# Patient Record
Sex: MALE | Race: WHITE | Employment: FULL TIME | URBAN - METROPOLITAN AREA
[De-identification: names, ages, dates, MRNs, and addresses within clinical notes are randomized per-mention and may not be internally consistent; named-entity substitution may affect disease eponyms.]

---

## 2017-01-09 ENCOUNTER — TELEPHONE (OUTPATIENT)
Dept: FAMILY MEDICINE CLINIC | Age: 57
End: 2017-01-09

## 2017-01-24 ENCOUNTER — TELEPHONE (OUTPATIENT)
Dept: FAMILY MEDICINE CLINIC | Age: 57
End: 2017-01-24

## 2017-02-28 RX ORDER — OLMESARTAN MEDOXOMIL 5 MG/1
TABLET ORAL
Qty: 180 TABLET | Refills: 0 | Status: SHIPPED | OUTPATIENT
Start: 2017-02-28 | End: 2017-05-18 | Stop reason: SDUPTHER

## 2017-05-18 ENCOUNTER — OFFICE VISIT (OUTPATIENT)
Dept: FAMILY MEDICINE CLINIC | Age: 57
End: 2017-05-18

## 2017-05-18 VITALS
HEIGHT: 69 IN | WEIGHT: 186 LBS | BODY MASS INDEX: 27.55 KG/M2 | HEART RATE: 70 BPM | DIASTOLIC BLOOD PRESSURE: 88 MMHG | TEMPERATURE: 98 F | RESPIRATION RATE: 10 BRPM | SYSTOLIC BLOOD PRESSURE: 132 MMHG

## 2017-05-18 DIAGNOSIS — Z00.00 WELL ADULT EXAM: Primary | ICD-10-CM

## 2017-05-18 DIAGNOSIS — Z12.11 SCREEN FOR COLON CANCER: ICD-10-CM

## 2017-05-18 DIAGNOSIS — Z11.59 NEED FOR HEPATITIS C SCREENING TEST: ICD-10-CM

## 2017-05-18 DIAGNOSIS — Z11.4 SCREENING FOR HIV (HUMAN IMMUNODEFICIENCY VIRUS): ICD-10-CM

## 2017-05-18 LAB
ALBUMIN SERPL-MCNC: 4.7 GM/DL (ref 3.5–5.1)
ALP BLD-CCNC: 70 U/L (ref 38–126)
ALT SERPL-CCNC: 32 U/L (ref 11–66)
ANION GAP SERPL CALCULATED.3IONS-SCNC: 21 MEQ/L (ref 8–16)
AST SERPL-CCNC: 19 U/L (ref 5–40)
BACTERIA: NORMAL
BASOPHILS # BLD: 0.8 %
BASOPHILS ABSOLUTE: 0.1 THOU/MM3 (ref 0–0.1)
BILIRUB SERPL-MCNC: 0.4 MG/DL (ref 0.3–1.2)
BILIRUBIN URINE: NEGATIVE
BLOOD, URINE: NEGATIVE
BUN BLDV-MCNC: 16 MG/DL (ref 7–22)
CALCIUM SERPL-MCNC: 10.1 MG/DL (ref 8.5–10.5)
CASTS: NORMAL /LPF
CASTS: NORMAL /LPF
CHARACTER, URINE: CLEAR
CHLORIDE BLD-SCNC: 100 MEQ/L (ref 98–111)
CHOLESTEROL, TOTAL: 196 MG/DL (ref 100–199)
CO2: 22 MEQ/L (ref 23–33)
COLOR: YELLOW
CREAT SERPL-MCNC: 0.8 MG/DL (ref 0.4–1.2)
CRYSTALS: NORMAL
EOSINOPHIL # BLD: 2.5 %
EOSINOPHILS ABSOLUTE: 0.2 THOU/MM3 (ref 0–0.4)
EPITHELIAL CELLS, UA: NORMAL /HPF
GFR SERPL CREATININE-BSD FRML MDRD: > 90 ML/MIN/1.73M2
GLUCOSE BLD-MCNC: 107 MG/DL (ref 70–108)
GLUCOSE, URINE: NEGATIVE MG/DL
HCT VFR BLD CALC: 45.2 % (ref 42–52)
HDLC SERPL-MCNC: 68 MG/DL
HEMOGLOBIN: 15.2 GM/DL (ref 14–18)
HEPATITIS C ANTIBODY: NEGATIVE
KETONES, URINE: NEGATIVE
LDL CHOLESTEROL CALCULATED: 97 MG/DL
LEUKOCYTE ESTERASE, URINE: NEGATIVE
LYMPHOCYTES # BLD: 26.4 %
LYMPHOCYTES ABSOLUTE: 2.1 THOU/MM3 (ref 1–4.8)
MCH RBC QN AUTO: 28.9 PG (ref 27–31)
MCHC RBC AUTO-ENTMCNC: 33.7 GM/DL (ref 33–37)
MCV RBC AUTO: 85.8 FL (ref 80–94)
MISCELLANEOUS LAB TEST RESULT: NORMAL
MONOCYTES # BLD: 6 %
MONOCYTES ABSOLUTE: 0.5 THOU/MM3 (ref 0.4–1.3)
NITRITE, URINE: NEGATIVE
NUCLEATED RED BLOOD CELLS: 0 /100 WBC
PDW BLD-RTO: 13.8 % (ref 11.5–14.5)
PH UA: 6.5
PLATELET # BLD: 304 THOU/MM3 (ref 130–400)
PMV BLD AUTO: 8.5 MCM (ref 7.4–10.4)
POTASSIUM SERPL-SCNC: 4.3 MEQ/L (ref 3.5–5.2)
PROTEIN UA: NEGATIVE MG/DL
RBC # BLD: 5.27 MILL/MM3 (ref 4.7–6.1)
RBC # BLD: NORMAL 10*6/UL
RBC URINE: NORMAL /HPF
RENAL EPITHELIAL, UA: NORMAL
SEG NEUTROPHILS: 64.3 %
SEGMENTED NEUTROPHILS ABSOLUTE COUNT: 5 THOU/MM3 (ref 1.8–7.7)
SODIUM BLD-SCNC: 143 MEQ/L (ref 135–145)
SPECIFIC GRAVITY UA: 1.02 (ref 1–1.03)
TOTAL PROTEIN: 7.6 GM/DL (ref 6.1–8)
TRIGL SERPL-MCNC: 154 MG/DL (ref 0–199)
UROBILINOGEN, URINE: 0.2 EU/DL
WBC # BLD: 7.8 THOU/MM3 (ref 4.8–10.8)
WBC UA: NORMAL /HPF
YEAST: NORMAL

## 2017-05-18 PROCEDURE — 90670 PCV13 VACCINE IM: CPT | Performed by: FAMILY MEDICINE

## 2017-05-18 PROCEDURE — 90471 IMMUNIZATION ADMIN: CPT | Performed by: FAMILY MEDICINE

## 2017-05-18 PROCEDURE — 99396 PREV VISIT EST AGE 40-64: CPT | Performed by: FAMILY MEDICINE

## 2017-05-18 RX ORDER — OLMESARTAN MEDOXOMIL 5 MG/1
TABLET ORAL
Qty: 180 TABLET | Refills: 1 | Status: SHIPPED | OUTPATIENT
Start: 2017-05-18 | End: 2017-07-21

## 2017-05-18 RX ORDER — PRAVASTATIN SODIUM 40 MG
TABLET ORAL
Qty: 90 TABLET | Refills: 1 | Status: SHIPPED | OUTPATIENT
Start: 2017-05-18 | End: 2018-01-16 | Stop reason: SDUPTHER

## 2017-05-18 ASSESSMENT — PATIENT HEALTH QUESTIONNAIRE - PHQ9
2. FEELING DOWN, DEPRESSED OR HOPELESS: 0
SUM OF ALL RESPONSES TO PHQ9 QUESTIONS 1 & 2: 0
1. LITTLE INTEREST OR PLEASURE IN DOING THINGS: 0
SUM OF ALL RESPONSES TO PHQ QUESTIONS 1-9: 0

## 2017-05-19 LAB — HIV-2 AB: NEGATIVE

## 2017-05-22 ENCOUNTER — TELEPHONE (OUTPATIENT)
Dept: FAMILY MEDICINE CLINIC | Age: 57
End: 2017-05-22

## 2017-05-23 ENCOUNTER — TELEPHONE (OUTPATIENT)
Dept: FAMILY MEDICINE CLINIC | Age: 57
End: 2017-05-23

## 2017-07-21 ENCOUNTER — TELEPHONE (OUTPATIENT)
Dept: FAMILY MEDICINE CLINIC | Age: 57
End: 2017-07-21

## 2017-07-21 RX ORDER — OLMESARTAN MEDOXOMIL 5 MG/1
10 TABLET ORAL DAILY
Qty: 60 TABLET | Refills: 5 | Status: SHIPPED | OUTPATIENT
Start: 2017-07-21 | End: 2018-01-16 | Stop reason: SDUPTHER

## 2018-01-17 RX ORDER — PRAVASTATIN SODIUM 40 MG
TABLET ORAL
Qty: 90 TABLET | Refills: 1 | Status: SHIPPED | OUTPATIENT
Start: 2018-01-17 | End: 2018-05-30 | Stop reason: SDUPTHER

## 2018-01-17 RX ORDER — OLMESARTAN MEDOXOMIL 5 MG/1
10 TABLET ORAL DAILY
Qty: 180 TABLET | Refills: 1 | Status: SHIPPED | OUTPATIENT
Start: 2018-01-17 | End: 2018-05-29 | Stop reason: SDUPTHER

## 2018-01-17 RX ORDER — OLMESARTAN MEDOXOMIL 5 MG/1
10 TABLET ORAL DAILY
Qty: 60 TABLET | Refills: 5 | Status: SHIPPED | OUTPATIENT
Start: 2018-01-17 | End: 2018-01-17 | Stop reason: DRUGHIGH

## 2018-01-17 NOTE — TELEPHONE ENCOUNTER
1/17/18 Pt is requesting 90 day supply on the Benicar, pt is leaving in the morning out of [place].  Pt goes to [facility], thanks/bing

## 2018-05-29 ENCOUNTER — TELEPHONE (OUTPATIENT)
Dept: FAMILY MEDICINE CLINIC | Age: 58
End: 2018-05-29

## 2018-05-30 ENCOUNTER — HOSPITAL ENCOUNTER (OUTPATIENT)
Age: 58
Discharge: HOME | End: 2018-05-30
Payer: COMMERCIAL

## 2018-05-30 ENCOUNTER — HOSPITAL ENCOUNTER (OUTPATIENT)
Dept: GENERAL RADIOLOGY | Age: 58
Discharge: HOME | End: 2018-05-30
Payer: COMMERCIAL

## 2018-05-30 ENCOUNTER — OFFICE VISIT (OUTPATIENT)
Dept: FAMILY MEDICINE CLINIC | Age: 58
End: 2018-05-30
Payer: COMMERCIAL

## 2018-05-30 VITALS
HEART RATE: 57 BPM | DIASTOLIC BLOOD PRESSURE: 72 MMHG | RESPIRATION RATE: 12 BRPM | HEIGHT: 69 IN | SYSTOLIC BLOOD PRESSURE: 128 MMHG | BODY MASS INDEX: 28.14 KG/M2 | WEIGHT: 190 LBS | OXYGEN SATURATION: 98 % | TEMPERATURE: 97.8 F

## 2018-05-30 DIAGNOSIS — I10 ESSENTIAL HYPERTENSION: ICD-10-CM

## 2018-05-30 DIAGNOSIS — R06.02 SHORTNESS OF BREATH: ICD-10-CM

## 2018-05-30 DIAGNOSIS — R07.89 CHEST TIGHTNESS: ICD-10-CM

## 2018-05-30 DIAGNOSIS — R40.0 HAS DAYTIME DROWSINESS: ICD-10-CM

## 2018-05-30 DIAGNOSIS — R06.83 SNORING: ICD-10-CM

## 2018-05-30 DIAGNOSIS — E78.00 HYPERCHOLESTEROLEMIA: ICD-10-CM

## 2018-05-30 DIAGNOSIS — R07.89 CHEST TIGHTNESS: Primary | ICD-10-CM

## 2018-05-30 DIAGNOSIS — R06.81 WITNESSED EPISODE OF APNEA: ICD-10-CM

## 2018-05-30 LAB
ALBUMIN SERPL-MCNC: 4.6 G/DL (ref 3.5–5.1)
ALP BLD-CCNC: 69 U/L (ref 38–126)
ALT SERPL-CCNC: 21 U/L (ref 11–66)
ANION GAP SERPL CALCULATED.3IONS-SCNC: 13 MEQ/L (ref 8–16)
AST SERPL-CCNC: 15 U/L (ref 5–40)
BACTERIA: NORMAL
BASOPHILS # BLD: 0.8 %
BASOPHILS ABSOLUTE: 0.1 THOU/MM3 (ref 0–0.1)
BILIRUB SERPL-MCNC: 0.4 MG/DL (ref 0.3–1.2)
BILIRUBIN URINE: NEGATIVE
BLOOD, URINE: NEGATIVE
BUN BLDV-MCNC: 13 MG/DL (ref 7–22)
CALCIUM SERPL-MCNC: 9.4 MG/DL (ref 8.5–10.5)
CASTS: NORMAL /LPF
CASTS: NORMAL /LPF
CHARACTER, URINE: CLEAR
CHLORIDE BLD-SCNC: 105 MEQ/L (ref 98–111)
CHOLESTEROL, TOTAL: 182 MG/DL (ref 100–199)
CO2: 24 MEQ/L (ref 23–33)
COLOR: YELLOW
CREAT SERPL-MCNC: 0.7 MG/DL (ref 0.4–1.2)
CRYSTALS: NORMAL
EKG ATRIAL RATE: 61 BPM
EKG P AXIS: 54 DEGREES
EKG P-R INTERVAL: 142 MS
EKG Q-T INTERVAL: 422 MS
EKG QRS DURATION: 88 MS
EKG QTC CALCULATION (BAZETT): 424 MS
EKG R AXIS: 93 DEGREES
EKG T AXIS: 79 DEGREES
EKG VENTRICULAR RATE: 61 BPM
EOSINOPHIL # BLD: 3.9 %
EOSINOPHILS ABSOLUTE: 0.3 THOU/MM3 (ref 0–0.4)
EPITHELIAL CELLS, UA: NORMAL /HPF
GFR SERPL CREATININE-BSD FRML MDRD: > 90 ML/MIN/1.73M2
GLUCOSE BLD-MCNC: 100 MG/DL (ref 70–108)
GLUCOSE, URINE: NEGATIVE MG/DL
HCT VFR BLD CALC: 42.5 % (ref 42–52)
HDLC SERPL-MCNC: 70 MG/DL
HEMOGLOBIN: 14.4 GM/DL (ref 14–18)
KETONES, URINE: NEGATIVE
LDL CHOLESTEROL CALCULATED: 90 MG/DL
LEUKOCYTE ESTERASE, URINE: NEGATIVE
LYMPHOCYTES # BLD: 31 %
LYMPHOCYTES ABSOLUTE: 2.1 THOU/MM3 (ref 1–4.8)
MCH RBC QN AUTO: 29.3 PG (ref 27–31)
MCHC RBC AUTO-ENTMCNC: 33.9 GM/DL (ref 33–37)
MCV RBC AUTO: 86.4 FL (ref 80–94)
MISCELLANEOUS LAB TEST RESULT: NORMAL
MONOCYTES # BLD: 2.3 %
MONOCYTES ABSOLUTE: 0.2 THOU/MM3 (ref 0.4–1.3)
NITRITE, URINE: NEGATIVE
NUCLEATED RED BLOOD CELLS: 0 /100 WBC
PDW BLD-RTO: 13.9 % (ref 11.5–14.5)
PH UA: 5.5
PLATELET # BLD: 260 THOU/MM3 (ref 130–400)
PMV BLD AUTO: 8.7 FL (ref 7.4–10.4)
POTASSIUM SERPL-SCNC: 4.2 MEQ/L (ref 3.5–5.2)
PROTEIN UA: NEGATIVE MG/DL
RBC # BLD: 4.92 MILL/MM3 (ref 4.7–6.1)
RBC URINE: NORMAL /HPF
RENAL EPITHELIAL, UA: NORMAL
SEG NEUTROPHILS: 62 %
SEGMENTED NEUTROPHILS ABSOLUTE COUNT: 4.2 THOU/MM3 (ref 1.8–7.7)
SODIUM BLD-SCNC: 142 MEQ/L (ref 135–145)
SPECIFIC GRAVITY UA: 1.02 (ref 1–1.03)
TOTAL PROTEIN: 7.1 G/DL (ref 6.1–8)
TRIGL SERPL-MCNC: 111 MG/DL (ref 0–199)
TSH SERPL DL<=0.05 MIU/L-ACNC: 3.75 UIU/ML (ref 0.4–4.2)
UROBILINOGEN, URINE: 0.2 EU/DL
VITAMIN B-12: 284 PG/ML (ref 211–911)
VITAMIN D 25-HYDROXY: 15 NG/ML (ref 30–100)
WBC # BLD: 6.7 THOU/MM3 (ref 4.8–10.8)
WBC UA: NORMAL /HPF
YEAST: NORMAL

## 2018-05-30 PROCEDURE — 86141 C-REACTIVE PROTEIN HS: CPT

## 2018-05-30 PROCEDURE — 82306 VITAMIN D 25 HYDROXY: CPT

## 2018-05-30 PROCEDURE — 36415 COLL VENOUS BLD VENIPUNCTURE: CPT

## 2018-05-30 PROCEDURE — 85025 COMPLETE CBC W/AUTO DIFF WBC: CPT

## 2018-05-30 PROCEDURE — 84443 ASSAY THYROID STIM HORMONE: CPT

## 2018-05-30 PROCEDURE — 81001 URINALYSIS AUTO W/SCOPE: CPT

## 2018-05-30 PROCEDURE — 80061 LIPID PANEL: CPT

## 2018-05-30 PROCEDURE — 80053 COMPREHEN METABOLIC PANEL: CPT

## 2018-05-30 PROCEDURE — 93005 ELECTROCARDIOGRAM TRACING: CPT

## 2018-05-30 PROCEDURE — 99215 OFFICE O/P EST HI 40 MIN: CPT | Performed by: FAMILY MEDICINE

## 2018-05-30 PROCEDURE — 82607 VITAMIN B-12: CPT

## 2018-05-30 PROCEDURE — 71046 X-RAY EXAM CHEST 2 VIEWS: CPT

## 2018-05-30 RX ORDER — OLMESARTAN MEDOXOMIL 5 MG/1
10 TABLET ORAL DAILY
Qty: 180 TABLET | Refills: 0 | Status: SHIPPED | OUTPATIENT
Start: 2018-05-30 | End: 2018-08-30 | Stop reason: SDUPTHER

## 2018-05-30 RX ORDER — PRAVASTATIN SODIUM 40 MG
TABLET ORAL
Qty: 90 TABLET | Refills: 1 | Status: SHIPPED | OUTPATIENT
Start: 2018-05-30 | End: 2019-02-11 | Stop reason: SDUPTHER

## 2018-05-30 RX ORDER — FLUTICASONE PROPIONATE 50 MCG
1 SPRAY, SUSPENSION (ML) NASAL DAILY
Qty: 1 BOTTLE | Refills: 3 | Status: SHIPPED | OUTPATIENT
Start: 2018-05-30 | End: 2018-08-30 | Stop reason: SDUPTHER

## 2018-05-30 ASSESSMENT — PATIENT HEALTH QUESTIONNAIRE - PHQ9
SUM OF ALL RESPONSES TO PHQ9 QUESTIONS 1 & 2: 0
1. LITTLE INTEREST OR PLEASURE IN DOING THINGS: 0
2. FEELING DOWN, DEPRESSED OR HOPELESS: 0
SUM OF ALL RESPONSES TO PHQ QUESTIONS 1-9: 0

## 2018-05-31 ENCOUNTER — TELEPHONE (OUTPATIENT)
Dept: FAMILY MEDICINE CLINIC | Age: 58
End: 2018-05-31

## 2018-05-31 DIAGNOSIS — R07.9 CHEST PAIN IN ADULT: Primary | ICD-10-CM

## 2018-05-31 DIAGNOSIS — I10 ESSENTIAL HYPERTENSION: ICD-10-CM

## 2018-05-31 DIAGNOSIS — E78.00 HYPERCHOLESTEROLEMIA: ICD-10-CM

## 2018-06-01 LAB — C-REACTIVE PROTEIN, HIGH SENSITIVITY: 1 MG/L

## 2018-06-04 ENCOUNTER — TELEPHONE (OUTPATIENT)
Dept: FAMILY MEDICINE CLINIC | Age: 58
End: 2018-06-04

## 2018-06-05 ENCOUNTER — INITIAL CONSULT (OUTPATIENT)
Dept: PULMONOLOGY | Age: 58
End: 2018-06-05
Payer: COMMERCIAL

## 2018-06-05 VITALS
BODY MASS INDEX: 28.58 KG/M2 | OXYGEN SATURATION: 97 % | DIASTOLIC BLOOD PRESSURE: 78 MMHG | HEIGHT: 69 IN | SYSTOLIC BLOOD PRESSURE: 136 MMHG | HEART RATE: 66 BPM | WEIGHT: 193 LBS

## 2018-06-05 DIAGNOSIS — R06.81 WITNESSED APNEIC SPELLS: ICD-10-CM

## 2018-06-05 DIAGNOSIS — R06.89 SLEEP RELATED CHOKING SENSATION: ICD-10-CM

## 2018-06-05 DIAGNOSIS — G47.10 HYPERSOMNIA: Primary | ICD-10-CM

## 2018-06-05 DIAGNOSIS — R06.09 DOE (DYSPNEA ON EXERTION): ICD-10-CM

## 2018-06-05 PROCEDURE — 99203 OFFICE O/P NEW LOW 30 MIN: CPT | Performed by: INTERNAL MEDICINE

## 2018-06-19 ENCOUNTER — HOSPITAL ENCOUNTER (OUTPATIENT)
Dept: NON INVASIVE DIAGNOSTICS | Age: 58
Discharge: HOME | End: 2018-06-19
Payer: COMMERCIAL

## 2018-06-19 VITALS — WEIGHT: 189 LBS | HEIGHT: 69 IN | BODY MASS INDEX: 27.99 KG/M2

## 2018-06-19 DIAGNOSIS — R07.89 CHEST TIGHTNESS: ICD-10-CM

## 2018-06-19 DIAGNOSIS — I10 ESSENTIAL HYPERTENSION: ICD-10-CM

## 2018-06-19 DIAGNOSIS — E78.00 HYPERCHOLESTEROLEMIA: ICD-10-CM

## 2018-06-19 DIAGNOSIS — R06.02 SHORTNESS OF BREATH: ICD-10-CM

## 2018-06-19 PROCEDURE — A9500 TC99M SESTAMIBI: HCPCS | Performed by: FAMILY MEDICINE

## 2018-06-19 PROCEDURE — 6360000002 HC RX W HCPCS

## 2018-06-19 PROCEDURE — 93017 CV STRESS TEST TRACING ONLY: CPT

## 2018-06-19 PROCEDURE — 78452 HT MUSCLE IMAGE SPECT MULT: CPT | Performed by: INTERNAL MEDICINE

## 2018-06-19 PROCEDURE — 3430000000 HC RX DIAGNOSTIC RADIOPHARMACEUTICAL: Performed by: FAMILY MEDICINE

## 2018-06-19 RX ADMIN — Medication 35 MILLICURIE: at 08:57

## 2018-06-19 RX ADMIN — Medication 10.9 MILLICURIE: at 07:58

## 2018-06-20 ENCOUNTER — TELEPHONE (OUTPATIENT)
Dept: FAMILY MEDICINE CLINIC | Age: 58
End: 2018-06-20

## 2018-06-22 ENCOUNTER — HOSPITAL ENCOUNTER (OUTPATIENT)
Age: 58
Discharge: HOME | End: 2018-06-22
Payer: COMMERCIAL

## 2018-06-22 ENCOUNTER — OFFICE VISIT (OUTPATIENT)
Dept: FAMILY MEDICINE CLINIC | Age: 58
End: 2018-06-22
Payer: COMMERCIAL

## 2018-06-22 VITALS
BODY MASS INDEX: 28.29 KG/M2 | TEMPERATURE: 97.6 F | SYSTOLIC BLOOD PRESSURE: 135 MMHG | HEART RATE: 63 BPM | DIASTOLIC BLOOD PRESSURE: 85 MMHG | HEIGHT: 69 IN | OXYGEN SATURATION: 98 % | WEIGHT: 191 LBS

## 2018-06-22 DIAGNOSIS — M79.604 PAIN IN BOTH LOWER EXTREMITIES: ICD-10-CM

## 2018-06-22 DIAGNOSIS — E53.8 LOW VITAMIN B12 LEVEL: ICD-10-CM

## 2018-06-22 DIAGNOSIS — R53.81 PHYSICAL DECONDITIONING: ICD-10-CM

## 2018-06-22 DIAGNOSIS — M79.605 PAIN IN BOTH LOWER EXTREMITIES: ICD-10-CM

## 2018-06-22 DIAGNOSIS — Z82.49 FAMILY HISTORY OF PERIPHERAL VASCULAR DISEASE: ICD-10-CM

## 2018-06-22 DIAGNOSIS — R68.89 ABNORMAL NASAL FINDING: ICD-10-CM

## 2018-06-22 DIAGNOSIS — Z12.11 COLON CANCER SCREENING: ICD-10-CM

## 2018-06-22 DIAGNOSIS — R09.89 DECREASED DORSALIS PEDIS PULSE: ICD-10-CM

## 2018-06-22 DIAGNOSIS — E53.8 LOW VITAMIN B12 LEVEL: Primary | ICD-10-CM

## 2018-06-22 LAB — FOLATE: 12.4 NG/ML (ref 4.8–24.2)

## 2018-06-22 PROCEDURE — 99214 OFFICE O/P EST MOD 30 MIN: CPT | Performed by: FAMILY MEDICINE

## 2018-06-22 PROCEDURE — 83090 ASSAY OF HOMOCYSTEINE: CPT

## 2018-06-22 PROCEDURE — 82746 ASSAY OF FOLIC ACID SERUM: CPT

## 2018-06-22 PROCEDURE — 36415 COLL VENOUS BLD VENIPUNCTURE: CPT

## 2018-06-22 PROCEDURE — 83921 ORGANIC ACID SINGLE QUANT: CPT

## 2018-06-23 LAB — HOMOCYSTEINE, TOTAL: 9 UMOL/L

## 2018-06-24 LAB — METHYLMALONIC ACID: NORMAL

## 2018-06-27 ENCOUNTER — TELEPHONE (OUTPATIENT)
Dept: FAMILY MEDICINE CLINIC | Age: 58
End: 2018-06-27

## 2018-06-29 ENCOUNTER — HOSPITAL ENCOUNTER (OUTPATIENT)
Dept: INTERVENTIONAL RADIOLOGY/VASCULAR | Age: 58
Discharge: HOME | End: 2018-06-29
Payer: COMMERCIAL

## 2018-06-29 DIAGNOSIS — Z82.49 FAMILY HISTORY OF PERIPHERAL VASCULAR DISEASE: ICD-10-CM

## 2018-06-29 DIAGNOSIS — M79.605 PAIN IN BOTH LOWER EXTREMITIES: ICD-10-CM

## 2018-06-29 DIAGNOSIS — R09.89 DECREASED DORSALIS PEDIS PULSE: ICD-10-CM

## 2018-06-29 DIAGNOSIS — M79.604 PAIN IN BOTH LOWER EXTREMITIES: ICD-10-CM

## 2018-06-29 PROCEDURE — 93922 UPR/L XTREMITY ART 2 LEVELS: CPT

## 2018-07-08 DIAGNOSIS — G47.33 OSA (OBSTRUCTIVE SLEEP APNEA): Primary | ICD-10-CM

## 2018-07-16 ENCOUNTER — TELEPHONE (OUTPATIENT)
Dept: FAMILY MEDICINE CLINIC | Age: 58
End: 2018-07-16

## 2018-07-18 ENCOUNTER — HOSPITAL ENCOUNTER (OUTPATIENT)
Dept: PULMONOLOGY | Age: 58
Discharge: HOME | End: 2018-07-18
Payer: COMMERCIAL

## 2018-07-18 DIAGNOSIS — R06.09 DOE (DYSPNEA ON EXERTION): ICD-10-CM

## 2018-07-18 PROCEDURE — 6360000002 HC RX W HCPCS

## 2018-07-18 PROCEDURE — 94070 EVALUATION OF WHEEZING: CPT

## 2018-07-18 PROCEDURE — 94726 PLETHYSMOGRAPHY LUNG VOLUMES: CPT

## 2018-07-18 PROCEDURE — 94010 BREATHING CAPACITY TEST: CPT

## 2018-07-18 PROCEDURE — 94729 DIFFUSING CAPACITY: CPT

## 2018-07-18 PROCEDURE — 2580000003 HC RX 258

## 2018-07-19 ENCOUNTER — TELEPHONE (OUTPATIENT)
Dept: PULMONOLOGY | Age: 58
End: 2018-07-19

## 2018-07-19 ENCOUNTER — OFFICE VISIT (OUTPATIENT)
Dept: PULMONOLOGY | Age: 58
End: 2018-07-19
Payer: COMMERCIAL

## 2018-07-19 VITALS
BODY MASS INDEX: 28.17 KG/M2 | WEIGHT: 190.2 LBS | OXYGEN SATURATION: 97 % | TEMPERATURE: 98.7 F | HEART RATE: 68 BPM | SYSTOLIC BLOOD PRESSURE: 115 MMHG | DIASTOLIC BLOOD PRESSURE: 74 MMHG | HEIGHT: 69 IN

## 2018-07-19 DIAGNOSIS — G47.33 OSA (OBSTRUCTIVE SLEEP APNEA): Primary | ICD-10-CM

## 2018-07-19 PROCEDURE — 99213 OFFICE O/P EST LOW 20 MIN: CPT | Performed by: INTERNAL MEDICINE

## 2018-07-19 NOTE — PROGRESS NOTES
CC: Hypersomnia, PICKERING    INTERVAL HISTORY         Nayeli Weber is a 63 y.o. old male who comes in to review the results of his recent sleep study, to answer questions and to explore options for treatment.   Had PSG, PFTs, Bronchoprocation test    PFTs and bronchial challenge test are normal    PSG reveal mild DAVIDSON which worsens during REM sleep    Has appointment with Dr Rodgers Phoenix for PICKERING, stress test is negative for ischemia        PMH  Past Medical History:   Diagnosis Date    CRVO (central retinal vein occlusion) 01/2016    Right eye    Heartburn     Hyperlipidemia 2010    Hypertension 2010     Past Surgical History:   Procedure Laterality Date    COLONOSCOPY  2009    WNL     PROSTATE BIOPSY  2012    benign     TESTICLE REMOVAL  1994    Lt     Social History   Substance Use Topics    Smoking status: Never Smoker    Smokeless tobacco: Never Used    Alcohol use Yes      Comment: occ beer     Family History   Problem Relation Age of Onset    Cancer Mother 72        lung     Heart Disease Mother 58    Coronary Art Dis Mother 58    Heart Disease Father 68    Heart Attack Father 68    Coronary Art Dis Father 68    High Blood Pressure Father     High Cholesterol Father     Diabetes Father         borderline    High Blood Pressure Sister 45    High Cholesterol Sister 45    Heart Attack Maternal Uncle     Heart Disease Maternal Uncle     High Blood Pressure Maternal Uncle     High Cholesterol Maternal Uncle     Coronary Art Dis Maternal Uncle     Coronary Art Dis Maternal Grandmother     Heart Attack Maternal Grandmother     Heart Disease Maternal Grandmother     High Blood Pressure Maternal Grandmother     High Cholesterol Maternal Grandmother     Cancer Maternal Grandfather         unknown type     Diabetes Paternal Grandmother     Other Paternal Grandfather         Black Lung     High Cholesterol Maternal Aunt     High Blood Pressure Maternal Aunt     Heart Disease Maternal Aunt     Heart Attack Maternal Aunt     Coronary Art Dis Maternal Aunt     Coronary Art Dis Maternal Aunt     Cancer Maternal Uncle         stomach     Heart Attack Maternal Uncle     Heart Disease Maternal Uncle     High Blood Pressure Maternal Uncle     High Cholesterol Maternal Uncle     Prostate Cancer Neg Hx        ALLERGIES  Allergies   Allergen Reactions    Other Other (See Comments)     -lalitanes; muscle spasms    Demerol Hives    Midazolam Hcl Hives       MEDS  Current Outpatient Prescriptions   Medication Sig Dispense Refill    aspirin 81 MG tablet Take 81 mg by mouth daily      olmesartan (BENICAR) 5 MG tablet Take 2 tablets by mouth daily 180 tablet 0    pravastatin (PRAVACHOL) 40 MG tablet TAKE ONE TABLET BY MOUTH ONCE DAILY 90 tablet 1    fluticasone (FLONASE) 50 MCG/ACT nasal spray 1 spray by Nasal route daily 1 Bottle 3     No current facility-administered medications for this visit. EXAM  Vitals -  /74   Pulse 68   Temp 98.7 °F (37.1 °C)   Ht 5' 8.5\" (1.74 m)   Wt 190 lb 3.2 oz (86.3 kg)   SpO2 97% Comment: room air at rest  BMI 28.50 kg/m²    Body mass index is 28.5 kg/m². Dentition - good  Constitutional - No distress. Patient is oriented to person, place, and time. Mouth/Throat - Oropharynx is clear and moist.   Neck - Neck supple. No JVD present. No tracheal deviation present. Psychiatric - Patient  has a normal [place] [address]                                 SLEEP STUDY REPORT     PATIENT NAME: Gricel Méndez                      :        1960  MED REC NO:   [id number]                           ROOM:  ACCOUNT NO:   [de-identified]                           ADMIT DATE: 2018  PROVIDER:     Radha Buckley M.D.           DATE OF STUDY:  2018     REFERRING PROVIDER:  Lynn Levy M.D.     HISTORY OF PRESENT ILLNESS:

## 2018-08-30 ENCOUNTER — OFFICE VISIT (OUTPATIENT)
Dept: FAMILY MEDICINE CLINIC | Age: 58
End: 2018-08-30
Payer: COMMERCIAL

## 2018-08-30 VITALS
RESPIRATION RATE: 14 BRPM | TEMPERATURE: 97.4 F | HEART RATE: 82 BPM | HEIGHT: 69 IN | WEIGHT: 189 LBS | SYSTOLIC BLOOD PRESSURE: 128 MMHG | BODY MASS INDEX: 27.99 KG/M2 | DIASTOLIC BLOOD PRESSURE: 79 MMHG

## 2018-08-30 DIAGNOSIS — E78.00 HYPERCHOLESTEROLEMIA: ICD-10-CM

## 2018-08-30 DIAGNOSIS — E55.9 VITAMIN D DEFICIENCY: ICD-10-CM

## 2018-08-30 DIAGNOSIS — E53.8 LOW VITAMIN B12 LEVEL: ICD-10-CM

## 2018-08-30 DIAGNOSIS — I10 ESSENTIAL HYPERTENSION: Primary | ICD-10-CM

## 2018-08-30 PROCEDURE — 90471 IMMUNIZATION ADMIN: CPT | Performed by: FAMILY MEDICINE

## 2018-08-30 PROCEDURE — 99214 OFFICE O/P EST MOD 30 MIN: CPT | Performed by: FAMILY MEDICINE

## 2018-08-30 PROCEDURE — 90688 IIV4 VACCINE SPLT 0.5 ML IM: CPT | Performed by: FAMILY MEDICINE

## 2018-08-30 RX ORDER — OLMESARTAN MEDOXOMIL 5 MG/1
10 TABLET ORAL DAILY
Qty: 180 TABLET | Refills: 1 | Status: SHIPPED | OUTPATIENT
Start: 2018-08-30 | End: 2019-02-11 | Stop reason: SDUPTHER

## 2018-08-30 RX ORDER — FLUTICASONE PROPIONATE 50 MCG
1 SPRAY, SUSPENSION (ML) NASAL DAILY
Qty: 1 BOTTLE | Refills: 3 | Status: SHIPPED | OUTPATIENT
Start: 2018-08-30 | End: 2018-10-04 | Stop reason: SDUPTHER

## 2018-08-30 RX ORDER — CHOLECALCIFEROL (VITAMIN D3) 125 MCG
1 TABLET ORAL DAILY
Qty: 90 TABLET | Refills: 1 | Status: SHIPPED | OUTPATIENT
Start: 2018-08-30 | End: 2020-10-19

## 2018-08-30 RX ORDER — FLUTICASONE PROPIONATE 50 MCG
1 SPRAY, SUSPENSION (ML) NASAL DAILY
Qty: 1 BOTTLE | Refills: 3 | Status: SHIPPED | OUTPATIENT
Start: 2018-08-30 | End: 2019-02-20 | Stop reason: SDUPTHER

## 2018-08-30 NOTE — PROGRESS NOTES
[address] [place]  [address] [place], [address]  Ph:   [phone]  Fax: [phone]    Provider:  Dr. Virginie Gomez    Patient:  Apryl Gutierrez  YOB: 1960      Visit Date:  8/30/2018     Reason For Visit:   Chief Complaint   Patient presents with   Kemi Arzate is a 63 y.o. male who comes today to the office because of shortness of breath and chest tightness. Chest tightness/SOB:  He came to see me back in early May with tightness in the chest described as difficulty getting a deep breath. These symptoms have improved. He did not have orthopnea or actual chest pain or pressure. He had short of breath upon walking or playing with his dog which was new from his previous level of health. He is using Flonase which he has been using every day  For the last 2 months. He believes it is helping some. Work up inlcuded blood test and Lexican stress test and Chest X-ray which were all normal.  The only abnormality was low normal vitamin B 12. He has HTN and takes Benicar 5 mg 1 PO daily. July 18 ha had a PFT done which was normal.  He has been walking for 30 minutes every day, but he still has to stop due to the leg pain. Last visit he complained of pain in the legs after walking which could get up to 9 when it gets bad and it mainly present when he walks more than his normal.  The pain better with rest.  Ankle brachial indexes were normal.     June 5 he saw Dr. Irene [address], Pulmonary, in a consult. He ordered a Challenge test which was negative. PSG revealed mild DAVIDSON which worsen during REM sleep and was placed on CPAP machine last week. He has been having hard time using it all night long. Hypertension:  He  is taking Benicar 5 mg 1 tablet PO daily and he denies any side effects from the medications. He is not exercising. He is not adherent to low salt diet. His blood pressure is well controlled at home.  He denies chest pain, dyspnea, exertional chest pressure/discomfort, fatigue, irregular heart beat, lower extremity edema, near-syncope, orthopnea, palpitations and syncope. Cardiovascular risk factors: dyslipidemia, family history of premature cardiovascular disease, hypertension, male gender and sedentary lifestyle.      Hyperlipidemia:   He is taking Pravachol 40 mg 1 tablet PO QHS and denies any side effects from the medicines. There is a family history of hyperlipidemia. There is a family history of early ischemia heart disease. He is not adherent to low cholesterol die t or exercising. Last cholesterol done in April 2016 showed a total cholesterol of 200, triglycerides 130. HDL 64 and . Significant Past Medical History:    Past Medical History:   Diagnosis Date    CRVO (central retinal vein occlusion) 01/2016    Right eye    Heartburn     Hyperlipidemia 2010    Hypertension 2010           Allergies:  is allergic to other; demerol; and midazolam hcl. Medications:   Current Outpatient Prescriptions   Medication Sig Dispense Refill    fluticasone (FLONASE) 50 MCG/ACT nasal spray 1 spray by Nasal route daily 1 Bottle 3    olmesartan (BENICAR) 5 MG tablet Take 2 tablets by mouth daily 180 tablet 1    Ergocalciferol (VITAMIN D2) 2000 units TABS Take 1 tablet by mouth daily 90 tablet 1    Cyanocobalamin 1000 MCG CAPS Take 1 capsule by mouth daily 90 capsule 1    fluticasone (FLONASE) 50 MCG/ACT nasal spray 1 spray by Nasal route daily 1 Bottle 3    aspirin 81 MG tablet Take 81 mg by mouth daily      pravastatin (PRAVACHOL) 40 MG tablet TAKE ONE TABLET BY MOUTH ONCE DAILY 90 tablet 1     No current facility-administered medications for this visit. Review of systems:  Review of Systems - History obtained from chart review and the patient  General ROS: negative for - chills,  or fever. Positive for fatigue, much better  ENT ROS: negative for - headaches, nasal congestion or nasal discharge  Respiratory ROS: negative for - cough. Positive for sensation shortness of breath, better. Cardiovascular ROS: negative for - chest tightness,   resolved  Neurological ROS: negative for - dizziness      Physical Examination:  /79 (Site: Right Arm, Position: Sitting, Cuff Size: Medium Adult)   Pulse 82   Temp 97.4 °F (36.3 °C) (Oral)   Resp 14   Ht 5' 8.5\" (1.74 m)   Wt 189 lb (85.7 kg)   BMI 28.32 kg/m²     General appearance - alert, well appearing, and in no distress  Mental status - normal mood, behavior, speech, dress, motor activity, and thought processes  HEENT - NC, AT, PERRLA, EOMI, Anicteric sclerae  Ears - normal tympanic membranes bilaterally  Nose - swollen erythematous  turbinates, clear drainage present  Throat - erythematous. No lesions, moist mucosas  Neck - supple, no significant adenopathy  Chest - clear to auscultation, no wheezes, rales or rhonchi, symmetric air entry  Heart - normal rate, regular rhythm, normal S1, S2, no murmurs, rubs, clicks or gallops  Abdomen - soft, nontender, nondistended, no masses or organomegaly  Extremities -  no pedal edema, no clubbing or cyanosis  Skin - normal coloration and turgor, no rashes, no suspicious skin lesions noted      Impression:   Diagnosis Orders   1. Essential hypertension     2. Hypercholesterolemia     3. Low vitamin B12 level     4. Vitamin D deficiency         Plan:  Orders Placed This Encounter   Procedures    INFLUENZA, MDCK QUADV, 4 YRS AND OLDER, IM, MDV, 0.5ML (FLUCELVAX QUADV)     Exercise instruction given again  Vitamin D3 2000 IU daily  B12 1000 ug PO daily  Continue ASA every other day for now until more data available  Continue Pravachol 40 mg daily  I have spent 25 minutes with him face to face. Most of that time was  Spent in patient education, importance of exercise, and healthy lifestyles. Follow Up:  Return in about 4 months (around 12/30/2018).     Renate Lin MD

## 2018-10-04 ENCOUNTER — OFFICE VISIT (OUTPATIENT)
Dept: CARDIOLOGY CLINIC | Age: 58
End: 2018-10-04
Payer: COMMERCIAL

## 2018-10-04 VITALS
HEART RATE: 68 BPM | BODY MASS INDEX: 28.58 KG/M2 | HEIGHT: 69 IN | WEIGHT: 193 LBS | SYSTOLIC BLOOD PRESSURE: 134 MMHG | DIASTOLIC BLOOD PRESSURE: 80 MMHG

## 2018-10-04 DIAGNOSIS — R06.09 DYSPNEA ON EXERTION: ICD-10-CM

## 2018-10-04 DIAGNOSIS — E78.01 FAMILIAL HYPERCHOLESTEROLEMIA: ICD-10-CM

## 2018-10-04 DIAGNOSIS — I10 ESSENTIAL HYPERTENSION: ICD-10-CM

## 2018-10-04 DIAGNOSIS — R07.2 PRECORDIAL PAIN: Primary | ICD-10-CM

## 2018-10-04 PROCEDURE — 99204 OFFICE O/P NEW MOD 45 MIN: CPT | Performed by: NUCLEAR MEDICINE

## 2018-10-04 ASSESSMENT — ENCOUNTER SYMPTOMS
RECTAL PAIN: 0
DIARRHEA: 0
PHOTOPHOBIA: 0
CHEST TIGHTNESS: 1
BACK PAIN: 0
BLOOD IN STOOL: 0
ABDOMINAL PAIN: 0
NAUSEA: 0
SHORTNESS OF BREATH: 1
ANAL BLEEDING: 0
VOMITING: 0
CONSTIPATION: 0
ABDOMINAL DISTENTION: 0

## 2018-10-04 NOTE — PROGRESS NOTES
New patient-referred by PCP. Patient complains of SOB on exertion. Patient did have chest tightness and chest pressure. Patient denies chest pain.
Grandfather         unknown type     Diabetes Paternal Grandmother     Other Paternal Grandfather         Black Lung     High Cholesterol Maternal Aunt     High Blood Pressure Maternal Aunt     Heart Disease Maternal Aunt     Heart Attack Maternal Aunt     Coronary Art Dis Maternal Aunt     Coronary Art Dis Maternal Aunt     Cancer Maternal Uncle         stomach     Heart Attack Maternal Uncle     Heart Disease Maternal Uncle     High Blood Pressure Maternal Uncle     High Cholesterol Maternal Uncle     Prostate Cancer Neg Hx      Social History   Substance Use Topics    Smoking status: Never Smoker    Smokeless tobacco: Never Used    Alcohol use Yes      Comment: occ beer      Current Outpatient Prescriptions   Medication Sig Dispense Refill    fluticasone (FLONASE) 50 MCG/ACT nasal spray 1 spray by Nasal route daily 1 Bottle 3    olmesartan (BENICAR) 5 MG tablet Take 2 tablets by mouth daily 180 tablet 1    Ergocalciferol (VITAMIN D2) 2000 units TABS Take 1 tablet by mouth daily 90 tablet 1    Cyanocobalamin 1000 MCG CAPS Take 1 capsule by mouth daily 90 capsule 1    aspirin 81 MG tablet Take 81 mg by mouth daily      pravastatin (PRAVACHOL) 40 MG tablet TAKE ONE TABLET BY MOUTH ONCE DAILY 90 tablet 1     No current facility-administered medications for this visit. Allergies   Allergen Reactions    Other Other (See Comments)     -zines; muscle spasms    Demerol Hives    Midazolam Hcl Hives     Health Maintenance   Topic Date Due    Shingles Vaccine (1 of 2 - 2 Dose Series) 01/14/2011    Colon cancer screen colonoscopy  01/01/2019    Potassium monitoring  05/30/2019    Creatinine monitoring  05/30/2019    Lipid screen  05/30/2023    DTaP/Tdap/Td vaccine (2 - Td) 04/19/2026    Flu vaccine  Completed    Hepatitis C screen  Addressed    HIV screen  Addressed       Subjective:  Review of Systems   Constitutional: Positive for fatigue. Negative for chills and diaphoresis.    HENT:

## 2018-10-30 ENCOUNTER — TELEPHONE (OUTPATIENT)
Dept: PULMONOLOGY | Age: 58
End: 2018-10-30

## 2018-11-07 ENCOUNTER — PREP FOR PROCEDURE (OUTPATIENT)
Dept: CARDIOLOGY | Age: 58
End: 2018-11-07

## 2018-11-07 RX ORDER — SODIUM CHLORIDE 0.9 % (FLUSH) 0.9 %
10 SYRINGE (ML) INJECTION EVERY 12 HOURS SCHEDULED
Status: CANCELLED | OUTPATIENT
Start: 2018-11-07

## 2018-11-07 RX ORDER — NITROGLYCERIN 0.4 MG/1
0.4 TABLET SUBLINGUAL EVERY 5 MIN PRN
Status: CANCELLED | OUTPATIENT
Start: 2018-11-07

## 2018-11-07 RX ORDER — ASPIRIN 325 MG
325 TABLET ORAL ONCE
Status: CANCELLED | OUTPATIENT
Start: 2018-11-07 | End: 2018-11-07

## 2018-11-07 RX ORDER — SODIUM CHLORIDE 0.9 % (FLUSH) 0.9 %
10 SYRINGE (ML) INJECTION PRN
Status: CANCELLED | OUTPATIENT
Start: 2018-11-07

## 2018-11-07 RX ORDER — DIPHENHYDRAMINE HYDROCHLORIDE 50 MG/ML
50 INJECTION INTRAMUSCULAR; INTRAVENOUS ONCE
Status: CANCELLED | OUTPATIENT
Start: 2018-11-07 | End: 2018-11-07

## 2018-11-07 RX ORDER — SODIUM CHLORIDE 9 MG/ML
INJECTION, SOLUTION INTRAVENOUS CONTINUOUS
Status: CANCELLED | OUTPATIENT
Start: 2018-11-07

## 2018-11-08 ENCOUNTER — HOSPITAL ENCOUNTER (OUTPATIENT)
Dept: INPATIENT UNIT | Age: 58
Discharge: HOME | End: 2018-11-08
Attending: NUCLEAR MEDICINE | Admitting: NUCLEAR MEDICINE
Payer: COMMERCIAL

## 2018-11-08 VITALS
DIASTOLIC BLOOD PRESSURE: 71 MMHG | SYSTOLIC BLOOD PRESSURE: 126 MMHG | OXYGEN SATURATION: 97 % | HEART RATE: 77 BPM | HEIGHT: 69 IN | RESPIRATION RATE: 20 BRPM | BODY MASS INDEX: 28.14 KG/M2 | WEIGHT: 190 LBS | TEMPERATURE: 97.9 F

## 2018-11-08 LAB
ABO: NORMAL
ANION GAP SERPL CALCULATED.3IONS-SCNC: 12 MEQ/L (ref 8–16)
ANTIBODY SCREEN: NORMAL
APTT: 33.6 SECONDS (ref 22–38)
BUN BLDV-MCNC: 11 MG/DL (ref 7–22)
CALCIUM SERPL-MCNC: 9.3 MG/DL (ref 8.5–10.5)
CHLORIDE BLD-SCNC: 104 MEQ/L (ref 98–111)
CO2: 24 MEQ/L (ref 23–33)
CREAT SERPL-MCNC: 0.8 MG/DL (ref 0.4–1.2)
EKG ATRIAL RATE: 66 BPM
EKG P AXIS: 25 DEGREES
EKG P-R INTERVAL: 134 MS
EKG Q-T INTERVAL: 416 MS
EKG QRS DURATION: 92 MS
EKG QTC CALCULATION (BAZETT): 436 MS
EKG R AXIS: 118 DEGREES
EKG T AXIS: 19 DEGREES
EKG VENTRICULAR RATE: 66 BPM
ERYTHROCYTE [DISTWIDTH] IN BLOOD BY AUTOMATED COUNT: 12.3 % (ref 11.5–14.5)
ERYTHROCYTE [DISTWIDTH] IN BLOOD BY AUTOMATED COUNT: 38.5 FL (ref 35–45)
GFR SERPL CREATININE-BSD FRML MDRD: > 90 ML/MIN/1.73M2
GLUCOSE BLD-MCNC: 97 MG/DL (ref 70–108)
HCT VFR BLD CALC: 44.4 % (ref 42–52)
HEMOGLOBIN: 15.2 GM/DL (ref 14–18)
INR BLD: 0.97 (ref 0.85–1.13)
LV EF: 55 %
LVEF MODALITY: NORMAL
MCH RBC QN AUTO: 29.3 PG (ref 26–33)
MCHC RBC AUTO-ENTMCNC: 34.2 GM/DL (ref 32.2–35.5)
MCV RBC AUTO: 85.5 FL (ref 80–94)
PLATELET # BLD: 309 THOU/MM3 (ref 130–400)
PMV BLD AUTO: 9.4 FL (ref 9.4–12.4)
POTASSIUM REFLEX MAGNESIUM: 4.1 MEQ/L (ref 3.5–5.2)
RBC # BLD: 5.19 MILL/MM3 (ref 4.7–6.1)
RH FACTOR: NORMAL
SODIUM BLD-SCNC: 140 MEQ/L (ref 135–145)
WBC # BLD: 7.8 THOU/MM3 (ref 4.8–10.8)

## 2018-11-08 PROCEDURE — 86901 BLOOD TYPING SEROLOGIC RH(D): CPT

## 2018-11-08 PROCEDURE — 93458 L HRT ARTERY/VENTRICLE ANGIO: CPT | Performed by: NUCLEAR MEDICINE

## 2018-11-08 PROCEDURE — 2709999900 HC NON-CHARGEABLE SUPPLY

## 2018-11-08 PROCEDURE — 86900 BLOOD TYPING SEROLOGIC ABO: CPT

## 2018-11-08 PROCEDURE — 80048 BASIC METABOLIC PNL TOTAL CA: CPT

## 2018-11-08 PROCEDURE — C1894 INTRO/SHEATH, NON-LASER: HCPCS

## 2018-11-08 PROCEDURE — 93005 ELECTROCARDIOGRAM TRACING: CPT | Performed by: NURSE PRACTITIONER

## 2018-11-08 PROCEDURE — 99152 MOD SED SAME PHYS/QHP 5/>YRS: CPT | Performed by: NUCLEAR MEDICINE

## 2018-11-08 PROCEDURE — 6360000004 HC RX CONTRAST MEDICATION: Performed by: NUCLEAR MEDICINE

## 2018-11-08 PROCEDURE — 85730 THROMBOPLASTIN TIME PARTIAL: CPT

## 2018-11-08 PROCEDURE — 93306 TTE W/DOPPLER COMPLETE: CPT

## 2018-11-08 PROCEDURE — 86850 RBC ANTIBODY SCREEN: CPT

## 2018-11-08 PROCEDURE — 2580000003 HC RX 258: Performed by: NURSE PRACTITIONER

## 2018-11-08 PROCEDURE — 6360000002 HC RX W HCPCS

## 2018-11-08 PROCEDURE — 85027 COMPLETE CBC AUTOMATED: CPT

## 2018-11-08 PROCEDURE — C1769 GUIDE WIRE: HCPCS

## 2018-11-08 PROCEDURE — 6370000000 HC RX 637 (ALT 250 FOR IP): Performed by: NURSE PRACTITIONER

## 2018-11-08 PROCEDURE — 85610 PROTHROMBIN TIME: CPT

## 2018-11-08 PROCEDURE — 2500000003 HC RX 250 WO HCPCS

## 2018-11-08 PROCEDURE — 36415 COLL VENOUS BLD VENIPUNCTURE: CPT

## 2018-11-08 RX ORDER — SODIUM CHLORIDE 9 MG/ML
INJECTION, SOLUTION INTRAVENOUS CONTINUOUS
Status: DISCONTINUED | OUTPATIENT
Start: 2018-11-08 | End: 2018-11-08 | Stop reason: HOSPADM

## 2018-11-08 RX ORDER — SODIUM CHLORIDE 9 MG/ML
INJECTION, SOLUTION INTRAVENOUS CONTINUOUS
Status: DISCONTINUED | OUTPATIENT
Start: 2018-11-08 | End: 2018-11-08 | Stop reason: SDUPTHER

## 2018-11-08 RX ORDER — SODIUM CHLORIDE 0.9 % (FLUSH) 0.9 %
10 SYRINGE (ML) INJECTION PRN
Status: DISCONTINUED | OUTPATIENT
Start: 2018-11-08 | End: 2018-11-08 | Stop reason: SDUPTHER

## 2018-11-08 RX ORDER — ASPIRIN 325 MG
325 TABLET ORAL ONCE
Status: COMPLETED | OUTPATIENT
Start: 2018-11-08 | End: 2018-11-08

## 2018-11-08 RX ORDER — SODIUM CHLORIDE 0.9 % (FLUSH) 0.9 %
10 SYRINGE (ML) INJECTION PRN
Status: DISCONTINUED | OUTPATIENT
Start: 2018-11-08 | End: 2018-11-08 | Stop reason: HOSPADM

## 2018-11-08 RX ORDER — SODIUM CHLORIDE 0.9 % (FLUSH) 0.9 %
10 SYRINGE (ML) INJECTION EVERY 12 HOURS SCHEDULED
Status: DISCONTINUED | OUTPATIENT
Start: 2018-11-08 | End: 2018-11-08 | Stop reason: HOSPADM

## 2018-11-08 RX ORDER — ACETAMINOPHEN 325 MG/1
650 TABLET ORAL EVERY 4 HOURS PRN
Status: DISCONTINUED | OUTPATIENT
Start: 2018-11-08 | End: 2018-11-08 | Stop reason: HOSPADM

## 2018-11-08 RX ORDER — ATROPINE SULFATE 0.4 MG/ML
0.5 AMPUL (ML) INJECTION
Status: DISCONTINUED | OUTPATIENT
Start: 2018-11-08 | End: 2018-11-08 | Stop reason: HOSPADM

## 2018-11-08 RX ADMIN — IOPAMIDOL 90 ML: 755 INJECTION, SOLUTION INTRAVENOUS at 13:59

## 2018-11-08 RX ADMIN — SODIUM CHLORIDE: 9 INJECTION, SOLUTION INTRAVENOUS at 11:45

## 2018-11-08 RX ADMIN — ASPIRIN 325 MG: 325 TABLET ORAL at 11:47

## 2018-11-08 NOTE — CONSULTS
mL, Intravenous, PRN, Algie Samantha, APRN - CNP  Prior to Admission medications    Medication Sig Start Date End Date Taking? Authorizing Provider   fluticasone (FLONASE) 50 MCG/ACT nasal spray 1 spray by Nasal route daily 8/30/18  Yes Alla Guidry MD   olmesartan (BENICAR) 5 MG tablet Take 2 tablets by mouth daily 8/30/18  Yes Alla Guidry MD   aspirin 81 MG tablet Take 81 mg by mouth daily   Yes Historical Provider, MD   pravastatin (PRAVACHOL) 40 MG tablet TAKE ONE TABLET BY MOUTH ONCE DAILY 5/30/18  Yes Alla Guidry MD   Ergocalciferol (VITAMIN D2) 2000 units TABS Take 1 tablet by mouth daily 8/30/18   Alla Guidry MD   Cyanocobalamin 1000 MCG CAPS Take 1 capsule by mouth daily 8/30/18   Alla Guidry MD     Additional information:       VITAL SIGNS   Vitals:    11/08/18 1122   BP: 136/78   Pulse: 68   Resp: 16   Temp: 97.9 °F (36.6 °C)   SpO2: 98%       PHYSICAL:   General: No acute distress  HEENT:  Unremarkable for age  Neck: without increased JVD, carotid pulses 2+ bilaterally without bruits  Heart: RRR, S1 & S2 WNL, S4 gallop, without murmurs or rubs    Lungs: Clear to auscultation    Abdomen: BS present, without HSM, masses, or tenderness    Extremities: without C,C,E.  Pulses 2+ bilaterally  Mental Status: Alert & Oriented        PLANNED PROCEDURE   [x]Cath  []PCI                []Pacemaker/AICD  []NANCY             []Cardioversion []Peripheral angiography/PTA  []Other:      SEDATION  Planned agent:[x]Midazolam []Meperidine [x]Sublimaze []Morphine  []Diazepam  []Other:     ASA Classification:  []1 [x]2 []3 []4 []5  Class 1: A normal healthy patient  Class 2: Pt with mild to moderate systemic disease  Class 3: Severe systemic disease or disturbance  Class 4: Severe systemic disorders that are already life threatening. Class 5: Moribund pt with little chances of survival, for more than 24 hours.   Mallampati I Airway Classification:   []1 [x]2 []3 []4    [x]Pre-procedure diagnostic studies complete and

## 2018-11-08 NOTE — FLOWSHEET NOTE
Patient admitted to 2E16  Ambulatory for cardiac cath  Patient NPO. Vital signs obtained. Assessment and data collection intiated. Oriented to room. Policies and procedures for 2E explained. All questions answered with no further questions at this time. Fall prevention and safety precautions discussed with patient.

## 2018-11-09 PROCEDURE — 93010 ELECTROCARDIOGRAM REPORT: CPT | Performed by: NUCLEAR MEDICINE

## 2019-02-11 RX ORDER — PRAVASTATIN SODIUM 40 MG
TABLET ORAL
Qty: 90 TABLET | Refills: 1 | Status: SHIPPED | OUTPATIENT
Start: 2019-02-11 | End: 2019-08-28 | Stop reason: SDUPTHER

## 2019-02-11 RX ORDER — OLMESARTAN MEDOXOMIL 5 MG/1
10 TABLET ORAL DAILY
Qty: 180 TABLET | Refills: 1 | Status: SHIPPED | OUTPATIENT
Start: 2019-02-11 | End: 2019-08-28 | Stop reason: SDUPTHER

## 2019-02-20 ENCOUNTER — OFFICE VISIT (OUTPATIENT)
Dept: FAMILY MEDICINE CLINIC | Age: 59
End: 2019-02-20
Payer: COMMERCIAL

## 2019-02-20 VITALS
TEMPERATURE: 97.9 F | RESPIRATION RATE: 18 BRPM | WEIGHT: 200.2 LBS | SYSTOLIC BLOOD PRESSURE: 130 MMHG | HEIGHT: 69 IN | HEART RATE: 70 BPM | DIASTOLIC BLOOD PRESSURE: 84 MMHG | BODY MASS INDEX: 29.65 KG/M2

## 2019-02-20 DIAGNOSIS — I10 ESSENTIAL HYPERTENSION: Primary | ICD-10-CM

## 2019-02-20 DIAGNOSIS — R06.02 SHORTNESS OF BREATH: ICD-10-CM

## 2019-02-20 DIAGNOSIS — E53.8 LOW VITAMIN B12 LEVEL: ICD-10-CM

## 2019-02-20 DIAGNOSIS — E55.9 VITAMIN D DEFICIENCY: ICD-10-CM

## 2019-02-20 DIAGNOSIS — G47.33 OSA (OBSTRUCTIVE SLEEP APNEA): ICD-10-CM

## 2019-02-20 DIAGNOSIS — R53.83 FATIGUE, UNSPECIFIED TYPE: ICD-10-CM

## 2019-02-20 DIAGNOSIS — E78.00 HYPERCHOLESTEROLEMIA: ICD-10-CM

## 2019-02-20 PROCEDURE — 99214 OFFICE O/P EST MOD 30 MIN: CPT | Performed by: FAMILY MEDICINE

## 2019-02-20 RX ORDER — FLUTICASONE PROPIONATE 50 MCG
1 SPRAY, SUSPENSION (ML) NASAL DAILY
Qty: 1 BOTTLE | Refills: 3 | Status: SHIPPED | OUTPATIENT
Start: 2019-02-20 | End: 2019-08-28 | Stop reason: SDUPTHER

## 2019-02-20 ASSESSMENT — PATIENT HEALTH QUESTIONNAIRE - PHQ9
SUM OF ALL RESPONSES TO PHQ QUESTIONS 1-9: 0
2. FEELING DOWN, DEPRESSED OR HOPELESS: 0
SUM OF ALL RESPONSES TO PHQ QUESTIONS 1-9: 0
1. LITTLE INTEREST OR PLEASURE IN DOING THINGS: 0
SUM OF ALL RESPONSES TO PHQ9 QUESTIONS 1 & 2: 0

## 2019-03-19 ENCOUNTER — TELEPHONE (OUTPATIENT)
Dept: CARDIOLOGY CLINIC | Age: 59
End: 2019-03-19

## 2019-05-07 ENCOUNTER — TELEPHONE (OUTPATIENT)
Dept: CARDIOLOGY CLINIC | Age: 59
End: 2019-05-07

## 2019-08-28 ENCOUNTER — OFFICE VISIT (OUTPATIENT)
Dept: FAMILY MEDICINE CLINIC | Age: 59
End: 2019-08-28
Payer: COMMERCIAL

## 2019-08-28 VITALS
TEMPERATURE: 98.2 F | WEIGHT: 191.4 LBS | HEART RATE: 68 BPM | RESPIRATION RATE: 12 BRPM | HEIGHT: 68 IN | DIASTOLIC BLOOD PRESSURE: 75 MMHG | SYSTOLIC BLOOD PRESSURE: 131 MMHG | BODY MASS INDEX: 29.01 KG/M2

## 2019-08-28 DIAGNOSIS — M25.512 CHRONIC LEFT SHOULDER PAIN: ICD-10-CM

## 2019-08-28 DIAGNOSIS — R06.02 SHORTNESS OF BREATH: ICD-10-CM

## 2019-08-28 DIAGNOSIS — R35.0 INCREASED URINARY FREQUENCY: ICD-10-CM

## 2019-08-28 DIAGNOSIS — E78.00 HYPERCHOLESTEROLEMIA: ICD-10-CM

## 2019-08-28 DIAGNOSIS — I10 ESSENTIAL HYPERTENSION: Primary | ICD-10-CM

## 2019-08-28 DIAGNOSIS — E55.9 VITAMIN D DEFICIENCY: ICD-10-CM

## 2019-08-28 DIAGNOSIS — Z12.11 SCREENING FOR COLON CANCER: ICD-10-CM

## 2019-08-28 DIAGNOSIS — Z23 NEED FOR PROPHYLACTIC VACCINATION AND INOCULATION AGAINST VARICELLA: ICD-10-CM

## 2019-08-28 DIAGNOSIS — G89.29 CHRONIC LEFT SHOULDER PAIN: ICD-10-CM

## 2019-08-28 PROCEDURE — 99214 OFFICE O/P EST MOD 30 MIN: CPT | Performed by: NURSE PRACTITIONER

## 2019-08-28 PROCEDURE — 90750 HZV VACC RECOMBINANT IM: CPT | Performed by: NURSE PRACTITIONER

## 2019-08-28 PROCEDURE — 90471 IMMUNIZATION ADMIN: CPT | Performed by: NURSE PRACTITIONER

## 2019-08-28 RX ORDER — FLUTICASONE PROPIONATE 50 MCG
1 SPRAY, SUSPENSION (ML) NASAL DAILY
Qty: 1 BOTTLE | Refills: 3 | Status: SHIPPED | OUTPATIENT
Start: 2019-08-28 | End: 2020-12-22 | Stop reason: SDUPTHER

## 2019-08-28 RX ORDER — OLMESARTAN MEDOXOMIL 5 MG/1
10 TABLET ORAL DAILY
Qty: 180 TABLET | Refills: 1 | Status: SHIPPED | OUTPATIENT
Start: 2019-08-28 | End: 2020-04-03

## 2019-08-28 RX ORDER — PRAVASTATIN SODIUM 40 MG
TABLET ORAL
Qty: 90 TABLET | Refills: 1 | Status: SHIPPED | OUTPATIENT
Start: 2019-08-28 | End: 2020-03-10

## 2019-08-28 NOTE — PROGRESS NOTES
[address]  [address] [facility], [address]  Ph:   [phone]  Fax: [phone]    Provider:  CORBY Driscoll CNP    Patient:  Cira Bourne  YOB: 1960      Visit Date:  8/28/2019     Reason For Visit:   Chief Complaint   Patient presents with    6 Month Follow-Up    Medication Refill    Hypertension    Hyperlipidemia        Bárbara Forde is a 63 y.o. male who comes today to the office for a 6 month follow up of hypertension, hyperlipidemia. Since his last with Dr Nayeli Kilgore in Feb he was in the ER in [place] for back pain in July. He was given medication and discharge. He hasn't had any trouble since then, but he states his back goes out a lot. He states he has been doing well, taking his medications as prescribed. He denies any side effects from his medications. Hypertension: He is taking Olmesartan 5 mg 2 tablet PO daily. He does not follow a low sodium diet. He does not exercise. His blood pressures at home can be 130-150/90. He has had his arm cuff for a few years. He wonders if his cuff is getting old. If it reads high at home he will have the nurse check it at work and it has always been normal. He has lost 9 lbs since his last visit in Feb.  He denies chest pain, swelling of the extremities, palpitations and syncope. He does have shortness of breath with mild exertion, but has been evaluated by cardiology and pulmonology but the workup has been negative. He has DAVIDSON, and tried a CPAP machine but could not tolerate it. EF in Nov 2018 was 55% with normal LV function, and a cardiac cath revealed patent coronary arteries and medical therapy was recommended. He continues to take Flonase nasal spray, which Dr. Nayeli Kilgore prescribed to assist with the shortness of breath. Hyperlipidemia: He is taking Pravastatin 40 mg 1 tablet daily. He does not follow a low cholesterol diet. He is not exercising.   He does have family history of early ischemic heart disease. His last Lipid panel was in May 2018 showing total cholesterol 182, Triglycerides 111, HDL 70, LDL 90. He has lab work ordered from Feb that has not been completed yet. Vitamin deficiency: He is not taking any Vitamin D or Vitamin B supplements as Dr Geno Savage has previously recommended. She recommended Vitamin D3 2000 IU daily, and B12 1000 ug PO daily. Most recent vitamin D level was 15 in May 2018. Vitamin B12 was 284 at that time. Homocysteine and methylmalonic acid were normal at that time. Left shoulder pain, and stiffness. He has been told that he is \"bone on bone\" by his chiropractor. It has been present for 8 years, and worsening in the last 3-4 months. No injury. He has pain without movement, worse with movement, and he has to assist his arm down if he rests it above his head. He is not taking an OTC medications for the pain. History elevated PSA/urinary frequency: He had a benign prostate biopsy in 2012. His PSA was 3.15 in October 2012 and 2.42 in July 2013. Today he reports an increase in urinary frequency, including increase in nocturnal urinary frequency which is new for him. He also feels that he does not completely empty his bladder when he voids, which he also reports is a new symptom. Significant Past Medical History:    Past Medical History:   Diagnosis Date    CRVO (central retinal vein occlusion) 01/2016    Right eye    Heartburn     Hyperlipidemia 2010    Hypertension 2010           Allergies:  is allergic to other; demerol; and midazolam hcl.     Medications:   Current Outpatient Medications   Medication Sig Dispense Refill    pravastatin (PRAVACHOL) 40 MG tablet TAKE ONE TABLET BY MOUTH ONCE DAILY 90 tablet 1    olmesartan (BENICAR) 5 MG tablet Take 2 tablets by mouth daily 180 tablet 1    fluticasone (FLONASE) 50 MCG/ACT nasal spray 1 spray by Nasal route daily 1 Bottle 3    Ergocalciferol (VITAMIN D2) 2000 units TABS Take 1 tablet by mouth daily tablet    CBC Auto Differential    Urinalysis with Microscopic   2. Hypercholesterolemia  pravastatin (PRAVACHOL) 40 MG tablet   3. Vitamin D deficiency     4. Shortness of breath  fluticasone (FLONASE) 50 MCG/ACT nasal spray   5. Chronic left shoulder pain  MRI SHOULDER LEFT WO CONTRAST   6. Increased urinary frequency  PSA, Prostatic Specific Antigen    Urinalysis with Microscopic   7. Screening for colon cancer  CRISTIAN Hughes MD, Gastroenterology, [place]   8. Need for prophylactic vaccination and inoculation against varicella  In- - [facility])       Plan:  Orders Placed This Encounter   Procedures    MRI SHOULDER LEFT WO CONTRAST     Standing Status:   Future     Standing Expiration Date:   8/28/2020     Order Specific Question:   Reason for exam:     Answer:   worsening left shoulder pain and stiffness    [facility]    PSA, Prostatic Specific Antigen     Standing Status:   Future     Standing Expiration Date:   8/27/2020    CBC Auto Differential     Standing Status:   Future     Standing Expiration Date:   8/27/2020    Urinalysis with Microscopic     Standing Status:   Future     Standing Expiration Date:   8/27/2020     Order Specific Question:   SPECIFY(EX-CATH,MIDSTREAM,CYSTO,ETC)?      Answer:   midstream    CRISTIAN Hughes MD, Gastroenterology, [place]     Referral Priority:   Routine     Referral Type:   Eval and Treat     Referral Reason:   Specialty Services Required     Referred to Provider:   Brenden Salvador MD     Requested Specialty:   Gastroenterology     Number of Visits Requested:   1     Orders Placed This Encounter   Medications    pravastatin (PRAVACHOL) 40 MG tablet     Sig: TAKE ONE TABLET BY MOUTH ONCE DAILY     Dispense:  90 tablet     Refill:  1    olmesartan (BENICAR) 5 MG tablet     Sig: Take 2 tablets by mouth daily     Dispense:  180 tablet     Refill:  1    fluticasone (FLONASE) 50 MCG/ACT nasal

## 2019-08-28 NOTE — PATIENT INSTRUCTIONS
You may receive a survey about your visit with us today. The feedback from our patients helps us identify what is working well and where the service to all patients can be enhanced. Thank you! Patient Education     Continue Benicar 5 mg 2 tablets daily  Continue Pravastatin 40 mg 1 tablet PO daily  Continue Flonase daily  MRI of left shoulder, possible referral to [facility] after resulted. PSA recheck  Get blood work  Refer to GI for colon cancer screening  Shingles vaccine today     High Cholesterol: Care Instructions  Your Care Instructions    Cholesterol is a type of fat in your blood. It is needed for many body functions, such as making new cells. Cholesterol is made by your body. It also comes from food you eat. High cholesterol means that you have too much of the fat in your blood. This raises your risk of a heart attack and stroke. LDL and HDL are part of your total cholesterol. LDL is the \"bad\" cholesterol. High LDL can raise your risk for heart disease, heart attack, and stroke. HDL is the \"good\" cholesterol. It helps clear bad cholesterol from the body. High HDL is linked with a lower risk of heart disease, heart attack, and stroke. Your cholesterol levels help your doctor find out your risk for having a heart attack or stroke. You and your doctor can talk about whether you need to lower your risk and what treatment is best for you. A heart-healthy lifestyle along with medicines can help lower your cholesterol and your risk. The way you choose to lower your risk will depend on how high your risk is for heart attack and stroke. It will also depend on how you feel about taking medicines. Follow-up care is a key part of your treatment and safety. Be sure to make and go to all appointments, and call your doctor if you are having problems. It's also a good idea to know your test results and keep a list of the medicines you take. How can you care for yourself at home? · Eat a variety of foods every day. you find how much sodium is in a food. The label lists the ingredients in a food in descending order (from the most to the least). If salt or sodium is high on the list, there may be a lot of sodium in the food. · Know that sodium has different names. Sodium is also called monosodium glutamate (MSG, common in [place] food), sodium citrate, sodium alginate, sodium hydroxide, and sodium phosphate. Read Nutrition Facts labels  · On most foods, there is a Nutrition Facts label. This will tell you how much sodium is in one serving of food. Look at both the serving size and the sodium amount. The serving size is located at the top of the label, usually right under the \"Nutrition Facts\" title. The amount of sodium is given in the list under the title. It is given in milligrams (mg). ? Check the serving size carefully. A single serving is often very small, and you may eat more than one serving. If this is the case, you will eat more sodium than listed on the label. For example, if the serving size for a canned soup is 1 cup and the sodium amount is 470 mg, if you have 2 cups you will eat 940 mg of sodium. · The nutrition facts for fresh fruits and vegetables are not listed on the food. They may be listed somewhere in the store. These foods usually have no sodium or low sodium. · The Nutrition Facts label also gives you the Percent Daily Value for sodium. This is how much of the recommended amount of sodium a serving contains. The daily value for sodium is less than 2,300 mg. So if the Percent Daily Value says 50%, this means one serving is giving you half of this, or 1,150 mg. Buy low-sodium foods  · Look for foods that are made with less sodium. Watch for the following words on the label. ? \"Unsalted\" means there is no sodium added to the food. But there may be sodium already in the food naturally. ? \"Sodium-free\" means a serving has less than 5 mg of sodium. ?  \"Very low sodium\" means a serving has 35 mg or less you need. If you have a serious medical condition, talk to your doctor before you try any diet. These conditions include kidney disease, heart disease, type 2 diabetes, high cholesterol, and high blood pressure. If you are pregnant, it may not be safe for your baby if you are on a low-carb diet. How can you lose weight safely? You might have heard that a diet plan helped another person lose weight. But that doesn't mean that it will work for you. It is very hard to stay on a diet that includes lots of big changes in your eating habits. If you want to get to a healthy weight and stay there, making healthy lifestyle changes will often work better than dieting. These steps can help. · Make a plan for change. Work with your doctor to create a plan that is right for you. · See a dietitian. He or she can show you how to make healthy changes in your eating habits. · Manage stress. If you have a lot of stress in your life, it can be hard to focus on making healthy changes to your daily habits. · Track your food and activity. You are likely to do better at losing weight if you keep track of what you eat and what you do. Follow-up care is a key part of your treatment and safety. Be sure to make and go to all appointments, and call your doctor if you are having problems. It's also a good idea to know your test results and keep a list of the medicines you take. Where can you learn more? Go to https://Digital H2Ocierra.Paperfold. org and sign in to your Beat[employer] account. Enter A121 in the [facility] box to learn more about \"Learning About Low-Carbohydrate Diets for Weight Loss. \"     If you do not have an account, please click on the \"Sign Up Now\" link. Current as of: November 7, 2018  Content Version: 12.1  © 8592-8936 Healthwise, Incorporated. Care instructions adapted under license by [facility] [place] ([facility]).  If you have questions about a medical condition or this instruction, always ask your healthcare

## 2019-09-11 ENCOUNTER — HOSPITAL ENCOUNTER (OUTPATIENT)
Dept: MRI IMAGING | Age: 59
Discharge: HOME | End: 2019-09-11
Payer: COMMERCIAL

## 2019-09-11 DIAGNOSIS — G89.29 CHRONIC LEFT SHOULDER PAIN: ICD-10-CM

## 2019-09-11 DIAGNOSIS — M25.512 CHRONIC LEFT SHOULDER PAIN: ICD-10-CM

## 2019-09-11 PROCEDURE — 73221 MRI JOINT UPR EXTREM W/O DYE: CPT

## 2019-09-12 ENCOUNTER — TELEPHONE (OUTPATIENT)
Dept: FAMILY MEDICINE CLINIC | Age: 59
End: 2019-09-12

## 2019-09-12 DIAGNOSIS — M25.512 CHRONIC LEFT SHOULDER PAIN: Primary | ICD-10-CM

## 2019-09-12 DIAGNOSIS — G89.29 CHRONIC LEFT SHOULDER PAIN: Primary | ICD-10-CM

## 2019-09-12 NOTE — TELEPHONE ENCOUNTER
Pt returned call and was advised. He is ok with [facility] referral. I advised him that he will receive a phone call from [facility] to schedule. He voiced understanding.  Faxed referral.

## 2019-11-09 ENCOUNTER — HOSPITAL ENCOUNTER (OUTPATIENT)
Age: 59
Discharge: HOME | End: 2019-11-09
Payer: COMMERCIAL

## 2019-11-09 DIAGNOSIS — R35.0 INCREASED URINARY FREQUENCY: ICD-10-CM

## 2019-11-09 DIAGNOSIS — I10 ESSENTIAL HYPERTENSION: ICD-10-CM

## 2019-11-09 LAB
BACTERIA: ABNORMAL
BASOPHILS # BLD: 0.5 %
BASOPHILS ABSOLUTE: 0 THOU/MM3 (ref 0–0.1)
BILIRUBIN URINE: NEGATIVE
BLOOD, URINE: NEGATIVE
CASTS: ABNORMAL /LPF
CASTS: ABNORMAL /LPF
CHARACTER, URINE: ABNORMAL
COLOR: YELLOW
CRYSTALS: ABNORMAL
EOSINOPHIL # BLD: 1.5 %
EOSINOPHILS ABSOLUTE: 0.1 THOU/MM3 (ref 0–0.4)
EPITHELIAL CELLS, UA: ABNORMAL /HPF
ERYTHROCYTE [DISTWIDTH] IN BLOOD BY AUTOMATED COUNT: 13 % (ref 11.5–14.5)
ERYTHROCYTE [DISTWIDTH] IN BLOOD BY AUTOMATED COUNT: 43.2 FL (ref 35–45)
GLUCOSE, URINE: NEGATIVE MG/DL
HCT VFR BLD CALC: 49.2 % (ref 42–52)
HEMOGLOBIN: 15.7 GM/DL (ref 14–18)
IMMATURE GRANS (ABS): 0.05 THOU/MM3 (ref 0–0.07)
IMMATURE GRANULOCYTES: 0.5 %
KETONES, URINE: NEGATIVE
LEUKOCYTE ESTERASE, URINE: ABNORMAL
LYMPHOCYTES # BLD: 20.5 %
LYMPHOCYTES ABSOLUTE: 1.9 THOU/MM3 (ref 1–4.8)
MCH RBC QN AUTO: 29.1 PG (ref 26–33)
MCHC RBC AUTO-ENTMCNC: 31.9 GM/DL (ref 32.2–35.5)
MCV RBC AUTO: 91.3 FL (ref 80–94)
MISCELLANEOUS LAB TEST RESULT: ABNORMAL
MONOCYTES # BLD: 6 %
MONOCYTES ABSOLUTE: 0.6 THOU/MM3 (ref 0.4–1.3)
NITRITE, URINE: NEGATIVE
NUCLEATED RED BLOOD CELLS: 0 /100 WBC
PH UA: 7 (ref 5–9)
PLATELET # BLD: 289 THOU/MM3 (ref 130–400)
PMV BLD AUTO: 8.7 FL (ref 9.4–12.4)
PROSTATE SPECIFIC ANTIGEN: 3.96 NG/ML (ref 0–1)
PROTEIN UA: NEGATIVE MG/DL
RBC # BLD: 5.39 MILL/MM3 (ref 4.7–6.1)
RBC URINE: ABNORMAL /HPF
RENAL EPITHELIAL, UA: ABNORMAL
SEG NEUTROPHILS: 71 %
SEGMENTED NEUTROPHILS ABSOLUTE COUNT: 6.5 THOU/MM3 (ref 1.8–7.7)
SPECIFIC GRAVITY UA: 1.02 (ref 1–1.03)
UROBILINOGEN, URINE: 0.2 EU/DL (ref 0–1)
WBC # BLD: 9.2 THOU/MM3 (ref 4.8–10.8)
WBC UA: ABNORMAL /HPF
YEAST: ABNORMAL

## 2019-11-09 PROCEDURE — 36415 COLL VENOUS BLD VENIPUNCTURE: CPT

## 2019-11-09 PROCEDURE — 85025 COMPLETE CBC W/AUTO DIFF WBC: CPT

## 2019-11-09 PROCEDURE — 84153 ASSAY OF PSA TOTAL: CPT

## 2019-11-09 PROCEDURE — 81001 URINALYSIS AUTO W/SCOPE: CPT

## 2019-11-11 ENCOUNTER — TELEPHONE (OUTPATIENT)
Dept: FAMILY MEDICINE CLINIC | Age: 59
End: 2019-11-11

## 2019-11-11 DIAGNOSIS — R97.20 ELEVATED PSA: Primary | ICD-10-CM

## 2019-12-10 ENCOUNTER — TELEPHONE (OUTPATIENT)
Dept: UROLOGY | Age: 59
End: 2019-12-10

## 2019-12-10 ENCOUNTER — OFFICE VISIT (OUTPATIENT)
Dept: UROLOGY | Age: 59
End: 2019-12-10
Payer: COMMERCIAL

## 2019-12-10 VITALS
BODY MASS INDEX: 27.55 KG/M2 | WEIGHT: 186 LBS | SYSTOLIC BLOOD PRESSURE: 130 MMHG | HEIGHT: 69 IN | DIASTOLIC BLOOD PRESSURE: 74 MMHG

## 2019-12-10 DIAGNOSIS — N30.00 ACUTE CYSTITIS WITHOUT HEMATURIA: ICD-10-CM

## 2019-12-10 DIAGNOSIS — R97.20 ELEVATED PSA: Primary | ICD-10-CM

## 2019-12-10 DIAGNOSIS — N40.1 BENIGN LOCALIZED PROSTATIC HYPERPLASIA WITH LOWER URINARY TRACT SYMPTOMS (LUTS): ICD-10-CM

## 2019-12-10 LAB
BILIRUBIN URINE: NEGATIVE
BLOOD URINE, POC: NEGATIVE
CHARACTER, URINE: CLEAR
COLOR, URINE: YELLOW
GLUCOSE URINE: NEGATIVE MG/DL
KETONES, URINE: NEGATIVE
LEUKOCYTE CLUMPS, URINE: NEGATIVE
NITRITE, URINE: NEGATIVE
PH, URINE: 6.5 (ref 5–9)
PROTEIN, URINE: NEGATIVE MG/DL
SPECIFIC GRAVITY, URINE: 1.02 (ref 1–1.03)
UROBILINOGEN, URINE: 0.2 EU/DL (ref 0–1)

## 2019-12-10 PROCEDURE — 99204 OFFICE O/P NEW MOD 45 MIN: CPT | Performed by: UROLOGY

## 2019-12-10 PROCEDURE — 81003 URINALYSIS AUTO W/O SCOPE: CPT | Performed by: UROLOGY

## 2019-12-24 ENCOUNTER — HOSPITAL ENCOUNTER (OUTPATIENT)
Dept: MRI IMAGING | Age: 59
Discharge: HOME | End: 2019-12-24
Payer: COMMERCIAL

## 2019-12-24 DIAGNOSIS — R97.20 ELEVATED PSA: ICD-10-CM

## 2019-12-24 LAB — POC CREATININE WHOLE BLOOD: 0.9 MG/DL (ref 0.5–1.2)

## 2019-12-24 PROCEDURE — 6360000004 HC RX CONTRAST MEDICATION: Performed by: UROLOGY

## 2019-12-24 PROCEDURE — 82565 ASSAY OF CREATININE: CPT

## 2019-12-24 PROCEDURE — A9579 GAD-BASE MR CONTRAST NOS,1ML: HCPCS | Performed by: UROLOGY

## 2019-12-24 PROCEDURE — 76377 3D RENDER W/INTRP POSTPROCES: CPT

## 2019-12-24 RX ADMIN — GADOTERIDOL 15 ML: 279.3 INJECTION, SOLUTION INTRAVENOUS at 08:56

## 2020-01-07 ENCOUNTER — OFFICE VISIT (OUTPATIENT)
Dept: UROLOGY | Age: 60
End: 2020-01-07
Payer: COMMERCIAL

## 2020-01-07 VITALS
BODY MASS INDEX: 27.7 KG/M2 | HEIGHT: 69 IN | WEIGHT: 187 LBS | SYSTOLIC BLOOD PRESSURE: 118 MMHG | DIASTOLIC BLOOD PRESSURE: 72 MMHG

## 2020-01-07 PROCEDURE — 81003 URINALYSIS AUTO W/O SCOPE: CPT | Performed by: UROLOGY

## 2020-01-07 PROCEDURE — 99214 OFFICE O/P EST MOD 30 MIN: CPT | Performed by: UROLOGY

## 2020-01-07 NOTE — PROGRESS NOTES
0. 20 0.0 - 1.0 eu/dl    Nitrite, Urine Negative NEGATIVE    Leukocyte Clumps, Urine Negative NEGATIVE    Color, Urine Yellow YELLOW-STR    Character, Urine Clear [id number]       Last BUN and creatinine:  Lab Results   Component Value Date    BUN 11 11/08/2018     Lab Results   Component Value Date    CREATININE 0.8 11/08/2018         Imaging Reviewed during this Office Visit:   imaging independently reviewed by So Arboleda MD and radiology report verified demonstrating     Mri Prostate W Wo Contrast    Result Date: 12/24/2019  PROCEDURE: MRI PROSTATE W WO CONTRAST CLINICAL INFORMATION: Elevated PSA. COMPARISON: No prior study. TECHNIQUE: Axial T2, coronal T2 and sagittal T2 imaging of the prostate gland. Large field of view axial T2 imaging of the prostate gland. Axial T1, axial T1 VIBE dynamic post tadeo and axial T1 VIBE dynamic subtracted post tadeo images through the prostate gland. Diffusion 50, 800, 1400 and ADC maps through the prostate gland. Axial T1 STAR VIBE post tadeo through the pelvis. 3-D images reconstructed on a separate [employer] workstation with MRI TRUS fusion of prostate mass lesions. CONTRAST: 15 mL ProHance intravenously. LABORATORY: 1. PSA 3.96 ng/mL (11/9/2019) 2. TRUS prostate biopsy (11/28/2012): Negative for malignancy FINDINGS: PROSTATE SIZE: 1. The prostate gland is enlarged measuring 5.0 x 4.3 x 3.2 cm in longitudinal, transverse and AP dimensions. 2. The prostate volume is 42 ml. TRANSITIONAL ZONE: 1. Enlarged and nodular transitional zone consistent with benign prostatic hypertrophy (score 2). 2. There is intermediate decreased T2 weighted signal throughout the prostate base which may be related to postinflammatory changes however infiltrative carcinoma cannot be excluded based on the MRI appearance (score 3). CENTRAL ZONE: 1. Unremarkable. PERIPHERAL ZONE: 1. The peripheral zone is not seen. PROSTATE CAPSULE/NV BUNDLE/SEMINAL VESICLES: Unremarkable.  URINARY BLADDER/RECTUM/PELVIC negative   Neurological: negative  Hematological/Lymphatic: negative  Psychological: negative    Physical Exam:    This a 63 y.o. male  Vitals:    01/07/20 1608   BP: 118/72     Body mass index is 28.02 kg/m². Constitutional: Patient in no acute distress; Neuro: alert and oriented to person place and time. Psych: Mood and affect normal.  Skin: warm, dry  Lungs: Respiratory effort normal, Symmetric chest rise  Cardiovascular:  Normal peripheral pulses; Regular rate, radial pulses palpable  Abdomen: Soft, non-tender, non-distended with no CVA, flank pain, hepatosplenomegaly or hernia. Kidneys normal.  Bladder non-tender and not distended. Lymphatics: no palpable lymphadenopathy  Minimal/no edema in bilateral lower extremities        Assessment and Plan        1. Elevated PSA               Plan:      Prostate biopsy MAC- cognitive fusion  Follow up postoperatively      Prescriptions Ordered:  No orders of the defined types were placed in this encounter.      Orders Placed:  Orders Placed This Encounter   Procedures    POCT Urinalysis No Micro (Auto)            SPEEDY CALI [facility], MD

## 2020-01-08 ENCOUNTER — TELEPHONE (OUTPATIENT)
Dept: UROLOGY | Age: 60
End: 2020-01-08

## 2020-01-08 NOTE — TELEPHONE ENCOUNTER
Spoke to BRITTANEY. Confirming the surgery date with Dr Angi Overton 1/16/2020. Arrival time 0600 second floor at [address] [place] [address] for the transrectal ultrasound with prostate biopsy. Surgical consent signed & scanned to chart. Discussed on NPO midnight. Discussed bowel prep the day of surgery. Discussed holding the blood thinners starting today. BRITTANEY will  instructions & orders. He will have the pre-op testing done at [address] [place] [address] outpatient express. No fasting required.
BEFORE THE BIOPSY AND 3 DAYS AFTER THE BIOPSY. SEE ATTACHED LIST OF ADDITIONAL MEDICATIONS. YOU MAY TAKE TYLENOL IF NEEDED  2. NOTHING TO EAT OR DRINK AFTER MIDNIGHT. 3.  E prefer that you shower or bathe with liquid anti-bacterial soap the day of the surgery. 4. Pre-op testing to be done at [address] [place] [address] express, as soon as possible. No fasting required. 5.  Do a Fleets Enema by 4:30am the day of surgery. Purchase it at any drug store and follow the instructions on the package. 6. Please ensure to bring a  with you the day of the surgery to transport you home. HOME GOING AND FOLLOW UP INSTRUCTIONS  Call the doctor if: 1. There is a large amount of blood or clots in the urine or stool. 2.  You are unable to urinate. 3.  If your temperature is 101 degrees F or greater.            4.  You may resume your regular medication except those on  attached list.    DATE: 1/8/2020       SIGNATURE:________________________________

## 2020-01-09 ENCOUNTER — HOSPITAL ENCOUNTER (OUTPATIENT)
Dept: GENERAL RADIOLOGY | Age: 60
Discharge: HOME | End: 2020-01-09
Payer: COMMERCIAL

## 2020-01-09 ENCOUNTER — TELEPHONE (OUTPATIENT)
Dept: UROLOGY | Age: 60
End: 2020-01-09

## 2020-01-09 ENCOUNTER — HOSPITAL ENCOUNTER (OUTPATIENT)
Age: 60
Discharge: HOME | End: 2020-01-09
Payer: COMMERCIAL

## 2020-01-09 ENCOUNTER — PREP FOR PROCEDURE (OUTPATIENT)
Dept: UROLOGY | Age: 60
End: 2020-01-09

## 2020-01-09 LAB
ANION GAP SERPL CALCULATED.3IONS-SCNC: 13 MEQ/L (ref 8–16)
BASOPHILS # BLD: 0.8 %
BASOPHILS ABSOLUTE: 0.1 THOU/MM3 (ref 0–0.1)
BUN BLDV-MCNC: 14 MG/DL (ref 7–22)
CALCIUM SERPL-MCNC: 10.2 MG/DL (ref 8.5–10.5)
CHLORIDE BLD-SCNC: 104 MEQ/L (ref 98–111)
CO2: 26 MEQ/L (ref 23–33)
CREAT SERPL-MCNC: 0.9 MG/DL (ref 0.4–1.2)
EKG ATRIAL RATE: 63 BPM
EKG P AXIS: 61 DEGREES
EKG P-R INTERVAL: 134 MS
EKG Q-T INTERVAL: 402 MS
EKG QRS DURATION: 90 MS
EKG QTC CALCULATION (BAZETT): 411 MS
EKG R AXIS: 104 DEGREES
EKG T AXIS: 73 DEGREES
EKG VENTRICULAR RATE: 63 BPM
EOSINOPHIL # BLD: 3.4 %
EOSINOPHILS ABSOLUTE: 0.3 THOU/MM3 (ref 0–0.4)
ERYTHROCYTE [DISTWIDTH] IN BLOOD BY AUTOMATED COUNT: 12.8 % (ref 11.5–14.5)
ERYTHROCYTE [DISTWIDTH] IN BLOOD BY AUTOMATED COUNT: 42.4 FL (ref 35–45)
GFR SERPL CREATININE-BSD FRML MDRD: 86 ML/MIN/1.73M2
GLUCOSE BLD-MCNC: 101 MG/DL (ref 70–108)
HCT VFR BLD CALC: 46.4 % (ref 42–52)
HEMOGLOBIN: 14.7 GM/DL (ref 14–18)
IMMATURE GRANS (ABS): 0.06 THOU/MM3 (ref 0–0.07)
IMMATURE GRANULOCYTES: 0.7 %
LYMPHOCYTES # BLD: 25.8 %
LYMPHOCYTES ABSOLUTE: 2.2 THOU/MM3 (ref 1–4.8)
MCH RBC QN AUTO: 29.1 PG (ref 26–33)
MCHC RBC AUTO-ENTMCNC: 31.7 GM/DL (ref 32.2–35.5)
MCV RBC AUTO: 91.9 FL (ref 80–94)
MONOCYTES # BLD: 6.3 %
MONOCYTES ABSOLUTE: 0.5 THOU/MM3 (ref 0.4–1.3)
NUCLEATED RED BLOOD CELLS: 0 /100 WBC
PLATELET # BLD: 325 THOU/MM3 (ref 130–400)
PMV BLD AUTO: 9.6 FL (ref 9.4–12.4)
POTASSIUM SERPL-SCNC: 4.8 MEQ/L (ref 3.5–5.2)
RBC # BLD: 5.05 MILL/MM3 (ref 4.7–6.1)
SEG NEUTROPHILS: 63 %
SEGMENTED NEUTROPHILS ABSOLUTE COUNT: 5.4 THOU/MM3 (ref 1.8–7.7)
SODIUM BLD-SCNC: 143 MEQ/L (ref 135–145)
WBC # BLD: 8.5 THOU/MM3 (ref 4.8–10.8)

## 2020-01-09 PROCEDURE — 85025 COMPLETE CBC W/AUTO DIFF WBC: CPT

## 2020-01-09 PROCEDURE — 93010 ELECTROCARDIOGRAM REPORT: CPT | Performed by: INTERNAL MEDICINE

## 2020-01-09 PROCEDURE — 93005 ELECTROCARDIOGRAM TRACING: CPT

## 2020-01-09 PROCEDURE — 80048 BASIC METABOLIC PNL TOTAL CA: CPT

## 2020-01-09 PROCEDURE — 36415 COLL VENOUS BLD VENIPUNCTURE: CPT

## 2020-01-09 PROCEDURE — 71046 X-RAY EXAM CHEST 2 VIEWS: CPT

## 2020-01-09 RX ORDER — SODIUM CHLORIDE 9 MG/ML
INJECTION, SOLUTION INTRAVENOUS CONTINUOUS
Status: CANCELLED | OUTPATIENT
Start: 2020-01-16

## 2020-01-09 NOTE — TELEPHONE ENCOUNTER
Shakira [phone]. Active PPO policy. No precert is required for the outpatient [id number] with Dr Marnie Cottrell on 1/16/2020. Per electronic voice message. Call NPX#[phone]. Document scanned to chart.

## 2020-01-16 ENCOUNTER — ANESTHESIA EVENT (OUTPATIENT)
Dept: OPERATING ROOM | Age: 60
End: 2020-01-16
Payer: COMMERCIAL

## 2020-01-16 ENCOUNTER — ANESTHESIA (OUTPATIENT)
Dept: OPERATING ROOM | Age: 60
End: 2020-01-16
Payer: COMMERCIAL

## 2020-01-16 ENCOUNTER — APPOINTMENT (OUTPATIENT)
Dept: ULTRASOUND IMAGING | Age: 60
End: 2020-01-16
Attending: UROLOGY
Payer: COMMERCIAL

## 2020-01-16 ENCOUNTER — HOSPITAL ENCOUNTER (OUTPATIENT)
Age: 60
Setting detail: OUTPATIENT SURGERY
Discharge: HOME | End: 2020-01-16
Attending: UROLOGY | Admitting: UROLOGY
Payer: COMMERCIAL

## 2020-01-16 ENCOUNTER — TELEPHONE (OUTPATIENT)
Dept: UROLOGY | Age: 60
End: 2020-01-16

## 2020-01-16 VITALS
TEMPERATURE: 97.8 F | HEART RATE: 66 BPM | DIASTOLIC BLOOD PRESSURE: 77 MMHG | BODY MASS INDEX: 28.04 KG/M2 | OXYGEN SATURATION: 94 % | SYSTOLIC BLOOD PRESSURE: 129 MMHG | HEIGHT: 68 IN | WEIGHT: 185 LBS | RESPIRATION RATE: 14 BRPM

## 2020-01-16 VITALS — OXYGEN SATURATION: 99 % | DIASTOLIC BLOOD PRESSURE: 54 MMHG | SYSTOLIC BLOOD PRESSURE: 96 MMHG

## 2020-01-16 PROCEDURE — 3600000003 HC SURGERY LEVEL 3 BASE: Performed by: UROLOGY

## 2020-01-16 PROCEDURE — 6360000002 HC RX W HCPCS: Performed by: REGISTERED NURSE

## 2020-01-16 PROCEDURE — 7100000010 HC PHASE II RECOVERY - FIRST 15 MIN: Performed by: UROLOGY

## 2020-01-16 PROCEDURE — 6360000002 HC RX W HCPCS

## 2020-01-16 PROCEDURE — 88344 IMHCHEM/IMCYTCHM EA MLT ANTB: CPT

## 2020-01-16 PROCEDURE — 2580000003 HC RX 258

## 2020-01-16 PROCEDURE — 88305 TISSUE EXAM BY PATHOLOGIST: CPT

## 2020-01-16 PROCEDURE — 7100000011 HC PHASE II RECOVERY - ADDTL 15 MIN: Performed by: UROLOGY

## 2020-01-16 PROCEDURE — 3700000000 HC ANESTHESIA ATTENDED CARE: Performed by: UROLOGY

## 2020-01-16 PROCEDURE — 3700000001 HC ADD 15 MINUTES (ANESTHESIA): Performed by: UROLOGY

## 2020-01-16 PROCEDURE — 2709999900 HC NON-CHARGEABLE SUPPLY: Performed by: UROLOGY

## 2020-01-16 PROCEDURE — 3600000013 HC SURGERY LEVEL 3 ADDTL 15MIN: Performed by: UROLOGY

## 2020-01-16 PROCEDURE — 76998 US GUIDE INTRAOP: CPT

## 2020-01-16 RX ORDER — FENTANYL CITRATE 50 UG/ML
INJECTION, SOLUTION INTRAMUSCULAR; INTRAVENOUS PRN
Status: DISCONTINUED | OUTPATIENT
Start: 2020-01-16 | End: 2020-01-16 | Stop reason: SDUPTHER

## 2020-01-16 RX ORDER — SODIUM CHLORIDE 9 MG/ML
INJECTION, SOLUTION INTRAVENOUS CONTINUOUS
Status: DISCONTINUED | OUTPATIENT
Start: 2020-01-16 | End: 2020-01-16 | Stop reason: HOSPADM

## 2020-01-16 RX ORDER — GENTAMICIN SULFATE 80 MG/100ML
80 INJECTION, SOLUTION INTRAVENOUS
Status: COMPLETED | OUTPATIENT
Start: 2020-01-16 | End: 2020-01-16

## 2020-01-16 RX ORDER — PROPOFOL 10 MG/ML
INJECTION, EMULSION INTRAVENOUS PRN
Status: DISCONTINUED | OUTPATIENT
Start: 2020-01-16 | End: 2020-01-16 | Stop reason: SDUPTHER

## 2020-01-16 RX ADMIN — FENTANYL CITRATE 25 MCG: 50 INJECTION, SOLUTION INTRAMUSCULAR; INTRAVENOUS at 08:02

## 2020-01-16 RX ADMIN — FENTANYL CITRATE 50 MCG: 50 INJECTION, SOLUTION INTRAMUSCULAR; INTRAVENOUS at 07:48

## 2020-01-16 RX ADMIN — CEFAZOLIN 2 G: 10 INJECTION, POWDER, FOR SOLUTION INTRAVENOUS at 07:50

## 2020-01-16 RX ADMIN — SODIUM CHLORIDE: 9 INJECTION, SOLUTION INTRAVENOUS at 07:07

## 2020-01-16 RX ADMIN — GENTAMICIN SULFATE 80 MG: 80 INJECTION, SOLUTION INTRAVENOUS at 07:53

## 2020-01-16 RX ADMIN — PROPOFOL 300 MG: 10 INJECTION, EMULSION INTRAVENOUS at 07:49

## 2020-01-16 ASSESSMENT — PAIN SCALES - GENERAL
PAINLEVEL_OUTOF10: 0

## 2020-01-16 ASSESSMENT — PULMONARY FUNCTION TESTS
PIF_VALUE: 0

## 2020-01-16 ASSESSMENT — PAIN - FUNCTIONAL ASSESSMENT: PAIN_FUNCTIONAL_ASSESSMENT: 0-10

## 2020-01-16 NOTE — PROGRESS NOTES
The patient returned from surgery directly. No pain. Spouse in the room. Pepsi given. Call light in reach.

## 2020-01-16 NOTE — H&P
Veena Thomas MD  History and Physical    Patient:  Murphy Jones  MRN: [id number]  YOB: 1960    HISTORY OF PRESENT ILLNESS:     The patient is a 62 y.o. male who presents with elevated PSA. Here for procedure. Patient's old records, notes and chart reviewed and summarized above. Veena Thomas MD independently reviewed the images and verified the radiology reports from:    Xr Chest Standard (2 Vw)    Result Date: 1/9/2020  PROCEDURE: XR CHEST (2 VW) CLINICAL INFORMATION: Hypertension, preoperative evaluation for prostate surgery TECHNIQUE: PA and lateral chest radiographs. COMPARISON: PA and lateral chest radiographs 5/30/2018 FINDINGS: Cardiomediastinal silhouette is within normal limits. Lungs are clear. Degenerative changes in the thoracic spine are stable. Soft tissues are unremarkable. No acute intrathoracic process. **This report has been created using voice recognition software. It may contain minor errors which are inherent in voice recognition technology. ** Final report electronically signed by Dr. Isatu Wong on 1/9/2020 4:03 PM        Past Medical History:    Past Medical History:   Diagnosis Date    CRVO (central retinal vein occlusion) 01/2016    Right eye    Heartburn     Hyperlipidemia 2010    Hypertension 2010       Past Surgical History:    Past Surgical History:   Procedure Laterality Date    COLONOSCOPY  2009    WNL     LIVER BIOPSY  1995    PROSTATE BIOPSY  2012    benign     TESTICLE REMOVAL  1994    Lt       Medications Prior to Admission:    Prior to Admission medications    Medication Sig Start Date End Date Taking?  Authorizing Provider   pravastatin (PRAVACHOL) 40 MG tablet TAKE ONE TABLET BY MOUTH ONCE DAILY 8/28/19   CORBY Gillespie CNP   olmesartan (BENICAR) 5 MG tablet Take 2 tablets by mouth daily  Patient taking differently: Take 10 mg by mouth nightly  8/28/19   CORBY Gillespie CNP   fluticasone (FLONASE) 50 MCG/ACT nasal spray 1 spray by Nasal route daily 8/28/19   CORBY Avalos CNP   Ergocalciferol (VITAMIN D2) 2000 units TABS Take 1 tablet by mouth daily  Patient not taking: Reported on 1/7/2020 8/30/18   Destiny Bear MD   Cyanocobalamin 1000 MCG CAPS Take 1 capsule by mouth daily  Patient not taking: Reported on 12/10/2019 8/30/18   Destiny Baer MD   aspirin 81 MG tablet Take 81 mg by mouth daily    Historical Provider, MD       Allergies: Other; Versed [midazolam];  Demerol; and Midazolam hcl    Social History:    Social History     Socioeconomic History    Marital status:      Spouse name: Not on file    Number of children: Not on file    Years of education: Not on file    Highest education level: Not on file   Occupational History    Not on file   Social Needs    Financial resource strain: Not on file    Food insecurity:     Worry: Not on file     Inability: Not on file    Transportation needs:     Medical: Not on file     Non-medical: Not on file   Tobacco Use    Smoking status: Never Smoker    Smokeless tobacco: Never Used   Substance and Sexual Activity    Alcohol use: Yes     Comment: occ beer    Drug use: No    Sexual activity: Yes     Partners: Female   Lifestyle    Physical activity:     Days per week: Not on file     Minutes per session: Not on file    Stress: Not on file   Relationships    Social connections:     Talks on phone: Not on file     Gets together: Not on file     Attends Druze service: Not on file     Active member of club or organization: Not on file     Attends meetings of clubs or organizations: Not on file     Relationship status: Not on file    Intimate partner violence:     Fear of current or ex partner: Not on file     Emotionally abused: Not on file     Physically abused: Not on file     Forced sexual activity: Not on file   Other Topics Concern    Not on file   Social History Narrative    Not on file       Family History:    Family History   Problem Relation Age of Onset    Cancer Mother 72        lung     Heart Disease Mother 58    Coronary Art Dis Mother 58    Heart Disease Father 68    Heart Attack Father 68    Coronary Art Dis Father 68    High Blood Pressure Father     High Cholesterol Father     Diabetes Father         borderline    High Blood Pressure Sister 45    High Cholesterol Sister 45    Heart Attack Maternal Uncle     Heart Disease Maternal Uncle     High Blood Pressure Maternal Uncle     High Cholesterol Maternal Uncle     Coronary Art Dis Maternal Uncle     Coronary Art Dis Maternal Grandmother     Heart Attack Maternal Grandmother     Heart Disease Maternal Grandmother     High Blood Pressure Maternal Grandmother     High Cholesterol Maternal Grandmother     Cancer Maternal Grandfather         unknown type     Diabetes Paternal Grandmother     Other Paternal Grandfather         Black Lung     High Cholesterol Maternal Aunt     High Blood Pressure Maternal Aunt     Heart Disease Maternal Aunt     Heart Attack Maternal Aunt     Coronary Art Dis Maternal Aunt     Coronary Art Dis Maternal Aunt     Cancer Maternal Uncle         stomach     Heart Attack Maternal Uncle     Heart Disease Maternal Uncle     High Blood Pressure Maternal Uncle     High Cholesterol Maternal Uncle     Prostate Cancer Neg Hx        REVIEW OF SYSTEMS:  Constitutional: negative  Eyes: negative  Respiratory: negative  Cardiovascular: negative  Gastrointestinal: negative  Genitourinary: no acute issues  Musculoskeletal: negative  Skin: negative   Neurological: negative  Hematological/Lymphatic: negative  Psychological: negative    Physical Exam:      No data found. Constitutional: Patient in no acute distress; Neuro: alert and oriented to person place and time. Psych: Mood and affect normal.  Skin: Normal  Lungs: Respiratory effort normal, CTA  Cardiovascular:  Normal peripheral pulses; no murmur.  Normal rhythm  Abdomen: Soft, non-tender,

## 2020-01-16 NOTE — ANESTHESIA PRE PROCEDURE
Hyperlipidemia E78.5    Angina effort I20.8       Past Medical History:        Diagnosis Date    CRVO (central retinal vein occlusion) 01/2016    Right eye    Heartburn     Hyperlipidemia 2010    Hypertension 2010       Past Surgical History:        Procedure Laterality Date    COLONOSCOPY  2009    WNL     LIVER BIOPSY  1995    PROSTATE BIOPSY  2012    benign     TESTICLE REMOVAL  1994    Lt       Social History:    Social History     Tobacco Use    Smoking status: Never Smoker    Smokeless tobacco: Never Used   Substance Use Topics    Alcohol use: Yes     Comment: occ beer                                Counseling given: Not Answered      Vital Signs (Current):   Vitals:    01/13/20 1020 01/16/20 0646 01/16/20 0652   BP:  131/67    Pulse:  70    Resp:  16    Temp:  98.5 °F (36.9 °C)    TempSrc:  Temporal    SpO2:  96%    Weight: 187 lb (84.8 kg)  185 lb (83.9 kg)   Height: 5' 8.5\" (1.74 m)  5' 8\" (1.727 m)                                              BP Readings from Last 3 Encounters:   01/16/20 131/67   01/07/20 118/72   12/10/19 130/74       NPO Status: Time of last liquid consumption: 2100                        Time of last solid consumption: 1800                        Date of last liquid consumption: 01/15/20                        Date of last solid food consumption: 01/15/20    BMI:   Wt Readings from Last 3 Encounters:   01/16/20 185 lb (83.9 kg)   01/07/20 187 lb (84.8 kg)   12/10/19 186 lb (84.4 kg)     Body mass index is 28.13 kg/m².     CBC:   Lab Results   Component Value Date    WBC 8.5 01/09/2020    RBC 5.05 01/09/2020    HGB 14.7 01/09/2020    HCT 46.4 01/09/2020    MCV 91.9 01/09/2020    RDW 13.9 05/30/2018     01/09/2020       CMP:   Lab Results   Component Value Date     01/09/2020    K 4.8 01/09/2020    K 4.1 11/08/2018     01/09/2020    CO2 26 01/09/2020    BUN 14 01/09/2020    CREATININE 0.9 01/09/2020    LABGLOM 86 01/09/2020    GLUCOSE 101 01/09/2020    PROT

## 2020-01-16 NOTE — OP NOTE
Patient:  Penny Bar  MRN: [id number]  YOB: 1960    FACILITY: [facility]    DATE: 1/16/2020    SURGEON: Maria E Hernandez MD     ASSISTANT: none    PREOPERATIVE DIAGNOSIS: ELEVATED PSA    POSTOPERATIVE DIAGNOSIS: as above    PROCEDURE PERFORMED: TRANSRECTAL ULTRASOUND WITH PROSTATE BX    1. Transrectal ultrasound guided prostate biopsy    ANESTHESIA: Monitor Anesthesia Care    ESTIMATED BLOOD LOSS: 0 mL    COMPLICATIONS: None immediate    DRAINS: none    SPECIMENS:   ID Type Source Tests Collected by Time Destination   A : RIGHT LATERAL BASE- RLB Tissue Prostate SURGICAL PATHOLOGY Sharon Hollins MD 1/16/2020 0732    B : RIGHT BASE- RB Tissue Prostate SURGICAL PATHOLOGY Sharon Hollins MD 1/16/2020 0732    C : LEFT LATERAL BASE - LLB Tissue Prostate SURGICAL PATHOLOGY Sharon Hollins MD 1/16/2020 0732    D : LEFT MID - LM Tissue Prostate SURGICAL PATHOLOGY Sharon Hollins MD 1/16/2020 0732    E : LEFT APEX - LA Tissue Prostate SURGICAL PATHOLOGY Sharon Hollins MD 1/16/2020 0732    F : LEFT LATERAL APEX - LLA Tissue Prostate SURGICAL PATHOLOGY Sharon Hollins MD 1/16/2020 0732    G : LEFT LATERAL MID - LLM Tissue Prostate SURGICAL PATHOLOGY Sharon Hollins MD 1/16/2020 0732    H : LEFT BASE - LB Tissue Prostate SURGICAL PATHOLOGY Sharon Hollins MD 1/16/2020 0732    I : RIGHT APEX - RA Tissue Prostate SURGICAL PATHOLOGY Sharon Hollins MD 1/16/2020 0732    J : RIGHT LATERAL APE - RLA Tissue Prostate SURGICAL PATHOLOGY Sharon Hollins MD 1/16/2020 0732    K : RIGHT MID - RM Tissue Prostate SURGICAL PATHOLOGY Sharon Hollins MD 1/16/2020 0732    L : RIGHT LATERAL MID- RLM Tissue Prostate SURGICAL PATHOLOGY Sharon Hollins MD 1/16/2020 0732        INDICATIONS FOR PROCEDURE:  The patient is a 62 y.o. male who presents today with ELEVATED PSA here for TRANSRECTAL ULTRASOUND WITH PROSTATE BX. After risks, benefits and alternatives of the procedure were discussed with the patient, the patient elected to proceed.      Details: Patient was brought back to the operating room table. He was laid in the supine position. EPC cuffs were placed on and functioning prior to induction of anesthesia. Anesthesia was inducted. A timeout performed. The ultrasound probe was placed per rectum. The prostate was brought into view. As previously noted his prostate was 30 g.  no median lobe. Seminal vesicles appeared normal.  We used 1 percent lidocaine to inject around the neurovascular bundle bilaterally. We then proceeded with a standard sextant biopsy starting on the right side in the left side for a total of 12 specimens. There was no hypoechoic nodule. These core specimens were sent off for permanent pathology. After completion of the biopsy we then removed the ultrasound probe. There is no evidence of brisk bleeding per the rectum. The patient tolerated the procedure well. His anesthesia was reversed. He was then taken to recovery in stable condition. He was discharged home in stable condition and instructed to follow-up for review of his pathology results. He is instructed to call if he has any fevers, shaking, or chills. I was present for the entire case.

## 2020-01-16 NOTE — PROGRESS NOTES
ADMITTED TO [facility] AND ORIENTED TO UNIT. SCDS ON. FALL AND ALLERGY BANDS ON. PT VERBALIZED APPROVAL FOR FIRST NAME, LAST INITIAL AND PHYSICIAN NAME ON UNIT WHITEBOARD.

## 2020-01-28 ENCOUNTER — OFFICE VISIT (OUTPATIENT)
Dept: UROLOGY | Age: 60
End: 2020-01-28
Payer: COMMERCIAL

## 2020-01-28 ENCOUNTER — TELEPHONE (OUTPATIENT)
Dept: UROLOGY | Age: 60
End: 2020-01-28

## 2020-01-28 VITALS
DIASTOLIC BLOOD PRESSURE: 74 MMHG | WEIGHT: 188.8 LBS | SYSTOLIC BLOOD PRESSURE: 122 MMHG | BODY MASS INDEX: 27.96 KG/M2 | HEIGHT: 69 IN

## 2020-01-28 LAB
BILIRUBIN URINE: NEGATIVE
BLOOD URINE, POC: NEGATIVE
CHARACTER, URINE: CLEAR
COLOR, URINE: YELLOW
GLUCOSE URINE: NEGATIVE MG/DL
KETONES, URINE: NEGATIVE
LEUKOCYTE CLUMPS, URINE: ABNORMAL
NITRITE, URINE: NEGATIVE
PH, URINE: 6.5 (ref 5–9)
PROTEIN, URINE: NEGATIVE MG/DL
SPECIFIC GRAVITY, URINE: 1.02 (ref 1–1.03)
UROBILINOGEN, URINE: 0.2 EU/DL (ref 0–1)

## 2020-01-28 PROCEDURE — 81003 URINALYSIS AUTO W/O SCOPE: CPT | Performed by: UROLOGY

## 2020-01-28 PROCEDURE — 99214 OFFICE O/P EST MOD 30 MIN: CPT | Performed by: UROLOGY

## 2020-01-28 NOTE — PROGRESS NOTES
Martina Nevarez MD        [address] [place] [address] [phone]  Dept: [phone]  Dept Fax: [phone]: [address] [address] Urology Office Note -     Patient:  Zaid Phillip  YOB: 1960  Date: 1/30/2020    The patient is a 62 y.o. male who presents today for evaluation of the following problems: Elevated PSA  Chief Complaint   Patient presents with    Follow-up     surgery f/u discuss pathology    referred/consultation requested by Gomez Beckford MD.    HISTORY OF PRESENT ILLNESS:     Prostate Cancer  Onset was  Years ago  Overall, the problem(s) are worse  Severity is described as moderate. Associated Symptoms: No dysuria, no gross hematuria. Current Pain Severity: 0    Here with prostate biopsy --matthew six one core  Previous biopsy negative by [facility] in past.       BPH  Onset was  Years ago  Overall, the problem(s) are unchanged. Severity is described as mild-moderate. Associated Symptoms: No dysuria, no gross hematuria. Current Pain Severity: 0    LUTS chronic. Mild bother  Not on medications for this.         Requested/reviewed records from Gomez Beckford MD office and/or outside physician/EMR    (Patient's old records have been requested, reviewed and pertinent findings summarized in today's note.)    Procedures Today: N/A      Last several PSA's:  Lab Results   Component Value Date    PSA 3.96 (H) 11/09/2019    PSA 2.42 07/12/2013       Last total testosterone:  No results found for: TESTOSTERONE    Urinalysis today:  Results for POC orders placed in visit on 01/28/20   POCT Urinalysis No Micro (Auto)   Result Value Ref Range    Glucose, Ur Negative NEGATIVE mg/dl    Bilirubin Urine Negative     Ketones, Urine Negative NEGATIVE    Specific Gravity, Urine 1.020 1.002 - 1.03    Blood, UA POC Negative NEGATIVE    pH, Urine 6.50 5.0 - 9.0    Protein, Urine Negative NEGATIVE mg/dl    Urobilinogen, Urine 0.20 0.0 - 1.0 eu/dl    Nitrite, Urine Negative NEGATIVE    Leukocyte Clumps, Urine Trace (A) NEGATIVE    Color, Urine Yellow YELLOW-STR    Character, Urine Clear CLR-SL.MILAN       Last BUN and creatinine:  Lab Results   Component Value Date    BUN 14 01/09/2020     Lab Results   Component Value Date    CREATININE 0.9 01/09/2020         Imaging Reviewed during this Office Visit:   imaging independently reviewed by Rachael Randle MD and radiology report verified demonstrating     Mri Prostate W Wo Contrast    Result Date: 12/24/2019  PROCEDURE: MRI PROSTATE W WO CONTRAST CLINICAL INFORMATION: Elevated PSA. COMPARISON: No prior study. TECHNIQUE: Axial T2, coronal T2 and sagittal T2 imaging of the prostate gland. Large field of view axial T2 imaging of the prostate gland. Axial T1, axial T1 VIBE dynamic post tadeo and axial T1 VIBE dynamic subtracted post tadeo images through the prostate gland. Diffusion 50, 800, 1400 and ADC maps through the prostate gland. Axial T1 STAR VIBE post tadeo through the pelvis. 3-D images reconstructed on a separate [employer] workstation with MRI TRUS fusion of prostate mass lesions. CONTRAST: 15 mL ProHance intravenously. LABORATORY: 1. PSA 3.96 ng/mL (11/9/2019) 2. TRUS prostate biopsy (11/28/2012): Negative for malignancy FINDINGS: PROSTATE SIZE: 1. The prostate gland is enlarged measuring 5.0 x 4.3 x 3.2 cm in longitudinal, transverse and AP dimensions. 2. The prostate volume is 42 ml. TRANSITIONAL ZONE: 1. Enlarged and nodular transitional zone consistent with benign prostatic hypertrophy (score 2). 2. There is intermediate decreased T2 weighted signal throughout the prostate base which may be related to postinflammatory changes however infiltrative carcinoma cannot be excluded based on the MRI appearance (score 3). CENTRAL ZONE: 1. Unremarkable. PERIPHERAL ZONE: 1. The peripheral zone is not seen. PROSTATE CAPSULE/NV BUNDLE/SEMINAL VESICLES: Unremarkable.  URINARY BLADDER/RECTUM/PELVIC Dis Father 68    High Blood Pressure Father     High Cholesterol Father     Diabetes Father         borderline    High Blood Pressure Sister 45    High Cholesterol Sister 45    Heart Attack Maternal Uncle     Heart Disease Maternal Uncle     High Blood Pressure Maternal Uncle     High Cholesterol Maternal Uncle     Coronary Art Dis Maternal Uncle     Coronary Art Dis Maternal Grandmother     Heart Attack Maternal Grandmother     Heart Disease Maternal Grandmother     High Blood Pressure Maternal Grandmother     High Cholesterol Maternal Grandmother     Cancer Maternal Grandfather         unknown type     Diabetes Paternal Grandmother     Other Paternal Grandfather         Black Lung     High Cholesterol Maternal Aunt     High Blood Pressure Maternal Aunt     Heart Disease Maternal Aunt     Heart Attack Maternal Aunt     Coronary Art Dis Maternal Aunt     Coronary Art Dis Maternal Aunt     Cancer Maternal Uncle         stomach     Heart Attack Maternal Uncle     Heart Disease Maternal Uncle     High Blood Pressure Maternal Uncle     High Cholesterol Maternal Uncle     Prostate Cancer Neg Hx      Outpatient Medications Marked as Taking for the 1/28/20 encounter (Office Visit) with Sandip Simpson MD   Medication Sig Dispense Refill    pravastatin (PRAVACHOL) 40 MG tablet TAKE ONE TABLET BY MOUTH ONCE DAILY 90 tablet 1    olmesartan (BENICAR) 5 MG tablet Take 2 tablets by mouth daily (Patient taking differently: Take 10 mg by mouth nightly ) 180 tablet 1    fluticasone (FLONASE) 50 MCG/ACT nasal spray 1 spray by Nasal route daily 1 Bottle 3    Ergocalciferol (VITAMIN D2) 2000 units TABS Take 1 tablet by mouth daily 90 tablet 1    Cyanocobalamin 1000 MCG CAPS Take 1 capsule by mouth daily 90 capsule 1       Other; Versed [midazolam];  Demerol; and Midazolam hcl  Social History     Tobacco Use   Smoking Status Never Smoker   Smokeless Tobacco Never Used      (If patient a smoker, smoking cessation counseling offered)   Social History     Substance and Sexual Activity   Alcohol Use Yes    Comment: occ beer       REVIEW OF SYSTEMS:  Constitutional: negative  Eyes: negative  Respiratory: negative  Cardiovascular: negative  Gastrointestinal: negative  Genitourinary: see HPI  Musculoskeletal: negative  Skin: negative   Neurological: negative  Hematological/Lymphatic: negative  Psychological: negative    Physical Exam:    This a 62 y.o. male  Vitals:    01/28/20 1158   BP: 122/74     Body mass index is 28.29 kg/m². Constitutional: Patient in no acute distress;           Assessment and Plan        1. Prostate cancer ([address]    2. Benign localized prostatic hyperplasia with lower urinary tract symptoms (LUTS)               Plan:      Patient was give results of his prostate biopsy today which demonstrate a small percent of matthew 6. We discussed active surveillance today at length. We will continue watching PSAs closely. We will get a PSA/OPHELIA in 6 months . Decipher post biopsy. We will see him back in month for those results. All questions were answered     Prescriptions Ordered:  No orders of the defined types were placed in this encounter.      Orders Placed:  Orders Placed This Encounter   Procedures    PSA Prostatic Specific Antigen     Standing Status:   Future     Standing Expiration Date:   7/28/2020    POCT Urinalysis No Micro (Auto)            SPEEDY CALI [facility], MD

## 2020-02-04 ENCOUNTER — TELEPHONE (OUTPATIENT)
Dept: UROLOGY | Age: 60
End: 2020-02-04

## 2020-02-11 ENCOUNTER — OFFICE VISIT (OUTPATIENT)
Dept: FAMILY MEDICINE CLINIC | Age: 60
End: 2020-02-11
Payer: COMMERCIAL

## 2020-02-11 ENCOUNTER — NURSE ONLY (OUTPATIENT)
Dept: LAB | Age: 60
End: 2020-02-11

## 2020-02-11 VITALS
SYSTOLIC BLOOD PRESSURE: 128 MMHG | BODY MASS INDEX: 27.93 KG/M2 | HEART RATE: 73 BPM | TEMPERATURE: 98.1 F | DIASTOLIC BLOOD PRESSURE: 81 MMHG | WEIGHT: 188.6 LBS | RESPIRATION RATE: 12 BRPM | HEIGHT: 69 IN

## 2020-02-11 LAB
ALBUMIN SERPL-MCNC: 4.7 G/DL (ref 3.5–5.1)
ALP BLD-CCNC: 58 U/L (ref 38–126)
ALT SERPL-CCNC: 31 U/L (ref 11–66)
ANION GAP SERPL CALCULATED.3IONS-SCNC: 12 MEQ/L (ref 8–16)
AST SERPL-CCNC: 22 U/L (ref 5–40)
BILIRUB SERPL-MCNC: 0.5 MG/DL (ref 0.3–1.2)
BUN BLDV-MCNC: 16 MG/DL (ref 7–22)
CALCIUM SERPL-MCNC: 9.8 MG/DL (ref 8.5–10.5)
CHLORIDE BLD-SCNC: 104 MEQ/L (ref 98–111)
CO2: 25 MEQ/L (ref 23–33)
CREAT SERPL-MCNC: 0.8 MG/DL (ref 0.4–1.2)
ERYTHROCYTE [DISTWIDTH] IN BLOOD BY AUTOMATED COUNT: 12.6 % (ref 11.5–14.5)
ERYTHROCYTE [DISTWIDTH] IN BLOOD BY AUTOMATED COUNT: 41.5 FL (ref 35–45)
GFR SERPL CREATININE-BSD FRML MDRD: > 90 ML/MIN/1.73M2
GLUCOSE BLD-MCNC: 97 MG/DL (ref 70–108)
HCT VFR BLD CALC: 47.3 % (ref 42–52)
HEMOGLOBIN: 15.3 GM/DL (ref 14–18)
MCH RBC QN AUTO: 29.4 PG (ref 26–33)
MCHC RBC AUTO-ENTMCNC: 32.3 GM/DL (ref 32.2–35.5)
MCV RBC AUTO: 91 FL (ref 80–94)
PLATELET # BLD: 324 THOU/MM3 (ref 130–400)
PMV BLD AUTO: 9.7 FL (ref 9.4–12.4)
POTASSIUM SERPL-SCNC: 4.2 MEQ/L (ref 3.5–5.2)
RBC # BLD: 5.2 MILL/MM3 (ref 4.7–6.1)
SODIUM BLD-SCNC: 141 MEQ/L (ref 135–145)
TOTAL PROTEIN: 7.2 G/DL (ref 6.1–8)
WBC # BLD: 8.3 THOU/MM3 (ref 4.8–10.8)

## 2020-02-11 PROCEDURE — 99214 OFFICE O/P EST MOD 30 MIN: CPT | Performed by: FAMILY MEDICINE

## 2020-02-11 ASSESSMENT — PATIENT HEALTH QUESTIONNAIRE - PHQ9
2. FEELING DOWN, DEPRESSED OR HOPELESS: 0
SUM OF ALL RESPONSES TO PHQ9 QUESTIONS 1 & 2: 0
SUM OF ALL RESPONSES TO PHQ QUESTIONS 1-9: 0
SUM OF ALL RESPONSES TO PHQ QUESTIONS 1-9: 0
1. LITTLE INTEREST OR PLEASURE IN DOING THINGS: 0

## 2020-02-11 NOTE — PROGRESS NOTES
quadrant  Refer to GI  Comp. Panel  Keep follow up withUrology  No orders of the defined types were placed in this encounter. Follow Up:  Return in about 2 months (around 4/11/2020).     Gerard Wright MD

## 2020-02-21 ENCOUNTER — HOSPITAL ENCOUNTER (OUTPATIENT)
Dept: ULTRASOUND IMAGING | Age: 60
Discharge: HOME | End: 2020-02-21
Payer: COMMERCIAL

## 2020-02-21 PROCEDURE — 76705 ECHO EXAM OF ABDOMEN: CPT

## 2020-02-24 ENCOUNTER — TELEPHONE (OUTPATIENT)
Dept: FAMILY MEDICINE CLINIC | Age: 60
End: 2020-02-24

## 2020-02-24 NOTE — TELEPHONE ENCOUNTER
No new recommendations. Keep appt with Dr Eloy Finley, he may do CT scan or other imaging test.   His lab work looked good also. If pain worsens, develops fevers/chills, or vomiting, will need ED.

## 2020-03-03 ENCOUNTER — OFFICE VISIT (OUTPATIENT)
Dept: UROLOGY | Age: 60
End: 2020-03-03
Payer: COMMERCIAL

## 2020-03-03 VITALS — HEIGHT: 69 IN | WEIGHT: 187 LBS | BODY MASS INDEX: 27.7 KG/M2

## 2020-03-03 PROCEDURE — 99213 OFFICE O/P EST LOW 20 MIN: CPT | Performed by: UROLOGY

## 2020-03-10 RX ORDER — PRAVASTATIN SODIUM 40 MG
TABLET ORAL
Qty: 90 TABLET | Refills: 1 | Status: SHIPPED | OUTPATIENT
Start: 2020-03-10 | End: 2020-09-16

## 2020-03-13 ENCOUNTER — ANESTHESIA (OUTPATIENT)
Dept: ENDOSCOPY | Age: 60
End: 2020-03-13
Payer: COMMERCIAL

## 2020-03-13 ENCOUNTER — ANESTHESIA EVENT (OUTPATIENT)
Dept: ENDOSCOPY | Age: 60
End: 2020-03-13
Payer: COMMERCIAL

## 2020-03-13 ENCOUNTER — HOSPITAL ENCOUNTER (OUTPATIENT)
Age: 60
Setting detail: OUTPATIENT SURGERY
Discharge: HOME | End: 2020-03-13
Attending: INTERNAL MEDICINE | Admitting: INTERNAL MEDICINE
Payer: COMMERCIAL

## 2020-03-13 VITALS
DIASTOLIC BLOOD PRESSURE: 85 MMHG | HEART RATE: 74 BPM | OXYGEN SATURATION: 96 % | HEIGHT: 69 IN | WEIGHT: 182 LBS | SYSTOLIC BLOOD PRESSURE: 117 MMHG | TEMPERATURE: 96.9 F | RESPIRATION RATE: 18 BRPM | BODY MASS INDEX: 26.96 KG/M2

## 2020-03-13 VITALS
SYSTOLIC BLOOD PRESSURE: 96 MMHG | OXYGEN SATURATION: 98 % | RESPIRATION RATE: 15 BRPM | DIASTOLIC BLOOD PRESSURE: 65 MMHG

## 2020-03-13 PROCEDURE — 3700000001 HC ADD 15 MINUTES (ANESTHESIA): Performed by: INTERNAL MEDICINE

## 2020-03-13 PROCEDURE — 7100000000 HC PACU RECOVERY - FIRST 15 MIN: Performed by: INTERNAL MEDICINE

## 2020-03-13 PROCEDURE — 3700000000 HC ANESTHESIA ATTENDED CARE: Performed by: INTERNAL MEDICINE

## 2020-03-13 PROCEDURE — 3609012400 HC EGD TRANSORAL BIOPSY SINGLE/MULTIPLE: Performed by: INTERNAL MEDICINE

## 2020-03-13 PROCEDURE — 3609027000 HC COLONOSCOPY: Performed by: INTERNAL MEDICINE

## 2020-03-13 PROCEDURE — 6360000002 HC RX W HCPCS: Performed by: NURSE ANESTHETIST, CERTIFIED REGISTERED

## 2020-03-13 PROCEDURE — 7100000001 HC PACU RECOVERY - ADDTL 15 MIN: Performed by: INTERNAL MEDICINE

## 2020-03-13 PROCEDURE — 2709999900 HC NON-CHARGEABLE SUPPLY: Performed by: INTERNAL MEDICINE

## 2020-03-13 PROCEDURE — 2580000003 HC RX 258: Performed by: INTERNAL MEDICINE

## 2020-03-13 PROCEDURE — 88305 TISSUE EXAM BY PATHOLOGIST: CPT

## 2020-03-13 RX ORDER — PROPOFOL 10 MG/ML
INJECTION, EMULSION INTRAVENOUS PRN
Status: DISCONTINUED | OUTPATIENT
Start: 2020-03-13 | End: 2020-03-13 | Stop reason: SDUPTHER

## 2020-03-13 RX ORDER — SODIUM CHLORIDE 450 MG/100ML
INJECTION, SOLUTION INTRAVENOUS CONTINUOUS
Status: DISCONTINUED | OUTPATIENT
Start: 2020-03-13 | End: 2020-03-13 | Stop reason: HOSPADM

## 2020-03-13 RX ADMIN — PROPOFOL 100 MG: 10 INJECTION, EMULSION INTRAVENOUS at 11:50

## 2020-03-13 RX ADMIN — PROPOFOL 100 MG: 10 INJECTION, EMULSION INTRAVENOUS at 11:46

## 2020-03-13 RX ADMIN — SODIUM CHLORIDE: 4.5 INJECTION, SOLUTION INTRAVENOUS at 11:27

## 2020-03-13 RX ADMIN — PROPOFOL 100 MG: 10 INJECTION, EMULSION INTRAVENOUS at 11:56

## 2020-03-13 ASSESSMENT — PAIN - FUNCTIONAL ASSESSMENT: PAIN_FUNCTIONAL_ASSESSMENT: 0-10

## 2020-03-13 ASSESSMENT — PAIN SCALES - GENERAL: PAINLEVEL_OUTOF10: 0

## 2020-03-13 NOTE — PROGRESS NOTES
Patient here for EGD/ Colonoscopy. Scope used CF SRE# 866. Pictures taken. Cold forceps biopsy taken and placed in 1 jar. EGD/ colonoscopy completed and patient tolerated well.

## 2020-03-13 NOTE — ANESTHESIA PRE PROCEDURE
Department of Anesthesiology  Preprocedure Note       Name:  Tiffany Clifton   Age:  62 y.o.  :  1960                                          MRN:  [id number]         Date:  3/13/2020      Surgeon: Trevor Alegria):  Kiel Aragon MD    Procedure: COLONOSCOPY DIAGNOSTIC (N/A )  EGD ESOPHAGOGASTRODUODENOSCOPY (N/A )    Medications prior to admission:   Prior to Admission medications    Medication Sig Start Date End Date Taking? Authorizing Provider   aspirin 81 MG tablet Take 81 mg by mouth daily   Yes Historical Provider, MD   pravastatin (PRAVACHOL) 40 MG tablet TAKE 1 TABLET BY MOUTH ONCE DAILY 3/10/20  Yes Makayla Omalley MD   olmesartan (BENICAR) 5 MG tablet Take 2 tablets by mouth daily  Patient taking differently: Take 10 mg by mouth nightly  19  Yes CORBY Cordova CNP   fluticasone [facility]) 50 MCG/ACT nasal spray 1 spray by Nasal route daily 19   CORBY Cordova CNP   Ergocalciferol (VITAMIN D2) 2000 units TABS Take 1 tablet by mouth daily 18   Makayla Omalley MD   Cyanocobalamin 1000 MCG CAPS Take 1 capsule by mouth daily 18   Makayla Omalley MD       Current medications:    Current Facility-Administered Medications   Medication Dose Route Frequency Provider Last Rate Last Dose    0.45 % sodium chloride infusion   Intravenous Continuous Kiel Aragon MD 75 mL/hr at 20 1127         Allergies:     Allergies   Allergen Reactions    Other Other (See Comments)     -florin; muscle spasms    Versed [Midazolam]      Hives      Demerol Hives    Midazolam Hcl Hives       Problem List:    Patient Active Problem List   Diagnosis Code    Elevated PSA R97.20    Hypertension I10    Heartburn R12    Hyperlipidemia E78.5    Angina effort I20.8       Past Medical History:        Diagnosis Date    [facility]     CRVO (central retinal vein occlusion) 2016    Right eye    Heartburn     Hyperlipidemia 2010    Hypertension        Past Surgical History:        Procedure Kiara Peak, POCHCT in the last 72 hours. Coags:   Lab Results   Component Value Date    INR 0.97 11/08/2018    APTT 33.6 11/08/2018       HCG (If Applicable): No results found for: PREGTESTUR, PREGSERUM, HCG, HCGQUANT     ABGs: No results found for: PHART, PO2ART, FFA2MFJ, BRE9GUQ, BEART, K5CDWWSC     Type & Screen (If Applicable):  Lab Results   Component Value Date    [address] 11/08/2018       Anesthesia Evaluation  Patient summary reviewed and Nursing notes reviewed no history of anesthetic complications:   Airway: Mallampati: II  TM distance: >3 FB   Neck ROM: full  Mouth opening: > = 3 FB Dental: normal exam         Pulmonary:Negative Pulmonary ROS                              Cardiovascular:    (+) hypertension:, angina:, hyperlipidemia      ECG reviewed      Echocardiogram reviewed  Stress test reviewed                Neuro/Psych:   Negative Neuro/Psych ROS              GI/Hepatic/Renal:   (+) bowel prep,           Endo/Other:    (+) malignancy/cancer. Abdominal:           Vascular:                                      Anesthesia Plan      MAC     ASA 3       Induction: intravenous. Anesthetic plan and risks discussed with patient. Plan discussed with attending.                 Chidi Chen, APRN - CRNA   3/13/2020

## 2020-03-13 NOTE — ANESTHESIA POSTPROCEDURE EVALUATION
Department of Anesthesiology  Postprocedure Note    Patient: Kin Aviles  MRN: [id number]  YOB: 1960  Date of evaluation: 3/13/2020  Time:  12:10 PM     Procedure Summary     Date:  03/13/20 Room / Location:  [address] [place] [address] / [address] [place] [address]    Anesthesia Start:  8127 Anesthesia Stop:  1209    Procedures:       COLONOSCOPY DIAGNOSTIC (N/A )      EGD BIOPSY (N/A ) Diagnosis:  (EPIGASTRIC PAIN, ABD PAIN)    Surgeon:  Prakash Peoples MD Responsible Provider:  Paris Parekh DO    Anesthesia Type:  MAC ASA Status:  3          Anesthesia Type: MAC    Sonia Phase I: Sonia Score: 10    Sonia Phase II:      Last vitals: Reviewed and per EMR flowsheets.        Anesthesia Post Evaluation    Patient location during evaluation: bedside  Patient participation: complete - patient participated  Level of consciousness: awake  Airway patency: patent  Nausea & Vomiting: no nausea and no vomiting  Complications: no  Cardiovascular status: hemodynamically stable  Respiratory status: acceptable and room air  Hydration status: euvolemic

## 2020-03-14 NOTE — H&P
[address] [place] [address]                       PREOPERATIVE HISTORY AND PHYSICAL    PATIENT NAME: Larisa Doherty                      :        1960  MED REC NO:   [id number]                           ROOM:  ACCOUNT NO:   [de-identified]                           ADMIT DATE: 2020  PROVIDER:     Claude Hole, M.D.      DATE OF PROCEDURE:  2020    PROCEDURES:  Esophagogastroduodenoscopy and colonoscopy. INDICATION:  The patient is a 60-year-old pleasant male, who complains  of epigastric abdominal pain; the pain comes and goes, and lower  abdominal discomfort. Denied any blood in the stool or black stools. The patient is undergoing further evaluation. PAST MEDICAL HISTORY:  Hypertension, hyperlipidemia, heart burn, central  retinal vein occlusion of the right eye. PAST SURGICAL HISTORY:  Transurethral resection of the prostrate,  orchiectomy, liver biopsy, colonoscopy in . MEDICATIONS:  Reviewed. PHYSICAL EXAMINATION:  VITAL SIGNS:  Afebrile. Vital signs are stable. Please see electronic  medical records for details. HEART:  Regular. Normal S1 and S2. No S3.  LUNGS:  Equal to expansion on inspection. ABDOMEN:  Soft. Normoactive bowel sounds. Nontender. Not distended. IMPRESSION:  A 60-year-old pleasant male who has abdominal pain, chronic  reflux symptoms. He is undergoing further evaluation. RECOMMENDATIONS:  Keep the patient nothing by mouth, proceed with EGD  and colonoscopy as planned. The risks including but not limited to  perforation, bleeding, infection, adverse reaction to medicine, very  slight chance of missing significant lesions. The patient expressed his  understanding. Further plans pending the above test results.         Jacob North M.D.    D: 2020 11:08:02       T: 2020 11:17:40     [email or web address]  Job#: [id number]     Doc#: [id number]    CC:

## 2020-03-14 NOTE — OP NOTE
#[id number] Moles and greater curvature and body of the  stomach look normal as shown in picture #A4. On retroflex, fundus looks  normal as shown in picture #A5. In the distal esophagus, irregular  Z-line consistent with grade 1 esophagitis noted as shown in picture  #A6. Proximal esophagus looks normal.  Air was withdrawn as the scope  was removed. The patient tolerated the procedure well without any  immediate complications. COLONOSCOPY REPORT:  After the rectal examination, continued on total  intravenous anesthesia, the [id number]-[id number] pediatric colonoscope was  inserted into the rectum and advanced up to the cecum without any  difficulty and terminal ileum was intubated. On careful inspection, the  preparation was good. On withdrawal of the scope, the terminal ileum  looks normal as shown in picture #A2 and A3. Appendiceal orifice and  cecum look normal as shown in picture #A1. Ascending colon looks normal  as shown in picture #A4. Transverse colon looks normal as shown in  picture #A5 and A6. Descending colon looks normal as shown in picture  #A7. Sigmoid colon looks normal as shown in picture #A8. On retroflex  in the rectum, showed small internal hemorrhoids noted as shown in  picture #A9. Air was withdrawn as the scope was removed. The patient  tolerated the procedure well without any immediate complications. Vitals including blood pressure, heart rate and pulse ox were stable  during the procedure and after the procedure. The patient was  transferred to the recovery room in stable condition. IMPRESSION:  Esophagogastroduodenoscopy to second portion of duodenum,  irregular Z-line consistent with grade 1 esophagitis. Multiple biopsies  obtained from the small bowel to rule out malabsorption syndrome. Colonoscopy to distal ileum. Entire colon was normal.    RECOMMENDATIONS:  Antireflux instructions. Follow the biopsy results. Follow up colonoscopy in 10 years.   Follow up with Ronni Berry CNP, to  go over the biopsy results and to reassess clinical response. Thank you, Dr. Brianna Laurent for letting me to do procedure on the patient. Do  not hesitate to call me if you have any questions. My recommendations  are above. Gemma Foster M.D.    D: 03/13/2020 12:16:45       T: 03/13/2020 12:26:07     [email or web address]  Job#: [id number]     Doc#: [id number]    CC:  Mira Laws M.D.

## 2020-03-19 NOTE — PROGRESS NOTES
Patricia Riley MD        [address] [place] [address] [phone]  Dept: [phone]  Dept Fax: [phone]: [address] [address] Urology Office Note -     Patient:  Kelly Diaz  YOB: 1960  Date: 3/18/2020    The patient is a 62 y.o. male who presents today for evaluation of the following problems: Elevated PSA  Chief Complaint   Patient presents with    Follow-up     decipher results     referred/consultation requested by Destiny Baer MD.    HISTORY OF PRESENT ILLNESS:     Prostate Cancer  Onset was  Years ago  Overall, the problem(s) are unchanged  Severity is described as moderate. Associated Symptoms: No dysuria, no gross hematuria. Current Pain Severity: 0    Here with prostate decipher results--matthew six one core  Previous biopsy negative by Matheus Wagner in past.       BPH  Onset was  Years ago  Overall, the problem(s) are unchanged. Severity is described as mild-moderate. Associated Symptoms: No dysuria, no gross hematuria. Current Pain Severity: 0    LUTS chronic. Mild bother  Not on medications for this. Requested/reviewed records from Destiny Baer MD office and/or outside physician/EMR    (Patient's old records have been requested, reviewed and pertinent findings summarized in today's note.)    Procedures Today: N/A      Last several PSA's:  Lab Results   Component Value Date    PSA 3.96 (H) 11/09/2019    PSA 2.42 07/12/2013       Last total testosterone:  No results found for: TESTOSTERONE    Urinalysis today:  No results found for this visit on 03/03/20.     Last BUN and creatinine:  Lab Results   Component Value Date    BUN 16 02/11/2020     Lab Results   Component Value Date    CREATININE 0.8 02/11/2020         Imaging Reviewed during this Office Visit:   imaging independently reviewed by Patricia Riley MD and radiology report verified demonstrating     Mri Prostate W Wo Contrast    Result Date: 12/24/2019  PROCEDURE: MRI PROSTATE W WO CONTRAST CLINICAL INFORMATION: Elevated PSA. COMPARISON: No prior study. TECHNIQUE: Axial T2, coronal T2 and sagittal T2 imaging of the prostate gland. Large field of view axial T2 imaging of the prostate gland. Axial T1, axial T1 VIBE dynamic post tadeo and axial T1 VIBE dynamic subtracted post tadeo images through the prostate gland. Diffusion 50, 800, 1400 and ADC maps through the prostate gland. Axial T1 STAR VIBE post tadeo through the pelvis. 3-D images reconstructed on a separate [employer] Cad workstation with MRI TRUS fusion of prostate mass lesions. CONTRAST: 15 mL ProHance intravenously. LABORATORY: 1. PSA 3.96 ng/mL (11/9/2019) 2. TRUS prostate biopsy (11/28/2012): Negative for malignancy FINDINGS: PROSTATE SIZE: 1. The prostate gland is enlarged measuring 5.0 x 4.3 x 3.2 cm in longitudinal, transverse and AP dimensions. 2. The prostate volume is 42 ml. TRANSITIONAL ZONE: 1. Enlarged and nodular transitional zone consistent with benign prostatic hypertrophy (score 2). 2. There is intermediate decreased T2 weighted signal throughout the prostate base which may be related to postinflammatory changes however infiltrative carcinoma cannot be excluded based on the MRI appearance (score 3). CENTRAL ZONE: 1. Unremarkable. PERIPHERAL ZONE: 1. The peripheral zone is not seen. PROSTATE CAPSULE/NV BUNDLE/SEMINAL VESICLES: Unremarkable. URINARY BLADDER/RECTUM/PELVIC DIAPHRAGM: 1. There is trabeculation of the urinary bladder wall suggesting outlet obstruction. 2. The rectum is unremarkable. 3. The pelvic diaphragm is unremarkable. PATHOLOGICALLY ENLARGED LYMPH NODES: 1. There are a few small retroperitoneal, iliac chain, pelvic and inguinal lymph nodes however there is no pathologically enlarged lymphadenopathy. OSSEOUS STRUCTURES: 1. Unremarkable. OTHER: 1. None.      1. The prostate gland is enlarged measuring 5.0 x 4.3 x 3.2 cm in longitudinal, transverse and Pressure Maternal Uncle     High Cholesterol Maternal Uncle     Coronary Art Dis Maternal Uncle     Coronary Art Dis Maternal Grandmother     Heart Attack Maternal Grandmother     Heart Disease Maternal Grandmother     High Blood Pressure Maternal Grandmother     High Cholesterol Maternal Grandmother     Cancer Maternal Grandfather         unknown type     Diabetes Paternal Grandmother     Other Paternal Grandfather         Black Lung     High Cholesterol Maternal Aunt     High Blood Pressure Maternal Aunt     Heart Disease Maternal Aunt     Heart Attack Maternal Aunt     Coronary Art Dis Maternal Aunt     Coronary Art Dis Maternal Aunt     Cancer Maternal Uncle         stomach     Heart Attack Maternal Uncle     Heart Disease Maternal Uncle     High Blood Pressure Maternal Uncle     High Cholesterol Maternal Uncle     Prostate Cancer Neg Hx      Outpatient Medications Marked as Taking for the 3/3/20 encounter (Office Visit) with Jovon Rajput MD   Medication Sig Dispense Refill    olmesartan (BENICAR) 5 MG tablet Take 2 tablets by mouth daily (Patient taking differently: Take 10 mg by mouth nightly ) 180 tablet 1    fluticasone (FLONASE) 50 MCG/ACT nasal spray 1 spray by Nasal route daily 1 Bottle 3    [DISCONTINUED] pravastatin (PRAVACHOL) 40 MG tablet TAKE ONE TABLET BY MOUTH ONCE DAILY 90 tablet 1    Ergocalciferol (VITAMIN D2) 2000 units TABS Take 1 tablet by mouth daily 90 tablet 1    Cyanocobalamin 1000 MCG CAPS Take 1 capsule by mouth daily 90 capsule 1       Other; Versed [midazolam];  Demerol; and Midazolam hcl  Social History     Tobacco Use   Smoking Status Never Smoker   Smokeless Tobacco Never Used      (If patient a smoker, smoking cessation counseling offered)   Social History     Substance and Sexual Activity   Alcohol Use Yes    Comment: occ beer       REVIEW OF SYSTEMS:  Constitutional: negative  Eyes: negative  Respiratory: negative  Cardiovascular: negative  Gastrointestinal: negative  Genitourinary: see HPI  Musculoskeletal: negative  Skin: negative   Neurological: negative  Hematological/Lymphatic: negative  Psychological: negative    Physical Exam:    This a 62 y.o. male  There were no vitals filed for this visit. Body mass index is 28.02 kg/m². Constitutional: Patient in no acute distress;           Assessment and Plan        1. Prostate cancer ([address]    2. Benign localized prostatic hyperplasia with lower urinary tract symptoms (LUTS)               Plan:      Discussed decipher results  Follow up in six months with OPHELIA and PSA prior  Prostate Cancer Treatment Options  I have discussed in great detail with the patient the natural history, diagnosis and treatment of prostate cancer. I explained the Edgar grading system as well as the   various treatments available for prostate cancer. I discussed the following options:    1. Watchful waiting / Active surveillance. I told that this approach was appropriate for older patients, patients with a life expectancy of 10 years or less and patients with low grade, low volume disease. This approach will require serial OPHELIA's, PSA's and possibly repeat prostate biopsy to check for grade or stage progression. 2.  Hormonal Therapy. I discussed the role of hormonal therapy in the form of LHRH agonists or antagonists such as Lupron, etc. Or surgical castration in the form of bilateral orchiectomy. The patient was told that this is primarily used in patients with saurabh or metastatic disease or in conjunction with radiation therapy. The side effects include hot flashes, fatigue, breast enlargement, diminished libido, ED and long term development of osteoporosis. The patient was told he will encouraged to take supplemental Calcium and Vitamin D to prevent the latter. 3.  Radiation Therapy in the form of external beam radiation (IMRT) or prostate brachytherapy.  Patient told that IMRT is given 5 days a week for 7-8 weeks and the side effects include rectal and bladder injury (OAB), erectile dysfunction (ED) and incontinence. Long term the patient may experience hematuria and radiation cystitis. Brachytherapy is done as an outpatient procedure under general anesthesia and postop the patient may experience dysuria, urgency, frequency, urge incontinence, increasing obstructive voiding symptoms and to a lesser degree than IMRT, rectal radiation injury and ED. Patients may require concomitant hormonal therapy with IMRT depending on the grade and extent of cancer. The patient may require hormonal therapy to downsize the prostate gland prior to brachytherapy. 4.  Open or Robotic assisted laparoscopic radical prostatectomy. Patient told about the options of open vs. Robotic assisted laparoscopic prostatectomy with or without pelvic lymphadenectomy. I discussed the risks including infection, bleeding, the risks of anesthesia, DVT, PE, MI, stroke, death, rectal injury and urethral stricture/bladder neck contracture. I discussed the side effect of stress urinary incontinence which is temporary for most patients. I discussed the side effect of ED and the fact that it may take 6-18 months to recover this function. 5.  Cryotherapy. I discussed the fact that this treatment option has a 100% ED rate and is used primarily for radiation treatment failures. Prescriptions Ordered:  No orders of the defined types were placed in this encounter.      Orders Placed:  Orders Placed This Encounter   Procedures    PSA, Diagnostic     Standing Status:   Future     Standing Expiration Date:   3/3/2021            Daniele Cote MD

## 2020-04-03 RX ORDER — OLMESARTAN MEDOXOMIL 5 MG/1
TABLET ORAL
Qty: 180 TABLET | Refills: 1 | Status: SHIPPED | OUTPATIENT
Start: 2020-04-03 | End: 2020-10-19

## 2020-04-08 ENCOUNTER — VIRTUAL VISIT (OUTPATIENT)
Dept: FAMILY MEDICINE CLINIC | Age: 60
End: 2020-04-08
Payer: COMMERCIAL

## 2020-04-08 PROCEDURE — 99213 OFFICE O/P EST LOW 20 MIN: CPT | Performed by: FAMILY MEDICINE

## 2020-04-08 NOTE — PROGRESS NOTES
2020    TELEHEALTH EVALUATION -- Audio/Visual (During SYBFJ-37 public health emergency)    HPI:    Jaci Brown (:  1960) has requested an audio/video evaluation for the following concern(s): hypertension and hypercholesterolemia. Couple days ago he had swollen glands in his neck but today are is much better    Hypertension: He is taking Benicar 5 mg 1 tablet by mouth daily. He denies any side effects from the medications. He is adherent to low-sodium diet. His blood pressure is well controlled at home. Today during the visit it was 138/72. He denies any chest pain, shortness of breath, or edema. Cardiovascular risk factors: Hypercholesterolemia, family history of premature cardiovascular disease, hypertension, male gender and sedentary lifestyle. Hypercholesterolemia: He is taking Pravachol 40 mg 1 tablet by mouth nightly and denies any side effects from the medication. Last cholesterol was 20 May 30, 2018 and show a total cholesterol 182, triglycerides 111, HDL 70 and LDL 90. Since then he has checked his liver profile which has been within acceptable ranges but we do not have a lipid profile. Since I saw him last time he had an EGD with duodenal biopsy performed which showed normal villiform small bowel mucosa. He also has seen the urologist for his recently diagnosed prostate cancer. He is no getting any treatment at the present time. He denies any symptoms and feels well overall. Since we have the coronavirus pandemic he has been at home practicing social isolation with his wife. Review of Systems: Positive for mild tenderness lymphadenopathy. No fever, chills, nausea, vomiting, shortness of breath or cough. Prior to Visit Medications    Medication Sig Taking?  Authorizing Provider   olmesartan (BENICAR) 5 MG tablet Take 2 tablets by mouth once daily  Jamshid Rico, APRN - CNP   aspirin 81 MG tablet Take 81 mg by mouth daily  Historical Provider, MD   pravastatin

## 2020-08-18 ENCOUNTER — OFFICE VISIT (OUTPATIENT)
Dept: UROLOGY | Age: 60
End: 2020-08-18
Payer: COMMERCIAL

## 2020-08-18 VITALS — HEIGHT: 65 IN | TEMPERATURE: 98.5 F | BODY MASS INDEX: 32.71 KG/M2 | WEIGHT: 196.3 LBS

## 2020-08-18 LAB
BILIRUBIN URINE: NEGATIVE
BLOOD URINE, POC: ABNORMAL
CHARACTER, URINE: CLEAR
COLOR, URINE: YELLOW
GLUCOSE URINE: NEGATIVE MG/DL
KETONES, URINE: NEGATIVE
LEUKOCYTE CLUMPS, URINE: ABNORMAL
NITRITE, URINE: NEGATIVE
PH, URINE: 6.5 (ref 5–9)
PROTEIN, URINE: NEGATIVE MG/DL
SPECIFIC GRAVITY, URINE: 1.02 (ref 1–1.03)
UROBILINOGEN, URINE: 0.2 EU/DL (ref 0–1)

## 2020-08-18 PROCEDURE — 81003 URINALYSIS AUTO W/O SCOPE: CPT | Performed by: UROLOGY

## 2020-08-18 PROCEDURE — 99214 OFFICE O/P EST MOD 30 MIN: CPT | Performed by: UROLOGY

## 2020-08-18 RX ORDER — IBUPROFEN 200 MG
200 TABLET ORAL EVERY 6 HOURS PRN
COMMUNITY
End: 2020-11-17 | Stop reason: ALTCHOICE

## 2020-08-18 RX ORDER — CEPHALEXIN 500 MG/1
500 CAPSULE ORAL 3 TIMES DAILY
Qty: 21 CAPSULE | Refills: 0 | Status: SHIPPED | OUTPATIENT
Start: 2020-08-18 | End: 2020-08-25

## 2020-08-18 RX ORDER — TAMSULOSIN HYDROCHLORIDE 0.4 MG/1
0.4 CAPSULE ORAL DAILY
Qty: 90 CAPSULE | Refills: 3 | Status: SHIPPED | OUTPATIENT
Start: 2020-08-18 | End: 2020-10-19

## 2020-08-18 NOTE — PROGRESS NOTES
Ainsley Day MD        [address] [place] [address] [phone]  Dept: [phone]  Dept Fax: [phone]: [address] [address] Urology Office Note -     Patient:  Valerio Muñoz  YOB: 1960  Date: 9/7/2020    The patient is a 62 y.o. male who presents today for evaluation of the following problems: Elevated PSA  Chief Complaint   Patient presents with    Follow-up     burning with urination and flank pain since thursday. took 2 azo pills. referred/consultation requested by Lorena Kiran MD.    HISTORY OF PRESENT ILLNESS:     Prostate Cancer  Onset was  Years ago  Overall, the problem(s) are unchanged  Severity is described as moderate. Associated Symptoms: No dysuria, no gross hematuria. Current Pain Severity: 0    Here with prostate decipher results--matthew six one core  Previous biopsy negative by Adrian Walters in past.       BPH  Onset was  Years ago  Overall, the problem(s) are unchanged. Severity is described as mild-moderate. Associated Symptoms: No dysuria, no gross hematuria. Current Pain Severity: 0    LUTS chronic. Mild bother  Not on medications for this. Discussed benefits of starting flomax. Acute cystitis-   He just recently started experiencing dysuria. He has been taking AZO which has helped. He has had something like this in the past. UA today shows a trace of blood and a moderate amount of leukocytes.          Requested/reviewed records from Lorena Kiran MD office and/or outside physician/EMR    (Patient's old records have been requested, reviewed and pertinent findings summarized in today's note.)    Procedures Today: N/A      Last several PSA's:  Lab Results   Component Value Date    PSA 3.96 (H) 11/09/2019    PSA 2.42 07/12/2013       Last total testosterone:  No results found for: TESTOSTERONE    Urinalysis today:  Results for POC orders placed in visit on 08/18/20   POCT Urinalysis No Micro (Auto)   Result Value Ref Range    Glucose, Ur Negative NEGATIVE mg/dl    Bilirubin Urine Negative     Ketones, Urine Negative NEGATIVE    Specific Gravity, Urine 1.020 1.002 - 1.03    Blood, UA POC Trace-intact NEGATIVE    pH, Urine 6.50 5.0 - 9.0    Protein, Urine Negative NEGATIVE mg/dl    Urobilinogen, Urine 0.20 0.0 - 1.0 eu/dl    Nitrite, Urine Negative NEGATIVE    Leukocyte Clumps, Urine Moderate (A) NEGATIVE    Color, Urine Yellow YELLOW-STR    Character, Urine Clear [email or web address]       Last BUN and creatinine:  Lab Results   Component Value Date    BUN 16 02/11/2020     Lab Results   Component Value Date    CREATININE 0.8 02/11/2020         Imaging Reviewed during this Office Visit:   imaging independently reviewed by Briana Herrera MD and radiology report verified demonstrating     Mri Prostate W Wo Contrast    Result Date: 12/24/2019  PROCEDURE: MRI PROSTATE W WO CONTRAST CLINICAL INFORMATION: Elevated PSA. COMPARISON: No prior study. TECHNIQUE: Axial T2, coronal T2 and sagittal T2 imaging of the prostate gland. Large field of view axial T2 imaging of the prostate gland. Axial T1, axial T1 VIBE dynamic post tadeo and axial T1 VIBE dynamic subtracted post tadeo images through the prostate gland. Diffusion 50, 800, 1400 and ADC maps through the prostate gland. Axial T1 STAR VIBE post tadeo through the pelvis. 3-D images reconstructed on a separate [employer] Cad workstation with MRI TRUS fusion of prostate mass lesions. CONTRAST: 15 mL ProHance intravenously. LABORATORY: 1. PSA 3.96 ng/mL (11/9/2019) 2. TRUS prostate biopsy (11/28/2012): Negative for malignancy FINDINGS: PROSTATE SIZE: 1. The prostate gland is enlarged measuring 5.0 x 4.3 x 3.2 cm in longitudinal, transverse and AP dimensions. 2. The prostate volume is 42 ml. TRANSITIONAL ZONE: 1. Enlarged and nodular transitional zone consistent with benign prostatic hypertrophy (score 2).  2. There is intermediate decreased T2 weighted signal throughout the prostate base which may be related to postinflammatory changes however infiltrative carcinoma cannot be excluded based on the MRI appearance (score 3). CENTRAL ZONE: 1. Unremarkable. PERIPHERAL ZONE: 1. The peripheral zone is not seen. PROSTATE CAPSULE/NV BUNDLE/SEMINAL VESICLES: Unremarkable. URINARY BLADDER/RECTUM/PELVIC DIAPHRAGM: 1. There is trabeculation of the urinary bladder wall suggesting outlet obstruction. 2. The rectum is unremarkable. 3. The pelvic diaphragm is unremarkable. PATHOLOGICALLY ENLARGED LYMPH NODES: 1. There are a few small retroperitoneal, iliac chain, pelvic and inguinal lymph nodes however there is no pathologically enlarged lymphadenopathy. OSSEOUS STRUCTURES: 1. Unremarkable. OTHER: 1. None. 1. The prostate gland is enlarged measuring 5.0 x 4.3 x 3.2 cm in longitudinal, transverse and AP dimensions. 2. The prostate volume is 42 ml. 3. Enlarged and nodular transitional zone consistent with benign prostatic hypertrophy (score 2). 4. There is intermediate decreased T2 weighted signal throughout the prostate base which may be related to postinflammatory changes however infiltrative carcinoma cannot be excluded based on the MRI appearance (score 3). 5. There is trabeculation of the urinary bladder wall suggesting outlet obstruction. 6. PI-RADS assessment category 3. **This report has been created using voice recognition software. It may contain minor errors which are inherent in voice recognition technology. ** Final report electronically signed by Dr. Ivelisse Gordon on 12/24/2019 9:19 AM      PAST MEDICAL, FAMILY AND SOCIAL HISTORY:  Past Medical History:   Diagnosis Date    Cancer ([address]     CRVO (central retinal vein occlusion) 01/2016    Right eye    Heartburn     Hyperlipidemia 2010    Hypertension 2010     Past Surgical History:   Procedure Laterality Date    COLONOSCOPY  2009    WNL     COLONOSCOPY N/A 3/13/2020    COLONOSCOPY DIAGNOSTIC performed by Shavon Valles Hong Young MD at [address] [facility] BIOPSY  2012    benign     TESTICLE REMOVAL  1994    Lt    ULTRASOUND PROSTATE/TRANSRECTAL N/A 1/16/2020    TRANSRECTAL ULTRASOUND WITH PROSTATE BX performed by Marybeth Latham MD at [facility] N/A 3/13/2020    EGD BIOPSY performed by Stan Barboza MD at [facility]     Family History   Problem Relation Age of Onset    Cancer Mother 72        lung     Heart Disease Mother 58    Coronary Art Dis Mother 58    Heart Disease Father 68    Heart Attack Father 68    Coronary Art Dis Father 68    High Blood Pressure Father     High Cholesterol Father     Diabetes Father         borderline    High Blood Pressure Sister 45    High Cholesterol Sister 45    Heart Attack Maternal Uncle     Heart Disease Maternal Uncle     High Blood Pressure Maternal Uncle     High Cholesterol Maternal Uncle     Coronary Art Dis Maternal Uncle     Coronary Art Dis Maternal Grandmother     Heart Attack Maternal Grandmother     Heart Disease Maternal Grandmother     High Blood Pressure Maternal Grandmother     High Cholesterol Maternal Grandmother     Cancer Maternal Grandfather         unknown type     Diabetes Paternal Grandmother     Other Paternal Grandfather         Black Lung     High Cholesterol Maternal Aunt     High Blood Pressure Maternal Aunt     Heart Disease Maternal Aunt     Heart Attack Maternal Aunt     Coronary Art Dis Maternal Aunt     Coronary Art Dis Maternal Aunt     Cancer Maternal Uncle         stomach     Heart Attack Maternal Uncle     Heart Disease Maternal Uncle     High Blood Pressure Maternal Uncle     High Cholesterol Maternal Uncle     Prostate Cancer Neg Hx      Outpatient Medications Marked as Taking for the 8/18/20 encounter (Office Visit) with Marybeth Latham MD   Medication Sig Dispense Refill    ibuprofen (ADVIL;MOTRIN) 200 MG tablet Take 200 mg by mouth every 6 hours as PSA.       Prescriptions Ordered:  Orders Placed This Encounter   Medications    cephALEXin (KEFLEX) 500 MG capsule     Sig: Take 1 capsule by mouth 3 times daily for 7 days     Dispense:  21 capsule     Refill:  0    tamsulosin (FLOMAX) 0.4 MG capsule     Sig: Take 1 capsule by mouth daily     Dispense:  90 capsule     Refill:  3      Orders Placed:  Orders Placed This Encounter   Procedures    Culture, Urine     Order Specific Question:   Specify (ex-cath, midstream, cysto, etc)?      Answer:   midstream    PSA Prostatic Specific Antigen     Standing Status:   Future     Standing Expiration Date:   8/18/2021    POCT Urinalysis No Micro (Auto)            SPEEDY Terry MD

## 2020-08-20 ENCOUNTER — TELEPHONE (OUTPATIENT)
Dept: UROLOGY | Age: 60
End: 2020-08-20

## 2020-08-20 LAB
ORGANISM: ABNORMAL
URINE CULTURE, ROUTINE: ABNORMAL

## 2020-08-20 NOTE — TELEPHONE ENCOUNTER
Sent below message via charity: water Dr Deshaun Cheatham this is Sekou Bates. .. Pt: Ronaldo Villalobos  MRN:  [id number]    8/20/2020  7:49 AM - Terrence Goodwin Render Incoming Lab Results From Soft     Specimen Information:  Urine, clean catch        Component  Collected  Lab   Organism Abnormal    08/18/2020  4:03 PM  [facility]   Enterococcus faecalis - (Group D)   Urine Culture, Routine  08/18/2020  4:03 PM  [facility]   [place] count: >100,000 CFU/mL    Testing Performed By     Lab - Abbreviation  Name  Director  Address  Valid Date Range   [facility] [address]  LAB  Yadiel Casiano MD  [address] [place] [id number]  08/30/17 0855-Present   Narrative   Performed by: [facility]   Source: urine, clean catch       Site: transport kit          Current Antibiotics: not   stated   Susceptibility     Enterococcus  faecalis (1)     Antibiotic  Interpretation  MARCIN  Status    Penicillin G  Sensitive  2  mcg/mL  Final    ampicillin  Sensitive  <=2  mcg/mL  Final    nitrofurantoin  Sensitive  <=16  mcg/mL  Final          Plan:      Keflex 500 TID for a week for dysuria- possible infection. BPH- start flomax. UTI- will send for culture. He is to have a PSA next month. We will change that to 3 months. We will see him back in 3 months with a PSA.

## 2020-08-21 NOTE — TELEPHONE ENCOUNTER
Spoke to Wyatt at [facility] [address] and gave verbal order for Macrobid 100 mg BID for 10 days. She voiced understanding. Patient advised and voiced understanding. He will call if no improvement in 10 days.

## 2020-08-28 ENCOUNTER — HOSPITAL ENCOUNTER (OUTPATIENT)
Age: 60
Discharge: HOME | End: 2020-08-28
Payer: COMMERCIAL

## 2020-08-28 ENCOUNTER — TELEPHONE (OUTPATIENT)
Dept: FAMILY MEDICINE CLINIC | Age: 60
End: 2020-08-28

## 2020-08-28 PROCEDURE — U0003 INFECTIOUS AGENT DETECTION BY NUCLEIC ACID (DNA OR RNA); SEVERE ACUTE RESPIRATORY SYNDROME CORONAVIRUS 2 (SARS-COV-2) (CORONAVIRUS DISEASE [COVID-19]), AMPLIFIED PROBE TECHNIQUE, MAKING USE OF HIGH THROUGHPUT TECHNOLOGIES AS DESCRIBED BY CMS-2020-01-R: HCPCS

## 2020-08-28 NOTE — TELEPHONE ENCOUNTER
Pt's wife stated that pt is at work until 3pm. The pt is only experiencing SOB no other symptoms at this moment. Would you like to do a Virtual visit after he gets off of work?

## 2020-08-28 NOTE — TELEPHONE ENCOUNTER
Pt is calling and needs an order for a covid test sent to [facility].   Please advise pt at [phone]

## 2020-08-30 LAB — SARS-COV-2: NOT DETECTED

## 2020-08-31 ENCOUNTER — TELEPHONE (OUTPATIENT)
Dept: FAMILY MEDICINE CLINIC | Age: 60
End: 2020-08-31

## 2020-09-16 RX ORDER — PRAVASTATIN SODIUM 40 MG
TABLET ORAL
Qty: 90 TABLET | Refills: 0 | Status: SHIPPED | OUTPATIENT
Start: 2020-09-16 | End: 2020-10-19 | Stop reason: SDUPTHER

## 2020-09-16 NOTE — TELEPHONE ENCOUNTER
Please approve or deny     Last Visit Date:  4/8/2020       Next Visit Date:    Visit date not found. Outstanding lipid order from 4-8-2020 needs to be completed. From last appointment patient needs to schedule a 6 month follow up which would be around 10-8-2020. [employer] message sent to patient to schedule. ,

## 2020-10-19 ENCOUNTER — OFFICE VISIT (OUTPATIENT)
Dept: FAMILY MEDICINE CLINIC | Age: 60
End: 2020-10-19
Payer: COMMERCIAL

## 2020-10-19 VITALS
DIASTOLIC BLOOD PRESSURE: 76 MMHG | WEIGHT: 197.8 LBS | HEIGHT: 69 IN | RESPIRATION RATE: 12 BRPM | BODY MASS INDEX: 29.3 KG/M2 | TEMPERATURE: 97.2 F | SYSTOLIC BLOOD PRESSURE: 128 MMHG | HEART RATE: 67 BPM

## 2020-10-19 PROCEDURE — 99214 OFFICE O/P EST MOD 30 MIN: CPT | Performed by: FAMILY MEDICINE

## 2020-10-19 PROCEDURE — 90471 IMMUNIZATION ADMIN: CPT | Performed by: FAMILY MEDICINE

## 2020-10-19 PROCEDURE — 90686 IIV4 VACC NO PRSV 0.5 ML IM: CPT | Performed by: FAMILY MEDICINE

## 2020-10-19 RX ORDER — PRAVASTATIN SODIUM 40 MG
40 TABLET ORAL DAILY
Qty: 90 TABLET | Refills: 1 | Status: SHIPPED | OUTPATIENT
Start: 2020-10-19 | End: 2021-05-06 | Stop reason: SDUPTHER

## 2020-10-19 RX ORDER — OLMESARTAN MEDOXOMIL 5 MG/1
TABLET ORAL
Qty: 180 TABLET | Refills: 0 | Status: SHIPPED | OUTPATIENT
Start: 2020-10-19 | End: 2021-01-26

## 2020-10-19 NOTE — PROGRESS NOTES
[address]  [address] [place] [address], [address]  Ph:   [phone]  Fax: [phone]    Provider:  Dr. Yaneth Paredes    Patient:  Hubert Villegas  YOB: 1960      Visit Date:  10/19/2020     Reason For Visit:   Chief Complaint   Patient presents with    Follow-up    Hypertension    Arthritis     Stiffness and pain in right hand joints x 2 months         Beto Mcneil is a 62 y.o. male who comes today to the office for follow up HTN, Hypercholesterolemia, Vitamin D    He states he has been doing well, taking his medications as prescribed. He denies any side effects from his medications. Hypertension: He is taking Benicar 5 mg 1 tablet by mouth daily. He denies any side effects from the medications. He is adherent to low-sodium diet. His blood pressure is well controlled at home. He denies any chest pain, shortness of breath, or edema. Cardiovascular risk factors: Hypercholesterolemia, family history of premature cardiovascular disease, hypertension, male gender and sedentary lifestyle.     Hypercholesterolemia: He is taking Pravachol 40 mg 1 tablet by mouth nightly and denies any side effects from the medication. Last cholesterol was 20 May 30, 2018 and show a total cholesterol 182, triglycerides 111, HDL 70 and LDL 90. Since then he has checked his liver profile which has been within acceptable ranges but we do not have a lipid profile. Vitamin D:  Back in 5/30/2018 his level was 15 and he was advised to take vitamin D 3 OTC, but he is not taking replacement. Arthritis in hand:  for 3 months  Computer   Right hand worse,  Not the kleft  Right handed      Significant Past Medical History:    Past Medical History:   Diagnosis Date    Cancer ([address]     CRVO (central retinal vein occlusion) 01/2016    Right eye    Heartburn     Hyperlipidemia 2010    Hypertension 2010           Allergies:  is allergic to other; versed [midazolam]; demerol; and midazolam hcl.     Medications: Current Outpatient Medications   Medication Sig Dispense Refill    pravastatin (PRAVACHOL) 40 MG tablet Take 1 tablet by mouth daily 90 tablet 1    ibuprofen (ADVIL;MOTRIN) 200 MG tablet Take 200 mg by mouth every 6 hours as needed for Pain As needed      olmesartan (BENICAR) 5 MG tablet Take 2 tablets by mouth once daily 180 tablet 1    aspirin 81 MG tablet Take 81 mg by mouth daily      fluticasone (FLONASE) 50 MCG/ACT nasal spray 1 spray by Nasal route daily 1 Bottle 3    Cyanocobalamin 1000 MCG CAPS Take 1 capsule by mouth daily 90 capsule 1     No current facility-administered medications for this visit.         Review of systems:  Review of Systems - History obtained from chart review and the patient  General ROS: negative for - chills, fatigue or fever  Psychological ROS: negative for - anxiety, depression or sleep disturbances  ENT ROS: negative for - headaches, nasal congestion or nasal discharge  Allergy and Immunology ROS: negative for - seasonal allergies  Hematological and Lymphatic ROS: negative for - bruising  Endocrine ROS: negative for - malaise/lethargy, polydipsia/polyuria or temperature intolerance  Respiratory ROS: negative for - cough,  shortness of breath or wheezing  Cardiovascular ROS: negative for - chest pain or edema  Gastrointestinal ROS: negative for - abdominal pain, constipation, diarrhea or nausea/vomiting  Musculoskeletal ROS: negative for - joint pain or muscle pain  Neurological ROS: negative for - dizziness  Dermatological ROS: negative for - rash    Physical Examination:  /76 (Site: Right Upper Arm, Position: Sitting, Cuff Size: Large Adult)   Pulse 67   Temp 97.2 °F (36.2 °C) (Temporal)   Resp 12   Ht 5' 8.5\" (1.74 m)   Wt 197 lb 12.8 oz (89.7 kg)   BMI 29.64 kg/m²     General-  Alert and oriented x 3, NAD  HEENT: NC, AT, PERRLA, EOMI, anicteric sclerae  Ears: Normal tympanic membranes bilaterally  Nose: patent, no lesions  Mouth: no lesions, moist

## 2020-10-19 NOTE — TELEPHONE ENCOUNTER
Please approve or deny     Last Visit Date:  4/8/2020       Next Visit Date:    Visit date not found   ** Left message for patient to call the office to make an appointment

## 2020-11-06 ENCOUNTER — HOSPITAL ENCOUNTER (OUTPATIENT)
Age: 60
Discharge: HOME | End: 2020-11-06
Payer: COMMERCIAL

## 2020-11-06 ENCOUNTER — HOSPITAL ENCOUNTER (OUTPATIENT)
Dept: GENERAL RADIOLOGY | Age: 60
Discharge: HOME | End: 2020-11-06
Payer: COMMERCIAL

## 2020-11-06 LAB
ALBUMIN SERPL-MCNC: 4.6 G/DL (ref 3.5–5.1)
ALP BLD-CCNC: 63 U/L (ref 38–126)
ALT SERPL-CCNC: 32 U/L (ref 11–66)
ANION GAP SERPL CALCULATED.3IONS-SCNC: 13 MEQ/L (ref 8–16)
AST SERPL-CCNC: 18 U/L (ref 5–40)
BACTERIA: ABNORMAL
BASOPHILS # BLD: 0.6 %
BASOPHILS ABSOLUTE: 0.1 THOU/MM3 (ref 0–0.1)
BILIRUB SERPL-MCNC: 0.4 MG/DL (ref 0.3–1.2)
BILIRUBIN URINE: NEGATIVE
BLOOD, URINE: NEGATIVE
BUN BLDV-MCNC: 13 MG/DL (ref 7–22)
CALCIUM SERPL-MCNC: 9.6 MG/DL (ref 8.5–10.5)
CASTS: ABNORMAL /LPF
CASTS: ABNORMAL /LPF
CHARACTER, URINE: CLEAR
CHLORIDE BLD-SCNC: 103 MEQ/L (ref 98–111)
CHOLESTEROL, TOTAL: 197 MG/DL (ref 100–199)
CO2: 25 MEQ/L (ref 23–33)
COLOR: YELLOW
CREAT SERPL-MCNC: 0.7 MG/DL (ref 0.4–1.2)
CRYSTALS: ABNORMAL
EOSINOPHIL # BLD: 3.2 %
EOSINOPHILS ABSOLUTE: 0.3 THOU/MM3 (ref 0–0.4)
EPITHELIAL CELLS, UA: ABNORMAL /HPF
ERYTHROCYTE [DISTWIDTH] IN BLOOD BY AUTOMATED COUNT: 12.4 % (ref 11.5–14.5)
ERYTHROCYTE [DISTWIDTH] IN BLOOD BY AUTOMATED COUNT: 40.5 FL (ref 35–45)
GFR SERPL CREATININE-BSD FRML MDRD: > 90 ML/MIN/1.73M2
GLUCOSE BLD-MCNC: 96 MG/DL (ref 70–108)
GLUCOSE, URINE: NEGATIVE MG/DL
HCT VFR BLD CALC: 46.8 % (ref 42–52)
HDLC SERPL-MCNC: 58 MG/DL
HEMOGLOBIN: 15.5 GM/DL (ref 14–18)
IMMATURE GRANS (ABS): 0.02 THOU/MM3 (ref 0–0.07)
IMMATURE GRANULOCYTES: 0.2 %
KETONES, URINE: NEGATIVE
LDL CHOLESTEROL CALCULATED: 104 MG/DL
LEUKOCYTE ESTERASE, URINE: ABNORMAL
LYMPHOCYTES # BLD: 31.2 %
LYMPHOCYTES ABSOLUTE: 2.6 THOU/MM3 (ref 1–4.8)
MCH RBC QN AUTO: 29.5 PG (ref 26–33)
MCHC RBC AUTO-ENTMCNC: 33.1 GM/DL (ref 32.2–35.5)
MCV RBC AUTO: 89 FL (ref 80–94)
MISCELLANEOUS LAB TEST RESULT: ABNORMAL
MONOCYTES # BLD: 6.5 %
MONOCYTES ABSOLUTE: 0.5 THOU/MM3 (ref 0.4–1.3)
NITRITE, URINE: NEGATIVE
NUCLEATED RED BLOOD CELLS: 0 /100 WBC
PH UA: 6 (ref 5–9)
PLATELET # BLD: 308 THOU/MM3 (ref 130–400)
PMV BLD AUTO: 9.7 FL (ref 9.4–12.4)
POTASSIUM SERPL-SCNC: 4 MEQ/L (ref 3.5–5.2)
PROSTATE SPECIFIC ANTIGEN: 4.4 NG/ML (ref 0–1)
PROTEIN UA: NEGATIVE MG/DL
RBC # BLD: 5.26 MILL/MM3 (ref 4.7–6.1)
RBC URINE: ABNORMAL /HPF
RENAL EPITHELIAL, UA: ABNORMAL
SEG NEUTROPHILS: 58.3 %
SEGMENTED NEUTROPHILS ABSOLUTE COUNT: 4.9 THOU/MM3 (ref 1.8–7.7)
SODIUM BLD-SCNC: 141 MEQ/L (ref 135–145)
SPECIFIC GRAVITY UA: 1.02 (ref 1–1.03)
TOTAL PROTEIN: 6.9 G/DL (ref 6.1–8)
TRIGL SERPL-MCNC: 174 MG/DL (ref 0–199)
TSH SERPL DL<=0.05 MIU/L-ACNC: 2.81 UIU/ML (ref 0.4–4.2)
UROBILINOGEN, URINE: 0.2 EU/DL (ref 0–1)
VITAMIN D 25-HYDROXY: 17 NG/ML (ref 30–100)
WBC # BLD: 8.4 THOU/MM3 (ref 4.8–10.8)
WBC UA: ABNORMAL /HPF
YEAST: ABNORMAL

## 2020-11-06 PROCEDURE — 81001 URINALYSIS AUTO W/SCOPE: CPT

## 2020-11-06 PROCEDURE — 73120 X-RAY EXAM OF HAND: CPT

## 2020-11-06 PROCEDURE — 84153 ASSAY OF PSA TOTAL: CPT

## 2020-11-06 PROCEDURE — 84443 ASSAY THYROID STIM HORMONE: CPT

## 2020-11-06 PROCEDURE — 36415 COLL VENOUS BLD VENIPUNCTURE: CPT

## 2020-11-06 PROCEDURE — 85025 COMPLETE CBC W/AUTO DIFF WBC: CPT

## 2020-11-06 PROCEDURE — 82306 VITAMIN D 25 HYDROXY: CPT

## 2020-11-06 PROCEDURE — 80053 COMPREHEN METABOLIC PANEL: CPT

## 2020-11-06 PROCEDURE — 80061 LIPID PANEL: CPT

## 2020-11-10 ENCOUNTER — TELEPHONE (OUTPATIENT)
Dept: FAMILY MEDICINE CLINIC | Age: 60
End: 2020-11-10

## 2020-11-17 ENCOUNTER — OFFICE VISIT (OUTPATIENT)
Dept: UROLOGY | Age: 60
End: 2020-11-17
Payer: COMMERCIAL

## 2020-11-17 VITALS — HEIGHT: 68 IN | BODY MASS INDEX: 30.01 KG/M2 | WEIGHT: 198 LBS | TEMPERATURE: 97.2 F

## 2020-11-17 PROCEDURE — 99214 OFFICE O/P EST MOD 30 MIN: CPT | Performed by: UROLOGY

## 2020-11-17 NOTE — PROGRESS NOTES
Nadine Jacobo MD        [address] [place] [address] [phone]  Dept: [phone]  Dept Fax: [phone]: [address] [address] Urology Office Note -     Patient:  Linda Pak  YOB: 1960  Date: 11/17/2020    The patient is a 62 y.o. male who presents today for evaluation of the following problems: Elevated PSA  No chief complaint on file. referred/consultation requested by lEsa Sainz MD.    HISTORY OF PRESENT ILLNESS:     Prostate Cancer  Onset was  Years ago  Overall, the problem(s) are unchanged  Severity is described as moderate. Associated Symptoms: No dysuria, no gross hematuria. Current Pain Severity: 0      on active surveillance  Past decipher results--matthew six one core  Previous biopsy negative by Michelle Diaz in past.     PSA trend  11/6/20- 4.40  11/9/19- 3.96  7/12/13-2.96        BPH  Onset was  Years ago  Overall, the problem(s) are stable. Severity is described as mild-moderate. Associated Symptoms: No dysuria, no gross hematuria. Current Pain Severity: 0    LUTS chronic. Mild bother  Not on medications for this. Discussed benefits of starting flomax in past.      Acute cystitis-   He just recently started experiencing dysuria. He has been taking AZO which has helped. He has had something like this in the past. UA today shows a trace of blood and a moderate amount of leukocytes. Requested/reviewed records from Elsa Sainz MD office and/or outside physician/EMR    (Patient's old records have been requested, reviewed and pertinent findings summarized in today's note.)    Procedures Today: N/A      Last several PSA's:  Lab Results   Component Value Date    PSA 4.40 (H) 11/06/2020    PSA 3.96 (H) 11/09/2019    PSA 2.42 07/12/2013       Last total testosterone:  No results found for: TESTOSTERONE    Urinalysis today:  No results found for this visit on 11/17/20.     Last BUN and NODES: 1. There are a few small retroperitoneal, iliac chain, pelvic and inguinal lymph nodes however there is no pathologically enlarged lymphadenopathy. OSSEOUS STRUCTURES: 1. Unremarkable. OTHER: 1. None. 1. The prostate gland is enlarged measuring 5.0 x 4.3 x 3.2 cm in longitudinal, transverse and AP dimensions. 2. The prostate volume is 42 ml. 3. Enlarged and nodular transitional zone consistent with benign prostatic hypertrophy (score 2). 4. There is intermediate decreased T2 weighted signal throughout the prostate base which may be related to postinflammatory changes however infiltrative carcinoma cannot be excluded based on the MRI appearance (score 3). 5. There is trabeculation of the urinary bladder wall suggesting outlet obstruction. 6. PI-RADS assessment category 3. **This report has been created using voice recognition software. It may contain minor errors which are inherent in voice recognition technology. ** Final report electronically signed by Dr. Jalil Stiles on 12/24/2019 9:19 AM      PAST MEDICAL, FAMILY AND SOCIAL HISTORY:  Past Medical History:   Diagnosis Date    [facility] ([facility] [address] [facility]     CRVO (central retinal vein occlusion) 01/2016    Right eye    Heartburn     Hyperlipidemia 2010    Hypertension 2010     Past Surgical History:   Procedure Laterality Date    COLONOSCOPY  2009    WNL     COLONOSCOPY N/A 3/13/2020    COLONOSCOPY DIAGNOSTIC performed by Michelle Love MD at [facility] BIOPSY  2012    benign     TESTICLE REMOVAL  1994    Lt    ULTRASOUND PROSTATE/TRANSRECTAL N/A 1/16/2020    TRANSRECTAL ULTRASOUND WITH PROSTATE BX performed by Prince Rodriguez MD at [facility] N/A 3/13/2020    EGD BIOPSY performed by Michelle Love MD at [facility]     Family History   Problem Relation Age of Onset    Cancer Mother 72        lung     Heart Disease Mother 58    Coronary Art Dis Mother 58    Heart Disease Father 68    Heart Attack Father 68    Coronary Art Dis Father 68    High Blood Pressure Father     High Cholesterol Father     Diabetes Father         borderline    High Blood Pressure Sister 45    High Cholesterol Sister 45    Heart Attack Maternal Uncle     Heart Disease Maternal Uncle     High Blood Pressure Maternal Uncle     High Cholesterol Maternal Uncle     Coronary Art Dis Maternal Uncle     Coronary Art Dis Maternal Grandmother     Heart Attack Maternal Grandmother     Heart Disease Maternal Grandmother     High Blood Pressure Maternal Grandmother     High Cholesterol Maternal Grandmother     Cancer Maternal Grandfather         unknown type     Diabetes Paternal Grandmother     Other Paternal Grandfather         Black Lung     High Cholesterol Maternal Aunt     High Blood Pressure Maternal Aunt     Heart Disease Maternal Aunt     Heart Attack Maternal Aunt     Coronary Art Dis Maternal Aunt     Coronary Art Dis Maternal Aunt     Cancer Maternal Uncle         stomach     Heart Attack Maternal Uncle     Heart Disease Maternal Uncle     High Blood Pressure Maternal Uncle     High Cholesterol Maternal Uncle     Prostate Cancer Neg Hx      Outpatient Medications Marked as Taking for the 11/17/20 encounter (Office Visit) with Alvino Kwong MD   Medication Sig Dispense Refill    olmesartan (BENICAR) 5 MG tablet Take 2 tablets by mouth once daily 180 tablet 0    pravastatin (PRAVACHOL) 40 MG tablet Take 1 tablet by mouth daily 90 tablet 1    aspirin 81 MG tablet Take 81 mg by mouth daily      fluticasone (FLONASE) 50 MCG/ACT nasal spray 1 spray by Nasal route daily 1 Bottle 3       Other; Versed [midazolam];  Demerol; and Midazolam hcl  Social History     Tobacco Use   Smoking Status Never Smoker   Smokeless Tobacco Never Used      (If patient a smoker, smoking cessation counseling offered)   Social History     Substance and Sexual Activity   Alcohol Use Yes    Comment: occ beer       REVIEW OF SYSTEMS:  Constitutional: negative  Eyes: negative  Respiratory: negative  Cardiovascular: negative  Gastrointestinal: negative  Genitourinary: see HPI  Musculoskeletal: negative  Skin: negative   Neurological: negative  Hematological/Lymphatic: negative  Psychological: negative    Physical Exam:    This a 62 y.o. male  Vitals:    11/17/20 1318   Temp: 97.2 °F (36.2 °C)     Body mass index is 30.11 kg/m². Constitutional: Patient in no acute distress;           Assessment and Plan        1. Acute cystitis without hematuria    2. Prostate cancer ([facility]    3. Benign localized prostatic hyperplasia with lower urinary tract symptoms (LUTS)               Plan:   No new infections  Slight rise in PSA could be related to recent infection  Return in 2-3 weeks with a MRI of the prostate. Will then schedule confirmatory prostate biopsy before end of the year. Prescriptions Ordered:  No orders of the defined types were placed in this encounter.      Orders Placed:  Orders Placed This Encounter   Procedures    MRI PROSTATE W WO CONTRAST     Standing Status:   Future     Standing Expiration Date:   11/17/2021            Disha Pennington MD

## 2020-12-01 ENCOUNTER — TELEPHONE (OUTPATIENT)
Dept: UROLOGY | Age: 60
End: 2020-12-01

## 2020-12-01 RX ORDER — GENTAMICIN SULFATE 40 MG/ML
80 INJECTION, SOLUTION INTRAMUSCULAR; INTRAVENOUS ONCE
Qty: 2 ML | Refills: 0 | OUTPATIENT
Start: 2020-12-01 | End: 2020-12-01

## 2020-12-01 RX ORDER — CIPROFLOXACIN 500 MG/1
500 TABLET, FILM COATED ORAL 2 TIMES DAILY
Qty: 6 TABLET | Refills: 0 | OUTPATIENT
Start: 2020-12-21 | End: 2020-12-24

## 2020-12-01 NOTE — TELEPHONE ENCOUNTER
PROSTATE ULTRASOUND/MRI GUIDED BIOPSY WITH DR. MARY Dan      1960    You are scheduled for the above procedure on:    12-   at:    11:00am- please arrive 15-minutes early    Your follow up appointment for your biopsy results is on:    01-     At:   3:30    Please note that you will be responsible for any charges that are not paid by your insurance. The prostate biopsy specimens are sent to a Lab and their charges are billed to you separate from the office charges. DESCRIPTION OF PROSTATIC ULTRASOUND AND BIOPSY  Ultrasound uses harmless sound waves to give us pictures of the prostate, and allows us to accurately guide a biopsy needle to areas of concern. The procedure is done in the office. Initially, a complete prostate exam is done. Next the ultrasound probe, finger-like in size and shape, is placed into the rectum. With slight movement of the probe, many different views are obtained. A prostate block may or may not be given at this time. A spring loaded fine needle is place through the probe and directed into the prostate. Six or more biopsies are usually taken. These biopsies are not usually painful. The entire exam takes 20-30 minutes. POSSIBLE RISKS OF THE PROCEDURE  Blood may be noted in the stool and urine for a few days. Blood may be seen in the semen for up to a month. Infection of the prostate or in the urine can occur even with antibiotic preparation. You should call if you develop fever, chills, severe pain, or have continuous or significant bleeding. If you have known hemorrhoids, you may have more bleeding and discomfort in the rectal area following the biopsy. This may last for 3-5 days afterwards. If any concerns arise, please call the office. PREPARATION  1.  DO NOT TAKE: ASPIRIN, MOTRIN, PLAVIX, IBUPROFEN, MOBIC/ MELOXICAM, COUMADIN, FISH OIL, AND VITAMIN E FOR 5 DAYS BEFORE THE BIOPSY AND 3 DAYS AFTER THE BIOPSY.   YOU MAY TAKE TYLENOL IF NEEDED  2. Please eat a regular breakfast/lunch. 3.  Take your antibiotics as directed:  Cipro 500 mg twice a day, take one in the morning and one in the evening, start on:   12-    take until finished. 4.  Bring your Gentamicin 80 mg with you to your appointment. 5.  Do a Fleets Enema 2 hours before the visit. Purchase it at any drug store and follow the instructions on the package. 6. Please ensure to bring a  with you the day of the surgery to transport you home. HOME GOING AND FOLLOW UP INSTRUCTIONS  Call the doctor if: 1. There is a large amount of blood or clots in the urine or stool. 2.  You are unable to urinate. 3.  If your temperature is 101 degrees F or greater. 4.  You could see blood in your semen for up to 2-months            5.   You may resume your regular medication 3-days after procedure    DATE: 12/1/2020       SIGNATURE:________________________________

## 2020-12-03 ENCOUNTER — HOSPITAL ENCOUNTER (OUTPATIENT)
Dept: MRI IMAGING | Age: 60
Discharge: HOME | End: 2020-12-03
Payer: COMMERCIAL

## 2020-12-03 PROCEDURE — 6360000004 HC RX CONTRAST MEDICATION: Performed by: UROLOGY

## 2020-12-03 PROCEDURE — A9579 GAD-BASE MR CONTRAST NOS,1ML: HCPCS | Performed by: UROLOGY

## 2020-12-03 PROCEDURE — 76377 3D RENDER W/INTRP POSTPROCES: CPT

## 2020-12-03 RX ADMIN — GADOTERIDOL 20 ML: 279.3 INJECTION, SOLUTION INTRAVENOUS at 18:18

## 2020-12-22 ENCOUNTER — PROCEDURE VISIT (OUTPATIENT)
Dept: UROLOGY | Age: 60
End: 2020-12-22
Payer: COMMERCIAL

## 2020-12-22 VITALS — BODY MASS INDEX: 30.01 KG/M2 | HEIGHT: 68 IN | WEIGHT: 198 LBS | TEMPERATURE: 96.6 F

## 2020-12-22 DIAGNOSIS — R97.20 ELEVATED PSA: Primary | ICD-10-CM

## 2020-12-22 PROCEDURE — 55700 PR BIOPSY OF PROSTATE,NEEDLE/PUNCH: CPT | Performed by: UROLOGY

## 2020-12-22 PROCEDURE — 76872 US TRANSRECTAL: CPT | Performed by: UROLOGY

## 2020-12-22 PROCEDURE — 96372 THER/PROPH/DIAG INJ SC/IM: CPT | Performed by: UROLOGY

## 2020-12-22 RX ORDER — FLUTICASONE PROPIONATE 50 MCG
1 SPRAY, SUSPENSION (ML) NASAL DAILY
Qty: 3 BOTTLE | Refills: 3 | Status: SHIPPED | OUTPATIENT
Start: 2020-12-22 | End: 2021-11-23

## 2020-12-22 RX ORDER — GENTAMICIN SULFATE 40 MG/ML
80 INJECTION, SOLUTION INTRAMUSCULAR; INTRAVENOUS ONCE
Status: COMPLETED | OUTPATIENT
Start: 2020-12-22 | End: 2020-12-22

## 2020-12-22 RX ADMIN — GENTAMICIN SULFATE 80 MG: 40 INJECTION, SOLUTION INTRAMUSCULAR; INTRAVENOUS at 11:22

## 2020-12-22 NOTE — PROGRESS NOTES
Mr. Deven Bridges was seen in follow up for his prostate biopsy. The biopsy was indicated and is being performed for prostate cancer. The biopsy is being done with MRI / Ultrasound fusion using the UroNav system. The biopsy was done without difficulty or complication. TRUS prostate biopsy note:  After obtaining informed consent, the rectal ultrasound probe was passed per rectum and the prostate visualized. A local block was performed by instilling 2% lidocaine at the base. Measurements were taken and the prostate  for a total volume of 44 cc. At this point, prostate biopsy was performed. Using [employer], the ultrasound and MRI images were fused and then biopsies of the lesions identified by the radiologist were taken. Three cores were taken from each of the 1 lesions seen on MRI. Two cores were then  taken at the base, the mid-portion, and the apex of the gland on either side for a total of 12 cores. The rectal probe was removed and there was minimal bleeding. Mr. Deven Bridges tolerated the procedure well and there were no complications. Prostate size: 44 cc  Prostatic calcifications:yes Location: left   Hypoechoic areas: yes Location: left  Cores taken:12 + 3 cores each from 1 lesions making 15 total cores  Complications: none        Assessment:      Prostate biopsy performed without difficulty. This was done with UroNav MRI fused guidance. Plan:        I will see Nichole Campuzano back to discuss the pathology in 1-2 weeks. Further recommendations will be based on these results.

## 2020-12-22 NOTE — PROGRESS NOTES
Procedure: Trus/Biopsy  Pt name and birth date verified Yes  Patient agrees Dr. Edson Maria is taking biopsies of the prostate Yes  Patient took Enema 2 hours prior to procedure Yes  Patient took 2 pill(s) of 1 day before procedure, day of, and will the day after Yes  Has patient eaten today? yes  Patient stopped all blood thinners prior to surgery Yes    Patient has given me verbal consent to perform Gentamicin Injection yes    Following Dr. Benito De Jesus of care.   Gentamicin 80MG/2ML GIVEN I.M right UOQ HIP  Lot Number: [id number]  Expiration Date: 01-  [facility] #: [id number]    Patient supplied their own medications Yes

## 2020-12-22 NOTE — TELEPHONE ENCOUNTER
Meliton Hidalgo called requesting a refill on the following medications:  Requested Prescriptions     Pending Prescriptions Disp Refills    fluticasone (FLONASE) 50 MCG/ACT nasal spray 3 Bottle 3     Si spray by Nasal route daily     Pharmacy verified:  [email or web address]      Date of last visit: 10/19/2020  Date of next visit (if applicable): Visit date not found

## 2020-12-30 ENCOUNTER — TELEPHONE (OUTPATIENT)
Dept: UROLOGY | Age: 60
End: 2020-12-30

## 2020-12-30 ENCOUNTER — NURSE ONLY (OUTPATIENT)
Dept: LAB | Age: 60
End: 2020-12-30

## 2020-12-30 LAB
BACTERIA: ABNORMAL
BILIRUBIN URINE: NEGATIVE
BLOOD, URINE: ABNORMAL
CASTS: ABNORMAL /LPF
CASTS: ABNORMAL /LPF
CHARACTER, URINE: CLEAR
COLOR: YELLOW
CRYSTALS: ABNORMAL
EPITHELIAL CELLS, UA: ABNORMAL /HPF
GLUCOSE, URINE: NEGATIVE MG/DL
KETONES, URINE: NEGATIVE
LEUKOCYTE EST, POC: ABNORMAL
MISCELLANEOUS LAB TEST RESULT: ABNORMAL
NITRITE, URINE: NEGATIVE
PH UA: 6.5 (ref 5–9)
PROTEIN UA: NEGATIVE MG/DL
RBC URINE: ABNORMAL /HPF
RENAL EPITHELIAL, UA: ABNORMAL
SPECIFIC GRAVITY UA: 1.02 (ref 1–1.03)
UROBILINOGEN, URINE: 0.2 EU/DL (ref 0–1)
WBC UA: ABNORMAL /HPF
YEAST: ABNORMAL

## 2020-12-30 RX ORDER — SULFAMETHOXAZOLE AND TRIMETHOPRIM 800; 160 MG/1; MG/1
1 TABLET ORAL 2 TIMES DAILY
Qty: 14 TABLET | Refills: 0 | Status: SHIPPED | OUTPATIENT
Start: 2020-12-30 | End: 2021-01-06

## 2020-12-30 NOTE — TELEPHONE ENCOUNTER
Check urinalysis and culture. Have pt take 1/2 bottle of magnesium citrate. If no BM in 2 hours take the other 1/2 bottle. He may need to use Miralax 17 grams daily for a few days afterward to continue to have normal bowel movements. Increase fluid intake. Start Bactrim empirically. If he develops fever or has worsening in fatigue and symptoms he needs to present to ER for evaluation. If any issues with emptying bladder we can bring him in for a PVR check. 0 = independent

## 2020-12-30 NOTE — TELEPHONE ENCOUNTER
Patient underwent prostate biopsy on 12/22/2020 with Dr Maren Jaime. He thinks he has infection. He has pain in the lower left to center of the abdomen. Rated 2-3 on scale of 0-10 that started 3-4 days ago. He also has nausea at times at night when he tries to sleep. He has not been able to sleep well. He does not feel well and has general fatigue    He denies burning, urgency, frequency, fever or chills. The urine is bright yellow. He has only had 2-3 bowel movements since the biopsy. He generally moves his bowels daily. Please advise. Thank you.

## 2020-12-30 NOTE — TELEPHONE ENCOUNTER
Patient advised to have the urine specimen sent to the lab, start Bactrim empirically, recommendations for magnesium citrate and miralax. He voiced understanding and if he develops fever or symptoms get worse he will go to the ER for evaluation. He denies problems with emptying the bladder but will call the office if symptoms develop.

## 2021-01-01 LAB — URINE CULTURE, ROUTINE: NORMAL

## 2021-01-07 ENCOUNTER — OFFICE VISIT (OUTPATIENT)
Dept: UROLOGY | Age: 61
End: 2021-01-07
Payer: COMMERCIAL

## 2021-01-07 VITALS — TEMPERATURE: 97.4 F | HEIGHT: 68 IN | WEIGHT: 196 LBS | BODY MASS INDEX: 29.7 KG/M2

## 2021-01-07 DIAGNOSIS — Z87.440 HISTORY OF UTI: ICD-10-CM

## 2021-01-07 DIAGNOSIS — N40.1 BENIGN LOCALIZED PROSTATIC HYPERPLASIA WITH LOWER URINARY TRACT SYMPTOMS (LUTS): ICD-10-CM

## 2021-01-07 DIAGNOSIS — C61 PROSTATE CANCER (HCC): Primary | ICD-10-CM

## 2021-01-07 PROCEDURE — 99214 OFFICE O/P EST MOD 30 MIN: CPT | Performed by: UROLOGY

## 2021-01-07 RX ORDER — SILODOSIN 8 MG/1
8 CAPSULE ORAL EVERY EVENING
Qty: 30 CAPSULE | Refills: 11 | Status: SHIPPED | OUTPATIENT
Start: 2021-01-07 | End: 2022-06-09

## 2021-01-07 NOTE — PROGRESS NOTES
Jonathan Larkin MD        [address] [place] [address] [phone]  Dept: [phone]  Dept Fax: [phone]: [address] [address] Urology Office Note -     Patient:  Gracy Mcneill  YOB: 1960  Date: 1/17/2021    The patient is a 62 y.o. male who presents today for evaluation of the following problems: Elevated PSA  Chief Complaint   Patient presents with    Follow-up     biopsy results    referred/consultation requested by Terry Mcdaniel MD.    HISTORY OF PRESENT ILLNESS:     Prostate Cancer  Onset was  Years ago  Overall, the problem(s) are unchanged  Severity is described as moderate. Associated Symptoms: No dysuria, no gross hematuria. Current Pain Severity: 0      on active surveillance pathology consistent with previous biopsy (matthew six in one core)  Past decipher results--matthew six one core  Previous biopsy negative by Olive Vela in past.     PSA trend  11/6/20- 4.40  11/9/19- 3.96  7/12/13-2.96        BPH  Onset was  Years ago  Overall, the problem(s) are worsening. Severity is described as mild-moderate. LUTS chronic. Mild bother  Not on medications for this. Discussed benefits of starting flomax in past but the patient could not tolerate the medication. Considering Rapaflo. Acute cystitis-   Has not had any recent urinary tract infections      Requested/reviewed records from Terry Mcdaniel MD office and/or outside physician/EMR    (Patient's old records have been requested, reviewed and pertinent findings summarized in today's note.)    Procedures Today: N/A      Last several PSA's:  Lab Results   Component Value Date    PSA 4.40 (H) 11/06/2020    PSA 3.96 (H) 11/09/2019    PSA 2.42 07/12/2013       Last total testosterone:  No results found for: TESTOSTERONE    Urinalysis today:  No results found for this visit on 01/07/21.     Last BUN and creatinine:  Lab Results   Component Value Date BUN 13 11/06/2020     Lab Results   Component Value Date    CREATININE 0.7 11/06/2020         Imaging Reviewed during this Office Visit:   imaging independently reviewed by Dex Doherty MD and radiology report verified demonstrating     Mri Prostate W Wo Contrast    Result Date: 12/24/2019  PROCEDURE: MRI PROSTATE W WO CONTRAST CLINICAL INFORMATION: Elevated PSA. COMPARISON: No prior study. TECHNIQUE: Axial T2, coronal T2 and sagittal T2 imaging of the prostate gland. Large field of view axial T2 imaging of the prostate gland. Axial T1, axial T1 VIBE dynamic post tadeo and axial T1 VIBE dynamic subtracted post tadeo images through the prostate gland. Diffusion 50, 800, 1400 and ADC maps through the prostate gland. Axial T1 STAR VIBE post tadeo through the pelvis. 3-D images reconstructed on a separate [employer] Cad workstation with MRI TRUS fusion of prostate mass lesions. CONTRAST: 15 mL ProHance intravenously. LABORATORY: 1. PSA 3.96 ng/mL (11/9/2019) 2. TRUS prostate biopsy (11/28/2012): Negative for malignancy FINDINGS: PROSTATE SIZE: 1. The prostate gland is enlarged measuring 5.0 x 4.3 x 3.2 cm in longitudinal, transverse and AP dimensions. 2. The prostate volume is 42 ml. TRANSITIONAL ZONE: 1. Enlarged and nodular transitional zone consistent with benign prostatic hypertrophy (score 2). 2. There is intermediate decreased T2 weighted signal throughout the prostate base which may be related to postinflammatory changes however infiltrative carcinoma cannot be excluded based on the MRI appearance (score 3). CENTRAL ZONE: 1. Unremarkable. PERIPHERAL ZONE: 1. The peripheral zone is not seen. PROSTATE CAPSULE/NV BUNDLE/SEMINAL VESICLES: Unremarkable. URINARY BLADDER/RECTUM/PELVIC DIAPHRAGM: 1. There is trabeculation of the urinary bladder wall suggesting outlet obstruction. 2. The rectum is unremarkable. 3. The pelvic diaphragm is unremarkable. PATHOLOGICALLY ENLARGED LYMPH NODES: 1.  There are a few small retroperitoneal, iliac chain, pelvic and inguinal lymph nodes however there is no pathologically enlarged lymphadenopathy. OSSEOUS STRUCTURES: 1. Unremarkable. OTHER: 1. None. 1. The prostate gland is enlarged measuring 5.0 x 4.3 x 3.2 cm in longitudinal, transverse and AP dimensions. 2. The prostate volume is 42 ml. 3. Enlarged and nodular transitional zone consistent with benign prostatic hypertrophy (score 2). 4. There is intermediate decreased T2 weighted signal throughout the prostate base which may be related to postinflammatory changes however infiltrative carcinoma cannot be excluded based on the MRI appearance (score 3). 5. There is trabeculation of the urinary bladder wall suggesting outlet obstruction. 6. PI-RADS assessment category 3. **This report has been created using voice recognition software. It may contain minor errors which are inherent in voice recognition technology. ** Final report electronically signed by Dr. Wero Ledesma on 12/24/2019 9:19 AM      PAST MEDICAL, FAMILY AND SOCIAL HISTORY:  Past Medical History:   Diagnosis Date    Cancer ([facility] [address]     CRVO (central retinal vein occlusion) 01/2016    Right eye    Heartburn     Hyperlipidemia 2010    Hypertension 2010     Past Surgical History:   Procedure Laterality Date    COLONOSCOPY  2009    WNL     COLONOSCOPY N/A 3/13/2020    COLONOSCOPY DIAGNOSTIC performed by Ophelia Louis MD at [facility] BIOPSY  2012    benign     TESTICLE REMOVAL  1994    Lt    ULTRASOUND PROSTATE/TRANSRECTAL N/A 1/16/2020    TRANSRECTAL ULTRASOUND WITH PROSTATE BX performed by Akil Loredo MD at [facility] N/A 3/13/2020    EGD BIOPSY performed by Ophelia Louis MD at [facility]     Family History   Problem Relation Age of Onset    Cancer Mother 72        lung     Heart Disease Mother 58    Coronary Art Dis Mother 58    Heart Disease Father 68    Heart Attack Father 68    Coronary Art Dis Father 68    High Blood Pressure Father     High Cholesterol Father     Diabetes Father         borderline    High Blood Pressure Sister 45    High Cholesterol Sister 45    Heart Attack Maternal Uncle     Heart Disease Maternal Uncle     High Blood Pressure Maternal Uncle     High Cholesterol Maternal Uncle     Coronary Art Dis Maternal Uncle     Coronary Art Dis Maternal Grandmother     Heart Attack Maternal Grandmother     Heart Disease Maternal Grandmother     High Blood Pressure Maternal Grandmother     High Cholesterol Maternal Grandmother     Cancer Maternal Grandfather         unknown type     Diabetes Paternal Grandmother     Other Paternal Grandfather         Black Lung     High Cholesterol Maternal Aunt     High Blood Pressure Maternal Aunt     Heart Disease Maternal Aunt     Heart Attack Maternal Aunt     Coronary Art Dis Maternal Aunt     Coronary Art Dis Maternal Aunt     Cancer Maternal Uncle         stomach     Heart Attack Maternal Uncle     Heart Disease Maternal Uncle     High Blood Pressure Maternal Uncle     High Cholesterol Maternal Uncle     Prostate Cancer Neg Hx      Outpatient Medications Marked as Taking for the 1/7/21 encounter (Office Visit) with Get Lugo MD   Medication Sig Dispense Refill    silodosin (RAPAFLO) 8 MG CAPS Take 1 capsule by mouth every evening 30 capsule 11    fluticasone (FLONASE) 50 MCG/ACT nasal spray 1 spray by Nasal route daily 3 Bottle 3    olmesartan (BENICAR) 5 MG tablet Take 2 tablets by mouth once daily 180 tablet 0    pravastatin (PRAVACHOL) 40 MG tablet Take 1 tablet by mouth daily 90 tablet 1    aspirin 81 MG tablet Take 81 mg by mouth daily      Cyanocobalamin 1000 MCG CAPS Take 1 capsule by mouth daily 90 capsule 1       Other, Versed [midazolam], Demerol, and Midazolam hcl  Social History     Tobacco Use   Smoking Status Never Smoker   Smokeless Tobacco Never Used      (If patient a smoker, smoking cessation counseling offered)   Social History     Substance and Sexual Activity   Alcohol Use Yes    Comment: occ beer       REVIEW OF SYSTEMS:  Constitutional: negative  Eyes: negative  Respiratory: negative  Cardiovascular: negative  Gastrointestinal: negative  Genitourinary: see HPI  Musculoskeletal: negative  Skin: negative   Neurological: negative  Hematological/Lymphatic: negative  Psychological: negative    Physical Exam:    This a 62 y.o. male  Vitals:    01/07/21 1532   Temp: 97.4 °F (36.3 °C)     Body mass index is 29.8 kg/m². Constitutional: Patient in no acute distress;           Assessment and Plan        1. Prostate cancer ([facility] [address]    2. Benign localized prostatic hyperplasia with lower urinary tract symptoms (LUTS)    3. History of UTI               Plan:   Confirmatory biopsy demonstrated stable prostate cancer.   He has had 3 biopsies in the past and has only one quarter of Edgar 6 in 2 of the 3 biopsies (negative in the other)  No new infections  Slight rise in PSA could be related to recent infection  Will start Rapaflo for worsening BPH symptoms        Prescriptions Ordered:  Orders Placed This Encounter   Medications    silodosin (RAPAFLO) 8 MG CAPS     Sig: Take 1 capsule by mouth every evening     Dispense:  30 capsule     Refill:  11      Orders Placed:  Orders Placed This Encounter   Procedures    PSA Prostatic Specific Antigen     Standing Status:   Future     Standing Expiration Date:   1/7/2022            Mohan Hillman MD

## 2021-01-19 ENCOUNTER — TELEPHONE (OUTPATIENT)
Dept: FAMILY MEDICINE CLINIC | Age: 61
End: 2021-01-19

## 2021-01-19 DIAGNOSIS — Z11.59 SCREENING FOR VIRAL DISEASE: Primary | ICD-10-CM

## 2021-01-19 NOTE — TELEPHONE ENCOUNTER
Patient called to discuss current symptoms. Patient's wife -Jose Mckeon has tested positive for Covid19. Patient is having increased sinus issues and was wondering what he could take. Instructions given for Mucinex DM, Tylenol/Ibuprofen, and flonase. Patient is not sure at this time if his work will require him to be tested. Waiting on a return call. Patient will keep in contact with office regarding symptoms and let us know if he would need an order placed for testing.

## 2021-01-20 ENCOUNTER — HOSPITAL ENCOUNTER (OUTPATIENT)
Age: 61
Setting detail: SPECIMEN
Discharge: HOME | End: 2021-01-20
Payer: COMMERCIAL

## 2021-01-20 PROCEDURE — U0003 INFECTIOUS AGENT DETECTION BY NUCLEIC ACID (DNA OR RNA); SEVERE ACUTE RESPIRATORY SYNDROME CORONAVIRUS 2 (SARS-COV-2) (CORONAVIRUS DISEASE [COVID-19]), AMPLIFIED PROBE TECHNIQUE, MAKING USE OF HIGH THROUGHPUT TECHNOLOGIES AS DESCRIBED BY CMS-2020-01-R: HCPCS

## 2021-01-22 LAB — SARS-COV-2: NOT DETECTED

## 2021-01-26 DIAGNOSIS — I10 ESSENTIAL HYPERTENSION: ICD-10-CM

## 2021-01-26 RX ORDER — OLMESARTAN MEDOXOMIL 5 MG/1
TABLET ORAL
Qty: 180 TABLET | Refills: 0 | Status: SHIPPED | OUTPATIENT
Start: 2021-01-26 | End: 2021-05-06 | Stop reason: SDUPTHER

## 2021-01-26 NOTE — TELEPHONE ENCOUNTER
Please approve or deny     Last Visit Date:  01/07/2021        Next Visit Date:    Visit date not found. Pt was contacted through [employer] to call and schedule appointment.

## 2021-03-04 ENCOUNTER — IMMUNIZATION (OUTPATIENT)
Dept: PRIMARY CARE CLINIC | Age: 61
End: 2021-03-04
Payer: COMMERCIAL

## 2021-03-04 PROCEDURE — 91300 COVID-19, PFIZER VACCINE 30MCG/0.3ML DOSE: CPT | Performed by: FAMILY MEDICINE

## 2021-03-04 PROCEDURE — 0001A PR IMM ADMN SARSCOV2 30MCG/0.3ML DIL RECON 1ST DOSE: CPT | Performed by: FAMILY MEDICINE

## 2021-03-25 ENCOUNTER — IMMUNIZATION (OUTPATIENT)
Dept: PRIMARY CARE CLINIC | Age: 61
End: 2021-03-25
Payer: COMMERCIAL

## 2021-03-25 PROCEDURE — 91300 COVID-19, PFIZER VACCINE 30MCG/0.3ML DOSE: CPT | Performed by: FAMILY MEDICINE

## 2021-03-25 PROCEDURE — 0002A COVID-19, PFIZER VACCINE 30MCG/0.3ML DOSE: CPT | Performed by: FAMILY MEDICINE

## 2021-05-06 DIAGNOSIS — E78.00 HYPERCHOLESTEROLEMIA: ICD-10-CM

## 2021-05-06 DIAGNOSIS — I10 ESSENTIAL HYPERTENSION: ICD-10-CM

## 2021-05-06 RX ORDER — OLMESARTAN MEDOXOMIL 5 MG/1
TABLET ORAL
Qty: 180 TABLET | Refills: 1 | Status: SHIPPED | OUTPATIENT
Start: 2021-05-06 | End: 2021-11-15

## 2021-05-06 RX ORDER — PRAVASTATIN SODIUM 40 MG
40 TABLET ORAL DAILY
Qty: 90 TABLET | Refills: 1 | Status: SHIPPED | OUTPATIENT
Start: 2021-05-06 | End: 2021-07-09 | Stop reason: SDUPTHER

## 2021-05-06 NOTE — TELEPHONE ENCOUNTER
Please approve or deny     Last Visit Date:  10/19/2020       Next Visit Date:    Visit date not found

## 2021-07-06 ENCOUNTER — NURSE ONLY (OUTPATIENT)
Dept: LAB | Age: 61
End: 2021-07-06

## 2021-07-06 DIAGNOSIS — C61 PROSTATE CANCER (HCC): ICD-10-CM

## 2021-07-06 LAB — PROSTATE SPECIFIC ANTIGEN: 3.28 NG/ML (ref 0–1)

## 2021-07-09 ENCOUNTER — NURSE ONLY (OUTPATIENT)
Dept: LAB | Age: 61
End: 2021-07-09

## 2021-07-09 ENCOUNTER — NURSE TRIAGE (OUTPATIENT)
Dept: OTHER | Facility: CLINIC | Age: 61
End: 2021-07-09

## 2021-07-09 ENCOUNTER — OFFICE VISIT (OUTPATIENT)
Dept: FAMILY MEDICINE CLINIC | Age: 61
End: 2021-07-09
Payer: COMMERCIAL

## 2021-07-09 VITALS
BODY MASS INDEX: 29.4 KG/M2 | HEART RATE: 62 BPM | WEIGHT: 194 LBS | TEMPERATURE: 97.8 F | HEIGHT: 68 IN | SYSTOLIC BLOOD PRESSURE: 132 MMHG | DIASTOLIC BLOOD PRESSURE: 82 MMHG

## 2021-07-09 DIAGNOSIS — R14.0 ABDOMINAL BLOATING: ICD-10-CM

## 2021-07-09 DIAGNOSIS — E78.00 HYPERCHOLESTEROLEMIA: ICD-10-CM

## 2021-07-09 DIAGNOSIS — M62.08 DIASTASIS OF RECTUS ABDOMINIS: ICD-10-CM

## 2021-07-09 DIAGNOSIS — R42 DIZZINESS: Primary | ICD-10-CM

## 2021-07-09 DIAGNOSIS — R42 DIZZINESS: ICD-10-CM

## 2021-07-09 DIAGNOSIS — L55.9 SUNBURN: ICD-10-CM

## 2021-07-09 LAB
ANION GAP SERPL CALCULATED.3IONS-SCNC: 12 MEQ/L (ref 8–16)
BUN BLDV-MCNC: 8 MG/DL (ref 7–22)
CALCIUM SERPL-MCNC: 9.8 MG/DL (ref 8.5–10.5)
CHLORIDE BLD-SCNC: 104 MEQ/L (ref 98–111)
CO2: 27 MEQ/L (ref 23–33)
CREAT SERPL-MCNC: 0.7 MG/DL (ref 0.4–1.2)
GFR SERPL CREATININE-BSD FRML MDRD: > 90 ML/MIN/1.73M2
GLUCOSE BLD-MCNC: 106 MG/DL (ref 70–108)
POTASSIUM SERPL-SCNC: 3.8 MEQ/L (ref 3.5–5.2)
SODIUM BLD-SCNC: 143 MEQ/L (ref 135–145)

## 2021-07-09 PROCEDURE — 99214 OFFICE O/P EST MOD 30 MIN: CPT | Performed by: NURSE PRACTITIONER

## 2021-07-09 RX ORDER — PRAVASTATIN SODIUM 40 MG
40 TABLET ORAL DAILY
Qty: 90 TABLET | Refills: 1 | Status: SHIPPED | OUTPATIENT
Start: 2021-07-09 | End: 2022-01-11

## 2021-07-09 RX ORDER — MECLIZINE HYDROCHLORIDE 25 MG/1
25 TABLET ORAL 3 TIMES DAILY PRN
Qty: 30 TABLET | Refills: 0 | Status: SHIPPED | OUTPATIENT
Start: 2021-07-09 | End: 2021-07-19

## 2021-07-09 SDOH — ECONOMIC STABILITY: FOOD INSECURITY: WITHIN THE PAST 12 MONTHS, YOU WORRIED THAT YOUR FOOD WOULD RUN OUT BEFORE YOU GOT MONEY TO BUY MORE.: NEVER TRUE

## 2021-07-09 SDOH — ECONOMIC STABILITY: FOOD INSECURITY: WITHIN THE PAST 12 MONTHS, THE FOOD YOU BOUGHT JUST DIDN'T LAST AND YOU DIDN'T HAVE MONEY TO GET MORE.: NEVER TRUE

## 2021-07-09 ASSESSMENT — SOCIAL DETERMINANTS OF HEALTH (SDOH): HOW HARD IS IT FOR YOU TO PAY FOR THE VERY BASICS LIKE FOOD, HOUSING, MEDICAL CARE, AND HEATING?: NOT HARD AT ALL

## 2021-07-09 ASSESSMENT — PATIENT HEALTH QUESTIONNAIRE - PHQ9
1. LITTLE INTEREST OR PLEASURE IN DOING THINGS: 0
SUM OF ALL RESPONSES TO PHQ9 QUESTIONS 1 & 2: 0
2. FEELING DOWN, DEPRESSED OR HOPELESS: 0
SUM OF ALL RESPONSES TO PHQ QUESTIONS 1-9: 0
DEPRESSION UNABLE TO ASSESS: PT REFUSES
SUM OF ALL RESPONSES TO PHQ QUESTIONS 1-9: 0
SUM OF ALL RESPONSES TO PHQ QUESTIONS 1-9: 0

## 2021-07-09 NOTE — PROGRESS NOTES
[address] [place]  [address] [place] [address] [place], [address]  Ph:   [phone]  Fax: [phone]    Provider:  CORBY Hallman CNP    Patient:  Sarabjit Dan  YOB: 1960      Visit Date:  7/9/2021     Reason For Visit:   Chief Complaint   Patient presents with    3 Month Follow-Up     pt c/o sun burn on both legs, dizziness, nausea         Radha Iverson is a 62 y.o. male who comes today to the office for dizziness, abdominal bloating. He states he has been doing well, taking his medications as prescribed. He denies any side effects from his medications. Was in [place]. visiting and on Sunday 7/4, and he got a sunburn on this anterior lower legs (did not put sunscreen on that area). Came home on Monday. Wed developed mild nausea, dizziness. It is staying the same. No vomiting, no sensation of self or room spinning. Notices dizziness most with position change. No otalgia or sinus pressure. No fevers or chills. Appetite is ok. HLD: needs refill on pravastatin. Not adherent to low cholesterol diet. 11/6 total cholesterol 197, triglycerides 197, HDL 58, . Is concerned about abdominal fat vs hernia. Had pain last week on the right lower abdomen. No change in bowel habits. Stool is normal color consistency. Eating ok. Weight has been stable in the past year. Significant Past Medical History:    Past Medical History:   Diagnosis Date    Cancer ([address]     CRVO (central retinal vein occlusion) 01/2016    Right eye    Heartburn     Hyperlipidemia 2010    Hypertension 2010           Allergies:  is allergic to other, versed [midazolam], demerol, and midazolam hcl.     Medications:   Current Outpatient Medications   Medication Sig Dispense Refill    pravastatin (PRAVACHOL) 40 MG tablet Take 1 tablet by mouth daily 90 tablet 1    meclizine (ANTIVERT) 25 MG tablet Take 1 tablet by mouth 3 times daily as needed for Dizziness or Nausea 30 tablet 0    olmesartan (BENICAR) 5 MG tablet Take 2 tablets by mouth once daily 180 tablet 1    silodosin (RAPAFLO) 8 MG CAPS Take 1 capsule by mouth every evening 30 capsule 11    fluticasone (FLONASE) 50 MCG/ACT nasal spray 1 spray by Nasal route daily 3 Bottle 3    aspirin 81 MG tablet Take 81 mg by mouth daily      Cyanocobalamin 1000 MCG CAPS Take 1 capsule by mouth daily 90 capsule 1     No current facility-administered medications for this visit. Review of systems:  Review of Systems - History obtained from the patient  General ROS: negative for - chills, fatigue or fever  Psychological ROS: negative for - anxiety, depression or sleep disturbances  ENT ROS: negative for - headaches, nasal congestion or nasal discharge  Allergy and Immunology ROS: negative for - seasonal allergies  Hematological and Lymphatic ROS: negative for - bruising  Endocrine ROS: negative for - malaise/lethargy, polydipsia/polyuria or temperature intolerance  Respiratory ROS: negative for - cough,  shortness of breath or wheezing  Cardiovascular ROS: negative for - chest pain or edema  Gastrointestinal ROS: negative for - abdominal pain, constipation, diarrhea or nausea/vomiting.  Positive for bloating, bulge  Musculoskeletal ROS: negative for - joint pain or muscle pain  Neurological ROS: positive for dizziness  Dermatological ROS: positive for sunburn, lower legs    Physical Examination:  /82 (Site: Right Upper Arm, Position: Sitting, Cuff Size: Large Adult)   Pulse 62   Temp 97.8 °F (36.6 °C) (Temporal)   Ht 5' 8\" (1.727 m)   Wt 194 lb (88 kg)   BMI 29.50 kg/m²     General-  Alert and oriented x 3, NAD  HEENT: NC, AT, PERRLA, EOMI, anicteric sclerae  Ears: Normal tympanic membranes bilaterally  Nose: patent, no lesions  Mouth: no lesions, moist mucosas  Neck - supple, no significant adenopathy  Chest - clear to auscultation, no wheezes, rales or rhonchi, symmetric air entry  Heart - normal rate, regular rhythm, normal S1, S2, no murmurs, rubs, clicks or gallops  Abdomen - soft, nontender, nondistended. No organomegaly. Midline abdominal bulge noticed in supine position with head flexion. Generalized abdominal bloating. Extremities - peripheral pulses normal, no pedal edema, no clubbing or cyanosis  Skin - bilat lower anterior legs with 1st degree sunburn. Impression:   Diagnosis Orders   1. Dizziness  Basic Metabolic Panel   2. Sunburn     3. Hypercholesterolemia  pravastatin (PRAVACHOL) 40 MG tablet   4. Diastasis of rectus abdominis  CT ABDOMEN PELVIS W WO CONTRAST Additional Contrast? None   5. Abdominal bloating  CT ABDOMEN PELVIS W WO CONTRAST Additional Contrast? None    Basic Metabolic Panel       Plan:  Orders Placed This Encounter   Procedures    CT ABDOMEN PELVIS W WO CONTRAST Additional Contrast? None     Standing Status:   Future     Standing Expiration Date:   7/9/2022     Order Specific Question:   Additional Contrast?     Answer:   None     Order Specific Question:   Reason for exam:     Answer:   abodminal bloating, distention, ?hernia vs rectus diast.    Basic Metabolic Panel     Standing Status:   Future     Number of Occurrences:   1     Standing Expiration Date:   7/9/2022     Orders Placed This Encounter   Medications    pravastatin (PRAVACHOL) 40 MG tablet     Sig: Take 1 tablet by mouth daily     Dispense:  90 tablet     Refill:  1    meclizine (ANTIVERT) 25 MG tablet     Sig: Take 1 tablet by mouth 3 times daily as needed for Dizziness or Nausea     Dispense:  30 tablet     Refill:  0     Meclizine 25 mg 1 tablet every 8 hour as needed for dizziness and nausea, may cause drowsiness  Blood work to check electrolyte and kidney function  OTC zyrtec for a few days to help possible allergy  Stay hydrated, sugar free gatorade is a good option for a few days to help with electrolytes. CT abdomen pelvis to evaluate hernia vs rectus diastisis  Refill Pravastatin.      Follow Up:  Return in about 3 months (around 10/9/2021), or if symptoms worsen or fail to improve.     Eugenio Tyler, APRN - CNP

## 2021-07-09 NOTE — TELEPHONE ENCOUNTER
Reason for Disposition   [1] Dizziness is main symptom AND [2] NO spinning sensation (i.e., vertigo)   Taking a medicine that could cause dizziness (e.g., blood pressure medications, diuretics)    Answer Assessment - Initial Assessment Questions  1. DESCRIPTION: \"Describe your dizziness. \"      Feels nauseous; lightheaded    2. VERTIGO: \"Do you feel like either you or the room is spinning or tilting? \"       No    3. LIGHTHEADED: \"Do you feel lightheaded? \" (e.g., somewhat faint, woozy, weak upon standing)      Lightheaded    4. SEVERITY: \"How bad is it? \"  \"Can you walk? \"    - MILD - Feels unsteady but walking normally. - MODERATE - Feels very unsteady when walking, but not falling; interferes with normal activities (e.g., school, work) . - SEVERE - Unable to walk without falling (requires assistance). Mild dizziness    5. ONSET:  \"When did the dizziness begin? \"      Onset was Thursday morning    6. AGGRAVATING FACTORS: \"Does anything make it worse? \" (e.g., standing, change in head position)      Nothing makes is worse or better    7. CAUSE: \"What do you think is causing the dizziness? \"      No    8. RECURRENT SYMPTOM: \"Have you had dizziness before? \" If so, ask: \"When was the last time? \" \"What happened that time? \"      No    9. OTHER SYMPTOMS: \"Do you have any other symptoms? \" (e.g., headache, weakness, numbness, vomiting, earache)      Nausea, dizziness, sunburn on Sunday - no blisters but is very blotchy, mild headaches - 3/10 - taking Ibuprofen    10. PREGNANCY: \"Is there any chance you are pregnant? \" \"When was your last menstrual period? \"        No    Protocols used: DIZZINESS - VERTIGO-ADULT-AH, DIZZINESS - LIGHTHEADEDNESS-ADULT-AH    Received call from [facility] with Red Flag Complaint. Brief description of triage: See triage above:  Patient is concerned that the dizziness is related to a Sunburn (acquired on Sunday). Dizziness began on Thursday.     Triage indicates for patient to be seen today as he does take blood pressure medications and reports a recent blood pressure of 158/70. Care advice provided, patient verbalizes understanding; denies any other questions or concerns; instructed to call back for any new or worsening symptoms. Writer provided warm transfer to Levine, Susan. \[facility] Outlook.\"" at [facility] for appointment scheduling. Attention Provider: Thank you for allowing me to participate in the care of your patient. The patient was connected to triage in response to information provided to the ECC. Please do not respond through this encounter as the response is not directed to a shared pool.

## 2021-07-09 NOTE — PATIENT INSTRUCTIONS
Meclizine 25 mg 1 tablet every 8 hour as needed for dizziness and nausea, may cuase drowsiness  Blood work to check electrolyte and kidney function  OTC zyrtec for a few days to help possible allergy  Stay hydrated, sugar free gatorade is a good option for a few days to help with electrolytes. CT abdomen pelvis to evaluate hernia vs rectus diastisis  Refill Pravastatin. Patient Education        Epley Maneuver at Home for Vertigo: Exercises  Introduction  Vertigo is a spinning or whirling sensation when you move your head. Your doctor may have moved you in different positions to help your vertigo get better faster. This is called the Epley maneuver. Your doctor also may have asked you to do these exercises at home. Do the exercises as often as your doctor recommends. If your vertigo is getting worse, your doctor may have you change the exercise or stop it. Step 1  Step 1   1. Sit on the edge of a bed or sofa. Step 2   1. Turn your head 45 degrees in the direction your doctor told you to. This should be toward the ear that causes the most vertigo for you. In this picture, the woman is turning toward her left ear. Step 3   1. Tilt yourself backward until you are lying on your back. Your head should still be at a 45-degree turn. Your head should be about midway between looking straight ahead and looking out to your side. Hold for 30 seconds. If you have vertigo, stay in this position until it stops. Step 4   1. Turn your head 90 degrees toward the ear that has the least vertigo. In this picture, the woman is turning to the right because she has vertigo on her left side. The point of your chin should be raised and over your shoulder. Hold for 30 seconds. Step 5   1. Roll onto the side with the least vertigo. You should now be looking at the floor. Hold for 30 seconds. Follow-up care is a key part of your treatment and safety.  Be sure to make and go to all appointments, and call your doctor if you are having problems. It's also a good idea to know your test results and keep a list of the medicines you take. Where can you learn more? Go to https://chpepiceweb.[employer]. org and sign in to your Inmoo account. Enter H335 in the [facility] box to learn more about \"Epley Maneuver at Home for Vertigo: Exercises. \"     If you do not have an account, please click on the \"Sign Up Now\" link. Current as of: August 4, 2020               Content Version: 12.9  © 2006-2021 SafetySkills. Care instructions adapted under license by [facility] ([facility]). If you have questions about a medical condition or this instruction, always ask your healthcare professional. [address] any warranty or liability for your use of this information. Patient Education        Cawthorne Exercises for Vertigo: Care Instructions  Your Care Instructions  Simple exercises can help you regain your balance when you have vertigo. If you have Ménière's disease, benign paroxysmal positional vertigo (BPPV), or another inner ear problem, you may have vertigo off and on. Do these exercises first thing in the morning and before you go to bed. You might get dizzy when you first start them. If this happens, try to do them for at least 5 minutes. Do a group of exercises at a time, starting at the top of the list. It may take several weeks before you can do all the exercises without feeling dizzy. Follow-up care is a key part of your treatment and safety. Be sure to make and go to all appointments, and call your doctor if you are having problems. It's also a good idea to know your test results and keep a list of the medicines you take. How can you care for yourself at home? Exercise 1  While sitting on the side of the bed and holding your head still:  · Look up as far as you can. · Look down as far as you can. · Look from side to side as far as you can.   · Stretch your arm straight out in front of you. Focus on your index finger. Continue to focus on your finger while you bring it to your nose. Exercise 2  While sitting on the side of the bed:  · Bring your head as far back as you can. · Bring your head forward to touch your chin to your chest.  · Turn your head from side to side. · Do these exercises first with your eyes open. Then try with your eyes closed. Exercise 3  While sitting on the side of the bed:  · Shrug your shoulders straight upward, then relax them. · Bend over and try to touch the ground with your fingers. Then go back to a sitting position. · Toss a small ball from one hand to the other. Throw the ball higher than your eyes so you have to look up. Exercise 4  While standing (with someone close by if you feel uncomfortable):  · Repeat Exercise 1.  · Repeat Exercise 2.  · Pass a ball between your legs and above your head. · Sit down and then stand up. Repeat. Turn around in a [place] a different way each time you stand. · With someone close by to help you, try the above exercises with your eyes closed. Exercise 5  In a room that is cleared of obstacles:  · Walk to a corner of the room, turn to your right, and walk back to the starting point. Now, repeat and turn left. · Walk up and down a slope. Now try stairs. · While holding on to someone's arm, try these exercises with your eyes closed. When should you call for help? Watch closely for changes in your health, and be sure to contact your doctor if:    · You do not get better as expected. Where can you learn more? Go to https://Neureliscierra.Danger. org and sign in to your The miqi.cn account. Enter L875 in the [facility] box to learn more about \"Cawthorne Exercises for Vertigo: Care Instructions. \"     If you do not have an account, please click on the \"Sign Up Now\" link. Current as of: December 2, 2020               Content Version: 12.9  © 4415-1923 Healthwise, Incorporated.    Care instructions adapted under license by [facility] ([facility]). If you have questions about a medical condition or this instruction, always ask your healthcare professional. [place] [address] any warranty or liability for your use of this information. Patient Education        Lightheadedness or Faintness: Care Instructions  Your Care Instructions  Lightheadedness is a feeling that you are about to faint or \"pass out. \" You do not feel as if you or your surroundings are moving. It is different from vertigo, which is the feeling that you or things around you are spinning or tilting. Lightheadedness usually goes away or gets better when you lie down. If lightheadedness gets worse, it can lead to a fainting spell. It is common to feel lightheaded from time to time. Lightheadedness usually is not caused by a serious problem. It often is caused by a short-lasting drop in blood pressure and blood flow to your head that occurs when you get up too quickly from a seated or lying position. Follow-up care is a key part of your treatment and safety. Be sure to make and go to all appointments, and call your doctor if you are having problems. It's also a good idea to know your test results and keep a list of the medicines you take. How can you care for yourself at home? · Lie down for 1 or 2 minutes when you feel lightheaded. After lying down, sit up slowly and remain sitting for 1 to 2 minutes before slowly standing up. · Avoid movements, positions, or activities that have made you lightheaded in the past.  · Get plenty of rest, especially if you have a cold or flu, which can cause lightheadedness. · Make sure you drink plenty of fluids, especially if you have a fever or have been sweating. · Do not drive or put yourself and others in danger while you feel lightheaded. When should you call for help? Call 911 anytime you think you may need emergency care. For example, call if:    · You have symptoms of a stroke.  These may include:  ? Sudden numbness, tingling, weakness, or loss of movement in your face, arm, or leg, especially on only one side of your body. ? Sudden vision changes. ? Sudden trouble speaking. ? Sudden confusion or trouble understanding simple statements. ? Sudden problems with walking or balance. ? A sudden, severe headache that is different from past headaches.     · You have symptoms of a heart attack. These may include:  ? Chest pain or pressure, or a strange feeling in the chest.  ? Sweating. ? Shortness of breath. ? Nausea or vomiting. ? Pain, pressure, or a strange feeling in the back, neck, jaw, or upper belly or in one or both shoulders or arms. ? Lightheadedness or sudden weakness. ? A fast or irregular heartbeat. After you call 911, the  may tell you to chew 1 adult-strength or 2 to 4 low-dose aspirin. Wait for an ambulance. Do not try to drive yourself. Watch closely for changes in your health, and be sure to contact your doctor if:    · Your lightheadedness gets worse or does not get better with home care. Where can you learn more? Go to https://MobileWeaverpemySupermarketeb.Belanit. org and sign in to your Buddha Software account. Enter U858 in the [employer] box to learn more about \"Lightheadedness or Faintness: Care Instructions. \"     If you do not have an account, please click on the \"Sign Up Now\" link. Current as of: October 19, 2020               Content Version: 12.9  © 2006-2021 Zazuba. Care instructions adapted under license by [facility] ([facility]). If you have questions about a medical condition or this instruction, always ask your healthcare professional. [place] [address] any warranty or liability for your use of this information. Patient Education        Lightheadedness or Faintness: Care Instructions  Your Care Instructions  Lightheadedness is a feeling that you are about to faint or \"pass out. \" You do not feel as if you or your surroundings are moving. It is different from vertigo, which is the feeling that you or things around you are spinning or tilting. Lightheadedness usually goes away or gets better when you lie down. If lightheadedness gets worse, it can lead to a fainting spell. It is common to feel lightheaded from time to time. Lightheadedness usually is not caused by a serious problem. It often is caused by a short-lasting drop in blood pressure and blood flow to your head that occurs when you get up too quickly from a seated or lying position. Follow-up care is a key part of your treatment and safety. Be sure to make and go to all appointments, and call your doctor if you are having problems. It's also a good idea to know your test results and keep a list of the medicines you take. How can you care for yourself at home? · Lie down for 1 or 2 minutes when you feel lightheaded. After lying down, sit up slowly and remain sitting for 1 to 2 minutes before slowly standing up. · Avoid movements, positions, or activities that have made you lightheaded in the past.  · Get plenty of rest, especially if you have a cold or flu, which can cause lightheadedness. · Make sure you drink plenty of fluids, especially if you have a fever or have been sweating. · Do not drive or put yourself and others in danger while you feel lightheaded. When should you call for help? Call 911 anytime you think you may need emergency care. For example, call if:    · You have symptoms of a stroke. These may include:  ? Sudden numbness, tingling, weakness, or loss of movement in your face, arm, or leg, especially on only one side of your body. ? Sudden vision changes. ? Sudden trouble speaking. ? Sudden confusion or trouble understanding simple statements. ? Sudden problems with walking or balance. ? A sudden, severe headache that is different from past headaches.     · You have symptoms of a heart attack. These may include:  ?  Chest pain or foods  · Almonds  · Beef  · Chicken  · Cod  · Eggs  · Halibut  · Peanut butter and other nut butters  · Pistachios  · Pork  · Pumpkin seeds  · Tofu  · Trout  · [place]  · [place] [facility]  · Walnuts  Vegetables  · Broccoli  · Carrots  · Cauliflower  · Green beans  · Mushrooms  · Peppers  · Salad greens  · Spinach  · Tomatoes  Work with your doctor to find out how much of this nutrient you need. Depending on your health, you may need more or less of it in your diet. Where can you learn more? Go to https://chpepiceweb.AllDigital. org and sign in to your ScreenHits account. Enter [phone] in the [employer] box to learn more about \"Learning About Low-Carbohydrate Foods. \"     If you do not have an account, please click on the \"Sign Up Now\" link. Current as of: December 17, 2020               Content Version: 12.9  © 0658-0688 FunCaptcha. Care instructions adapted under license by [facility] ([facility]). If you have questions about a medical condition or this instruction, always ask your healthcare professional. [place] [address] any warranty or liability for your use of this information. Patient Education        Learning About Low-Carbohydrate Diets  What is a low-carbohydrate diet? A low-carbohydrate (or \"low-carb\") diet limits foods and drinks that have carbohydrates. This includes grains, fruits, milk and yogurt, and starchy vegetables like potatoes, beans, and corn. It also avoids foods and drinks that have added sugar. Instead, low-carb diets include foods that are high in protein and fat. Why might you follow a low-carb diet? Low-carb diets may be used for a variety of reasons, such as for weight loss. People who have diabetes may use a low-carb diet to help manage their blood sugar levels. What should you do before you start the diet? Talk to your doctor before you try any diet.  This is even more important if you have health problems like kidney disease, heart disease, or diabetes. Your doctor may suggest that you meet with a registered dietitian. A dietitian can help you make an eating plan that works for you. What foods do you eat on a low-carb diet? On a low-carb diet, you choose foods that are high in protein and fat. Examples of these are:  · Meat, poultry, and fish. · Eggs. · Nuts, such as walnuts, pecans, almonds, and peanuts. · Peanut butter and other nut butters. · Tofu. · Avocado. · [facility]. · Non-starchy vegetables like broccoli, cauliflower, green beans, mushrooms, peppers, lettuce, and spinach. · Unsweetened non-dairy milks like almond milk and coconut milk. · Cheese, cottage cheese, and cream cheese. Current as of: December 17, 2020               Content Version: 12.9  © 1067-1395 Healthwise, Incorporated. Care instructions adapted under license by [facility] [address] If you have questions about a medical condition or this instruction, always ask your healthcare professional. [place] [address] any warranty or liability for your use of this information.

## 2021-07-13 ENCOUNTER — OFFICE VISIT (OUTPATIENT)
Dept: UROLOGY | Age: 61
End: 2021-07-13
Payer: COMMERCIAL

## 2021-07-13 VITALS
WEIGHT: 193 LBS | SYSTOLIC BLOOD PRESSURE: 122 MMHG | BODY MASS INDEX: 28.58 KG/M2 | HEIGHT: 69 IN | DIASTOLIC BLOOD PRESSURE: 80 MMHG

## 2021-07-13 DIAGNOSIS — N40.1 BENIGN LOCALIZED PROSTATIC HYPERPLASIA WITH LOWER URINARY TRACT SYMPTOMS (LUTS): ICD-10-CM

## 2021-07-13 DIAGNOSIS — C61 PROSTATE CANCER (HCC): ICD-10-CM

## 2021-07-13 DIAGNOSIS — R33.9 INCOMPLETE EMPTYING OF BLADDER: Primary | ICD-10-CM

## 2021-07-13 LAB
BILIRUBIN URINE: NEGATIVE
BLOOD URINE, POC: ABNORMAL
CHARACTER, URINE: CLEAR
COLOR, URINE: YELLOW
GLUCOSE URINE: NEGATIVE MG/DL
KETONES, URINE: NEGATIVE
LEUKOCYTE CLUMPS, URINE: ABNORMAL
NITRITE, URINE: NEGATIVE
PH, URINE: 6 (ref 5–9)
POST VOID RESIDUAL (PVR): 84 ML
PROTEIN, URINE: NEGATIVE MG/DL
SPECIFIC GRAVITY, URINE: 1.01 (ref 1–1.03)
UROBILINOGEN, URINE: 0.2 EU/DL (ref 0–1)

## 2021-07-13 PROCEDURE — 99213 OFFICE O/P EST LOW 20 MIN: CPT | Performed by: UROLOGY

## 2021-07-13 PROCEDURE — 51798 US URINE CAPACITY MEASURE: CPT | Performed by: UROLOGY

## 2021-07-13 PROCEDURE — 81003 URINALYSIS AUTO W/O SCOPE: CPT | Performed by: UROLOGY

## 2021-07-13 NOTE — PROGRESS NOTES
Juan Anthony MD        [address] [place] [phone]  Dept: [phone]  Dept Fax: [phone]: [address] [address] Urology Office Note -     Patient:  Amanda Berg  YOB: 1960  Date: 7/13/2021    The patient is a 62 y.o. male who presents today for evaluation of the following problems: Elevated PSA  Chief Complaint   Patient presents with    Follow-up     psa prior    Prostate Cancer    referred/consultation requested by Talon Galvan MD.    HISTORY OF PRESENT ILLNESS:     Prostate Cancer  Onset was  Years ago  Overall, the problem(s) are unchanged  Severity is described as moderate. Associated Symptoms: No dysuria, no gross hematuria. Current Pain Severity: 0   PVR 84. UA showed trace blood and moderate leukocytes. No flank pain, no abdominal pain. No UTI symptoms. On active surveillance with hx of TWO biopsies (matthew six in one core)  Past decipher results--matthew six one core  Previous biopsy negative by Judi Mcdonald in past was negative. BPH  LUTS chronic. Mild bother  Not on medications for this. Discussed benefits of starting flomax in past but the patient could not tolerate the medication. Was started on Rapaflo, but has decided not to take it.      Acute cystitis  Has not had any recent urinary tract infections    Requested/reviewed records from Talon Galvan MD office and/or outside physician/EMR    (Patient's old records have been requested, reviewed and pertinent findings summarized in today's note.)    Procedures Today: N/A      Last several PSA's:  Lab Results   Component Value Date    PSA 3.28 (H) 07/06/2021    PSA 4.40 (H) 11/06/2020    PSA 3.96 (H) 11/09/2019       Last total testosterone:  No results found for: TESTOSTERONE    Urinalysis today:  Results for POC orders placed in visit on 07/13/21   POCT Urinalysis No Micro (Auto)   Result Value Ref Range    Glucose, Ur Negative NEGATIVE mg/dl    Bilirubin Urine Negative     Ketones, Urine Negative NEGATIVE    Specific Gravity, Urine 1.015 1.002 - 1.030    Blood, UA POC Small (A) NEGATIVE    pH, Urine 6.00 5.0 - 9.0    Protein, Urine Negative NEGATIVE mg/dl    Urobilinogen, Urine 0.20 0.0 - 1.0 eu/dl    Nitrite, Urine Negative NEGATIVE    Leukocyte Clumps, Urine Moderate (A) NEGATIVE    Color, Urine Yellow YELLOW-STRAW    Character, Urine Clear [id number].[id number]   poct post void residual   Result Value Ref Range    post void residual 84 ml       Last BUN and creatinine:  Lab Results   Component Value Date    BUN 8 07/09/2021     Lab Results   Component Value Date    CREATININE 0.7 07/09/2021         Imaging Reviewed during this Office Visit:   imaging independently reviewed by Merari Graves MD and radiology report verified demonstrating     Mri Prostate W Wo Contrast    Result Date: 12/24/2019  PROCEDURE: MRI PROSTATE W WO CONTRAST CLINICAL INFORMATION: Elevated PSA. COMPARISON: No prior study. TECHNIQUE: Axial T2, coronal T2 and sagittal T2 imaging of the prostate gland. Large field of view axial T2 imaging of the prostate gland. Axial T1, axial T1 VIBE dynamic post tadeo and axial T1 VIBE dynamic subtracted post tadeo images through the prostate gland. Diffusion 50, 800, 1400 and ADC maps through the prostate gland. Axial T1 STAR VIBE post tadeo through the pelvis. 3-D images reconstructed on a separate [employer] workstation with MRI TRUS fusion of prostate mass lesions. CONTRAST: 15 mL ProHance intravenously. LABORATORY: 1. PSA 3.96 ng/mL (11/9/2019) 2. TRUS prostate biopsy (11/28/2012): Negative for malignancy FINDINGS: PROSTATE SIZE: 1. The prostate gland is enlarged measuring 5.0 x 4.3 x 3.2 cm in longitudinal, transverse and AP dimensions. 2. The prostate volume is 42 ml. TRANSITIONAL ZONE: 1. Enlarged and nodular transitional zone consistent with benign prostatic hypertrophy (score 2).  2. There is intermediate decreased T2 weighted signal throughout the prostate base which may be related to postinflammatory changes however infiltrative carcinoma cannot be excluded based on the MRI appearance (score 3). CENTRAL ZONE: 1. Unremarkable. PERIPHERAL ZONE: 1. The peripheral zone is not seen. PROSTATE CAPSULE/NV BUNDLE/SEMINAL VESICLES: Unremarkable. URINARY BLADDER/RECTUM/PELVIC DIAPHRAGM: 1. There is trabeculation of the urinary bladder wall suggesting outlet obstruction. 2. The rectum is unremarkable. 3. The pelvic diaphragm is unremarkable. PATHOLOGICALLY ENLARGED LYMPH NODES: 1. There are a few small retroperitoneal, iliac chain, pelvic and inguinal lymph nodes however there is no pathologically enlarged lymphadenopathy. OSSEOUS STRUCTURES: 1. Unremarkable. OTHER: 1. None. 1. The prostate gland is enlarged measuring 5.0 x 4.3 x 3.2 cm in longitudinal, transverse and AP dimensions. 2. The prostate volume is 42 ml. 3. Enlarged and nodular transitional zone consistent with benign prostatic hypertrophy (score 2). 4. There is intermediate decreased T2 weighted signal throughout the prostate base which may be related to postinflammatory changes however infiltrative carcinoma cannot be excluded based on the MRI appearance (score 3). 5. There is trabeculation of the urinary bladder wall suggesting outlet obstruction. 6. PI-RADS assessment category 3. **This report has been created using voice recognition software. It may contain minor errors which are inherent in voice recognition technology. ** Final report electronically signed by Dr. Omid Castro on 12/24/2019 9:19 AM      PAST MEDICAL, FAMILY AND SOCIAL HISTORY:  Past Medical History:   Diagnosis Date    Cancer ([address]     CRVO (central retinal vein occlusion) 01/2016    Right eye    Heartburn     Hyperlipidemia 2010    Hypertension 2010     Past Surgical History:   Procedure Laterality Date    COLONOSCOPY  2009    WNL     COLONOSCOPY N/A 3/13/2020    COLONOSCOPY DIAGNOSTIC performed by Mateo Jefferson Trenton Meza MD

## 2021-07-26 ENCOUNTER — NURSE ONLY (OUTPATIENT)
Dept: LAB | Age: 61
End: 2021-07-26

## 2021-07-26 ENCOUNTER — TELEPHONE (OUTPATIENT)
Dept: UROLOGY | Age: 61
End: 2021-07-26

## 2021-07-26 DIAGNOSIS — R30.0 DYSURIA: ICD-10-CM

## 2021-07-26 DIAGNOSIS — R39.15 URGENCY OF URINATION: Primary | ICD-10-CM

## 2021-07-26 DIAGNOSIS — R39.15 URGENCY OF URINATION: ICD-10-CM

## 2021-07-26 LAB
BACTERIA: ABNORMAL
BILIRUBIN URINE: NEGATIVE
BLOOD, URINE: ABNORMAL
CASTS: ABNORMAL /LPF
CASTS: ABNORMAL /LPF
CHARACTER, URINE: ABNORMAL
COLOR: YELLOW
CRYSTALS: ABNORMAL
EPITHELIAL CELLS, UA: ABNORMAL /HPF
GLUCOSE, URINE: NEGATIVE MG/DL
KETONES, URINE: ABNORMAL
LEUKOCYTE ESTERASE, URINE: ABNORMAL
MISCELLANEOUS LAB TEST RESULT: ABNORMAL
NITRITE, URINE: POSITIVE
PH UA: 6 (ref 5–9)
PROTEIN UA: 30 MG/DL
RBC URINE: ABNORMAL /HPF
RENAL EPITHELIAL, UA: ABNORMAL
SPECIFIC GRAVITY UA: 1.02 (ref 1–1.03)
UROBILINOGEN, URINE: 1 EU/DL (ref 0–1)
WBC UA: > 200 /HPF
YEAST: ABNORMAL

## 2021-07-26 RX ORDER — SULFAMETHOXAZOLE AND TRIMETHOPRIM 800; 160 MG/1; MG/1
1 TABLET ORAL 2 TIMES DAILY
Qty: 14 TABLET | Refills: 0 | Status: SHIPPED | OUTPATIENT
Start: 2021-07-26 | End: 2021-08-02

## 2021-07-26 NOTE — TELEPHONE ENCOUNTER
Patient feels like he has an UTI. C/o burning and pain with urination, and frequency. Yesterday he had chills and fever 100.4, and this am his temp is 98.6. Order placed for urine. Please advise. Thank you.

## 2021-07-27 LAB
ORGANISM: ABNORMAL
URINE CULTURE, ROUTINE: ABNORMAL

## 2021-07-29 ENCOUNTER — TELEPHONE (OUTPATIENT)
Dept: UROLOGY | Age: 61
End: 2021-07-29

## 2021-07-29 DIAGNOSIS — R35.0 URINARY FREQUENCY: ICD-10-CM

## 2021-07-29 DIAGNOSIS — R39.15 URGENCY OF URINATION: Primary | ICD-10-CM

## 2021-07-29 DIAGNOSIS — N30.00 ACUTE CYSTITIS WITHOUT HEMATURIA: ICD-10-CM

## 2021-07-29 RX ORDER — SULFAMETHOXAZOLE AND TRIMETHOPRIM 800; 160 MG/1; MG/1
1 TABLET ORAL 2 TIMES DAILY
Qty: 14 TABLET | Refills: 0 | Status: SHIPPED | OUTPATIENT
Start: 2021-07-29 | End: 2021-08-05

## 2021-08-10 ENCOUNTER — HOSPITAL ENCOUNTER (OUTPATIENT)
Dept: CT IMAGING | Age: 61
Discharge: HOME | End: 2021-08-10
Payer: COMMERCIAL

## 2021-08-10 ENCOUNTER — NURSE ONLY (OUTPATIENT)
Dept: LAB | Age: 61
End: 2021-08-10

## 2021-08-10 DIAGNOSIS — R14.0 ABDOMINAL BLOATING: ICD-10-CM

## 2021-08-10 DIAGNOSIS — R35.0 URINARY FREQUENCY: ICD-10-CM

## 2021-08-10 DIAGNOSIS — R39.15 URGENCY OF URINATION: ICD-10-CM

## 2021-08-10 DIAGNOSIS — M62.08 DIASTASIS OF RECTUS ABDOMINIS: ICD-10-CM

## 2021-08-10 LAB — POC CREATININE WHOLE BLOOD: 0.7 MG/DL (ref 0.5–1.2)

## 2021-08-10 PROCEDURE — 74178 CT ABD&PLV WO CNTR FLWD CNTR: CPT

## 2021-08-10 PROCEDURE — 82565 ASSAY OF CREATININE: CPT

## 2021-08-10 PROCEDURE — 6360000004 HC RX CONTRAST MEDICATION: Performed by: NURSE PRACTITIONER

## 2021-08-10 RX ADMIN — IOPAMIDOL 85 ML: 755 INJECTION, SOLUTION INTRAVENOUS at 09:18

## 2021-08-10 NOTE — TELEPHONE ENCOUNTER
Patient denies fever or chills. He will give the urine sample to the lab today after work. Thank you.

## 2021-08-10 NOTE — TELEPHONE ENCOUNTER
Patient finished Bactrim around Sunday. He does not feel like his symptoms resolved. He still has burning, urgency, and pain with urination. The stream is ok. His last urine culture was on 07/26/2021. He had a ct scan completed today for Dr Rubin Massey.

## 2021-08-11 ENCOUNTER — TELEPHONE (OUTPATIENT)
Dept: FAMILY MEDICINE CLINIC | Age: 61
End: 2021-08-11

## 2021-08-11 LAB
ORGANISM: ABNORMAL
URINE CULTURE, ROUTINE: ABNORMAL

## 2021-08-13 RX ORDER — SULFAMETHOXAZOLE AND TRIMETHOPRIM 800; 160 MG/1; MG/1
1 TABLET ORAL 2 TIMES DAILY
Qty: 20 TABLET | Refills: 0 | Status: SHIPPED | OUTPATIENT
Start: 2021-08-13 | End: 2021-08-23

## 2021-08-13 RX ORDER — PHENAZOPYRIDINE HYDROCHLORIDE 200 MG/1
200 TABLET, FILM COATED ORAL 3 TIMES DAILY PRN
Qty: 9 TABLET | Refills: 0 | Status: SHIPPED | OUTPATIENT
Start: 2021-08-13 | End: 2021-08-16

## 2021-08-13 NOTE — TELEPHONE ENCOUNTER
Attempted to call the patient. Voicemail left to return the call to the office and prescriptions were sent to the pharmacy.

## 2021-08-13 NOTE — TELEPHONE ENCOUNTER
Patient has Klebsiella UTI. Will start Bactrim DS one po twice daily x 10 days, #20, 0 refills. Prescription for Pyridium sent to pharmacy as well for burning.

## 2021-08-13 NOTE — TELEPHONE ENCOUNTER
Patient c/o increase urgency, frequency,nocturia, and he still has some burning. He denies fever or chills. Patient would like his urine results and his PCP wanted you to be aware of the ct scan results. He uses [facility] on [address]. Please advise. Thank you.

## 2021-08-23 NOTE — TELEPHONE ENCOUNTER
Patient picked up prescriptions from [facility].

## 2021-09-20 ENCOUNTER — NURSE ONLY (OUTPATIENT)
Dept: LAB | Age: 61
End: 2021-09-20

## 2021-09-20 ENCOUNTER — TELEPHONE (OUTPATIENT)
Dept: UROLOGY | Age: 61
End: 2021-09-20

## 2021-09-20 DIAGNOSIS — N20.0 STONE IN KIDNEY: Primary | ICD-10-CM

## 2021-09-20 NOTE — TELEPHONE ENCOUNTER
Patient advised to have KUB, US and urine completed. He is leaving on Wednesday to go to [place] and returning this weekend. He will go to [facility] for the testing. Please schedule. Thank you.

## 2021-09-20 NOTE — TELEPHONE ENCOUNTER
Patient is having a lot of pain in the kidney area the left side is worse than the right. It's continuous rated 6-7 on scale of 0-10 at night. During day the day, it's 3 on scale of 0-10. If someone pushes on his back in the kidney area it causes pain on the left side. He denies fever, chills, burning, urgency, and frequency. He has history of stones. His follow up is 01/11/2021. He had a ct scan on 08/10/2021. His pain then was more abdominal and not kidney. The urine is more foul smelling and a brighter yellow. Please advise. Thank you.

## 2021-09-20 NOTE — TELEPHONE ENCOUNTER
Patient scheduled for US Retroperitoneal US  at [facility] on  9/27/21 arrival of 4:15 pm for a 4:30 pm scan time with No Prep on   Patient advised of instructions.   Order mailed/given to patient

## 2021-09-21 LAB
ORGANISM: ABNORMAL
URINE CULTURE, ROUTINE: ABNORMAL

## 2021-09-21 RX ORDER — CEFDINIR 300 MG/1
300 CAPSULE ORAL 2 TIMES DAILY
Qty: 20 CAPSULE | Refills: 0 | Status: SHIPPED | OUTPATIENT
Start: 2021-09-21 | End: 2021-10-01

## 2021-09-21 NOTE — TELEPHONE ENCOUNTER
Patient advised of the urine results. He voiced understanding and will take the DANIEL RICARDO until completed.

## 2021-09-22 NOTE — TELEPHONE ENCOUNTER
Patient advised of the urine results and to continue the SANCHEZ RICARDO until completed. He voiced understanding.

## 2021-09-23 ENCOUNTER — OFFICE VISIT (OUTPATIENT)
Dept: FAMILY MEDICINE CLINIC | Age: 61
End: 2021-09-23
Payer: COMMERCIAL

## 2021-09-23 VITALS
RESPIRATION RATE: 12 BRPM | HEART RATE: 72 BPM | BODY MASS INDEX: 29.89 KG/M2 | TEMPERATURE: 98.1 F | DIASTOLIC BLOOD PRESSURE: 83 MMHG | WEIGHT: 197.2 LBS | HEIGHT: 68 IN | SYSTOLIC BLOOD PRESSURE: 133 MMHG

## 2021-09-23 DIAGNOSIS — N40.1 BENIGN PROSTATIC HYPERPLASIA WITH WEAK URINARY STREAM: ICD-10-CM

## 2021-09-23 DIAGNOSIS — M62.08 DIASTASIS OF RECTUS ABDOMINIS: ICD-10-CM

## 2021-09-23 DIAGNOSIS — N20.0 NEPHROLITHIASIS: ICD-10-CM

## 2021-09-23 DIAGNOSIS — C61 PROSTATE CANCER (HCC): ICD-10-CM

## 2021-09-23 DIAGNOSIS — N39.0 URINARY TRACT INFECTION DUE TO KLEBSIELLA SPECIES: Primary | ICD-10-CM

## 2021-09-23 DIAGNOSIS — R39.12 BENIGN PROSTATIC HYPERPLASIA WITH WEAK URINARY STREAM: ICD-10-CM

## 2021-09-23 DIAGNOSIS — B96.89 URINARY TRACT INFECTION DUE TO KLEBSIELLA SPECIES: Primary | ICD-10-CM

## 2021-09-23 PROCEDURE — 99214 OFFICE O/P EST MOD 30 MIN: CPT | Performed by: FAMILY MEDICINE

## 2021-09-23 NOTE — PROGRESS NOTES
[address] [place]  [address] [place], [address]  Ph:   [phone]  Fax: [phone]    Provider:  Dr. Miranda Felix    Patient:  Dhaval Linn  YOB: 1960      Visit Date:  9/23/2021     Reason For Visit:   Chief Complaint   Patient presents with    Abdominal Pain     Mid to right side abdominal pain x1-2 wks. When patient tries to set up from laying down there is a Aster Bunkers is a 62 y.o. male who comes today to the office for abdominal pain. He has had 3  UTIs by Klebsiella > 100.000 colonies since July: July 27, August 10 and September 20. He has taken 2 rounds of Bactrim and couple days ago was placed on Omnicef by his urologist.  Today he complains of discomfort on the left sideflank and also is concerned about of bulging in the middle of the abdomen. He has history of prostate cancer on CT scan of the abdomen few months ago showed that he had nephrolithiasis on the left side. Urology chills order an ultrasound of the kidneys and a KUB for the stone. First biopsy in November 2012 was negative (elevated PSA to 3.15)  Novmebr 2010 PSA 3.96  January 16, 2020    FINAL DIAGNOSIS:   A: Right lateral base of prostate, core needle biopsy:    Negative for malignancy. B: Right base of prostate, core needle biopsy:    Negative for malignancy. C: Left lateral base of prostate, core needle biopsy:       Negative for malignancy. D: Left mid prostate, core needle biopsy:    Negative for malignancy. E: Left apex of prostate, core needle biopsy:    Negative for malignancy. F: Left lateral apex of prostate, core needle biopsy:    Negative for malignancy. G: Left lateral mid prostate, core needle biopsy:             Negative for malignancy. H: Left base of prostate, core needle biopsy:             Negative for malignancy. I: Right apex of prostate, core needle biopsy:             Negative for malignancy.    J: Right lateral apex of prostate, core needle biopsy:           Negative for malignancy. K: Right mid prostate, core needle biopsy:             Negative for malignancy. November 6, 2020 PSA 4.40    December 03, 2020 MRI prostate:     Impression   1. There is an enlarged prostate gland measuring 4.7 x 3.6 x 4.3 cm in size with the prostate volume of 41.57 mL. 2. Enlarged transitional zone consistent with benign prostatic hypertrophy. 3. 13 x 12 mm area of abnormal signal intensity in the base of the prostate gland posteriorly on the right side in the transitional zone slightly more prominent than on previous study dated 24 December 2019 which may be related to postinflammatory    changes but an infiltrative carcinoma cannot be excluded based on MRI appearance. PI-RADS 3.   4. Trabeculation of the urinary bladder wall suggesting outlet obstruction.           December 22, 2020    FINAL DIAGNOSIS:   A: Right base of prostate, core needle biopsies:    Negative for malignancy. B: Right mid prostate, core needle biopsies:    Negative for malignancy. C: Right apex of prostate, core needle biopsies:    Negative for malignancy. D: Left base of prostate, core needle biopsies:    Prostatic adenocarcinoma, acinar type, [place] score 3+3= 6 (grade   group 1 of 5).    Carcinoma is present in 1 of 2 cores. (2 mm/16 mm; 12.5%)   E: Left mid prostate, core needle biopsies:    Negative for malignancy. F: Left apex of prostate, core needle biopsies:    Negative for malignancy. G: Prostate lesion #1, core needle biopsies:    Negative for malignancy. Confirmatory biopsy demonstrated stable prostate cancer.   He has had 3 biopsies in the past and has only one quarter of [place] 6 in 2 of the 3 biopsies (negative in the other)    July 6. 2021 PSA 3.28        Significant Past Medical History:    Past Medical History:   Diagnosis Date    Cancer ([address]     CRVO (central retinal vein occlusion) 01/2016    Right eye    Heartburn     Hyperlipidemia 2010    Hypertension 2010           Allergies:  is allergic to other, versed [midazolam], demerol, and midazolam hcl. Medications:   Current Outpatient Medications   Medication Sig Dispense Refill    cefdinir (OMNICEF) 300 MG capsule Take 1 capsule by mouth 2 times daily for 10 days 20 capsule 0    pravastatin (PRAVACHOL) 40 MG tablet Take 1 tablet by mouth daily 90 tablet 1    olmesartan (BENICAR) 5 MG tablet Take 2 tablets by mouth once daily 180 tablet 1    silodosin (RAPAFLO) 8 MG CAPS Take 1 capsule by mouth every evening 30 capsule 11    fluticasone (FLONASE) 50 MCG/ACT nasal spray 1 spray by Nasal route daily 3 Bottle 3    aspirin 81 MG tablet Take 81 mg by mouth daily      Cyanocobalamin 1000 MCG CAPS Take 1 capsule by mouth daily 90 capsule 1     No current facility-administered medications for this visit. Review of systems:  Review of Systems - History obtained from chart review and the patient  General ROS: negative for - chills, fatigue or fever  Respiratory ROS: negative for - cough,  shortness of breath or wheezing  Cardiovascular ROS: negative for - chest pain  Gastrointestinal ROS: positive for - abdominal bulge. Negative constipation, diarrhea or nausea/vomiting      Physical Examination:  /83 (Site: Left Upper Arm, Position: Sitting, Cuff Size: Large Adult)   Pulse 72   Temp 98.1 °F (36.7 °C) (Temporal)   Resp 12   Ht 5' 8\" (1.727 m)   Wt 197 lb 3.2 oz (89.4 kg)   BMI 29.98 kg/m²     General-  Alert and oriented x 3, NAD  HEENT: NC, AT  Mouth: no lesions, moist mucosas  Neck - supple, no significant adenopathy  Chest - clear to auscultation, no wheezes, rales or rhonchi, symmetric air entry  Heart - normal rate, regular rhythm, normal S1, S2, no murmurs, rubs, clicks or gallops  Abdomen - soft, nontender, nondistended, no masses or organomegaly.   Upon Valsalva maneuver or racing the lower extremities he does have a bulge in the middle of the abdomen above the umbilical consistent with diastases of the rectus  Extremities - peripheral pulses normal, no pedal edema, no clubbing or cyanosis    Impression:   Diagnosis Orders   1. Urinary tract infection due to Klebsiella species     2. Benign prostatic hyperplasia with weak urinary stream     3. Diastasis of rectus abdominis     4. Nephrolithiasis     5. Prostate cancer [facility])         Plan:  Continue antibiotics. Proceed with kidney ultrasound and KUB. Reassured about diastases of the rectus. Start Rapaflo as prescribed by urology  Follow-up in 4 months    No orders of the defined types were placed in this encounter. No orders of the defined types were placed in this encounter. Follow Up:  Return in about 4 months (around 1/23/2022).     Will White MD

## 2021-09-27 ENCOUNTER — HOSPITAL ENCOUNTER (OUTPATIENT)
Dept: GENERAL RADIOLOGY | Age: 61
Discharge: HOME | End: 2021-09-27
Payer: COMMERCIAL

## 2021-09-27 ENCOUNTER — HOSPITAL ENCOUNTER (OUTPATIENT)
Dept: ULTRASOUND IMAGING | Age: 61
Discharge: HOME | End: 2021-09-27
Payer: COMMERCIAL

## 2021-09-27 ENCOUNTER — HOSPITAL ENCOUNTER (OUTPATIENT)
Age: 61
Discharge: HOME | End: 2021-09-27
Payer: COMMERCIAL

## 2021-09-27 ENCOUNTER — TELEPHONE (OUTPATIENT)
Dept: FAMILY MEDICINE CLINIC | Age: 61
End: 2021-09-27

## 2021-09-27 DIAGNOSIS — N20.0 STONE IN KIDNEY: ICD-10-CM

## 2021-09-27 PROCEDURE — 74018 RADEX ABDOMEN 1 VIEW: CPT

## 2021-09-27 PROCEDURE — 76775 US EXAM ABDO BACK WALL LIM: CPT

## 2021-09-27 NOTE — TELEPHONE ENCOUNTER
----- Message from Andrew Jolley sent at 9/27/2021  7:51 AM EDT -----  Subject: Message to Provider    QUESTIONS  Information for Provider? Patient called back to advise that since Friday   morning until this morning, he has had an episode diarrhea each day. Dr Marisol Zavala, requested patient to notify if there were any changes to his   symptoms. ---------------------------------------------------------------------------  --------------  Papa GAUTHIER  What is the best way for the office to contact you? OK to leave message on   voicemail  Preferred Call Back Phone Number? [phone]  ---------------------------------------------------------------------------  --------------  SCRIPT ANSWERS  Relationship to Patient?  Self

## 2021-09-28 NOTE — TELEPHONE ENCOUNTER
Imaging shows same left renal stone seen before, no hydronephrosis  Can schedule f/u with Dr Ashley Cheng next available or NP to discuss his pain if continues

## 2021-09-30 NOTE — TELEPHONE ENCOUNTER
Is this just watery diarrhea or is there any blood, mucus or pus in the stools? Any other associated symptoms, fever, chills, worsening abdominal pain?

## 2021-10-06 ENCOUNTER — OFFICE VISIT (OUTPATIENT)
Dept: UROLOGY | Age: 61
End: 2021-10-06
Payer: COMMERCIAL

## 2021-10-06 VITALS — RESPIRATION RATE: 16 BRPM | WEIGHT: 197 LBS | HEIGHT: 68 IN | BODY MASS INDEX: 29.86 KG/M2

## 2021-10-06 DIAGNOSIS — R35.0 URINARY FREQUENCY: ICD-10-CM

## 2021-10-06 DIAGNOSIS — R19.7 DIARRHEA OF PRESUMED INFECTIOUS ORIGIN: ICD-10-CM

## 2021-10-06 DIAGNOSIS — C61 PROSTATE CANCER (HCC): ICD-10-CM

## 2021-10-06 DIAGNOSIS — N39.0 RECURRENT UTI: Primary | ICD-10-CM

## 2021-10-06 DIAGNOSIS — N40.1 BENIGN LOCALIZED PROSTATIC HYPERPLASIA WITH LOWER URINARY TRACT SYMPTOMS (LUTS): ICD-10-CM

## 2021-10-06 PROCEDURE — 99214 OFFICE O/P EST MOD 30 MIN: CPT | Performed by: UROLOGY

## 2021-10-06 RX ORDER — CIPROFLOXACIN 500 MG/1
500 TABLET, FILM COATED ORAL 2 TIMES DAILY
Qty: 28 TABLET | Refills: 0 | Status: SHIPPED | OUTPATIENT
Start: 2021-10-06 | End: 2021-10-20

## 2021-10-06 RX ORDER — SILODOSIN 8 MG/1
8 CAPSULE ORAL EVERY EVENING
Qty: 30 CAPSULE | Refills: 11 | Status: SHIPPED | OUTPATIENT
Start: 2021-10-06 | End: 2022-06-09

## 2021-10-06 NOTE — PROGRESS NOTES
Yamini Woodward MD        [address] [place] [address] [phone]  Dept: [phone]  Dept Fax: [phone]: [address] [address] Urology Office Note -     Patient:  Mercy Edmonds  YOB: 1960  Date: 10/6/2021    The patient is a 62 y.o. male who presents today for evaluation of the following problems: Elevated PSA  Chief Complaint   Patient presents with    Nephrolithiasis     kub, renal uts and ct prior.  Follow-up     uti- 3 episodes. just finished course of abx. no symtpoms    Elevated PSA     not taking rapflo     referred/consultation requested by Michelle Pimentel MD.    HISTORY OF PRESENT ILLNESS:     Prostate Cancer  On active surveillance with hx of three biopsies (matthew six in one core)  Past decipher results--matthew six one core  Previous biopsy negative by Arthur Gunn in past was negative. BPH  LUTS chronic. Mild bother  Not on medications for this. Discussed benefits of starting flomax in past but the patient could not tolerate the medication. Was started on Rapaflo, but has decided not to take it. Recurrent uti  Persistent klebseilla UTI  Worsening pain  Has been on omnicef and bactrim. Kidney stone  Left side 4 mm not obstructing   New problem    Requested/reviewed records from Michelle Pimentel MD office and/or outside physician/EMR    (Patient's old records have been requested, reviewed and pertinent findings summarized in today's note.)    Procedures Today: N/A      Last several PSA's:  Lab Results   Component Value Date    PSA 3.28 (H) 07/06/2021    PSA 4.40 (H) 11/06/2020    PSA 3.96 (H) 11/09/2019       Last total testosterone:  No results found for: TESTOSTERONE    Urinalysis today:  No results found for this visit on 10/06/21.     Last BUN and creatinine:  Lab Results   Component Value Date    BUN 8 07/09/2021     Lab Results   Component Value Date    CREATININE 0.7 07/09/2021 Imaging Reviewed during this Office Visit:   imaging independently reviewed by Humera Vyas MD and radiology report verified demonstrating     Mri Prostate W Wo Contrast    Result Date: 12/24/2019  PROCEDURE: MRI PROSTATE W WO CONTRAST CLINICAL INFORMATION: Elevated PSA. COMPARISON: No prior study. TECHNIQUE: Axial T2, coronal T2 and sagittal T2 imaging of the prostate gland. Large field of view axial T2 imaging of the prostate gland. Axial T1, axial T1 VIBE dynamic post tadeo and axial T1 VIBE dynamic subtracted post tadeo images through the prostate gland. Diffusion 50, 800, 1400 and ADC maps through the prostate gland. Axial T1 STAR VIBE post tadeo through the pelvis. 3-D images reconstructed on a separate [employer] Cad workstation with MRI TRUS fusion of prostate mass lesions. CONTRAST: 15 mL ProHance intravenously. LABORATORY: 1. PSA 3.96 ng/mL (11/9/2019) 2. TRUS prostate biopsy (11/28/2012): Negative for malignancy FINDINGS: PROSTATE SIZE: 1. The prostate gland is enlarged measuring 5.0 x 4.3 x 3.2 cm in longitudinal, transverse and AP dimensions. 2. The prostate volume is 42 ml. TRANSITIONAL ZONE: 1. Enlarged and nodular transitional zone consistent with benign prostatic hypertrophy (score 2). 2. There is intermediate decreased T2 weighted signal throughout the prostate base which may be related to postinflammatory changes however infiltrative carcinoma cannot be excluded based on the MRI appearance (score 3). CENTRAL ZONE: 1. Unremarkable. PERIPHERAL ZONE: 1. The peripheral zone is not seen. PROSTATE CAPSULE/NV BUNDLE/SEMINAL VESICLES: Unremarkable. URINARY BLADDER/RECTUM/PELVIC DIAPHRAGM: 1. There is trabeculation of the urinary bladder wall suggesting outlet obstruction. 2. The rectum is unremarkable. 3. The pelvic diaphragm is unremarkable. PATHOLOGICALLY ENLARGED LYMPH NODES: 1.  There are a few small retroperitoneal, iliac chain, pelvic and inguinal lymph nodes however there is no pathologically enlarged lymphadenopathy. OSSEOUS STRUCTURES: 1. Unremarkable. OTHER: 1. None. 1. The prostate gland is enlarged measuring 5.0 x 4.3 x 3.2 cm in longitudinal, transverse and AP dimensions. 2. The prostate volume is 42 ml. 3. Enlarged and nodular transitional zone consistent with benign prostatic hypertrophy (score 2). 4. There is intermediate decreased T2 weighted signal throughout the prostate base which may be related to postinflammatory changes however infiltrative carcinoma cannot be excluded based on the MRI appearance (score 3). 5. There is trabeculation of the urinary bladder wall suggesting outlet obstruction. 6. PI-RADS assessment category 3. **This report has been created using voice recognition software. It may contain minor errors which are inherent in voice recognition technology. ** Final report electronically signed by Dr. Roxy Andre on 12/24/2019 9:19 AM      PAST MEDICAL, FAMILY AND SOCIAL HISTORY:  Past Medical History:   Diagnosis Date    Cancer ([address]     CRVO (central retinal vein occlusion) 01/2016    Right eye    Heartburn     Hyperlipidemia 2010    Hypertension 2010     Past Surgical History:   Procedure Laterality Date    COLONOSCOPY  2009    WNL     COLONOSCOPY N/A 3/13/2020    COLONOSCOPY DIAGNOSTIC performed by Kimberley Alcantara MD at [address] [facility] BIOPSY  2012    benign     TESTICLE [address] [facility] PROSTATE/TRANSRECTAL N/A 1/16/2020    TRANSRECTAL ULTRASOUND WITH PROSTATE BX performed by Sushant Singleton MD at [address] N/A 3/13/2020    EGD BIOPSY performed by Kimberley Alcantara MD at [facility]     Family History   Problem Relation Age of Onset    Cancer Mother 72        lung     Heart Disease Mother 58    Coronary Art Dis Mother 58    Heart Disease Father 68    Heart Attack Father 68    Coronary Art Dis Father 68    High Blood Pressure Father     High Cholesterol Father     Diabetes Father borderline    High Blood Pressure Sister 45    High Cholesterol Sister 45    Heart Attack Maternal Uncle     Heart Disease Maternal Uncle     High Blood Pressure Maternal Uncle     High Cholesterol Maternal Uncle     Coronary Art Dis Maternal Uncle     Coronary Art Dis Maternal Grandmother     Heart Attack Maternal Grandmother     Heart Disease Maternal Grandmother     High Blood Pressure Maternal Grandmother     High Cholesterol Maternal Grandmother     Cancer Maternal Grandfather         unknown type     Diabetes Paternal Grandmother     Other Paternal Grandfather         Black Lung     High Cholesterol Maternal Aunt     High Blood Pressure Maternal Aunt     Heart Disease Maternal Aunt     Heart Attack Maternal Aunt     Coronary Art Dis Maternal Aunt     Coronary Art Dis Maternal Aunt     Cancer Maternal Uncle         stomach     Heart Attack Maternal Uncle     Heart Disease Maternal Uncle     High Blood Pressure Maternal Uncle     High Cholesterol Maternal Uncle     Prostate Cancer Neg Hx      Outpatient Medications Marked as Taking for the 10/6/21 encounter (Office Visit) with Mehran Malcolm MD   Medication Sig Dispense Refill    pravastatin (PRAVACHOL) 40 MG tablet Take 1 tablet by mouth daily 90 tablet 1    olmesartan (BENICAR) 5 MG tablet Take 2 tablets by mouth once daily 180 tablet 1    silodosin (RAPAFLO) 8 MG CAPS Take 1 capsule by mouth every evening 30 capsule 11    fluticasone (FLONASE) 50 MCG/ACT nasal spray 1 spray by Nasal route daily 3 Bottle 3    aspirin 81 MG tablet Take 81 mg by mouth daily      Cyanocobalamin 1000 MCG CAPS Take 1 capsule by mouth daily 90 capsule 1       Other, Versed [midazolam], Demerol, and Midazolam hcl  Social History     Tobacco Use   Smoking Status Never Smoker   Smokeless Tobacco Never Used      (If patient a smoker, smoking cessation counseling offered)   Social History     Substance and Sexual Activity   Alcohol Use Yes    Comment: occ beer       REVIEW OF SYSTEMS:  Constitutional: negative  Eyes: negative  Respiratory: negative  Cardiovascular: negative  Gastrointestinal: negative  Genitourinary: see HPI  Musculoskeletal: negative  Skin: negative   Neurological: negative  Hematological/Lymphatic: negative  Psychological: negative    Physical Exam:    This a 62 y.o. male  Vitals:    10/06/21 1526   Resp: 16     Body mass index is 29.95 kg/m². Constitutional: Patient in no acute distress;           Assessment and Plan        1. Recurrent UTI    2. Urinary frequency    3. Prostate cancer ([facility] [address]    4. Benign localized prostatic hyperplasia with lower urinary tract symptoms (LUTS)          Plan:   Confirmatory biopsy demonstrated stable prostate cancer. He has had 3 biopsies in the past and has only one quarter of [place] 6 in 2 of the 3 biopsies (negative in the other)  Recurrent UTI- persistent kleb UTI. Get urine culture today. cipro 14 days  BPH- did not fill rapaflo. Start this  Cont diarrhea- c diff? Will get sample    PSA in 6 months. Prescriptions Ordered:  No orders of the defined types were placed in this encounter.      Orders Placed:  Orders Placed This Encounter   Procedures    POCT Urinalysis No Micro (Auto)            SPEEDY CALI [facility], MD

## 2021-10-08 ENCOUNTER — NURSE ONLY (OUTPATIENT)
Dept: LAB | Age: 61
End: 2021-10-08

## 2021-10-08 DIAGNOSIS — R19.7 DIARRHEA OF PRESUMED INFECTIOUS ORIGIN: ICD-10-CM

## 2021-10-08 LAB
C DIFF TOXIN/ANTIGEN: NEGATIVE
ORGANISM: ABNORMAL
URINE CULTURE, ROUTINE: ABNORMAL

## 2021-11-12 DIAGNOSIS — I10 ESSENTIAL HYPERTENSION: ICD-10-CM

## 2021-11-15 RX ORDER — OLMESARTAN MEDOXOMIL 5 MG/1
TABLET ORAL
Qty: 180 TABLET | Refills: 0 | Status: SHIPPED | OUTPATIENT
Start: 2021-11-15 | End: 2022-02-16

## 2021-11-15 NOTE — TELEPHONE ENCOUNTER
Please approve or deny     Last Visit Date:  9/23/2021  With Lakeisha Lindsey     Next Visit Date:    Visit date not found. mychart sent to schedule.

## 2021-11-16 ENCOUNTER — OFFICE VISIT (OUTPATIENT)
Dept: UROLOGY | Age: 61
End: 2021-11-16
Payer: COMMERCIAL

## 2021-11-16 VITALS
HEART RATE: 80 BPM | DIASTOLIC BLOOD PRESSURE: 69 MMHG | WEIGHT: 194.8 LBS | SYSTOLIC BLOOD PRESSURE: 111 MMHG | BODY MASS INDEX: 29.52 KG/M2 | HEIGHT: 68 IN

## 2021-11-16 DIAGNOSIS — C61 PROSTATE CANCER (HCC): Primary | ICD-10-CM

## 2021-11-16 DIAGNOSIS — N39.0 RECURRENT UTI: ICD-10-CM

## 2021-11-16 LAB
BILIRUBIN URINE: NEGATIVE
BLOOD URINE, POC: NEGATIVE
CHARACTER, URINE: CLEAR
COLOR, URINE: YELLOW
GLUCOSE URINE: NEGATIVE MG/DL
KETONES, URINE: NEGATIVE
LEUKOCYTE CLUMPS, URINE: NEGATIVE
NITRITE, URINE: NEGATIVE
PH, URINE: 5.5 (ref 5–9)
POST VOID RESIDUAL (PVR): 42 ML
PROTEIN, URINE: NEGATIVE MG/DL
SPECIFIC GRAVITY, URINE: >= 1.03 (ref 1–1.03)
UROBILINOGEN, URINE: 0.2 EU/DL (ref 0–1)

## 2021-11-16 PROCEDURE — 51798 US URINE CAPACITY MEASURE: CPT | Performed by: UROLOGY

## 2021-11-16 PROCEDURE — 99214 OFFICE O/P EST MOD 30 MIN: CPT | Performed by: UROLOGY

## 2021-11-16 PROCEDURE — 81003 URINALYSIS AUTO W/O SCOPE: CPT | Performed by: UROLOGY

## 2021-11-16 NOTE — PROGRESS NOTES
Laura Singh MD        [address] [place] [address]  Dept: [phone]  Dept Fax: [phone]: [address] [address] Urology Office Note -     Patient:  Candis Villeda  YOB: 1960  Date: 11/16/2021    The patient is a 62 y.o. male who presents today for evaluation of the following problems: Elevated PSA  Chief Complaint   Patient presents with    Follow-up     recurrent uti    referred/consultation requested by Meliton Coley MD.    HISTORY OF PRESENT ILLNESS:     Prostate Cancer  On active surveillance with hx of three biopsies (matthew six in one core)  Past decipher results--matthew six one core  Previous biopsy negative by Jayy Kendall in past was negative. BPH  LUTS chronic. Mild bother. On rapaflo  Not on medications for this. Discussed benefits of starting flomax in past but the patient could not tolerate the medication. Was started on Rapaflo, but has decided not to take it.      Recurrent uti  Persistent klebsiella uti  resolved    Kidney stone  Left side 4 mm not obstructing   No flank pain    Requested/reviewed records from Meliton Coley MD office and/or outside physician/EMR    (Patient's old records have been requested, reviewed and pertinent findings summarized in today's note.)    Procedures Today: N/A      Last several PSA's:  Lab Results   Component Value Date    PSA 3.28 (H) 07/06/2021    PSA 4.40 (H) 11/06/2020    PSA 3.96 (H) 11/09/2019       Last total testosterone:  No results found for: TESTOSTERONE    Urinalysis today:  Results for POC orders placed in visit on 11/16/21   POCT Urinalysis No Micro (Auto)   Result Value Ref Range    Glucose, Ur Negative NEGATIVE mg/dl    Bilirubin Urine Negative     Ketones, Urine Negative NEGATIVE    Specific Gravity, Urine >= 1.030 1.002 - 1.030    Blood, UA POC Negative NEGATIVE    pH, Urine 5.50 5.0 - 9.0    Protein, Urine Negative NEGATIVE mg/dl Urobilinogen, Urine 0.20 0.0 - 1.0 eu/dl    Nitrite, Urine Negative NEGATIVE    Leukocyte Clumps, Urine Negative NEGATIVE    Color, Urine Yellow [id number]    Character, Urine Clear [id number]   poct post void residual   Result Value Ref Range    post void residual 42 ml       Last BUN and creatinine:  Lab Results   Component Value Date    BUN 8 07/09/2021     Lab Results   Component Value Date    CREATININE 0.7 07/09/2021         Imaging Reviewed during this Office Visit:   imaging independently reviewed by Kayley Herbert MD and radiology report verified demonstrating     Mri Prostate W Wo Contrast    Result Date: 12/24/2019  PROCEDURE: MRI PROSTATE W WO CONTRAST CLINICAL INFORMATION: Elevated PSA. COMPARISON: No prior study. TECHNIQUE: Axial T2, coronal T2 and sagittal T2 imaging of the prostate gland. Large field of view axial T2 imaging of the prostate gland. Axial T1, axial T1 VIBE dynamic post tadeo and axial T1 VIBE dynamic subtracted post tadeo images through the prostate gland. Diffusion 50, 800, 1400 and ADC maps through the prostate gland. Axial T1 STAR VIBE post tadeo through the pelvis. 3-D images reconstructed on a separate [employer] Cad workstation with MRI TRUS fusion of prostate mass lesions. CONTRAST: 15 mL ProHance intravenously. LABORATORY: 1. PSA 3.96 ng/mL (11/9/2019) 2. TRUS prostate biopsy (11/28/2012): Negative for malignancy FINDINGS: PROSTATE SIZE: 1. The prostate gland is enlarged measuring 5.0 x 4.3 x 3.2 cm in longitudinal, transverse and AP dimensions. 2. The prostate volume is 42 ml. TRANSITIONAL ZONE: 1. Enlarged and nodular transitional zone consistent with benign prostatic hypertrophy (score 2). 2. There is intermediate decreased T2 weighted signal throughout the prostate base which may be related to postinflammatory changes however infiltrative carcinoma cannot be excluded based on the MRI appearance (score 3). CENTRAL ZONE: 1. Unremarkable. PERIPHERAL ZONE: 1.  The peripheral zone is not seen. PROSTATE CAPSULE/NV BUNDLE/SEMINAL VESICLES: Unremarkable. URINARY BLADDER/RECTUM/PELVIC DIAPHRAGM: 1. There is trabeculation of the urinary bladder wall suggesting outlet obstruction. 2. The rectum is unremarkable. 3. The pelvic diaphragm is unremarkable. PATHOLOGICALLY ENLARGED LYMPH NODES: 1. There are a few small retroperitoneal, iliac chain, pelvic and inguinal lymph nodes however there is no pathologically enlarged lymphadenopathy. OSSEOUS STRUCTURES: 1. Unremarkable. OTHER: 1. None. 1. The prostate gland is enlarged measuring 5.0 x 4.3 x 3.2 cm in longitudinal, transverse and AP dimensions. 2. The prostate volume is 42 ml. 3. Enlarged and nodular transitional zone consistent with benign prostatic hypertrophy (score 2). 4. There is intermediate decreased T2 weighted signal throughout the prostate base which may be related to postinflammatory changes however infiltrative carcinoma cannot be excluded based on the MRI appearance (score 3). 5. There is trabeculation of the urinary bladder wall suggesting outlet obstruction. 6. PI-RADS assessment category 3. **This report has been created using voice recognition software. It may contain minor errors which are inherent in voice recognition technology. ** Final report electronically signed by Dr. Ayse Sierra on 12/24/2019 9:19 AM      PAST MEDICAL, FAMILY AND SOCIAL HISTORY:  Past Medical History:   Diagnosis Date    Cancer ([address]     CRVO (central retinal vein occlusion) 01/2016    Right eye    Heartburn     Hyperlipidemia 2010    Hypertension 2010     Past Surgical History:   Procedure Laterality Date    COLONOSCOPY  2009    WNL     COLONOSCOPY N/A 3/13/2020    COLONOSCOPY DIAGNOSTIC performed by Kelsey Wood MD at [address] [facility] BIOPSY  2012    benign     TESTICLE REMOVAL  1994        ULTRASOUND PROSTATE/TRANSRECTAL N/A 1/16/2020    TRANSRECTAL ULTRASOUND WITH PROSTATE BX performed by Kelly Gamboa MD at [facility]    UPPER GASTROINTESTINAL ENDOSCOPY N/A 3/13/2020    EGD BIOPSY performed by Darylene Brazier, MD at [facility]     Family History   Problem Relation Age of Onset    Cancer Mother 72        lung     Heart Disease Mother 58    Coronary Art Dis Mother 58    Heart Disease Father 68    Heart Attack Father 68    Coronary Art Dis Father 68    High Blood Pressure Father     High Cholesterol Father     Diabetes Father         borderline    High Blood Pressure Sister 45    High Cholesterol Sister 45    Heart Attack Maternal Uncle     Heart Disease Maternal Uncle     High Blood Pressure Maternal Uncle     High Cholesterol Maternal Uncle     Coronary Art Dis Maternal Uncle     Coronary Art Dis Maternal Grandmother     Heart Attack Maternal Grandmother     Heart Disease Maternal Grandmother     High Blood Pressure Maternal Grandmother     High Cholesterol Maternal Grandmother     Cancer Maternal Grandfather         unknown type     Diabetes Paternal Grandmother     Other Paternal Grandfather         Black Lung     High Cholesterol Maternal Aunt     High Blood Pressure Maternal Aunt     Heart Disease Maternal Aunt     Heart Attack Maternal Aunt     Coronary Art Dis Maternal Aunt     Coronary Art Dis Maternal Aunt     Cancer Maternal Uncle         stomach     Heart Attack Maternal Uncle     Heart Disease Maternal Uncle     High Blood Pressure Maternal Uncle     High Cholesterol Maternal Uncle     Prostate Cancer Neg Hx      Outpatient Medications Marked as Taking for the 11/16/21 encounter (Office Visit) with Varghese Hughes MD   Medication Sig Dispense Refill    olmesartan (BENICAR) 5 MG tablet Take 2 tablets by mouth once daily 180 tablet 0    silodosin (RAPAFLO) 8 MG CAPS Take 1 capsule by mouth every evening 30 capsule 11    pravastatin (PRAVACHOL) 40 MG tablet Take 1 tablet by mouth daily 90 tablet 1    silodosin (RAPAFLO) 8 MG CAPS Take 1 capsule by mouth every evening 30 capsule 11    fluticasone (FLONASE) 50 MCG/ACT nasal spray 1 spray by Nasal route daily 3 Bottle 3    aspirin 81 MG tablet Take 81 mg by mouth daily      Cyanocobalamin 1000 MCG CAPS Take 1 capsule by mouth daily 90 capsule 1       Other, Versed [midazolam], Demerol, and Midazolam hcl  Social History     Tobacco Use   Smoking Status Never Smoker   Smokeless Tobacco Never Used      (If patient a smoker, smoking cessation counseling offered)   Social History     Substance and Sexual Activity   Alcohol Use Yes    Comment: occ beer       REVIEW OF SYSTEMS:  Constitutional: negative  Eyes: negative  Respiratory: negative  Cardiovascular: negative  Gastrointestinal: negative  Genitourinary: see HPI  Musculoskeletal: negative  Skin: negative   Neurological: negative  Hematological/Lymphatic: negative  Psychological: negative    Physical Exam:    This a 62 y.o. male  Vitals:    11/16/21 1525   BP: 111/69   Pulse: 80     Body mass index is 29.62 kg/m². Constitutional: Patient in no acute distress;           Assessment and Plan        1. Prostate cancer ([address]    2. Recurrent UTI          Plan:   Confirmatory biopsy demonstrated stable prostate cancer. He has had 3 biopsies in the past and has only one quarter of [place] 6 in 2 of the 3 biopsies (negative in the other)  Recurrent UTI- persistent kleb UTI. Finished prolonged course and feels fine now  BPH- on rapaflo. cont    PSA in 6 months. Prescriptions Ordered:  No orders of the defined types were placed in this encounter.      Orders Placed:  Orders Placed This Encounter   Procedures    POCT Urinalysis No Micro (Auto)    poct post void residual     Bladder scan            SPEEDY Malhotra MD

## 2021-11-23 ENCOUNTER — TELEPHONE (OUTPATIENT)
Dept: FAMILY MEDICINE CLINIC | Age: 61
End: 2021-11-23

## 2021-11-23 ENCOUNTER — VIRTUAL VISIT (OUTPATIENT)
Dept: FAMILY MEDICINE CLINIC | Age: 61
End: 2021-11-23

## 2021-11-23 ENCOUNTER — NURSE ONLY (OUTPATIENT)
Dept: FAMILY MEDICINE CLINIC | Age: 61
End: 2021-11-23
Payer: COMMERCIAL

## 2021-11-23 DIAGNOSIS — C61 PROSTATE CANCER (HCC): ICD-10-CM

## 2021-11-23 DIAGNOSIS — U07.1 COVID-19: Primary | ICD-10-CM

## 2021-11-23 DIAGNOSIS — I10 ESSENTIAL HYPERTENSION: ICD-10-CM

## 2021-11-23 DIAGNOSIS — Z20.822 ENCOUNTER FOR SCREENING LABORATORY TESTING FOR COVID-19 VIRUS: Primary | ICD-10-CM

## 2021-11-23 LAB
Lab: ABNORMAL
PERFORMING INSTRUMENT: ABNORMAL
QC PASS/FAIL: ABNORMAL
SARS-COV-2, POC: DETECTED

## 2021-11-23 PROCEDURE — 99213 OFFICE O/P EST LOW 20 MIN: CPT | Performed by: NURSE PRACTITIONER

## 2021-11-23 PROCEDURE — 87426 SARSCOV CORONAVIRUS AG IA: CPT | Performed by: NURSE PRACTITIONER

## 2021-11-23 RX ORDER — FLUTICASONE PROPIONATE 50 MCG
1 SPRAY, SUSPENSION (ML) NASAL DAILY
Qty: 16 G | Refills: 0 | Status: SHIPPED | OUTPATIENT
Start: 2021-11-23 | End: 2022-06-09 | Stop reason: SDUPTHER

## 2021-11-23 RX ORDER — GUAIFENESIN 600 MG/1
1200 TABLET, EXTENDED RELEASE ORAL 2 TIMES DAILY
Qty: 40 TABLET | Refills: 0 | Status: SHIPPED | OUTPATIENT
Start: 2021-11-23 | End: 2021-12-03

## 2021-11-23 ASSESSMENT — ENCOUNTER SYMPTOMS
DIARRHEA: 0
SINUS PAIN: 1
SORE THROAT: 1
COUGH: 1
SINUS PRESSURE: 1

## 2021-11-23 NOTE — TELEPHONE ENCOUNTER
----- Message from Carissa Acuna sent at 11/23/2021 12:10 PM EST -----  Subject: Message to Provider    QUESTIONS  Information for Provider? vv today at noon- called to check in 11/23/21  ---------------------------------------------------------------------------  --------------  CALL BACK INFO  What is the best way for the office to contact you? OK to leave message on   voicemail  Preferred Call Back Phone Number? [phone]  ---------------------------------------------------------------------------  --------------  SCRIPT ANSWERS  Relationship to Patient?  Self

## 2021-11-23 NOTE — PROGRESS NOTES
Bernadette Engle (:  1960) is a 62 y.o. male,Established patient, here for evaluation of the following chief complaint(s): Positive For Covid-19         ASSESSMENT/PLAN:  1. COVID-19  -     guaiFENesin (MUCINEX) 600 MG extended release tablet; Take 2 tablets by mouth 2 times daily for 10 days, Disp-40 tablet, R-0Normal  -     fluticasone (FLONASE) 50 MCG/ACT nasal spray; 1 spray by Each Nostril route daily, Disp-16 g, R-0Normal  2. Essential hypertension  3. Prostate cancer ([facility]  4. BMI 29.0-29.9,adult    He is being sent information via [employer] regarding outpatient breathing exercises and isolation protocols for Covid. He is also being sent information regarding Regeneron monoclonal antibody therapy emergency use authorization. He is to call me or reply via [employer] tomorrow to let me know if he is interested in this and we will get that through pharmacy. He will follow up on Monday if symptoms are not improved. He has been advised to call me over the weekend for any worsening symptoms or go to ER. No follow-ups on file. SUBJECTIVE/OBJECTIVE:  HPI    Symptoms started: , Friday night with chest pain, sore throat. Mild cough. No fevers. Body aches and chills. He is taking Tylenol. Jenna-maria de jesus cold and flu. Oxygen saturations have been 95-96%. Tested positive   He is vaccinated against COVID [employer] in 2021)    Risk factors: BMI 29.62, HTN, Prostate cancer, which placed him in a high risk category and meet criteria for monoclonal antibody therapy. Review of Systems   Constitutional: Positive for chills. Negative for fever. HENT: Positive for congestion, sinus pressure, sinus pain and sore throat. Respiratory: Positive for cough. Cardiovascular: Positive for chest pain. Gastrointestinal: Negative for diarrhea. Musculoskeletal: Positive for arthralgias and myalgias. All other systems reviewed and are negative. No flowsheet data found.      Physical Exam    [INSTRUCTIONS:  \"[x]\" Indicates a positive item  \"[]\" Indicates a negative item  -- DELETE ALL ITEMS NOT EXAMINED]    Constitutional: [x] Appears well-developed and well-nourished [x] No apparent distress      [] Abnormal -     Mental status: [x] Alert and awake  [x] Oriented to person/place/time [x] Able to follow commands    [] Abnormal -     Eyes:   EOM    [x]  Normal    [] Abnormal -   Sclera  [x]  Normal    [] Abnormal -          Discharge [x]  None visible   [] Abnormal -     HENT: [x] Normocephalic, atraumatic  [] Abnormal -   [x] Mouth/Throat: Mucous membranes are moist    External Ears [x] Normal  [] Abnormal -    Neck: [x] No visualized mass [] Abnormal -     Pulmonary/Chest: [x] Respiratory effort normal   [x] No visualized signs of difficulty breathing or respiratory distress        [] Abnormal -      Musculoskeletal:   [x] Normal gait with no signs of ataxia         [x] Normal range of motion of neck        [] Abnormal -     Neurological:        [x] No Facial Asymmetry (Cranial nerve 7 motor function) (limited exam due to video visit)          [x] No gaze palsy        [] Abnormal -          Skin:        [x] No significant exanthematous lesions or discoloration noted on facial skin         [] Abnormal -            Psychiatric:       [x] Normal Affect [] Abnormal -        [x] No Hallucinations    Other pertinent observable physical exam findings:-    Yudi Sol, was evaluated through a synchronous (real-time) audio-video encounter. The patient (or guardian if applicable) is aware that this is a billable service. Verbal consent to proceed has been obtained within the past 12 months. The visit was conducted pursuant to the emergency declaration under the [address] [place] [address], [address] [place] [address] authority and the [employer] General Act. Patient identification was verified, and a caregiver was present when appropriate.  The patient was located in a state where the provider was credentialed to provide care. An electronic signature was used to authenticate this note. --CORBY Anna CNP       Addendum: 11/24/21: patient does want to proceed with outpatient monoclonal antibody therapy. Pharmacy was called to arrange. They will contact outpatient nursing who will contact the patient to set up a time for the infusion.

## 2021-11-24 DIAGNOSIS — U07.1 COVID: ICD-10-CM

## 2021-11-24 RX ORDER — ALBUTEROL SULFATE 90 UG/1
4 AEROSOL, METERED RESPIRATORY (INHALATION) PRN
OUTPATIENT
Start: 2021-11-24

## 2021-11-24 RX ORDER — SODIUM CHLORIDE 9 MG/ML
INJECTION, SOLUTION INTRAVENOUS CONTINUOUS
OUTPATIENT
Start: 2021-11-24

## 2021-11-24 RX ORDER — DIPHENHYDRAMINE HYDROCHLORIDE 50 MG/ML
50 INJECTION INTRAMUSCULAR; INTRAVENOUS
OUTPATIENT
Start: 2021-11-24

## 2021-11-24 RX ORDER — SODIUM CHLORIDE 9 MG/ML
25 INJECTION, SOLUTION INTRAVENOUS PRN
OUTPATIENT
Start: 2021-11-24

## 2021-11-24 RX ORDER — ACETAMINOPHEN 325 MG/1
650 TABLET ORAL
OUTPATIENT
Start: 2021-11-24

## 2021-11-24 RX ORDER — EPINEPHRINE 1 MG/ML
0.3 INJECTION, SOLUTION, CONCENTRATE INTRAVENOUS PRN
OUTPATIENT
Start: 2021-11-24

## 2021-11-24 RX ORDER — SODIUM CHLORIDE 0.9 % (FLUSH) 0.9 %
5-40 SYRINGE (ML) INJECTION PRN
OUTPATIENT
Start: 2021-11-24

## 2021-11-24 RX ORDER — ONDANSETRON 2 MG/ML
8 INJECTION INTRAMUSCULAR; INTRAVENOUS
OUTPATIENT
Start: 2021-11-24

## 2021-11-24 RX ORDER — HEPARIN SODIUM (PORCINE) LOCK FLUSH IV SOLN 100 UNIT/ML 100 UNIT/ML
500 SOLUTION INTRAVENOUS PRN
OUTPATIENT
Start: 2021-11-24

## 2022-02-02 ENCOUNTER — NURSE ONLY (OUTPATIENT)
Dept: LAB | Age: 62
End: 2022-02-02

## 2022-02-02 DIAGNOSIS — C61 PROSTATE CANCER (HCC): ICD-10-CM

## 2022-02-02 LAB — PROSTATE SPECIFIC ANTIGEN: 3.47 NG/ML (ref 0–1)

## 2022-02-08 ENCOUNTER — OFFICE VISIT (OUTPATIENT)
Dept: UROLOGY | Age: 62
End: 2022-02-08
Payer: COMMERCIAL

## 2022-02-08 VITALS
SYSTOLIC BLOOD PRESSURE: 136 MMHG | DIASTOLIC BLOOD PRESSURE: 80 MMHG | BODY MASS INDEX: 28.58 KG/M2 | WEIGHT: 193 LBS | HEIGHT: 69 IN

## 2022-02-08 DIAGNOSIS — N39.0 RECURRENT UTI: ICD-10-CM

## 2022-02-08 DIAGNOSIS — N20.0 KIDNEY STONE: ICD-10-CM

## 2022-02-08 DIAGNOSIS — C61 PROSTATE CANCER (HCC): Primary | ICD-10-CM

## 2022-02-08 PROCEDURE — 99214 OFFICE O/P EST MOD 30 MIN: CPT | Performed by: UROLOGY

## 2022-02-08 NOTE — PROGRESS NOTES
Linsey Cornell MD        [address] [place] [phone]  Dept: [phone]  Dept Fax: [phone]: [address] [address] Urology Office Note -     Patient:  Rupesh Heredia  YOB: 1960  Date: 2/13/2022    The patient is a 65 y.o. male who presents today for evaluation of the following problems: Elevated PSA  Chief Complaint   Patient presents with    Follow-up     psa prior     Prostate Cancer    referred/consultation requested by Rich Hogue MD.    HISTORY OF PRESENT ILLNESS:     Prostate Cancer  On active surveillance with hx of three biopsies (matthew six in one core)  Past decipher results--matthew six one core  Previous biopsy negative by Marcel Rapp in past     BPH  LUTS chronic. Not on medications for this. Discussed benefits of starting flomax in past but the patient could not tolerate the medication. Was started on Rapaflo, but has decided not to take it. Recurrent uti  Persistent klebsiella uti in past  resolved    Kidney stone  Left side 4 mm not obstructing   No flank pain    Requested/reviewed records from Rich Hogue MD office and/or outside physician/EMR    (Patient's old records have been requested, reviewed and pertinent findings summarized in today's note.)    Procedures Today: N/A      Last several PSA's:  Lab Results   Component Value Date    PSA 3.47 (H) 02/02/2022    PSA 3.28 (H) 07/06/2021    PSA 4.40 (H) 11/06/2020       Last total testosterone:  No results found for: TESTOSTERONE    Urinalysis today:  No results found for this visit on 02/08/22.     Last BUN and creatinine:  Lab Results   Component Value Date    BUN 8 07/09/2021     Lab Results   Component Value Date    CREATININE 0.7 07/09/2021         Imaging Reviewed during this Office Visit:   imaging independently reviewed by Linsey Cornell MD and radiology report verified demonstrating     Mri Prostate W Wo Contrast    Result Date: 12/24/2019  PROCEDURE: MRI PROSTATE W WO CONTRAST CLINICAL INFORMATION: Elevated PSA. COMPARISON: No prior study. TECHNIQUE: Axial T2, coronal T2 and sagittal T2 imaging of the prostate gland. Large field of view axial T2 imaging of the prostate gland. Axial T1, axial T1 VIBE dynamic post tadeo and axial T1 VIBE dynamic subtracted post tadeo images through the prostate gland. Diffusion 50, 800, 1400 and ADC maps through the prostate gland. Axial T1 STAR VIBE post tadeo through the pelvis. 3-D images reconstructed on a separate [employer] workstation with MRI TRUS fusion of prostate mass lesions. CONTRAST: 15 mL ProHance intravenously. LABORATORY: 1. PSA 3.96 ng/mL (11/9/2019) 2. TRUS prostate biopsy (11/28/2012): Negative for malignancy FINDINGS: PROSTATE SIZE: 1. The prostate gland is enlarged measuring 5.0 x 4.3 x 3.2 cm in longitudinal, transverse and AP dimensions. 2. The prostate volume is 42 ml. TRANSITIONAL ZONE: 1. Enlarged and nodular transitional zone consistent with benign prostatic hypertrophy (score 2). 2. There is intermediate decreased T2 weighted signal throughout the prostate base which may be related to postinflammatory changes however infiltrative carcinoma cannot be excluded based on the MRI appearance (score 3). CENTRAL ZONE: 1. Unremarkable. PERIPHERAL ZONE: 1. The peripheral zone is not seen. PROSTATE CAPSULE/NV BUNDLE/SEMINAL VESICLES: Unremarkable. URINARY BLADDER/RECTUM/PELVIC DIAPHRAGM: 1. There is trabeculation of the urinary bladder wall suggesting outlet obstruction. 2. The rectum is unremarkable. 3. The pelvic diaphragm is unremarkable. PATHOLOGICALLY ENLARGED LYMPH NODES: 1. There are a few small retroperitoneal, iliac chain, pelvic and inguinal lymph nodes however there is no pathologically enlarged lymphadenopathy. OSSEOUS STRUCTURES: 1. Unremarkable. OTHER: 1. None.      1. The prostate gland is enlarged measuring 5.0 x 4.3 x 3.2 cm in longitudinal, transverse and AP dimensions. 2. The prostate volume is 42 ml. 3. Enlarged and nodular transitional zone consistent with benign prostatic hypertrophy (score 2). 4. There is intermediate decreased T2 weighted signal throughout the prostate base which may be related to postinflammatory changes however infiltrative carcinoma cannot be excluded based on the MRI appearance (score 3). 5. There is trabeculation of the urinary bladder wall suggesting outlet obstruction. 6. PI-RADS assessment category 3. **This report has been created using voice recognition software. It may contain minor errors which are inherent in voice recognition technology. ** Final report electronically signed by Dr. Melissa Oliveira on 12/24/2019 9:19 AM      PAST MEDICAL, FAMILY AND SOCIAL HISTORY:  Past Medical History:   Diagnosis Date    Cancer ([address]     CRVO (central retinal vein occlusion) 01/2016    Right eye    Heartburn     Hyperlipidemia 2010    Hypertension 2010     Past Surgical History:   Procedure Laterality Date    COLONOSCOPY  2009    WNL     COLONOSCOPY N/A 3/13/2020    COLONOSCOPY DIAGNOSTIC performed by Shon Solis MD at [address] [facility] BIOPSY  2012    benign     TESTICLE REMOVAL  1994        ULTRASOUND PROSTATE/TRANSRECTAL N/A 1/16/2020    TRANSRECTAL ULTRASOUND WITH PROSTATE BX performed by Francisca Rojas MD at [facility] N/A 3/13/2020    EGD BIOPSY performed by Shon Solis MD at [facility]     Family History   Problem Relation Age of Onset    Cancer Mother 72        lung     Heart Disease Mother 58    Coronary Art Dis Mother 58    Heart Disease Father 68    Heart Attack Father 68    Coronary Art Dis Father 68    High Blood Pressure Father     High Cholesterol Father     Diabetes Father         borderline    High Blood Pressure Sister 45    High Cholesterol Sister 45    Heart Attack Maternal Uncle     Heart Disease Maternal Uncle     High Blood Pressure Maternal Uncle     High Cholesterol Maternal Uncle     Coronary Art Dis Maternal Uncle     Coronary Art Dis Maternal Grandmother     Heart Attack Maternal Grandmother     Heart Disease Maternal Grandmother     High Blood Pressure Maternal Grandmother     High Cholesterol Maternal Grandmother     Cancer Maternal Grandfather         unknown type     Diabetes Paternal Grandmother     Other Paternal Grandfather         Black Lung     High Cholesterol Maternal Aunt     High Blood Pressure Maternal Aunt     Heart Disease Maternal Aunt     Heart Attack Maternal Aunt     Coronary Art Dis Maternal Aunt     Coronary Art Dis Maternal Aunt     Cancer Maternal Uncle         stomach     Heart Attack Maternal Uncle     Heart Disease Maternal Uncle     High Blood Pressure Maternal Uncle     High Cholesterol Maternal Uncle     Prostate Cancer Neg Hx      No outpatient medications have been marked as taking for the 2/8/22 encounter (Office Visit) with Cuca Byrnes MD.       Other, Versed [midazolam], Demerol, and Midazolam hcl  Social History     Tobacco Use   Smoking Status Never Smoker   Smokeless Tobacco Never Used      (If patient a smoker, smoking cessation counseling offered)   Social History     Substance and Sexual Activity   Alcohol Use Yes    Comment: occ beer       REVIEW OF SYSTEMS:  Constitutional: negative  Eyes: negative  Respiratory: negative  Cardiovascular: negative  Gastrointestinal: negative  Genitourinary: see HPI  Musculoskeletal: negative  Skin: negative   Neurological: negative  Hematological/Lymphatic: negative  Psychological: negative    Physical Exam:    This a 65 y.o. male  Vitals:    02/08/22 1515   BP: 136/80     Body mass index is 28.92 kg/m². Constitutional: Patient in no acute distress;           Assessment and Plan        1. Prostate cancer ([address]    2. Recurrent UTI    3. Kidney stone          Plan:   Kidney stones- no new issues. kub in six months.  4 mm stone nonobs. BPH- stable. Sometimes takes rapalfo but it made him sick before  Prostate cancer- Cont active surveillance--   He has had 3 biopsies in the past and has only one quarter of [place] 6 in 2 of the 3 biopsies (negative in the other)        Prescriptions Ordered:  No orders of the defined types were placed in this encounter.      Orders Placed:  Orders Placed This Encounter   Procedures    XR ABDOMEN (KUB) (SINGLE AP VIEW)     Standing Status:   Future     Standing Expiration Date:   2/8/2023    PSA, Prostatic Specific Antigen     Standing Status:   Future     Standing Expiration Date:   2/8/2023            Rachna Burton MD

## 2022-02-15 DIAGNOSIS — I10 ESSENTIAL HYPERTENSION: ICD-10-CM

## 2022-02-16 RX ORDER — OLMESARTAN MEDOXOMIL 5 MG/1
TABLET ORAL
Qty: 180 TABLET | Refills: 0 | Status: SHIPPED | OUTPATIENT
Start: 2022-02-16 | End: 2022-06-09 | Stop reason: SDUPTHER

## 2022-02-16 NOTE — TELEPHONE ENCOUNTER
Please approve or deny     Last Visit Date:  11/23/2021       Next Visit Date:    Visit date not found

## 2022-06-03 DIAGNOSIS — I10 ESSENTIAL HYPERTENSION: ICD-10-CM

## 2022-06-03 RX ORDER — OLMESARTAN MEDOXOMIL 5 MG/1
TABLET ORAL
Qty: 180 TABLET | Refills: 0 | OUTPATIENT
Start: 2022-06-03

## 2022-06-03 NOTE — TELEPHONE ENCOUNTER
Please approve or deny     Last Visit Date:  11/23/2021       Next Visit Date:    Visit date not found         Patient needs appointment

## 2022-06-09 ENCOUNTER — OFFICE VISIT (OUTPATIENT)
Dept: FAMILY MEDICINE CLINIC | Age: 62
End: 2022-06-09
Payer: COMMERCIAL

## 2022-06-09 ENCOUNTER — TELEPHONE (OUTPATIENT)
Dept: FAMILY MEDICINE CLINIC | Age: 62
End: 2022-06-09

## 2022-06-09 VITALS
TEMPERATURE: 98 F | DIASTOLIC BLOOD PRESSURE: 74 MMHG | RESPIRATION RATE: 14 BRPM | WEIGHT: 191 LBS | BODY MASS INDEX: 28.29 KG/M2 | HEIGHT: 69 IN | HEART RATE: 63 BPM | SYSTOLIC BLOOD PRESSURE: 116 MMHG

## 2022-06-09 DIAGNOSIS — J30.2 SEASONAL ALLERGIC RHINITIS, UNSPECIFIED TRIGGER: ICD-10-CM

## 2022-06-09 DIAGNOSIS — Z13.1 DIABETES MELLITUS SCREENING: ICD-10-CM

## 2022-06-09 DIAGNOSIS — R35.0 URINARY FREQUENCY: ICD-10-CM

## 2022-06-09 DIAGNOSIS — I10 ESSENTIAL HYPERTENSION: Primary | ICD-10-CM

## 2022-06-09 DIAGNOSIS — E78.00 HYPERCHOLESTEROLEMIA: ICD-10-CM

## 2022-06-09 DIAGNOSIS — C61 PROSTATE CANCER (HCC): ICD-10-CM

## 2022-06-09 LAB
BILIRUBIN URINE: NEGATIVE
BLOOD, URINE: NEGATIVE
CHARACTER, URINE: CLEAR
COLOR: YELLOW
GLUCOSE URINE: NEGATIVE MG/DL
KETONES, URINE: NEGATIVE
LEUKOCYTE ESTERASE, URINE: NEGATIVE
NITRITE, URINE: NEGATIVE
PH UA: 6 (ref 5–9)
PROTEIN UA: NEGATIVE
SPECIFIC GRAVITY, URINE: 1.02 (ref 1–1.03)
UROBILINOGEN, URINE: 0.2 EU/DL (ref 0–1)

## 2022-06-09 PROCEDURE — 99214 OFFICE O/P EST MOD 30 MIN: CPT | Performed by: NURSE PRACTITIONER

## 2022-06-09 RX ORDER — PRAVASTATIN SODIUM 40 MG
40 TABLET ORAL DAILY
Qty: 90 TABLET | Refills: 1 | Status: SHIPPED | OUTPATIENT
Start: 2022-06-09

## 2022-06-09 RX ORDER — FLUTICASONE PROPIONATE 50 MCG
1 SPRAY, SUSPENSION (ML) NASAL DAILY
Qty: 3 EACH | Refills: 0 | Status: SHIPPED | OUTPATIENT
Start: 2022-06-09

## 2022-06-09 RX ORDER — OLMESARTAN MEDOXOMIL 5 MG/1
TABLET ORAL
Qty: 180 TABLET | Refills: 1 | Status: SHIPPED | OUTPATIENT
Start: 2022-06-09

## 2022-06-09 ASSESSMENT — PATIENT HEALTH QUESTIONNAIRE - PHQ9
SUM OF ALL RESPONSES TO PHQ QUESTIONS 1-9: 0
SUM OF ALL RESPONSES TO PHQ QUESTIONS 1-9: 0
SUM OF ALL RESPONSES TO PHQ9 QUESTIONS 1 & 2: 0
1. LITTLE INTEREST OR PLEASURE IN DOING THINGS: 0
2. FEELING DOWN, DEPRESSED OR HOPELESS: 0
SUM OF ALL RESPONSES TO PHQ QUESTIONS 1-9: 0
SUM OF ALL RESPONSES TO PHQ QUESTIONS 1-9: 0

## 2022-06-09 ASSESSMENT — ENCOUNTER SYMPTOMS: ABDOMINAL PAIN: 1

## 2022-06-09 NOTE — PROGRESS NOTES
Basilio Segundo (:  1960) is a 65 y.o. male,Established patient, here for evaluation of the following chief complaint(s):  Follow-up, Hypertension, and Hyperlipidemia         ASSESSMENT/PLAN:  1. Essential hypertension  -     olmesartan (BENICAR) 5 MG tablet; Take 2 tablets by mouth once daily, Disp-180 tablet, R-1Normal  -     CBC with Auto Differential; Future  -     Comprehensive Metabolic Panel; Future  -     Lipid, Fasting; Future  2. Hypercholesterolemia  -     pravastatin (PRAVACHOL) 40 MG tablet; Take 1 tablet by mouth daily, Disp-90 tablet, R-1Normal  -     CBC with Auto Differential; Future  -     Comprehensive Metabolic Panel; Future  -     Lipid, Fasting; Future  3. Seasonal allergic rhinitis, unspecified trigger  -     fluticasone (FLONASE) 50 MCG/ACT nasal spray; 1 spray by Each Nostril route daily, Disp-3 each, R-0Normal  4. Prostate cancer ([facility] [address]  -     Urinalysis with Reflex to Culture; Future  5. Urinary frequency  -     Urinalysis with Reflex to Culture; Future  6. Diabetes mellitus screening  -     Hemoglobin A1C; Future      Return in about 5 months (around 2022) for wellness. Subjective   SUBJECTIVE/OBJECTIVE:  HPI    HTN: Benicar 5 mg 2 tabs daily. Not following a low sodium diet. Walking at work more, but not specific exercise for health. No cardiac symptoms. Has lost a few lbs since last visit. There is a family hx of heart disease, but not premature. Hypercholesterolemia: Pravastatin 40 mg daily. They have been eating out a lot, especially on the weekend. He is working a lot, so his diet has suffered. Lipid in 2020 were total cholesterol 197, triglycerides 174, HDL 58, . Prostate cancer: follows with urology. PSA was 3.47 in Feb. Has follow up in in Aug.  Having some stomach discomfort \"something is wrong\". Increased urinary frequency, no dysuria. Urine is light yellow, no odor.    Wondering about a urinary tract infection, had one last summer that required several rounds of antibiotics. No change in bowel habits. Allergic rhinitis: using Flonase    Review of Systems   Gastrointestinal: Positive for abdominal pain. Genitourinary: Positive for frequency. Negative for dysuria. All other systems reviewed and are negative. Objective   Physical Exam  Vitals and nursing note reviewed. HENT:      Head: Normocephalic. Right Ear: External ear normal.      Left Ear: External ear normal.   Eyes:      Pupils: Pupils are equal, round, and reactive to light. Neck:      Trachea: No tracheal deviation. Cardiovascular:      Rate and Rhythm: Normal rate and regular rhythm. Heart sounds: Normal heart sounds. No murmur heard. No friction rub. No gallop. Pulmonary:      Effort: Pulmonary effort is normal. No respiratory distress. Breath sounds: Normal breath sounds. No wheezing or rales. Chest:      Chest wall: No tenderness. Abdominal:      General: Bowel sounds are normal.      Palpations: Abdomen is soft. Tenderness: There is no abdominal tenderness. There is no rebound. Genitourinary:     Penis: Normal.    Musculoskeletal:         General: Normal range of motion. Cervical back: Full passive range of motion without pain, normal range of motion and neck supple. Lymphadenopathy:      Cervical: No cervical adenopathy. Skin:     General: Skin is warm and dry. Findings: No rash. Neurological:      Mental Status: He is alert and oriented to person, place, and time. Psychiatric:         Judgment: Judgment normal.                  An electronic signature was used to authenticate this note.     --CORBY Angel - CNP

## 2022-06-09 NOTE — TELEPHONE ENCOUNTER
----- Message from CORBY Fuentes CNP sent at 6/9/2022 12:58 PM EDT -----  Normal urine. No signs of infection.

## 2022-06-30 ENCOUNTER — OFFICE VISIT (OUTPATIENT)
Dept: FAMILY MEDICINE CLINIC | Age: 62
End: 2022-06-30
Payer: COMMERCIAL

## 2022-06-30 VITALS
RESPIRATION RATE: 12 BRPM | BODY MASS INDEX: 28.29 KG/M2 | HEART RATE: 69 BPM | HEIGHT: 69 IN | DIASTOLIC BLOOD PRESSURE: 78 MMHG | SYSTOLIC BLOOD PRESSURE: 130 MMHG | WEIGHT: 191 LBS | TEMPERATURE: 97.9 F

## 2022-06-30 DIAGNOSIS — L30.9 ECZEMA OF FACE: Primary | ICD-10-CM

## 2022-06-30 DIAGNOSIS — D22.9 CHANGE IN MULTIPLE NEVI: ICD-10-CM

## 2022-06-30 PROCEDURE — 99214 OFFICE O/P EST MOD 30 MIN: CPT | Performed by: NURSE PRACTITIONER

## 2022-06-30 RX ORDER — TRIAMCINOLONE ACETONIDE 0.25 MG/G
CREAM TOPICAL
Qty: 80 G | Refills: 0 | Status: SHIPPED | OUTPATIENT
Start: 2022-06-30

## 2022-06-30 ASSESSMENT — ENCOUNTER SYMPTOMS: COLOR CHANGE: 1

## 2022-07-23 ENCOUNTER — NURSE ONLY (OUTPATIENT)
Dept: LAB | Age: 62
End: 2022-07-23

## 2022-07-23 DIAGNOSIS — I10 ESSENTIAL HYPERTENSION: ICD-10-CM

## 2022-07-23 DIAGNOSIS — E78.00 HYPERCHOLESTEROLEMIA: ICD-10-CM

## 2022-07-23 DIAGNOSIS — C61 PROSTATE CANCER (HCC): ICD-10-CM

## 2022-07-23 DIAGNOSIS — Z13.1 DIABETES MELLITUS SCREENING: ICD-10-CM

## 2022-07-23 LAB
ALBUMIN SERPL-MCNC: 4.6 G/DL (ref 3.5–5.1)
ALP BLD-CCNC: 68 U/L (ref 38–126)
ALT SERPL-CCNC: 20 U/L (ref 11–66)
ANION GAP SERPL CALCULATED.3IONS-SCNC: 11 MEQ/L (ref 8–16)
AST SERPL-CCNC: 17 U/L (ref 5–40)
AVERAGE GLUCOSE: 105 MG/DL (ref 70–126)
BASOPHILS # BLD: 0.8 %
BASOPHILS ABSOLUTE: 0.1 THOU/MM3 (ref 0–0.1)
BILIRUB SERPL-MCNC: 0.5 MG/DL (ref 0.3–1.2)
BUN BLDV-MCNC: 13 MG/DL (ref 7–22)
CALCIUM SERPL-MCNC: 10 MG/DL (ref 8.5–10.5)
CHLORIDE BLD-SCNC: 102 MEQ/L (ref 98–111)
CHOLESTEROL, FASTING: 188 MG/DL (ref 100–199)
CO2: 27 MEQ/L (ref 23–33)
CREAT SERPL-MCNC: 0.8 MG/DL (ref 0.4–1.2)
EOSINOPHIL # BLD: 4.2 %
EOSINOPHILS ABSOLUTE: 0.3 THOU/MM3 (ref 0–0.4)
ERYTHROCYTE [DISTWIDTH] IN BLOOD BY AUTOMATED COUNT: 12.3 % (ref 11.5–14.5)
ERYTHROCYTE [DISTWIDTH] IN BLOOD BY AUTOMATED COUNT: 40.5 FL (ref 35–45)
GFR SERPL CREATININE-BSD FRML MDRD: > 90 ML/MIN/1.73M2
GLUCOSE BLD-MCNC: 103 MG/DL (ref 70–108)
HBA1C MFR BLD: 5.5 % (ref 4.4–6.4)
HCT VFR BLD CALC: 47.6 % (ref 42–52)
HDLC SERPL-MCNC: 67 MG/DL
HEMOGLOBIN: 15.3 GM/DL (ref 14–18)
IMMATURE GRANS (ABS): 0.03 THOU/MM3 (ref 0–0.07)
IMMATURE GRANULOCYTES: 0.4 %
LDL CHOLESTEROL CALCULATED: 97 MG/DL
LYMPHOCYTES # BLD: 29.4 %
LYMPHOCYTES ABSOLUTE: 2.3 THOU/MM3 (ref 1–4.8)
MCH RBC QN AUTO: 29.1 PG (ref 26–33)
MCHC RBC AUTO-ENTMCNC: 32.1 GM/DL (ref 32.2–35.5)
MCV RBC AUTO: 90.5 FL (ref 80–94)
MONOCYTES # BLD: 6.1 %
MONOCYTES ABSOLUTE: 0.5 THOU/MM3 (ref 0.4–1.3)
NUCLEATED RED BLOOD CELLS: 0 /100 WBC
PLATELET # BLD: 293 THOU/MM3 (ref 130–400)
PMV BLD AUTO: 9.3 FL (ref 9.4–12.4)
POTASSIUM SERPL-SCNC: 4.7 MEQ/L (ref 3.5–5.2)
PROSTATE SPECIFIC ANTIGEN: 3.9 NG/ML (ref 0–1)
RBC # BLD: 5.26 MILL/MM3 (ref 4.7–6.1)
SEG NEUTROPHILS: 59.1 %
SEGMENTED NEUTROPHILS ABSOLUTE COUNT: 4.7 THOU/MM3 (ref 1.8–7.7)
SODIUM BLD-SCNC: 140 MEQ/L (ref 135–145)
TOTAL PROTEIN: 6.7 G/DL (ref 6.1–8)
TRIGLYCERIDE, FASTING: 118 MG/DL (ref 0–199)
WBC # BLD: 7.9 THOU/MM3 (ref 4.8–10.8)

## 2022-07-29 ENCOUNTER — HOSPITAL ENCOUNTER (OUTPATIENT)
Age: 62
Discharge: HOME | End: 2022-07-29
Payer: COMMERCIAL

## 2022-07-29 ENCOUNTER — HOSPITAL ENCOUNTER (OUTPATIENT)
Dept: GENERAL RADIOLOGY | Age: 62
Discharge: HOME | End: 2022-07-29
Payer: COMMERCIAL

## 2022-07-29 DIAGNOSIS — N20.0 KIDNEY STONE: ICD-10-CM

## 2022-07-29 PROCEDURE — 74018 RADEX ABDOMEN 1 VIEW: CPT

## 2022-08-09 ENCOUNTER — OFFICE VISIT (OUTPATIENT)
Dept: UROLOGY | Age: 62
End: 2022-08-09
Payer: COMMERCIAL

## 2022-08-09 VITALS
BODY MASS INDEX: 27.7 KG/M2 | DIASTOLIC BLOOD PRESSURE: 82 MMHG | WEIGHT: 187 LBS | HEIGHT: 69 IN | SYSTOLIC BLOOD PRESSURE: 130 MMHG

## 2022-08-09 DIAGNOSIS — C61 PROSTATE CANCER (HCC): Primary | ICD-10-CM

## 2022-08-09 DIAGNOSIS — N20.0 KIDNEY STONE: ICD-10-CM

## 2022-08-09 DIAGNOSIS — N39.0 RECURRENT UTI: ICD-10-CM

## 2022-08-09 PROCEDURE — 99214 OFFICE O/P EST MOD 30 MIN: CPT | Performed by: UROLOGY

## 2022-08-09 NOTE — PROGRESS NOTES
Felicia Sanderson MD        [address] [place] [address] [phone]  Dept: [phone]  Dept Fax: [phone]: [address] [address] Urology Office Note -     Patient:  Analy Pedroza  YOB: 1960  Date: 8/9/2022    The patient is a 65 y.o. male who presents today for evaluation of the following problems:   Chief Complaint   Patient presents with    Follow-up     PSA and KUB Prior     Prostate Cancer    referred/consultation requested by Gary Romero MD.    HISTORY OF PRESENT ILLNESS:     Prostate Cancer  On active surveillance with hx of three biopsies (matthew six in one core)  Past decipher results--matthew six one core  Previous biopsy negative by Mathieu Gonzalez in past     BPH  LUTS chronic. Not on medications for this. Discussed benefits of starting flomax in past but the patient could not tolerate the medication. Was started on Rapaflo, but has decided not to take it. Recurrent uti  Persistent klebsiella uti in past  No new infections    Kidney stone  Left side 5 mm  Here with kub  Some pain in left side lower abdomen    Requested/reviewed records from Gary Romero MD office and/or outside physician/EMR    (Patient's old records have been requested, reviewed and pertinent findings summarized in today's note.)    Procedures Today: N/A      Last several PSA's:  Lab Results   Component Value Date    PSA 3.90 (H) 07/23/2022    PSA 3.47 (H) 02/02/2022    PSA 3.28 (H) 07/06/2021       Last total testosterone:  No results found for: TESTOSTERONE    Urinalysis today:  No results found for this visit on 08/09/22.     Last BUN and creatinine:  Lab Results   Component Value Date    BUN 13 07/23/2022     Lab Results   Component Value Date    CREATININE 0.8 07/23/2022         Imaging Reviewed during this Office Visit:   imaging independently reviewed by Felicia Sanderson MD and radiology report verified demonstrating     XR ABDOMEN (KUB) (SINGLE AP VIEW)    Result Date: 7/29/2022  PROCEDURE: XR ABDOMEN (KUB) (SINGLE AP VIEW) CLINICAL INFORMATION: Kidney stone . TECHNIQUE: 2 supine projections of the abdomen COMPARISON: 9/27/2021 FINDINGS: Faint calcifications overlying the mid left renal shadow. Coarse calcifications overlying the medial aspect the left renal shadow measuring 5 mm. No right renal calculi are seen but the right kidney is fairly obscured by bowel gas and content. Bowel gas pattern is normal. Flank stripes preserved. Left nephrolithiasis which appears to be similar to the prior exam the 5 mm calcification may have progressed toward the renal pelvis since the prior exam **This report has been created using voice recognition software. It may contain minor errors which are inherent in voice recognition technology. ** Final report electronically signed by Dr. Toney Anderson on 7/29/2022 1:21 PM      PAST MEDICAL, FAMILY AND SOCIAL HISTORY:  Past Medical History:   Diagnosis Date    Cancer ([address]     CRVO (central retinal vein occlusion) 01/2016    Right eye    Heartburn     Hyperlipidemia 2010    Hypertension 2010     Past Surgical History:   Procedure Laterality Date    COLONOSCOPY  2009    WNL     COLONOSCOPY N/A 3/13/2020    COLONOSCOPY DIAGNOSTIC performed by Mika Velasquez MD at [address]  2012    benign     [address] PROSTATE/TRANSRECTAL N/A 1/16/2020    TRANSRECTAL ULTRASOUND WITH PROSTATE BX performed by Ashok Robison MD at [address] [facility] N/A 3/13/2020    EGD BIOPSY performed by Mika Velasquez MD at [address] [facility]     Family History   Problem Relation Age of Onset    Cancer Mother 72        lung     Heart Disease Mother 58    Coronary Art Dis Mother 58    Heart Disease Father 68    Heart Attack Father 68    Coronary Art Dis Father 68    High Blood Pressure Father     High Cholesterol Father     Diabetes Father borderline    High Blood Pressure Sister 45    High Cholesterol Sister 45    Heart Attack Maternal Uncle     Heart Disease Maternal Uncle     High Blood Pressure Maternal Uncle     High Cholesterol Maternal Uncle     Coronary Art Dis Maternal Uncle     Coronary Art Dis Maternal Grandmother     Heart Attack Maternal Grandmother     Heart Disease Maternal Grandmother     High Blood Pressure Maternal Grandmother     High Cholesterol Maternal Grandmother     Cancer Maternal Grandfather         unknown type     Diabetes Paternal Grandmother     Other Paternal Grandfather         Black Lung     High Cholesterol Maternal Aunt     High Blood Pressure Maternal Aunt     Heart Disease Maternal Aunt     Heart Attack Maternal Aunt     Coronary Art Dis Maternal Aunt     Coronary Art Dis Maternal Aunt     Cancer Maternal Uncle         stomach     Heart Attack Maternal Uncle     Heart Disease Maternal Uncle     High Blood Pressure Maternal Uncle     High Cholesterol Maternal Uncle     Prostate Cancer Neg Hx      Outpatient Medications Marked as Taking for the 8/9/22 encounter (Office Visit) with Lizbeth Bruno MD   Medication Sig Dispense Refill    triamcinolone (KENALOG) 0.025 % cream Apply topically 2 times daily.  80 g 0    fluticasone (FLONASE) 50 MCG/ACT nasal spray 1 spray by Each Nostril route daily 3 each 0    pravastatin (PRAVACHOL) 40 MG tablet Take 1 tablet by mouth daily 90 tablet 1    olmesartan (BENICAR) 5 MG tablet Take 2 tablets by mouth once daily 180 tablet 1    aspirin 81 MG tablet Take 81 mg by mouth daily         Other, Versed [midazolam], Demerol, and Midazolam hcl  Social History     Tobacco Use   Smoking Status Never   Smokeless Tobacco Never      (If patient a smoker, smoking cessation counseling offered)   Social History     Substance and Sexual Activity   Alcohol Use Yes    Comment: occ beer       REVIEW OF SYSTEMS:  Constitutional: negative  Eyes: negative  Respiratory: negative  Cardiovascular: negative  Gastrointestinal: negative  Genitourinary: see HPI  Musculoskeletal: negative  Skin: negative   Neurological: negative  Hematological/Lymphatic: negative  Psychological: negative    Physical Exam:    This a 65 y.o. male  Vitals:    08/09/22 1449   BP: 130/82     Body mass index is 28.02 kg/m². Constitutional: Patient in no acute distress;           Assessment and Plan        1. Prostate cancer ([address]    2. Kidney stone    3. Recurrent UTI          Plan:   Kidney stones- poss left sided pain. Stone on left side. Left eswl  BPH- stable. Sometimes takes rapalfo but it made him sick before  Prostate cancer- Cont active surveillance w psa in six months.-   He has had 3 biopsies in the past and has only one quarter of Edgar 6 in 2 of the 3 biopsies (negative in the other)        Prescriptions Ordered:  No orders of the defined types were placed in this encounter. Orders Placed:  No orders of the defined types were placed in this encounter.            Collette Moose, MD

## 2022-08-16 ENCOUNTER — TELEPHONE (OUTPATIENT)
Dept: UROLOGY | Age: 62
End: 2022-08-16

## 2022-08-16 DIAGNOSIS — Z01.818 PRE-OP TESTING: ICD-10-CM

## 2022-08-16 DIAGNOSIS — N20.0 KIDNEY STONE: Primary | ICD-10-CM

## 2022-08-16 NOTE — TELEPHONE ENCOUNTER
DO NOT TAKE ASPIRIN,  FISH OIL, COUMADIN, IBUPROFEN, MOTRIN-LIKE DRUGS AND ANY MULTIVITAMINS OR OVER THE COUNTER SUPPLEMENTS 14 DAYS PRIOR TO SURGERY. MUST HAVE AN ADULT OVER THE AGE OF 18 WITH YOU AT THE TIME OF THE PROCEDURE AND WITH YOU AT HOME AFTER THE PROCEDURE FOR 24 HOURS       Xenia Reyna 1960 Diagnosis:     Surgical Physician: Dr. Mary Carlson have been scheduled for the procedure marked below:      Surgery: Left extracorporeal shock wave lithotripsy          Date: 10/12/2022     Anesthesia: Anesthesiologist (General/Spinal)     Place of Service: [address] [place] [address] Second Floor Same Day Surgery         Arrive to same day surgery by:  6:00am  (Surgery time is subject to change)      INSTRUCTIONS AS MARKED BELOW:    1.  DO NOT eat or drink anything after midnight before surgery. 2.  We prefer you shower or bathe with an antibacterial soap (Dial) the morning of surgery. 3  Please bring a current medication list, photo ID and insurance card(s) with you  4. Okay to take Tylenol  5. If you take Glucophage, Metformin or Janumet, hold 48-hours prior to surgery  6. Take blood pressure or heart medication as directed, if taken in the morning take with a small sip of water  7. The office will call you in 1-2 days after your procedure to schedule a follow up. DATE SENSITIVE TESTING-DO ON THE DATE LISTED*WALK IN *NO APPOINTMENT    DO URINE CULTURE AND EKG ON 9/28/2022. ORDERS INCLUDED.         Date: 8/16/2022

## 2022-08-16 NOTE — TELEPHONE ENCOUNTER
SURGERY [address]  [address] [place] [address] [address] [facility], [address]      Phone *[phone] *[phone]   Surgical Scheduling Direct Line Phone *[phone] Fax *[phone]      Rafa Hardy 1960 male    [address]  [address] [facility]   Marital Status:          Home Phone: [phone]      Cell Phone:    Telephone Information:   Mobile [phone]          Surgeon: Dr. Libertad Jessica Surgery Date: 10/12/2022   Time: 7:30 am    Procedure: Left extracorporeal shock wave lithotripsy     Diagnosis: Kidney Pratibha Le     Important Medical History:  In Epic    Special Inst/Equip: Regular                          Next Med XOBY#[id number] per Devin    CPT Codes:    29208  Latex Allergy: No     Cardiac Device:  No    Anesthesia:  General          Admission Type:  Same Day                        Admit Prior to Day of Surgery: No    Case Location:  Main OR            Preadmission Testing:  Phone Call          PAT Date and Time:______________________________________________________    PAT Confirmation #: ______________________________________________________    Post Op Visit: ___________________________________________________________    Need Preop Cardiac Clearance: No    Does Patient have Cardiologist/physician?     none    Surgery Confirmation #: __________________________________________________    : ________________________   Date: __________________________     [employer] Name: Nereyda Singh

## 2022-09-13 ENCOUNTER — TELEPHONE (OUTPATIENT)
Dept: UROLOGY | Age: 62
End: 2022-09-13

## 2022-09-13 NOTE — TELEPHONE ENCOUNTER
Prior Henreitta Essex is not required for CPT [phone]; [facility]#[id number]

## 2022-09-20 ENCOUNTER — TELEPHONE (OUTPATIENT)
Dept: UROLOGY | Age: 62
End: 2022-09-20

## 2022-09-20 NOTE — TELEPHONE ENCOUNTER
Patient is scheduled Left ESWL on 10/12/2022. He wanted to make sure it was ok to take tylenol for pain. C/o left lower back pain rated 5 on scale of 0-10 and decreases to 1-2 after taking tylenol. Patient advised may continue to take tylenol but if he develops fever, chills, intractable nausea or vomiting, severe uncontrolled pain to be evaluated in the ER.

## 2022-09-21 ENCOUNTER — PREP FOR PROCEDURE (OUTPATIENT)
Dept: UROLOGY | Age: 62
End: 2022-09-21

## 2022-09-21 RX ORDER — SODIUM CHLORIDE 9 MG/ML
INJECTION, SOLUTION INTRAVENOUS CONTINUOUS
Status: CANCELLED | OUTPATIENT
Start: 2022-10-12

## 2022-11-09 ENCOUNTER — OFFICE VISIT (OUTPATIENT)
Dept: FAMILY MEDICINE CLINIC | Age: 62
End: 2022-11-09
Payer: COMMERCIAL

## 2022-11-09 VITALS
BODY MASS INDEX: 29.86 KG/M2 | TEMPERATURE: 98.1 F | DIASTOLIC BLOOD PRESSURE: 84 MMHG | HEART RATE: 70 BPM | HEIGHT: 68 IN | SYSTOLIC BLOOD PRESSURE: 144 MMHG | RESPIRATION RATE: 12 BRPM | WEIGHT: 197 LBS

## 2022-11-09 DIAGNOSIS — E78.00 HYPERCHOLESTEROLEMIA: ICD-10-CM

## 2022-11-09 DIAGNOSIS — J30.2 SEASONAL ALLERGIC RHINITIS, UNSPECIFIED TRIGGER: ICD-10-CM

## 2022-11-09 DIAGNOSIS — I10 ESSENTIAL HYPERTENSION: ICD-10-CM

## 2022-11-09 DIAGNOSIS — Z00.00 ENCOUNTER FOR WELL ADULT EXAM WITHOUT ABNORMAL FINDINGS: Primary | ICD-10-CM

## 2022-11-09 DIAGNOSIS — C61 PROSTATE CANCER (HCC): ICD-10-CM

## 2022-11-09 PROCEDURE — 99386 PREV VISIT NEW AGE 40-64: CPT | Performed by: NURSE PRACTITIONER

## 2022-11-09 PROCEDURE — 3074F SYST BP LT 130 MM HG: CPT | Performed by: NURSE PRACTITIONER

## 2022-11-09 PROCEDURE — 3078F DIAST BP <80 MM HG: CPT | Performed by: NURSE PRACTITIONER

## 2022-11-09 RX ORDER — PRAVASTATIN SODIUM 40 MG
40 TABLET ORAL DAILY
Qty: 90 TABLET | Refills: 1 | Status: SHIPPED | OUTPATIENT
Start: 2022-11-09

## 2022-11-09 RX ORDER — OLMESARTAN MEDOXOMIL 5 MG/1
TABLET ORAL
Qty: 180 TABLET | Refills: 1 | Status: SHIPPED | OUTPATIENT
Start: 2022-11-09

## 2022-11-09 SDOH — ECONOMIC STABILITY: FOOD INSECURITY: WITHIN THE PAST 12 MONTHS, YOU WORRIED THAT YOUR FOOD WOULD RUN OUT BEFORE YOU GOT MONEY TO BUY MORE.: PATIENT DECLINED

## 2022-11-09 SDOH — ECONOMIC STABILITY: FOOD INSECURITY: WITHIN THE PAST 12 MONTHS, THE FOOD YOU BOUGHT JUST DIDN'T LAST AND YOU DIDN'T HAVE MONEY TO GET MORE.: PATIENT DECLINED

## 2022-11-09 ASSESSMENT — SOCIAL DETERMINANTS OF HEALTH (SDOH): HOW HARD IS IT FOR YOU TO PAY FOR THE VERY BASICS LIKE FOOD, HOUSING, MEDICAL CARE, AND HEATING?: NOT HARD AT ALL

## 2022-11-09 NOTE — PATIENT INSTRUCTIONS
You may receive a survey about your visit with us today. The feedback from our patients helps us identify what is working well and where the service to all patients can be enhanced. Thank you! Advance Directives: Care Instructions  Overview  An advance directive is a legal way to state your wishes at the end of your life. It tells your family and your doctor what to do if you can't say what you want. There are two main types of advance directives. You can change them any time your wishes change. Living will. This form tells your family and your doctor your wishes about life support and other treatment. The form is also called a declaration. Medical power of . This form lets you name a person to make treatment decisions for you when you can't speak for yourself. This person is called a health care agent (health care proxy, health care surrogate). The form is also called a durable power of  for health care. If you do not have an advance directive, decisions about your medical care may be made by a family member, or by a doctor or a  who doesn't know you. It may help to think of an advance directive as a gift to the people who care for you. If you have one, they won't have to make tough decisions by themselves. Follow-up care is a key part of your treatment and safety. Be sure to make and go to all appointments, and call your doctor if you are having problems. It's also a good idea to know your test results and keep a list of the medicines you take. What should you include in an advance directive? Many states have a unique advance directive form. (It may ask you to address specific issues.) Or you might use a universal form that's approved by many states. If your form doesn't tell you what to address, it may be hard to know what to include in your advance directive. Use the questions below to help you get started.   Who do you want to make decisions about your medical care if you are not able to? What life-support measures do you want if you have a serious illness that gets worse over time or can't be cured? What are you most afraid of that might happen? (Maybe you're afraid of having pain, losing your independence, or being kept alive by machines.)  Where would you prefer to die? (Your home? A hospital? A nursing home?)  Do you want to donate your organs when you die? Do you want certain Cheondoism practices performed before you die? When should you call for help? Be sure to contact your doctor if you have any questions. Where can you learn more? Go to https://[email or web address] and sign in to your [employer] account. Enter R264 in the Qubulus box to learn more about \"Advance Directives: Care Instructions. \"     If you do not have an account, please click on the \"Sign Up Now\" link. Current as of: June 16, 2022               Content Version: 13.4  © 2006-2022 Crocodoc. Care instructions adapted under license by [facility] ([facility]). If you have questions about a medical condition or this instruction, always ask your healthcare professional. [place] [address] any warranty or liability for your use of this information. Learning About Medical Power of   What is a medical power of ? A medical power of , also called a durable power of  for health care, is one type of the legal forms called advance directives. It lets you name the person you want to make treatment decisions for you if you can't speak or decide for yourself. The person you choose is called your health care agent. This person is also called a health care proxy or health care surrogate. A medical power of  may be called something else in your state. How do you choose a health care agent? Choose your health care agent carefully. This person may or may not be a family member.   Talk to the person before you make your final decision. Make sure he or she is comfortable with this responsibility. It's a good idea to choose someone who:  Is at least 25years old. Knows you well and understands what makes life meaningful for you. Understands your Scientologist and moral values. Will do what you want, not what he or she wants. Will be able to make difficult choices at a stressful time. Will be able to refuse or stop treatment, if that is what you would want, even if you could die. Will be firm and confident with health professionals if needed. Will ask questions to get needed information. Lives near you or agrees to travel to you if needed. Your family may help you make medical decisions while you can still be part of that process. But it's important to choose one person to be your health care agent in case you aren't able to make decisions for yourself. If you don't fill out the legal form and name a health care agent, the decisions your family can make may be limited. A health care agent may be called something else in your state. Who will make decisions for you if you don't have a health care agent? If you don't have a health care agent or a living will, you may not get the care you want. Decisions may be made by family members who disagree about your medical care. Or decisions may be made by a medical professional who doesn't know you well. In some cases, a  makes the decisions. When you name a health care agent, it is very clear who has the power to make health decisions for you. How do you name a health care agent? You name your health care agent on a legal form. This form is usually called a medical power of . Ask your hospital, state bar association, or office on aging where to find these forms. You must sign the form to make it legal. Some states require you to get the form notarized. This means that a person called a  watches you sign the form and then he or she signs the form.  Some states also require that two or more witnesses sign the form. Be sure to tell your family members and doctors who your health care agent is. Where can you learn more? Go to https://chpepiceweb.[email or web address] org and sign in to your 2Catalyze account. Enter [id number] in the [facility] box to learn more about \"Learning About [address]. \"     If you do not have an account, please click on the \"Sign Up Now\" link. Current as of: October 6, 2021               Content Version: 13.4  © 9323-0999 MapMyIndia. Care instructions adapted under license by [facility] ([facility]). If you have questions about a medical condition or this instruction, always ask your healthcare professional. [address] any warranty or liability for your use of this information. Learning About Living [place]  What is a living will? A living will, also called a declaration, is a legal form. It tells your family and your doctor your wishes when you can't speak for yourself. It's used by the health professionals who will treat you as you near the end of your life or if you get seriously hurt or ill. If you put your wishes in writing, your loved ones and others will know what kind of care you want. They won't need to guess. This can ease your mind and be helpful to others. And you can change or cancel your living will at any time. A living will is not the same as an estate or property will. An estate will explains what you want to happen with your money and property after you die. How do you use it? Keep these facts in mind about how a living will is used. Your living will is used only if you can't speak or make decisions for yourself. Most often, one or more doctors must certify that you can't speak or decide for yourself before your living will takes effect. If you get better and can speak for yourself again, you can accept or refuse any treatment.  It doesn't matter what you said in your living will. Some states may limit your right to refuse treatment in certain cases. For example, you may need to clearly state in your living will that you don't want artificial hydration and nutrition, such as being fed through a tube. Is a living will a legal document? A living will is a legal document. Each state has its own laws about living [place]. And a living will may be called something else in your state. Here are some things to know about living ohara. You don't need an  to complete a living will. But legal advice can be helpful if your state's laws are unclear. It can also help if your health history is complicated or your family can't agree on what should be in your living will. You can change your living will at any time. Some people find that their wishes about end-of-life care change as their health changes. If you make big changes to your living will, complete a new form. If you move to another state, make sure that your living will is legal in the state where you now live. In most cases, doctors will respect your wishes even if you have a form from a different state. You might use a universal form that has been approved by many states. This kind of form can sometimes be filled out and stored online. Your digital copy will then be available wherever you have a connection to the internet. The doctors and nurses who need to treat you can find it right away. Your state may offer an online registry. This is another place where you can store your living will online. It's a good idea to get your living will notarized. This means using a person called a  to watch two people sign, or witness, your living will. What should you know when you create a living will? Here are some questions to ask yourself as you make your living will. Do you know enough about life support methods that might be used?  If not, talk to your doctor so you know what might be done if you can't breathe on your own, your heart stops, or you can't swallow. What things would you still want to be able to do after you receive life-support methods? Would you want to be able to walk? To speak? To eat on your own? To live without the help of machines? Do you want certain Christian practices performed if you become very ill? If you have a choice, where do you want to be cared for? In your home? At a hospital or nursing home? If you have a choice at the end of your life, where would you prefer to die? At home? In a hospital or nursing home? Somewhere else? Would you prefer to be buried or cremated? Do you want your organs to be donated after you die? What should you do with your living will? Make sure that your family members and your health care agent have copies of your living will (also called a declaration). Give your doctor a copy of your living will. Ask to have it kept as part of your medical record. If you have more than one doctor, make sure that each one has a copy. Put a copy of your living will where it can be easily found. For example, some people may put a copy on their refrigerator door. If you are using a digital copy, be sure your doctor, family members, and health care agent know how to find and access it. Where can you learn more? Go to https://MyoKardiahammadeb.Axis Systems. org and sign in to your Path101 account. Enter K536 in the Preply.com box to learn more about \"Learning About Living [place]ing. \"     If you do not have an account, please click on the \"Sign Up Now\" link. Current as of: June 16, 2022               Content Version: 13.4  © 8660-8567 Healthwise, NetSpark. Care instructions adapted under license by [facility] ([facility]). If you have questions about a medical condition or this instruction, always ask your healthcare professional. [address] any warranty or liability for your use of this information.            Well Visit, Men 48 to 72: Care Instructions  Overview     Well visits can help you stay healthy. Your doctor has checked your overall health and may have suggested ways to take good care of yourself. Your doctor also may have recommended tests. At home, you can help prevent illness with healthy eating, regular exercise, and other steps. Follow-up care is a key part of your treatment and safety. Be sure to make and go to all appointments, and call your doctor if you are having problems. It's also a good idea to know your test results and keep a list of the medicines you take. How can you care for yourself at home? Get screening tests that you and your doctor decide on. Screening helps find diseases before any symptoms appear. Eat healthy foods. Choose fruits, vegetables, whole grains, protein, and low-fat dairy foods. Limit fat, especially saturated fat. Reduce salt in your diet. Limit alcohol. Have no more than 2 drinks a day or 14 drinks a week. Get at least 30 minutes of exercise on most days of the week. Walking is a good choice. You also may want to do other activities, such as running, swimming, cycling, or playing tennis or team sports. Reach and stay at a healthy weight. This will lower your risk for many problems, such as obesity, diabetes, heart disease, and high blood pressure. Do not smoke. Smoking can make health problems worse. If you need help quitting, talk to your doctor about stop-smoking programs and medicines. These can increase your chances of quitting for good. Care for your mental health. It is easy to get weighed down by worry and stress. Learn strategies to manage stress, like deep breathing and mindfulness, and stay connected with your family and community. If you find you often feel sad or hopeless, talk with your doctor. Treatment can help. Talk to your doctor about whether you have any risk factors for sexually transmitted infections (STIs).  You can help prevent STIs if you wait to have sex with a new partner (or partners) until [facility] each been tested for STIs. It also helps if you use condoms (male or female condoms) and if you limit your sex partners to one person who only has sex with you. Vaccines are available for some STIs. If it's important to you to prevent pregnancy with your partner, talk with your doctor about birth control options that might be best for you. If you think you may have a problem with alcohol or drug use, talk to your doctor. This includes prescription medicines (such as amphetamines and opioids) and illegal drugs (such as cocaine and methamphetamine). Your doctor can help you figure out what type of treatment is best for you. Protect your skin from too much sun. When you're outdoors from 10 a.m. to 4 p.m., stay in the shade or cover up with clothing and a hat with a wide brim. Wear sunglasses that block UV rays. Even when it's cloudy, put broad-spectrum sunscreen (SPF 30 or higher) on any exposed skin. See a dentist one or two times a year for checkups and to have your teeth cleaned. Wear a seat belt in the car. When should you call for help? Watch closely for changes in your health, and be sure to contact your doctor if you have any problems or symptoms that concern you. Where can you learn more? Go to https://Johnshout Brothers Platformcierra.healthFK Biotecnologiapartners. org and sign in to your LiquidText account. Enter C259 in the [facility] box to learn more about \"Well Visit, Men 48 to 72: Care Instructions. \"     If you do not have an account, please click on the \"Sign Up Now\" link. Current as of: June 6, 2022               Content Version: 13.4  © 3039-0794 Healthwise, Incorporated. Care instructions adapted under license by [facility] ([facility]). If you have questions about a medical condition or this instruction, always ask your healthcare professional. [place] [address] any warranty or liability for your use of this information.            Learning About Changing a Habit by Setting Goals  How can you change a habit? If you've decided to change a habit--whether it's quitting smoking, lowering your blood pressure, becoming more active, or doing something else to improve your health--congratulations! Making that decision is the first step toward making a change. What happens next? Have a reason. Set goals you can reach. Prepare for slip-ups. And get support. What's your reason? Your reason for wanting to change a habit is really important. Maybe you want to quit smoking so that you can avoid future health problems. Or maybe you want to eat a healthier diet so you can lose weight. If you have high blood pressure, your reason may be clear: to lower your blood pressure. Maybe you smoke and want to save money on cigarettes. You need to feel ready to make a change. If you don't feel ready now, that's okay. You can still be thinking and planning. When you truly want to make changes, you're ready for the next step. It's not easy to change habits--but you can do it. Taking the time to really think about what will motivate or inspire you will help you reach your goals. How do you set goals? Setting goals can help a lot when you're trying to make a healthy change. Focus on small goals. This will help you reach larger goals over time. With smaller goals, you'll have success more often, which will help you stay with it. For example, your large goal may be to lose 20 pounds. Your small goal could be to lose 5. Write down your goals. This will help you remember, and you'll have a clearer idea of what you want to achieve. Use a journal or notebook to record your goals. Hang up your plan where you will see it often as a reminder of what you're trying to do. Make your goals specific. Specific goals help you measure your progress. For example, setting a goal to eat one extra serving of vegetables a day is better than a general goal to \"eat more vegetables. \"  Focus on one goal at a time.  By doing this, you're less likely to feel overwhelmed and then give up. When you reach a goal, reward yourself. Celebrate your new behavior and success for several days, and then think about setting your next goal.  How can you prepare for slip-ups? It's perfectly normal to try to change a habit, go along fine for a while, and then have a setback. Lots of people try and try again before they reach their goals. What are the things that might cause a setback for you? If you have tried to change a habit before, think about what helped you and what got in your way. By thinking about these barriers now, you can plan ahead for how to deal with them if they happen. There will be times when you slip up and don't make your goal for the week. When that happens, don't get mad at yourself. Learn from the experience. Ask yourself what got in the way of reaching your goal. Positive thinking goes a long way when you're making lifestyle changes. How can you get support? Get a partner. It's motivating to know that someone is trying to make the same change that you're making, like being more active or changing your eating habits. You have someone who is counting on you to help them succeed. That person can also remind you how far you've come. Get friends and family involved. They can exercise with you. Or they can encourage you by saying how they admire what you are doing. Family members can join you in your healthy eating efforts. Don't be afraid to tell family and friends that their encouragement makes a big difference to you. Join a class or support group. People in these groups often have some of the same barriers you have. They can give you support when you don't feel like staying with your plan. They can boost your morale when you need a lift. [place] also find a number of online support groups. Encourage yourself. When you feel like giving up, don't waste energy feeling bad about yourself.  Remember your reason for wanting to change, think about the progress you've made, and give yourself a pep talk and a pat on the back. Get professional help. A dietitian can help you make your diet healthier while still allowing you to eat foods that you enjoy. A  or physical therapist can help design an exercise program that is fun and easy to stay on. A counselor, a , or your doctor can help you overcome hurdles, reduce stress, or quit smoking. Where can you learn more? Go to https://Digital Development Partners."VSee Lab, Inc". org and sign in to your IActionable account. Enter U133 in the Launchups box to learn more about \"Learning About Changing a Habit by Setting Goals. \"     If you do not have an account, please click on the \"Sign Up Now\" link. Current as of: February 9, 2022               Content Version: 13.4  © 2006-2022 [email or web address]. Care instructions adapted under license by [facility] ([facility]). If you have questions about a medical condition or this instruction, always ask your healthcare professional. [place] [address] any warranty or liability for your use of this information. Heart-Healthy Diet   Sodium, Fat, and Cholesterol Controlled Diet       What Is a Heart Healthy Diet? A heart-healthy diet is one that limits sodium , certain types of fat , and cholesterol . This type of diet is recommended for:   People with any form of cardiovascular disease (eg, coronary heart disease , peripheral vascular disease , previous heart attack , previous stroke )   People with risk factors for cardiovascular disease, such as high blood pressure , high cholesterol , or diabetes   Anyone who wants to lower their risk of developing cardiovascular disease   Sodium    Sodium is a mineral found in many foods. In general, most people consume much more sodium than they need. Diets high in sodium can increase blood pressure and lead to edema (water retention).  On a heart-healthy diet, you should consume no more than 2,300 mg (milligrams) of sodium per dayabout the amount in one teaspoon of table salt. The foods highest in sodium include table salt (about 50% sodium), processed foods, convenience foods, and preserved foods. Cholesterol    Cholesterol is a fat-like, waxy substance in your blood. Our bodies make some cholesterol. It is also found in animal products, with the highest amounts in fatty meat, egg yolks, whole milk, cheese, shellfish, and organ meats. On a heart-healthy diet, you should limit your cholesterol intake to less than 200 mg per day. It is normal and important to have some cholesterol in your bloodstream. But too much cholesterol can cause plaque to build up within your arteries, which can eventually lead to a heart attack or stroke. The two types of cholesterol that are most commonly referred to are:   Low-density lipoprotein (LDL) cholesterol  Also known as bad cholesterol, this is the cholesterol that tends to build up along your arteries. Bad cholesterol levels are increased by eating fats that are saturated or hydrogenated. Optimal level of this cholesterol is less than 100. Over 130 starts to get risky for heart disease. High-density lipoprotein (HDL) cholesterol  Also known as good cholesterol, this type of cholesterol actually carries cholesterol away from your arteries and may, therefore, help lower your risk of having a heart attack. You want this level to be high (ideally greater than 60). It is a risk to have a level less than 40. You can raise this good cholesterol by eating olive oil, canola oil, avocados, or nuts. Exercise raises this level, too. Fat    Fat is calorie dense and packs a lot of calories into a small amount of food. Even though fats should be limited due to their high calorie content, not all fats are bad. In fact, some fats are quite healthful. Fat can be broken down into four main types.    The good-for-you fats are:   Monounsaturated fat  found in oils such as olive and canola, avocados, and nuts and natural nut butters; can decrease cholesterol levels, while keeping levels of HDL cholesterol high   Polyunsaturated fat  found in oils such as safflower, sunflower, soybean, corn, and sesame; can decrease total cholesterol and LDL cholesterol   Omega-3 fatty acids  particularly those found in fatty fish (such as salmon, trout, tuna, mackerel, herring, and sardines); can decrease risk of arrhythmias, decrease triglyceride levels, and slightly lower blood pressure   The fats that you want to limit are:   Saturated fat  found in animal products, many fast foods, and a few vegetables; increases total blood cholesterol, including LDL levels   Animal fats that are saturated include: butter, lard, whole-milk dairy products, meat fat, and poultry skin   Vegetable fats that are saturated include: hydrogenated shortening, palm oil, coconut oil, cocoa butter   Hydrogenated or trans fat  found in margarine and vegetable shortening, most shelf stable snack foods, and fried foods; increases LDL and decreases HDL     It is generally recommended that you limit your total fat for the day to less than 30% of your total calories. If you follow an 1800-calorie heart healthy diet, for example, this would mean 60 grams of fat or less per day. Saturated fat and trans fat in your diet raises your blood cholesterol the most, much more than dietary cholesterol does. For this reason, on a heart-healthy diet, less than 7% of your calories should come from saturated fat and ideally 0% from trans fat. On an 1800-calorie diet, this translates into less than 14 grams of saturated fat per day, leaving 46 grams of fat to come from mono- and polyunsaturated fats.    Food Choices on a Heart Healthy Diet   Food Category   Foods Recommended   Foods to Avoid   Grains   Breads and rolls without salted tops Most dry and cooked cereals Unsalted crackers and breadsticks Low-sodium or homemade breadcrumbs or stuffing All rice and pastas   Breads, rolls, and crackers with salted tops High-fat baked goods (eg, muffins, donuts, pastries) Quick breads, self-rising flour, and biscuit mixes Regular bread crumbs Instant hot cereals Commercially prepared rice, pasta, or stuffing mixes   Vegetables   Most fresh, frozen, and low-sodium canned vegetables Low-sodium and salt-free vegetable juices Canned vegetables if unsalted or rinsed   Regular canned vegetables and juices, including sauerkraut and pickled vegetables Frozen vegetables with sauces Commercially prepared potato and vegetable mixes   Fruits   Most fresh, frozen, and canned fruits All fruit juices   Fruits processed with salt or sodium   Milk   Nonfat or low-fat (1%) milk Nonfat or low-fat yogurt Cottage cheese, low-fat ricotta, cheeses labeled as low-fat and low-sodium   Whole milk Reduced-fat (2%) milk Malted and chocolate milk Full fat yogurt Most cheeses (unless low-fat and low salt) Buttermilk (no more than 1 cup per week)   Meats and Beans   Lean cuts of fresh or frozen beef, veal, lamb, or pork (look for the word loin) Fresh or frozen poultry without the skin Fresh or frozen fish and some shellfish Egg whites and egg substitutes (Limit whole eggs to three per week) Tofu Nuts or seeds (unsalted, dry-roasted), low-sodium peanut butter Dried peas, beans, and lentils   Any smoked, cured, salted, or canned meat, fish, or poultry (including walton, chipped beef, cold cuts, hot dogs, sausages, sardines, and anchovies) Poultry skins Breaded and/or fried fish or meats Canned peas, beans, and lentils Salted nuts   Fats and Oils   Olive oil and canola oil Low-sodium, low-fat salad dressings and mayonnaise   Butter, margarine, coconut and palm oils, walton fat   Snacks, Sweets, and Condiments   Low-sodium or unsalted versions of broths, soups, soy sauce, and condiments Pepper, herbs, and spices; vinegar, lemon, or lime juice Low-fat frozen desserts (yogurt, sherbet, fruit bars) Sugar, cocoa powder, honey, syrup, jam, and preserves Low-fat, trans-fat free cookies, cakes, and pies Willard and animal crackers, fig bars, alysha snaps   High-fat desserts Broth, soups, gravies, and sauces, made from instant mixes or other high-sodium ingredients Salted snack foods Canned olives Meat tenderizers, seasoning salt, and most flavored vinegars   Beverages   Low-sodium carbonated beverages Tea and coffee in moderation Soy milk   Commercially softened water   Suggestions   Make whole grains, fruits, and vegetables the base of your diet. Choose heart-healthy fats such as canola, olive, and flaxseed oil, and foods high in heart-healthy fats, such as nuts, seeds, soybeans, tofu, and fish. Eat fish at least twice per week; the fish highest in omega-3 fatty acids and lowest in mercury include salmon, herring, mackerel, sardines, and canned chunk light tuna. If you eat fish less than twice per week or have high triglycerides, talk to your doctor about taking fish oil supplements. Read food labels. For products low in fat and cholesterol, look for fat free, low-fat, cholesterol free, saturated fat free, and trans fat freeAlso scan the Nutrition Facts Label, which lists saturated fat, trans fat, and cholesterol amounts. For products low in sodium, look for sodium free, very low sodium, low sodium, no added salt, and unsalted   Skip the salt when cooking or at the table; if food needs more flavor, get creative and try out different herbs and spices. Garlic and onion also add substantial flavor to foods. Trim any visible fat off meat and poultry before cooking, and drain the fat off after pressley. Use cooking methods that require little or no added fat, such as grilling, boiling, baking, poaching, broiling, roasting, steaming, stir-frying, and sauting. Avoid fast food and convenience food. They tend to be high in saturated and trans fat and have a lot of added salt.     Talk to a registered dietitian for individualized diet advice. Last Reviewed: March 2011 Joshua Carvajal MS, MPH, RD   Updated: 3/29/2011       DASH Diet: After Your Visit  Your Care Instructions  The DASH diet is an eating plan that can help lower your blood pressure. DASH stands for Dietary Approaches to Stop Hypertension. Hypertension is high blood pressure. The DASH diet focuses on eating foods that are high in calcium, potassium, and magnesium. These nutrients can lower blood pressure. The foods that are highest in these nutrients are fruits, vegetables, low-fat dairy products, nuts, seeds, and legumes. But taking calcium, potassium, and magnesium supplements instead of eating foods that are high in those nutrients does not have the same effect. The DASH diet also includes whole grains, fish, and poultry. The DASH diet is one of several lifestyle changes your doctor may recommend to lower your high blood pressure. Your doctor may also want you to decrease the amount of sodium in your diet. Lowering sodium while following the DASH diet can lower blood pressure even further than just the DASH diet alone. Follow-up care is a key part of your treatment and safety. Be sure to make and go to all appointments, and call your doctor if you are having problems. Its also a good idea to know your test results and keep a list of the medicines you take. How can you care for yourself at home? Following the DASH diet  Eat 4 to 5 servings of fruit each day. A serving is 1 medium-sized piece of fruit, ½ cup chopped or canned fruit, 1/4 cup dried fruit, or 4 ounces (½ cup) of fruit juice. Choose fruit more often than fruit juice. Eat 4 to 5 servings of vegetables each day. A serving is 1 cup of lettuce or raw leafy vegetables, ½ cup of chopped or cooked vegetables, or 4 ounces (½ cup) of vegetable juice. Choose vegetables more often than vegetable juice. Get 2 to 3 servings of low-fat and fat-free dairy each day.  A serving is 8 ounces of milk, 1 cup of yogurt, or 1 ½ ounces of cheese. Eat 7 to 8 servings of grains each day. A serving is 1 slice of bread, 1 ounce of dry cereal, or ½ cup of cooked rice, pasta, or cooked cereal. Try to choose whole-grain products as much as possible. Limit lean meat, poultry, and fish to 6 ounces each day. Six ounces is about the size of two decks of cards. Eat 4 to 5 servings of nuts, seeds, and legumes (cooked dried beans, lentils, and split peas) each week. A serving is 1/3 cup of nuts, 2 tablespoons of seeds, or ½ cup cooked dried beans or peas. Limit sweets and added sugars to 5 servings or less a week. A serving is 1 tablespoon jelly or jam, ½ cup sorbet, or 1 cup of lemonade. Tips for success  Start small. Do not try to make dramatic changes to your diet all at once. You might feel that you are missing out on your favorite foods and then be more likely to not follow the plan. Make small changes, and stick with them. Once those changes become habit, add a few more changes. Try some of the following:  Make it a goal to eat a fruit or vegetable at every meal and at snacks. This will make it easy to get the recommended amount of fruits and vegetables each day. Try yogurt topped with fruit and nuts for a snack or healthy dessert. Add lettuce, tomato, cucumber, and onion to sandwiches. Combine a ready-made pizza crust with low-fat mozzarella cheese and lots of vegetable toppings. Try using tomatoes, squash, spinach, broccoli, carrots, cauliflower, and onions. Have a variety of cut-up vegetables with a low-fat dip as an appetizer instead of chips and dip. Sprinkle sunflower seeds or chopped almonds over salads. Or try adding chopped walnuts or almonds to cooked vegetables. Try some vegetarian meals using beans and peas. Add garbanzo or kidney beans to salads. Make burritos and tacos with mashed kennedy beans or black beans    © 9249-3655 [employer].  Care instructions adapted under license by [facility] [address]. This care instruction is for use with your licensed healthcare professional. If you have questions about a medical condition or this instruction, always ask your healthcare professional. [address] any warranty or liability for your use of this information. Content Version: 9.4.92164; Last Revised: March 15, 2012                Patient information: Weight loss treatments    INTRODUCTION -- Obesity is a major international problem, and Americans are among the heaviest people in the world. The percentage of obese people in the [facility] has risen steadily. Many people find that although they initially lose weight by dieting, they quickly regain the weight after the diet ends. Because it so hard to keep weight off over time, it is important to have as much information and support as possible before starting a diet. You are most likely to be successful in losing weight and keeping it off when you believe that your body weight can be controlled. STARTING A WEIGHT LOSS PROGRAM -- Some people like to talk to their doctor or nurse to get help choosing the best plan, monitoring progress, and getting advice and support along the way. To know what treatment (or combination of treatments) will work best, determine your body mass index (BMI) and waist circumference (measurement). The BMI is calculated from your height and weight. A person with a BMI between 25 and 29.9 is considered overweight   A person with a BMI of 30 or greater is considered to be obese  A waist circumference greater than 35 inches (88 cm) in women and 40 inches (102 cm) in men increases the risk of obesity-related complications, such as heart disease and diabetes. People who are obese and who have a larger waist size may need more aggressive weight loss treatment than others. Talk to your doctor or nurse for advice.   Types of treatment -- Based on your measurements and your medical history, your doctor or nurse can determine what combination of weight loss treatments would work best for you. Treatments may include changes in lifestyle, exercise, dieting, and, in some cases, weight loss medicines or weight loss surgery. Weight loss surgery, also called bariatric surgery, is reserved for people with severe obesity who have not responded to other weight loss treatments. SETTING A WEIGHT LOSS GOAL -- It is important to set a realistic weight loss goal. Your first goal should be to avoid gaining more weight and staying at your current weight (or within 5 percent). Many people have a \"dream\" weight that is difficult or impossible to achieve. People at high risk of developing diabetes who are able to lose 5 percent of their body weight and maintain this weight will reduce their risk of developing diabetes by about 50 percent and reduce their blood pressure. This is a success. Losing more than 15 percent of your body weight and staying at this weight is an extremely good result, even if you never reach your \"dream\" or \"ideal\" weight. LIFESTYLE CHANGES -- Programs that help you to change your lifestyle are usually run by psychologists or other professionals. The goals of lifestyle changes are to help you change your eating habits, become more active, and be more aware of how much you eat and exercise, helping you to make healthier choices. This type of treatment can be broken down into three steps: The triggers that make you want to eat   Eating   What happens after you eat  Triggers to eat -- Determining what triggers you to eat involves figuring out what foods you eat and where and when you eat. To figure out what triggers you to eat, keep a record for a few days of everything you eat, the places where you eat, how often you eat, and the emotions you were feeling when you ate. For some people, the trigger is related to a certain time of day or night.  For others, the trigger is related to a certain place, like sitting at a desk working. Eating -- You can change your eating habits by breaking the chain of events between the trigger for eating and eating itself. There are many ways to do this. For instance, you can:  Limit where you eat to a few places (eg, dining room)   Restrict the number of utensils (eg, only a fork) used for eating   Drink a sip of water between each bite   Chew your food a certain number of times   Get up and stop eating every few minutes  What happens after you eat -- Rewarding yourself for good eating behaviors can help you to develop better habits. This is not a reward for weight loss; instead, it is a reward for changing unhealthy behaviors. Do not use food as a reward. Some people find money, clothing, or personal care (eg, a hair cut, manicure, or massage) to be effective rewards. Treat yourself immediately after making better eating choices to reinforce the value of the good behavior. You need to have clear behavior goals, and you must have a time frame for reaching your goals. Reward small changes along the way to your final goal.  Other factors that contribute to successful weight loss -- Changing your behavior involves more than just changing unhealthy eating habits; it also involves finding people around you to support your weight loss, reducing stress, and learning to be strong when tempted by food. Establish a \"joey\" system -- Having a friend or family member available to provide support and reinforce good behavior is very helpful. The support person needs to understand your goals. Learn to be strong -- Learning to be strong when tempted by food is an important part of losing weight. As an example, you will need to learn how to say \"no\" and continue to say no when urged to eat at parties and social gatherings. Develop strategies for events before you go, such as eating before you go or taking low-calorie snacks and drinks with you.    Develop a support system -- Having a support system is helpful when losing weight. This is why many commercial groups are successful. Family support is also essential; if your family does not support your efforts to lose weight, this can slow your progress or even keep you from losing weight. Positive thinking -- People often have conversations with themselves in their head; these conversations can be positive or negative. If you eat a piece of cake that was not planned, you may respond by thinking, \"Oh, you stupid idiot, you've blown your diet! \" and as a result, you may eat more cake. A positive thought for the same event could be, \"Well, I ate cake when it was not on my plan. Now I should do something to get back on track. \" A positive approach is much more likely to be successful than a negative one. Reduce stress -- Although stress is a part of everyday life, it can trigger uncontrolled eating in some people. It is important to find a way to get through these difficult times without eating or by eating low-calorie food, like raw vegetables. It may be helpful to imagine a relaxing place that allows you to temporarily escape from stress. With deep breaths and closed eyes, you can imagine this relaxing place for a few minutes. Self-help programs -- Self-help programs like [employer] [place] Watchers®, Overeaters Anonymous®, and Take Off [place] (TOPS)© work for some people. As with all weight loss programs, you are most likely to be successful with these plans if you make long-term changes in how you eat. CHOOSING A DIET -- A calorie is a unit of energy found in food. Your body needs calories to function. The goal of any diet is to burn up more calories than you eat. How quickly you lose weight depends upon several factors, such as your age, gender, and starting weight. Older people have a slower metabolism than young people, so they lose weight more slowly.    Men lose more weight than women of similar height and weight when dieting because they use more energy. People who are extremely overweight lose weight more quickly than those who are only mildly overweight. Try not to drink alcohol or drinks with added sugar, and most sweets (candy, cakes, cookies), since they rarely contain important nutrients. Portion-controlled diets -- One simple way to diet is to buy packaged foods, like frozen low-calorie meals or meal-replacement canned drinks. A typical meal plan for one day may include:  A meal-replacement drink or breakfast bar for breakfast   A meal-replacement drink or a frozen low-calorie (250 to 350 calories) meal for lunch   A frozen low-calorie meal or other prepackaged, calorie-controlled meal, along with extra vegetables for dinner  This would give you 1000 to 1500 calories per day. Low-fat diet -- To reduce the amount of fat in your diet, you can:  Eat low-fat foods. Low-fat foods are those that contain less than 30 percent of calories from fat. Fat is listed on the food facts label (figure 1). Count fat grams. For a 1500 calorie diet, this would mean about 45 g or fewer of fat per day. Low-carbohydrate diet -- Low- and very-low-carbohydrate diets (eg, Atkins diet, [employer]) have become popular ways to lose weight quickly. With a very-low-carbohydrate diet, you eat between 0 and 60 grams of carbohydrates per day (a standard diet contains 200 to 300 grams of carbohydrates)   With a low-carbohydrate diet, you eat between 60 and 130 grams of carbohydrates per day  Carbohydrates are found in fruits, vegetables, and grains (including breads, rice, pasta, and cereal), alcoholic beverages, and in dairy products. Meat and fish do not contain carbohydrates. Side effects of very-low-carbohydrate diets can include constipation, headache, bad breath, muscle cramps, diarrhea, and weakness. Mediterranean diet -- The term \"Mediterranean diet\" refers to a way of eating that is common in olive[place]growing regions around the [facility]. Although there is some variation in Mediterranean diets, there are some similarities. Most Mediterranean diets include:  A high level of monounsaturated fats (from olive or canola oil, walnuts, pecans, almonds) and a low level of saturated fats (from butter)   A high amount of vegetables, fruits, legumes, and grains (7 to 10 servings of fruits and vegetables per day)   A moderate amount of milk and dairy products, mostly in the form of cheese. Use low-fat dairy products (skim milk, fat-free yogurt, low-fat cheese). A relatively low amount of red meat and meat products. Substitute fish or poultry for red meat. For those who drink alcohol, a modest amount (mainly as red wine) may help to protect against cardiovascular disease. A modest amount is up to one (4 ounce) glass per day for women and up to two glasses per day for men. Which diet is best? -- Studies have compared different diets, including:  Very-low-carbohydrate (Atkins)   Macronutrient balance controlling glycemic load (Zone®)   Reduced-calorie (Weight Watchers®)   Very-low-fat (Ornish)  No one diet is \"best\" for weight loss. Any diet will help you to lose weight if you stick with the diet. Therefore, it is important to choose a diet that includes foods you like. Fad diets -- Fad diets often promise quick weight loss (more than 1 to 2 pounds per week) and may claim that you do not need to exercise or give up favorite foods. Some fad diets cost a lot of money, because you have to pay for seminars or pills. Fad diets generally lack any scientific evidence that they are safe and effective, but instead rely on \"before\" and \"after\" photos or testimonials. Diets that sound too good to be true usually are. These plans are a waste of time and money and are not recommended. A doctor, nurse, or nutritionist can help you find a safe and effective way to lose weight and keep it off.   WEIGHT LOSS MEDICINES -- Taking a weight loss medicine may be helpful when used in combination with diet, exercise, and lifestyle changes. However, it is important to understand the risks and benefits of these medicines. It is also important to be realistic about your goal weight using a weight loss medicine; you may not reach your \"dream\" weight, but you may be able to reduce your risk of diabetes or heart disease. Weight loss medicines may be recommended for people who have not been able to lose weight with diet and exercise who have a:  BMI of 30 or more    BMI between 27 and 29.9 and have other medical problems, such as diabetes, high cholesterol, or high blood pressure  Two weight loss medicines are approved in the [place] for long-term use. These are sibutramine and orlistat. Other weight loss medicines (phentermine, diethylpropion) are available but are only approved for short-term use (up to 12 weeks). Sibutramine -- Sibutramine (Meridia®, Reductil®) is a medicine that reduces your appetite. In people who take the medicine for one year, the average weight loss is 10 percent of the initial body weight (5 percent more than those who took a placebo treatment). Side effects of sibutramine include insomnia, dry mouth, and constipation. Increases in blood pressure can occur. Therefore, blood pressure is usually monitored during treatment. There is no evidence that sibutramine causes heart or lung problems (like dexfenfluramine and fenfluramine (Phen/Fen)). However, experts agree that sibutramine should not used by people with coronary heart disease, heart failure, uncontrolled hypertension, stroke, irregular heart rhythms, or peripheral vascular disease (poor circulation in the legs). Orlistat -- Orlistat (Xenical® 120 mg capsules) is a medicine that reduces the amount of fat your body absorbs from the foods you eat. A lower-dose version is now available without a prescription (Jonnathan® 60 mg capsules) in many countries, including the [place].  The medicine is recommended three times per day, taken with a meal; you can skip a dose if you skip a meal or if the meal contains no fat. After one year of treatment with orlistat, the average weight loss is approximately 8 to 10 percent of initial body weight (4 percent more than in those who took a placebo). Cholesterol levels often improve, and blood pressure sometimes falls. In people with diabetes, orlistat may help control blood sugar levels. Side effects occur in 15 to 10 percent of people and may include stomach cramps, gas, diarrhea, leakage of stool, or oily stools. These problems are more likely when you take orlistat with a high-fat meal (if more than 30 percent of calories in the meal are from fat). Side effects usually improve as you learn to avoid high-fat foods. Severe liver injury has been reported rarely in patients taking orlistat, but it is not known if orlistat caused the liver problems. Diet supplements -- Diet supplements are widely used by people who are trying to lose weight, although the safety and efficacy of these supplements are often unproven. A few of the more common diet supplements are discussed below; none of these are recommended because they have not been studied carefully, and there is no proof they are safe or effective. Chitosan and wheat dextrin are ineffective for weight loss, and their use is not recommended. Ephedra, a compound related to ephedrine, is no longer available in the [facility] due to safety concerns. Many nonprescription diet pills previously contained ephedra. Although some studies have shown that ephedra helps with weight loss, there can be serious side effects (psychiatric symptoms, palpitations, and stomach upset), including death. There are not enough data about the safety and efficacy of chromium, ginseng, glucomannan, green tea, hydroxycitric acid, L carnitine, psyllium, pyruvate supplements, Tull wort, and conjugated linoleic acid.    Two supplements from [facility], Emagrscar Sim (also known as the Padmaja Evans 15 pill) and Herbathin dietary supplement, have been shown to contain prescription drugs. Hoodia gordonii is a dietary supplement derived from a plant in [place]. It is not recommended because there is no proof that it is safe or effective. Bitter orange (Citrus aurantium) can increase your heart rate and blood pressure and is not recommended. SHOULD I HAVE SURGERY TO LOSE WEIGHT? -- Weight loss surgery is recommended ONLY for people with one of the following:  Severe obesity (body mass index above 40) (calculator 1 and calculator 2) who have not responded to diet, exercise, or weight loss medicines   Body mass index between 35 and 40, along with a serious medical problem (including diabetes, severe joint pain, or sleep apnea) that would improve with weight loss  You should be sure that you understand the potential risks and benefits of weight loss surgery. You must be motivated and willing to make lifelong changes in how you eat to reach and maintain a healthier weight after surgery. You must also be realistic about weight loss after surgery (see 'Effectiveness of weight loss surgery' below). PREPARING FOR WEIGHT LOSS SURGERY -- Most people who have weight loss surgery will meet with several specialists before surgery is scheduled. This often includes a dietitian, mental health counselor, a doctor who specializes in care of obese people, and a surgeon who performs weight loss surgery (bariatric surgeon). You may need to work with these providers for several weeks or months before surgery. The nutritionist will explain what and how much you will be able to eat after surgery. You may also need to lose a small amount of weight before surgery.    The mental health specialist will help you to cope with stress and other factors that can make it harder to lose weight or trigger you to eat   The medical doctor will determine whether you need other tests, counseling, or treatment before surgery. He or she might also help you begin a medical weight loss program so that you can lose some weight before surgery. The bariatric surgeon will meet with you to discuss the surgeries available to treat obesity. He or she will also make sure you are a good candidate for surgery. TYPES OF WEIGHT LOSS SURGERY -- There are several types of weight loss surgeries, the most common being lap banding, gastric bypass, and gastric sleeve. Lap banding -- Laparoscopic adjustable gastric banding (LAGB), or lap banding, is a surgery that uses an adjustable band around the opening to the stomach (figure 1). This reduces the amount of food that you can eat at one time. Lap banding is done through small incisions, with a laparoscope. The band can be adjusted after surgery, allowing you to eat more or less food. Adjustments to the size and tightness of the band are made by using a needle to add or remove fluid from a port (a small container under the skin that is connected to the band). Adding fluid to the band makes it tighter which restricts the amount of food you can eat and may help you to lose more weight. Lap banding is a popular choice because it is relatively simple to perform, can be adjusted or removed, and has a low risk of serious complications immediately after surgery. However, weight loss with the lap band depends on your ability to follow the program closely. You will need to prepare nutritious meals that [facility] with\" the band, not against it. For example, the lap band will not work well if you eat or drink a large amount of liquid calories (like ice cream). The band will not help you to feel full when you eat/drink liquid calories. Weight loss ranges from 45 to 75 percent after two years. As an example, a person who is 120 pounds overweight could expect to lose approximately 54 to 90 pounds in the two years after lap banding.   Gastric bypass -- Kvng-en-Y gastric bypass, also called gastric bypass, helps you to lose weight by reducing the amount of food you can eat and reducing the number of calories and nutrients you absorb from the food you eat. To perform gastric bypass, a surgeon creates a small stomach pouch by dividing the stomach and attaching it to the small intestine. This helps you to lose weight in two ways: The smaller stomach can hold less food than before surgery. This causes you to feel full after eating a very small amount of food or liquid. Over time, the pouch might stretch, allowing you to eat more food. The body absorbs fewer calories, since food bypasses most of the stomach as well as the upper small intestine. This new arrangement seems to decrease your appetite and change how you break down foods by changing the release of various hormones. Gastric bypass can be performed as open surgery (through an incision on the abdomen) or laparoscopically, which uses smaller incisions and smaller instruments. Both the laparoscopic and open techniques have risks and benefits. You and your surgeon should work together to decide which surgery, if any, is right for you. Gastric bypass has a high success rate, and people lose an average of 62 to 68 percent of their excess body weight in the first year. Weight loss typically levels off after one to two years, with an overall excess weight loss between 50 and 75 percent. For a person who is 120 pounds overweight, an average of 60 to 90 pounds of weight loss would be expected. Gastric sleeve -- Gastric sleeve, also known as sleeve gastrectomy, is a surgery that reduces the size of the stomach and makes it into a narrow tube (figure 3). The new stomach is much smaller and produces less of the hormone (ghrelin) that causes hunger, helping you feel satisfied with less food. Sleeve gastrectomy is safer than gastric bypass because the intestines are not rearranged, and there is less chance of malnutrition.  It also appears to control hunger better than lap banding. It might be safer than the lap banding because no foreign materials are used. The gastric sleeve has a good success rate, and people lose an average of 33 percent of their excess body weight in the first year. For a person who is 120 pounds overweight, this would mean losing about 40 pounds in the first year. WEIGHT LOSS SURGERY COMPLICATIONS -- A variety of complications can occur with weight loss surgery. The risks of surgery depend upon which surgery you have and any medical problems you had before surgery. Some of the more common early surgical complications (one to six weeks after surgery) include:  Bleeding   Infection   Blockage or tear in the bowels   Need for further surgery  Important medical complications after surgery can include blood clots in the legs or lungs, heart attack, pneumonia, and urinary tract infection. Complications are less likely when surgery is performed in centers that are experienced in weight loss surgery. In general, centers with experience in weight loss surgery have:  Board-certified doctors and surgeons   A team of support staff (dietitians, counselors, nurses)   Long-term follow-up after surgery   Hospital staff experienced with the care of weight loss patients. This includes nurses who are trained in the care of patients immediately after surgery and anesthesiologists who are experienced in caring for the morbidly obese. EFFECTIVENESS OF WEIGHT LOSS SURGERY -- The goal of weight loss surgery is to reduce the risk of illness or death associated with obesity. Weight loss surgery can also help you to feel and look better, reduce the amount of money you spend on medicines, and cut down on sick days. As an example, weight loss surgery can improve health problems related to obesity (diabetes, high blood pressure, high cholesterol, sleep apnea) to the point that you need less or no medicine.   Finally, weight loss surgery might reduce your risk of developing heart disease, cancer, and certain infections. AFTER WEIGHT LOSS SURGERY -- You will need to stay in the hospital until your team feels that it is safe for you to leave (on average, one to three days). Do not drive if you are taking prescription pain medicine. Begin exercising as soon as possible once you have healed; most weight loss centers will design an exercise program for you. Once you are home, it is important to eat and drink exactly what your doctor and dietitian recommend. You will see your doctor, nurse, and dietitian on a regular basis after surgery to monitor your health, diet, and weight loss. You will be able to slowly increase how much you eat over time, although it will always be important to:  Eat small, frequent meals and not skip meals   Chew your food slowly and completely   Avoid eating while \"distracted\" (such as eating while watching TV)   Stop eating when you feel full   Drink liquids at least 30 minutes before or after eating   Avoid foods high in fat or sugar   Take vitamin supplements, as recommended  It can take several months to learn to listen to your body so that you know when you are hungry and when you are full. You may dislike foods you previously loved, and you may begin to prefer new foods. This can be a frustrating process for some people, so talk to your dietitian if you are having trouble. It usually takes between one and two years to lose weight after surgery. After reaching their goal weight, some people have plastic surgery (called \"body contouring\") to remove excess skin from the body, particularly in the abdominal area. Before you decide to have weight loss surgery, you must commit to staying healthy for life. This includes following up with your healthcare team, exercising most days of the week, and eating a sensible diet every day.  It can be difficult to develop new eating and exercise habits after weight loss surgery, and you will have to work hard to stick to your goals. Recovering from surgery and losing weight can be stressful and emotional, and it is important to have the support of family and friends. Working with a , therapist, or support group can help you through the ups and downs. WHERE TO GET MORE INFORMATION -- Your healthcare provider is the best source of information for questions and concerns related to your medical problem.   This article will be updated as needed every four months on our Web site ([email or web address])

## 2022-11-09 NOTE — PROGRESS NOTES
[address] [place]  [address] [facility] [place], [address]  Ph:   [phone]  Fax: [phone]    Provider:  Linda Ha CNP    Wellness Visit    Patient:  Michael Stringer  YOB: 1960      Visit Date:  11/9/2022    Subjective:  Review of Systems   All other systems reviewed and are negative. Health Habits: With regard to his health habits he states he  eats 3 meals per day and 2 snacks per day. Hedoes not exercise regularly. He does take over the counter vitamins. He never had a blood transfusion or tattoo. He wears seatbelts when driving a car. He does not talk or text on the phone while driving. He works 55-60 hours a week. He is content with his life. He is . Health Maintenance   Topic Date Due    Shingles vaccine (2 of 2) 10/23/2019    COVID-19 Vaccine (3 - Booster for Pfizer series) 05/20/2021    Flu vaccine (1) 08/01/2022    Depression Screen  06/09/2023    Lipids  07/23/2023    Prostate Specific Antigen (PSA) Screening or Monitoring  07/23/2023    Diabetes screen  07/23/2025    DTaP/Tdap/Td vaccine (2 - Td or Tdap) 04/19/2026    Colorectal Cancer Screen  03/13/2030    Hepatitis C screen  Completed    HIV screen  Completed    Hepatitis A vaccine  Aged Out    Hib vaccine  Aged Out    Meningococcal (ACWY) vaccine  Aged Out    Pneumococcal 0-64 years Vaccine  Aged Out       He  reports that he has never smoked. He has never used smokeless tobacco. He reports current alcohol use. He reports that he does not use drugs. .   He has had his screening colonoscopy. [address] [place]  [address] [place] [address], [address]  Ph:   [phone]  Fax: [phone]    Provider:  CORBY Kelley - EUGENIO                       PROVIDER NOTES            HPI:  Michael Stringer is a 65y.o. year old male, who comes today for an annual wellness visit. HTN: Benicar 5 mg 2 tabs daily. Not following a low sodium diet.  Walking at work more, but not specific exercise for health. No cardiac symptoms. BP has been 120-130/70-80 There is a family hx of heart disease, but not premature. Hypercholesterolemia: Pravastatin 40 mg daily. They have been eating out a lot, especially on the weekend. He is working a lot, so his diet has suffered. Lipid in July 2022 were total cholesterol 188, triglycerides 118, HDL 67, LDL 97. Prostate cancer: follows with urology. PSA was 3.9 in July. Known kidney stones.       Allergic rhinitis: using Flonase    Past Medical History:   Diagnosis Date    Cancer ([address]     CRVO (central retinal vein occlusion) 01/2016    Right eye    Heartburn     Hyperlipidemia 2010    Hypertension 2010       Past Surgical History:   Procedure Laterality Date    COLONOSCOPY  2009    WNL     COLONOSCOPY N/A 3/13/2020    COLONOSCOPY DIAGNOSTIC performed by Sivakumar Esquivel MD at [address] [place] [address]  2012    benign     TESTICLE REMOVAL  1994    Lt    ULTRASOUND PROSTATE/TRANSRECTAL N/A 1/16/2020    TRANSRECTAL ULTRASOUND WITH PROSTATE BX performed by Patricia Del Rosario MD at [address] [place] [address] N/A 3/13/2020    EGD BIOPSY performed by S[facility]akumar Esquivel [facility]       Family History   Problem Relation Age of Onset    Cancer Mother 72        lung     Heart Disease Mother 58    Coronary Art Dis Mother 58    Heart Disease Father 68    Heart Attack Father 68    Coronary Art Dis Father 68    High Blood Pressure Father     High Cholesterol Father     Diabetes Father         borderline    High Blood Pressure Sister 45    High Cholesterol Sister 45    Heart Attack Maternal Uncle     Heart Disease Maternal Uncle     High Blood Pressure Maternal Uncle     High Cholesterol Maternal Uncle     Coronary Art Dis Maternal Uncle     Coronary Art Dis Maternal Grandmother     Heart Attack Maternal Grandmother     Heart Disease Maternal Grandmother     High Blood Pressure Maternal Grandmother     High Cholesterol Maternal Grandmother     Cancer Maternal Grandfather         unknown type     Diabetes Paternal Grandmother     Other Paternal Grandfather         Black Lung     High Cholesterol Maternal Aunt     High Blood Pressure Maternal Aunt     Heart Disease Maternal Aunt     Heart Attack Maternal Aunt     Coronary Art Dis Maternal Aunt     Coronary Art Dis Maternal Aunt     Cancer Maternal Uncle         stomach     Heart Attack Maternal Uncle     Heart Disease Maternal Uncle     High Blood Pressure Maternal Uncle     High Cholesterol Maternal Uncle     Prostate Cancer Neg Hx        Social History     Socioeconomic History    Marital status:      Spouse name: Not on file    Number of children: Not on file    Years of education: Not on file    Highest education level: Not on file   Occupational History    Not on file   Tobacco Use    Smoking status: Never    Smokeless tobacco: Never   Vaping Use    Vaping Use: Never used   Substance and Sexual Activity    Alcohol use: Yes     Comment: occ beer    Drug use: No    Sexual activity: Yes     Partners: Female   Other Topics Concern    Not on file   Social History Narrative    Not on file     Social Determinants of Health     Financial Resource Strain: Low Risk     Difficulty of Paying Living Expenses: Not hard at all   Food Insecurity: Unknown    Worried About Running Out of Food in the Last Year: Patient refused    Ran Out of Food in the Last Year: Patient refused   Transportation Needs: Not on file   Physical Activity: Not on file   Stress: Not on file   Social Connections: Not on file   Intimate Partner Violence: Not on file   Housing Stability: Not on file       Allergies   Allergen Reactions    Other Other (See Comments)     -florin; muscle spasms    Versed [Midazolam]      Hives      Demerol Hives    Midazolam Hcl Hives       Louis Yin has a current medication list which includes the following prescription(s): olmesartan, pravastatin, triamcinolone, fluticasone, and aspirin. Objective:  Blood pressure (!) 144/84, pulse 70, temperature 98.1 °F (36.7 °C), temperature source Temporal, resp. rate 12, height 5' 8\" (1.727 m), weight 197 lb (89.4 kg). Physical Examination:   General appearance - alert, well appearing, and in no distress and oriented to person, place, and time  Eyes - pupils equal and reactive, extraocular eye movements intact, sclera anicteric  Ears - bilateral TM's and external ear canals normal, hearing grossly normal bilaterally  Nose - normal and patent, no erythema, discharge or polyps  Mouth - mucous membranes moist, pharynx normal without lesions  Neck - supple, no significant adenopathy  Lymphatics - no palpable lymphadenopathy  Chest - clear to auscultation, no wheezes, rales or rhonchi, symmetric air entry  Heart - normal rate, regular rhythm, normal S1, S2, no murmurs, rubs, clicks or gallops  Abdomen - soft, nontender, nondistended, no masses or organomegaly   Male - no penile lesions or discharge, no testicular masses or tenderness, no hernias  Rectal - normal sphincter tone, no mass, lesions or tenderness. Normal prostate exam  Neurological - alert, oriented, normal speech, no focal findings or movement disorder noted  Extremities - peripheral pulses normal, no pedal edema, no clubbing or cyanosis  Skin - normal coloration and turgor, no rashes, no suspicious skin lesions noted    Assessment:   Diagnosis Orders   1. Encounter for well adult exam without abnormal findings        2. Essential hypertension  olmesartan (BENICAR) 5 MG tablet      3. Hypercholesterolemia  pravastatin (PRAVACHOL) 40 MG tablet      4. Prostate cancer ([facility] [address] [facility]        5. Seasonal allergic rhinitis, unspecified trigger            Plan:    Return in 6 months (on 5/9/2023). .    Counseling was provided today regarding the following topics:  Healthy eating habits, exercise and lifestyle, vitamin/mineral supplementation,  and testicular self exam.    Frank Mcclendon APRN - CNP Advance Care Planning   Advanced Care Planning: Discussed the patients choices for care and treatment in case of a health event that adversely affects decision-making abilities. Also discussed the patients long-term treatment options. Reviewed with the patient the appropriate state-specific advance directive documents. Reviewed the process of designating a competent adult as an Agent (or -in-fact) that could take make health care decisions for the patient if incompetent. Patient was asked to complete the declaration forms, either acknowledge the forms by a public notary or an eligible witness and provide a signed copy to the practice office. Time spent (minutes): 3     Cardiovascular Disease Risk Counseling: Assessed the patient's risk to develop cardiovascular disease and reviewed main risk factors. Reviewed steps to reduce disease risk including:   Quitting tobacco use, reducing amount smoked, or not starting the habit  Making healthy food choices  Being physically active and gradualy increasing activity levels   Reduce weight and determine a healthy BMI goal  Monitor blood pressure and treat if higher than 140/90 mmHg  Maintain blood total cholesterol levels under 5 mmol/l or 190 mg/dl  Maintain LDL cholesterol levels under 3.0 mmol/l or 115 mg/dl   Control blood glucose levels  Consider taking aspirin (75 mg daily), once blood pressure is controlled   Provided a follow up plan.   Time spent (minutes): 5

## 2023-02-07 DIAGNOSIS — R97.20 ELEVATED PSA: ICD-10-CM

## 2023-02-07 DIAGNOSIS — N20.0 NEPHROLITHIASIS: Primary | ICD-10-CM

## 2023-02-07 NOTE — PROGRESS NOTES
PSA and KUB ordered. Active Surveillance and discussion for ESWL per Dr. Drew Failing last office visit.

## 2023-02-14 ENCOUNTER — HOSPITAL ENCOUNTER (OUTPATIENT)
Age: 63
Discharge: HOME | End: 2023-02-14
Payer: COMMERCIAL

## 2023-02-14 DIAGNOSIS — R97.20 ELEVATED PSA: ICD-10-CM

## 2023-02-14 LAB — PSA SERPL-MCNC: 3.2 NG/ML (ref 0–1)

## 2023-02-14 PROCEDURE — 36415 COLL VENOUS BLD VENIPUNCTURE: CPT

## 2023-02-14 PROCEDURE — 84153 ASSAY OF PSA TOTAL: CPT

## 2023-02-15 ENCOUNTER — HOSPITAL ENCOUNTER (OUTPATIENT)
Dept: GENERAL RADIOLOGY | Age: 63
Discharge: HOME | End: 2023-02-15
Payer: COMMERCIAL

## 2023-02-15 ENCOUNTER — HOSPITAL ENCOUNTER (OUTPATIENT)
Age: 63
Discharge: HOME | End: 2023-02-15
Payer: COMMERCIAL

## 2023-02-15 DIAGNOSIS — N20.0 NEPHROLITHIASIS: ICD-10-CM

## 2023-02-15 PROCEDURE — 74018 RADEX ABDOMEN 1 VIEW: CPT

## 2023-02-21 ENCOUNTER — OFFICE VISIT (OUTPATIENT)
Dept: UROLOGY | Age: 63
End: 2023-02-21
Payer: COMMERCIAL

## 2023-02-21 VITALS — HEIGHT: 68 IN | BODY MASS INDEX: 29.1 KG/M2 | RESPIRATION RATE: 16 BRPM | WEIGHT: 192 LBS

## 2023-02-21 DIAGNOSIS — N40.1 BENIGN LOCALIZED PROSTATIC HYPERPLASIA WITH LOWER URINARY TRACT SYMPTOMS (LUTS): ICD-10-CM

## 2023-02-21 DIAGNOSIS — C61 PROSTATE CANCER (HCC): Primary | ICD-10-CM

## 2023-02-21 DIAGNOSIS — N39.0 RECURRENT UTI: ICD-10-CM

## 2023-02-21 PROCEDURE — 99214 OFFICE O/P EST MOD 30 MIN: CPT | Performed by: UROLOGY

## 2023-03-15 ENCOUNTER — TELEPHONE (OUTPATIENT)
Dept: UROLOGY | Age: 63
End: 2023-03-15

## 2023-03-15 NOTE — TELEPHONE ENCOUNTER
Patient scheduled for MRI PROSTATE W WO  at [facility] MR on 8/2/23 ARRIVAL OF 10AM FOR A 1030AM SCAN.     Order mailed with instructions to the patient

## 2023-03-22 ENCOUNTER — NURSE ONLY (OUTPATIENT)
Dept: LAB | Age: 63
End: 2023-03-22

## 2023-03-22 ENCOUNTER — TELEPHONE (OUTPATIENT)
Dept: UROLOGY | Age: 63
End: 2023-03-22

## 2023-03-22 DIAGNOSIS — N39.0 RECURRENT UTI: Primary | ICD-10-CM

## 2023-03-22 DIAGNOSIS — N39.0 RECURRENT UTI: ICD-10-CM

## 2023-03-22 LAB
BACTERIA: NORMAL
BILIRUB UR QL STRIP: NEGATIVE
CASTS #/AREA URNS LPF: NORMAL /LPF
CASTS #/AREA URNS LPF: NORMAL /LPF
CHARACTER UR: CLEAR
CHARCOAL URNS QL MICRO: NORMAL
COLOR UR: YELLOW
CRYSTALS URNS QL MICRO: NORMAL
EPITHELIAL CELLS, UA: NORMAL /HPF
GLUCOSE UR QL STRIP.AUTO: NEGATIVE MG/DL
HGB UR QL STRIP.AUTO: NEGATIVE
KETONES UR QL STRIP.AUTO: NEGATIVE
LEUKOCYTE ESTERASE UR QL STRIP.AUTO: NEGATIVE
NITRITE UR QL STRIP.AUTO: NEGATIVE
PH UR STRIP.AUTO: 6.5 [PH] (ref 5–9)
PROT UR STRIP.AUTO-MCNC: NEGATIVE MG/DL
RBC #/AREA URNS HPF: NORMAL /HPF
RENAL EPI CELLS #/AREA URNS HPF: NORMAL /[HPF]
SPECIFIC GRAVITY UA: 1.02 (ref 1–1.03)
UROBILINOGEN, URINE: 0.2 EU/DL (ref 0–1)
WBC #/AREA URNS HPF: NORMAL /HPF
YEAST LIKE FUNGI URNS QL MICRO: NORMAL

## 2023-03-22 NOTE — TELEPHONE ENCOUNTER
Patient thinks he has a infection. He has a odd feeling in the stomach/ bladder. He denies any other pain, urgency or frequency, fever, or chills. He does not normally have these feelings with infection. Orders placed for urine.

## 2023-03-24 LAB
BACTERIA UR CULT: ABNORMAL
ORGANISM: ABNORMAL

## 2023-03-27 RX ORDER — NITROFURANTOIN 25; 75 MG/1; MG/1
100 CAPSULE ORAL 2 TIMES DAILY
Qty: 20 CAPSULE | Refills: 0 | Status: SHIPPED | OUTPATIENT
Start: 2023-03-27 | End: 2023-04-06

## 2023-04-06 ENCOUNTER — ANESTHESIA (OUTPATIENT)
Dept: OPERATING ROOM | Age: 63
End: 2023-04-06
Payer: COMMERCIAL

## 2023-04-06 ENCOUNTER — APPOINTMENT (OUTPATIENT)
Dept: GENERAL RADIOLOGY | Age: 63
End: 2023-04-06
Payer: COMMERCIAL

## 2023-04-06 ENCOUNTER — APPOINTMENT (OUTPATIENT)
Dept: CT IMAGING | Age: 63
End: 2023-04-06
Payer: COMMERCIAL

## 2023-04-06 ENCOUNTER — TELEPHONE (OUTPATIENT)
Dept: UROLOGY | Age: 63
End: 2023-04-06

## 2023-04-06 ENCOUNTER — HOSPITAL ENCOUNTER (OUTPATIENT)
Age: 63
Setting detail: OBSERVATION
Discharge: HOME | End: 2023-04-06
Payer: COMMERCIAL

## 2023-04-06 ENCOUNTER — ANESTHESIA EVENT (OUTPATIENT)
Dept: OPERATING ROOM | Age: 63
End: 2023-04-06
Payer: COMMERCIAL

## 2023-04-06 VITALS
RESPIRATION RATE: 20 BRPM | HEART RATE: 74 BPM | DIASTOLIC BLOOD PRESSURE: 89 MMHG | SYSTOLIC BLOOD PRESSURE: 151 MMHG | BODY MASS INDEX: 28.58 KG/M2 | HEIGHT: 69 IN | TEMPERATURE: 97.9 F | OXYGEN SATURATION: 97 % | WEIGHT: 193 LBS

## 2023-04-06 DIAGNOSIS — N13.2 HYDRONEPHROSIS CONCURRENT WITH AND DUE TO CALCULI OF KIDNEY AND URETER: Primary | ICD-10-CM

## 2023-04-06 PROBLEM — N20.0 NEPHROLITHIASIS: Status: ACTIVE | Noted: 2023-04-06

## 2023-04-06 LAB
ALBUMIN SERPL BCG-MCNC: 4.4 G/DL (ref 3.5–5.1)
ALP SERPL-CCNC: 68 U/L (ref 38–126)
ALT SERPL W/O P-5'-P-CCNC: 31 U/L (ref 11–66)
ANION GAP SERPL CALC-SCNC: 12 MEQ/L (ref 8–16)
AST SERPL-CCNC: 17 U/L (ref 5–40)
BACTERIA: ABNORMAL
BASOPHILS ABSOLUTE: 0 THOU/MM3 (ref 0–0.1)
BASOPHILS NFR BLD AUTO: 0.2 %
BILIRUB SERPL-MCNC: 0.7 MG/DL (ref 0.3–1.2)
BILIRUB UR QL STRIP: NEGATIVE
BUN SERPL-MCNC: 11 MG/DL (ref 7–22)
CALCIUM SERPL-MCNC: 10 MG/DL (ref 8.5–10.5)
CASTS #/AREA URNS LPF: ABNORMAL /LPF
CASTS #/AREA URNS LPF: ABNORMAL /LPF
CHARACTER UR: CLEAR
CHARCOAL URNS QL MICRO: ABNORMAL
CHLORIDE SERPL-SCNC: 102 MEQ/L (ref 98–111)
CO2 SERPL-SCNC: 25 MEQ/L (ref 23–33)
COLOR UR: YELLOW
CREAT SERPL-MCNC: 0.9 MG/DL (ref 0.4–1.2)
CRYSTALS URNS QL MICRO: ABNORMAL
DEPRECATED RDW RBC AUTO: 40.3 FL (ref 35–45)
EOSINOPHIL NFR BLD AUTO: 4.7 %
EOSINOPHILS ABSOLUTE: 0.5 THOU/MM3 (ref 0–0.4)
EPITHELIAL CELLS, UA: ABNORMAL /HPF
ERYTHROCYTE [DISTWIDTH] IN BLOOD BY AUTOMATED COUNT: 12.3 % (ref 11.5–14.5)
GFR SERPL CREATININE-BSD FRML MDRD: > 60 ML/MIN/1.73M2
GLUCOSE SERPL-MCNC: 135 MG/DL (ref 70–108)
GLUCOSE UR QL STRIP.AUTO: NEGATIVE MG/DL
HCT VFR BLD AUTO: 43.7 % (ref 42–52)
HGB BLD-MCNC: 14.6 GM/DL (ref 14–18)
HGB UR QL STRIP.AUTO: ABNORMAL
IMM GRANULOCYTES # BLD AUTO: 0.04 THOU/MM3 (ref 0–0.07)
IMM GRANULOCYTES NFR BLD AUTO: 0.3 %
KETONES UR QL STRIP.AUTO: NEGATIVE
LEUKOCYTE ESTERASE UR QL STRIP.AUTO: ABNORMAL
LIPASE SERPL-CCNC: 23.7 U/L (ref 5.6–51.3)
LYMPHOCYTES ABSOLUTE: 1.2 THOU/MM3 (ref 1–4.8)
LYMPHOCYTES NFR BLD AUTO: 10.1 %
MCH RBC QN AUTO: 30 PG (ref 26–33)
MCHC RBC AUTO-ENTMCNC: 33.4 GM/DL (ref 32.2–35.5)
MCV RBC AUTO: 89.7 FL (ref 80–94)
MONOCYTES ABSOLUTE: 0.9 THOU/MM3 (ref 0.4–1.3)
MONOCYTES NFR BLD AUTO: 7.6 %
NEUTROPHILS NFR BLD AUTO: 77.1 %
NITRITE UR QL STRIP.AUTO: NEGATIVE
NRBC BLD AUTO-RTO: 0 /100 WBC
PH UR STRIP.AUTO: 6.5 [PH] (ref 5–9)
PLATELET # BLD AUTO: 268 THOU/MM3 (ref 130–400)
PMV BLD AUTO: 9.5 FL (ref 9.4–12.4)
POTASSIUM SERPL-SCNC: 3.9 MEQ/L (ref 3.5–5.2)
PROT SERPL-MCNC: 7.2 G/DL (ref 6.1–8)
PROT UR STRIP.AUTO-MCNC: ABNORMAL MG/DL
RBC # BLD AUTO: 4.87 MILL/MM3 (ref 4.7–6.1)
RBC #/AREA URNS HPF: ABNORMAL /HPF
RENAL EPI CELLS #/AREA URNS HPF: ABNORMAL /[HPF]
SEGMENTED NEUTROPHILS ABSOLUTE COUNT: 8.9 THOU/MM3 (ref 1.8–7.7)
SODIUM SERPL-SCNC: 139 MEQ/L (ref 135–145)
SPECIFIC GRAVITY UA: 1.01 (ref 1–1.03)
UROBILINOGEN, URINE: 0.2 EU/DL (ref 0–1)
WBC # BLD AUTO: 11.6 THOU/MM3 (ref 4.8–10.8)
WBC #/AREA URNS HPF: ABNORMAL /HPF
YEAST LIKE FUNGI URNS QL MICRO: ABNORMAL

## 2023-04-06 PROCEDURE — 99285 EMERGENCY DEPT VISIT HI MDM: CPT

## 2023-04-06 PROCEDURE — G0378 HOSPITAL OBSERVATION PER HR: HCPCS

## 2023-04-06 PROCEDURE — 2709999900 HC NON-CHARGEABLE SUPPLY: Performed by: UROLOGY

## 2023-04-06 PROCEDURE — 6360000002 HC RX W HCPCS: Performed by: NURSE PRACTITIONER

## 2023-04-06 PROCEDURE — C2617 STENT, NON-COR, TEM W/O DEL: HCPCS | Performed by: UROLOGY

## 2023-04-06 PROCEDURE — 3700000000 HC ANESTHESIA ATTENDED CARE: Performed by: UROLOGY

## 2023-04-06 PROCEDURE — 74018 RADEX ABDOMEN 1 VIEW: CPT

## 2023-04-06 PROCEDURE — C1769 GUIDE WIRE: HCPCS | Performed by: UROLOGY

## 2023-04-06 PROCEDURE — 80053 COMPREHEN METABOLIC PANEL: CPT

## 2023-04-06 PROCEDURE — 99223 1ST HOSP IP/OBS HIGH 75: CPT

## 2023-04-06 PROCEDURE — 3700000001 HC ADD 15 MINUTES (ANESTHESIA): Performed by: UROLOGY

## 2023-04-06 PROCEDURE — 2580000003 HC RX 258: Performed by: NURSE PRACTITIONER

## 2023-04-06 PROCEDURE — 36415 COLL VENOUS BLD VENIPUNCTURE: CPT

## 2023-04-06 PROCEDURE — 74176 CT ABD & PELVIS W/O CONTRAST: CPT

## 2023-04-06 PROCEDURE — 96375 TX/PRO/DX INJ NEW DRUG ADDON: CPT

## 2023-04-06 PROCEDURE — 81001 URINALYSIS AUTO W/SCOPE: CPT

## 2023-04-06 PROCEDURE — 83690 ASSAY OF LIPASE: CPT

## 2023-04-06 PROCEDURE — 96365 THER/PROPH/DIAG IV INF INIT: CPT

## 2023-04-06 PROCEDURE — 3600000012 HC SURGERY LEVEL 2 ADDTL 15MIN: Performed by: UROLOGY

## 2023-04-06 PROCEDURE — 85025 COMPLETE CBC W/AUTO DIFF WBC: CPT

## 2023-04-06 PROCEDURE — APPNB30 APP NON BILLABLE TIME 0-30 MINS

## 2023-04-06 PROCEDURE — 3209999900 FLUORO FOR SURGICAL PROCEDURES

## 2023-04-06 PROCEDURE — 2580000003 HC RX 258: Performed by: NURSE ANESTHETIST, CERTIFIED REGISTERED

## 2023-04-06 PROCEDURE — 3600000002 HC SURGERY LEVEL 2 BASE: Performed by: UROLOGY

## 2023-04-06 PROCEDURE — 6360000002 HC RX W HCPCS: Performed by: NURSE ANESTHETIST, CERTIFIED REGISTERED

## 2023-04-06 DEVICE — URETERAL STENT
Type: IMPLANTABLE DEVICE | Status: FUNCTIONAL
Brand: PERCUFLEX™ PLUS

## 2023-04-06 RX ORDER — PRAVASTATIN SODIUM 40 MG
40 TABLET ORAL DAILY
Status: CANCELLED | OUTPATIENT
Start: 2023-04-06

## 2023-04-06 RX ORDER — SODIUM CHLORIDE 0.9 % (FLUSH) 0.9 %
5-40 SYRINGE (ML) INJECTION PRN
Status: CANCELLED | OUTPATIENT
Start: 2023-04-06

## 2023-04-06 RX ORDER — SODIUM CHLORIDE 0.9 % (FLUSH) 0.9 %
5-40 SYRINGE (ML) INJECTION EVERY 12 HOURS SCHEDULED
Status: CANCELLED | OUTPATIENT
Start: 2023-04-06

## 2023-04-06 RX ORDER — TAMSULOSIN HYDROCHLORIDE 0.4 MG/1
0.4 CAPSULE ORAL DAILY
Qty: 30 CAPSULE | Refills: 0 | Status: SHIPPED | OUTPATIENT
Start: 2023-04-06 | End: 2023-04-10

## 2023-04-06 RX ORDER — FLUTICASONE PROPIONATE 50 MCG
1 SPRAY, SUSPENSION (ML) NASAL DAILY
Status: CANCELLED | OUTPATIENT
Start: 2023-04-06

## 2023-04-06 RX ORDER — ACETAMINOPHEN 650 MG/1
650 SUPPOSITORY RECTAL EVERY 6 HOURS PRN
Status: CANCELLED | OUTPATIENT
Start: 2023-04-06

## 2023-04-06 RX ORDER — ENOXAPARIN SODIUM 100 MG/ML
40 INJECTION SUBCUTANEOUS DAILY
Status: CANCELLED | OUTPATIENT
Start: 2023-04-06

## 2023-04-06 RX ORDER — POTASSIUM CHLORIDE 20 MEQ/1
40 TABLET, EXTENDED RELEASE ORAL PRN
Status: CANCELLED | OUTPATIENT
Start: 2023-04-06

## 2023-04-06 RX ORDER — IPRATROPIUM BROMIDE AND ALBUTEROL SULFATE 2.5; .5 MG/3ML; MG/3ML
1 SOLUTION RESPIRATORY (INHALATION)
Status: CANCELLED | OUTPATIENT
Start: 2023-04-06 | End: 2023-04-07

## 2023-04-06 RX ORDER — FENTANYL CITRATE 50 UG/ML
INJECTION, SOLUTION INTRAMUSCULAR; INTRAVENOUS PRN
Status: DISCONTINUED | OUTPATIENT
Start: 2023-04-06 | End: 2023-04-06 | Stop reason: SDUPTHER

## 2023-04-06 RX ORDER — LOSARTAN POTASSIUM 25 MG/1
12.5 TABLET ORAL DAILY
Status: CANCELLED | OUTPATIENT
Start: 2023-04-06

## 2023-04-06 RX ORDER — ONDANSETRON 2 MG/ML
4 INJECTION INTRAMUSCULAR; INTRAVENOUS
Status: CANCELLED | OUTPATIENT
Start: 2023-04-06 | End: 2023-04-07

## 2023-04-06 RX ORDER — KETOROLAC TROMETHAMINE 30 MG/ML
15 INJECTION, SOLUTION INTRAMUSCULAR; INTRAVENOUS EVERY 6 HOURS PRN
Status: DISCONTINUED | OUTPATIENT
Start: 2023-04-06 | End: 2023-04-06 | Stop reason: HOSPADM

## 2023-04-06 RX ORDER — MORPHINE SULFATE 2 MG/ML
2 INJECTION, SOLUTION INTRAMUSCULAR; INTRAVENOUS EVERY 5 MIN PRN
Status: CANCELLED | OUTPATIENT
Start: 2023-04-06

## 2023-04-06 RX ORDER — SODIUM CHLORIDE 9 MG/ML
INJECTION, SOLUTION INTRAVENOUS PRN
Status: CANCELLED | OUTPATIENT
Start: 2023-04-06

## 2023-04-06 RX ORDER — POTASSIUM CHLORIDE 7.45 MG/ML
10 INJECTION INTRAVENOUS PRN
Status: CANCELLED | OUTPATIENT
Start: 2023-04-06

## 2023-04-06 RX ORDER — ONDANSETRON 2 MG/ML
4 INJECTION INTRAMUSCULAR; INTRAVENOUS EVERY 6 HOURS PRN
Status: CANCELLED | OUTPATIENT
Start: 2023-04-06

## 2023-04-06 RX ORDER — LABETALOL HYDROCHLORIDE 5 MG/ML
10 INJECTION, SOLUTION INTRAVENOUS
Status: CANCELLED | OUTPATIENT
Start: 2023-04-06

## 2023-04-06 RX ORDER — DIPHENHYDRAMINE HYDROCHLORIDE 50 MG/ML
12.5 INJECTION INTRAMUSCULAR; INTRAVENOUS
Status: CANCELLED | OUTPATIENT
Start: 2023-04-06 | End: 2023-04-07

## 2023-04-06 RX ORDER — KETOROLAC TROMETHAMINE 30 MG/ML
15 INJECTION, SOLUTION INTRAMUSCULAR; INTRAVENOUS ONCE
Status: COMPLETED | OUTPATIENT
Start: 2023-04-06 | End: 2023-04-06

## 2023-04-06 RX ORDER — OXYBUTYNIN CHLORIDE 10 MG/1
10 TABLET, EXTENDED RELEASE ORAL DAILY
Qty: 30 TABLET | Refills: 3 | Status: SHIPPED | OUTPATIENT
Start: 2023-04-06 | End: 2023-04-11

## 2023-04-06 RX ORDER — ACETAMINOPHEN 325 MG/1
650 TABLET ORAL EVERY 6 HOURS PRN
Status: CANCELLED | OUTPATIENT
Start: 2023-04-06

## 2023-04-06 RX ORDER — DROPERIDOL 2.5 MG/ML
0.62 INJECTION, SOLUTION INTRAMUSCULAR; INTRAVENOUS
Status: CANCELLED | OUTPATIENT
Start: 2023-04-06 | End: 2023-04-07

## 2023-04-06 RX ORDER — SODIUM CHLORIDE 9 MG/ML
25 INJECTION, SOLUTION INTRAVENOUS PRN
Status: CANCELLED | OUTPATIENT
Start: 2023-04-06

## 2023-04-06 RX ORDER — SODIUM CHLORIDE 9 MG/ML
INJECTION, SOLUTION INTRAVENOUS CONTINUOUS PRN
Status: DISCONTINUED | OUTPATIENT
Start: 2023-04-06 | End: 2023-04-06 | Stop reason: SDUPTHER

## 2023-04-06 RX ORDER — PROPOFOL 10 MG/ML
INJECTION, EMULSION INTRAVENOUS PRN
Status: DISCONTINUED | OUTPATIENT
Start: 2023-04-06 | End: 2023-04-06 | Stop reason: SDUPTHER

## 2023-04-06 RX ORDER — HYDRALAZINE HYDROCHLORIDE 20 MG/ML
10 INJECTION INTRAMUSCULAR; INTRAVENOUS
Status: CANCELLED | OUTPATIENT
Start: 2023-04-06

## 2023-04-06 RX ORDER — MAGNESIUM SULFATE IN WATER 40 MG/ML
2000 INJECTION, SOLUTION INTRAVENOUS PRN
Status: CANCELLED | OUTPATIENT
Start: 2023-04-06

## 2023-04-06 RX ORDER — ONDANSETRON 4 MG/1
4 TABLET, ORALLY DISINTEGRATING ORAL EVERY 8 HOURS PRN
Status: CANCELLED | OUTPATIENT
Start: 2023-04-06

## 2023-04-06 RX ORDER — 0.9 % SODIUM CHLORIDE 0.9 %
1000 INTRAVENOUS SOLUTION INTRAVENOUS ONCE
Status: COMPLETED | OUTPATIENT
Start: 2023-04-06 | End: 2023-04-06

## 2023-04-06 RX ORDER — POLYETHYLENE GLYCOL 3350 17 G/17G
17 POWDER, FOR SOLUTION ORAL DAILY PRN
Status: CANCELLED | OUTPATIENT
Start: 2023-04-06

## 2023-04-06 RX ADMIN — SODIUM CHLORIDE: 9 INJECTION, SOLUTION INTRAVENOUS at 13:29

## 2023-04-06 RX ADMIN — SODIUM CHLORIDE 1000 ML: 9 INJECTION, SOLUTION INTRAVENOUS at 10:27

## 2023-04-06 RX ADMIN — KETOROLAC TROMETHAMINE 15 MG: 30 INJECTION, SOLUTION INTRAMUSCULAR at 10:26

## 2023-04-06 RX ADMIN — PROPOFOL 100 MG: 10 INJECTION, EMULSION INTRAVENOUS at 13:40

## 2023-04-06 RX ADMIN — PROPOFOL 100 MG: 10 INJECTION, EMULSION INTRAVENOUS at 13:48

## 2023-04-06 RX ADMIN — FENTANYL CITRATE 50 MCG: 50 INJECTION, SOLUTION INTRAMUSCULAR; INTRAVENOUS at 13:33

## 2023-04-06 RX ADMIN — PROPOFOL 100 MG: 10 INJECTION, EMULSION INTRAVENOUS at 13:33

## 2023-04-06 RX ADMIN — CEFTRIAXONE SODIUM 1000 MG: 1 INJECTION, POWDER, FOR SOLUTION INTRAMUSCULAR; INTRAVENOUS at 11:36

## 2023-04-06 RX ADMIN — FENTANYL CITRATE 50 MCG: 50 INJECTION, SOLUTION INTRAMUSCULAR; INTRAVENOUS at 13:45

## 2023-04-06 ASSESSMENT — PAIN - FUNCTIONAL ASSESSMENT
PAIN_FUNCTIONAL_ASSESSMENT: 0-10

## 2023-04-06 ASSESSMENT — PAIN DESCRIPTION - LOCATION: LOCATION: FLANK

## 2023-04-06 ASSESSMENT — PAIN SCALES - GENERAL
PAINLEVEL_OUTOF10: 2
PAINLEVEL_OUTOF10: 4
PAINLEVEL_OUTOF10: 2
PAINLEVEL_OUTOF10: 7

## 2023-04-06 ASSESSMENT — PAIN DESCRIPTION - ORIENTATION: ORIENTATION: LEFT

## 2023-04-06 NOTE — ED NOTES
ED to inpatient nurses report    Chief Complaint   Patient presents with    Flank Pain     left      Present to ED from home  LOC: alert and orientated to name, place, date  Vital signs   Vitals:    04/06/23 0853 04/06/23 1004 04/06/23 1109 04/06/23 1136   BP: (!) 154/87 129/76 137/80 133/74   Pulse: (!) 107 89 82 81   Resp: 18 18 18 18   Temp: 97.6 °F (36.4 °C)      TempSrc: Oral      SpO2: 96%  97% 95%   Weight:  193 lb (87.5 kg)     Height:  5' 9\" (1.753 m)        Oxygen Baseline room air     Current needs required none   LDAs:   Peripheral IV 04/06/23 Left;Proximal;Anterior Forearm (Active)   Site Assessment Clean, dry & intact 04/06/23 1110   Line Status Blood return noted; Flushed;Specimen collected; Infusing 04/06/23 1016   Phlebitis Assessment No symptoms 04/06/23 1110   Infiltration Assessment 0 04/06/23 1110   Dressing Status New dressing applied;Clean, dry & intact 04/06/23 1016   Dressing Type Transparent 04/06/23 1016   Dressing Intervention New 04/06/23 1016     Mobility: Requires assistance * 1  Pending ED orders: none  Present condition: stable      C-SSRS Risk of Suicide: No Risk  Swallow Screening    Preferred Language: [place]     Electronically signed by Zonia Arechiga RN on 4/6/2023 at 12:08 PM      Zonia Arechiga RN  04/06/23 1204

## 2023-04-06 NOTE — ANESTHESIA PRE PROCEDURE
Hyperlipidemia 2010    Hypertension 2010       Past Surgical History:        Procedure Laterality Date    COLONOSCOPY  2009    WNL     COLONOSCOPY N/A 3/13/2020    COLONOSCOPY DIAGNOSTIC performed by Sivakumar Esquivel MD at [address] BIOPSY  2012    benign     TESTICLE REMOVAL  1994    Lt    ULTRASOUND PROSTATE/TRANSRECTAL N/A 1/16/2020    TRANSRECTAL ULTRASOUND WITH PROSTATE BX performed by Patricia Del Rosario MD at [facility] [address] N/A 3/13/2020    EGD BIOPSY performed by Sivakumar Esquivel MD at [facility]       Social History:    Social History     Tobacco Use    Smoking status: Never    Smokeless tobacco: Never   Substance Use Topics    Alcohol use: Yes     Comment: occ beer                                Counseling given: Not Answered      Vital Signs (Current):   Vitals:    04/06/23 0853 04/06/23 1004 04/06/23 1109 04/06/23 1136   BP: (!) 154/87 129/76 137/80 133/74   Pulse: (!) 107 89 82 81   Resp: 18 18 18 18   Temp: 97.6 °F (36.4 °C)      TempSrc: Oral      SpO2: 96%  97% 95%   Weight:  193 lb (87.5 kg)     Height:  5' 9\" (1.753 m)                                                BP Readings from Last 3 Encounters:   04/06/23 133/74   11/09/22 (!) 144/84   08/09/22 130/82       NPO Status:                                                                                 BMI:   Wt Readings from Last 3 Encounters:   04/06/23 193 lb (87.5 kg)   02/21/23 192 lb (87.1 kg)   11/09/22 197 lb (89.4 kg)     Body mass index is 28.5 kg/m².     CBC:   Lab Results   Component Value Date/Time    WBC 11.6 04/06/2023 10:15 AM    RBC 4.87 04/06/2023 10:15 AM    HGB 14.6 04/06/2023 10:15 AM    HCT 43.7 04/06/2023 10:15 AM    MCV 89.7 04/06/2023 10:15 AM    RDW 13.9 05/30/2018 01:42 PM     04/06/2023 10:15 AM       CMP:   Lab Results   Component Value Date/Time     04/06/2023 10:15 AM    K 3.9 04/06/2023 10:15 AM    K 4.1 11/08/2018 11:23 AM

## 2023-04-06 NOTE — DISCHARGE SUMMARY
Hospitalist Discharge Summary    Patient: Elpidio Marcum  YOB: 1960  MRN: [id number]   Acct: [de-identified]    Primary Care Physician: Rimma Pearson MD    Admit date  4/6/2023    Discharge date:  4/6/23       PLEASE SEE H&P FROM THE SAME DAY      Patient Instructions: Activity: activity as tolerated  Diet: ADULT DIET; Regular      Follow-up visits:   Jacki Vivar MD  [address] [place] [address] [phone]    Call in 1 day(s)        Disposition: home  Condition at Discharge: Stable    Time Spent: 30 minutes    Signed: Thank you Rimma Pearson MD for the opportunity to be involved in this patient's care.     Electronically signed by Tamar Cabrera PA-C on 4/6/2023 at 4:59 PM  Discharging Hospitalist

## 2023-04-06 NOTE — ED PROVIDER NOTES
shows left-sided ureteral stent with proximal coil projecting over the region of the left renal shadow. Please refer to the operative report for further information. **This report has been created using voice recognition software. It may contain minor errors which are inherent in voice recognition technology. **      Final report electronically signed by Dr. Imelda Rivas MD on 4/6/2023 2:23 PM      FLUORO FOR SURGICAL PROCEDURES   Final Result      CT ABDOMEN PELVIS WO CONTRAST Additional Contrast? None   Final Result   7 x 9 mm left UPJ calculus with associated hydronephrosis. 2. Left lower lobe atelectasis and associated very small pleural effusion   3. Small pericardial effusion            **This report has been created using voice recognition software. It may contain minor errors which are inherent in voice recognition technology. **      Final report electronically signed by Dr. Marva Reeder on 4/6/2023 10:42 AM          LABS: (none if blank)  Labs Reviewed   CBC WITH AUTO DIFFERENTIAL - Abnormal; Notable for the following components:       Result Value    WBC 11.6 (*)     Segs Absolute 8.9 (*)     Eosinophils Absolute 0.5 (*)     All other components within normal limits   COMPREHENSIVE METABOLIC PANEL - Abnormal; Notable for the following components:    Glucose 135 (*)     All other components within normal limits   URINALYSIS WITH MICROSCOPIC - Abnormal; Notable for the following components:    Blood, Urine SMALL (*)     Protein, UA TRACE (*)     Leukocyte Esterase, Urine TRACE (*)     All other components within normal limits   LIPASE   ANION GAP   GLOMERULAR FILTRATION RATE, ESTIMATED       (Any cultures that may have been sent were not resulted at the time of this patient visit)    [address] / ED COURSE:     1) Number and Complexity of Problems            Problem List This Visit:         Chief Complaint   Patient presents with    Flank Pain     left            Differential

## 2023-04-06 NOTE — CARE COORDINATION
Spoke with patient who advised does not have ACP documents at home, would like to complete this admission and attach to chart. Denied concerns or questions.

## 2023-04-06 NOTE — ED NOTES
Pt reassessed. Pt reports pain is much more tolerable. Vitals stable. Waiting for provider re-eval at this time.       Princess Marquis RN  04/06/23 4019

## 2023-04-06 NOTE — TELEPHONE ENCOUNTER
Patient is s/p cystoscopy left ureteral stent insertion today 4/6/23 with Dr. Celina Salvador. Will need scheduled for definitive stone treatment. Please call patient to schedule.

## 2023-04-06 NOTE — H&P
prominence of the prostate gland. Lung bases Left lower lobe Atelectasis. Very small left pleural effusion Thickened interlobular septa bilaterally. Right lateral pericardial effusion measuring up to 17 mm. 7 x 9 mm left UPJ calculus with associated hydronephrosis. 2. Left lower lobe atelectasis and associated very small pleural effusion 3. Small pericardial effusion **This report has been created using voice recognition software. It may contain minor errors which are inherent in voice recognition technology. ** Final report electronically signed by Dr. Nicola Kelley on 4/6/2023 10:42 AM        Tele:   [x] yes             [] no      Thank you Dacia Herron MD for the opportunity to be involved in this patient's care.     Electronically signed by Rhys Barlow PA-C on 4/6/2023 at 1:22 PM

## 2023-04-06 NOTE — CONSULTS
LABGLOM >60 04/06/2023 10:15 AM    GLUCOSE 135 04/06/2023 10:15 AM     BUN/Creatinine:    Lab Results   Component Value Date/Time    BUN 11 04/06/2023 10:15 AM    CREATININE 0.9 04/06/2023 10:15 AM     Magnesium:  No results found for: MG  Phosphorus:  No results found for: PHOS  PT/INR:    Lab Results   Component Value Date/Time    INR 0.97 11/08/2018 11:23 AM     U/A:    Lab Results   Component Value Date/Time    NITRITE neg 07/16/2013 11:35 AM    COLORU YELLOW 04/06/2023 10:15 AM    PHUR 6.5 04/06/2023 10:15 AM    LABCAST 8-15 HYALINE 04/06/2023 10:15 AM    LABCAST NONE SEEN 04/06/2023 10:15 AM    WBCUA 10-15 04/06/2023 10:15 AM    RBCUA 15-25 04/06/2023 10:15 AM    YEAST NONE SEEN 04/06/2023 10:15 AM    BACTERIA NONE SEEN 04/06/2023 10:15 AM    SPECGRAV 1.013 04/06/2023 10:15 AM    LEUKOCYTESUR TRACE 04/06/2023 10:15 AM    UROBILINOGEN 0.2 04/06/2023 10:15 AM    BILIRUBINUR NEGATIVE 04/06/2023 10:15 AM    BLOODU SMALL 04/06/2023 10:15 AM    GLUCOSEU NEGATIVE 06/09/2022 10:08 AM       Imaging: The patient has had a CT abdomen and pelvis on 4/6/2023  which I have independently reviewed along with its accompanying report. The study demonstrates   7 x 9 mm left UPJ calculus with associated hydronephrosis. 2. Left lower lobe atelectasis and associated very small pleural effusion   3. Small pericardial effusion         IMPRESSION/Plan  Acute Cystitis  Left UPJ stone with associated hydronephrosis  Left Flank Pain  Nausea    -patient has been treated with Macrobid. Urine culture on 3/22/2023 remarkable for Enterococcus Faecalis. Will go home with antibiotics, as well  -Pain and symptom control  -Patient to have cystoscopy, left  ureteral stent placement with Dr. Gerard Salinas today.   -I described the procedure in detail and also described the associated risks and benefits at length. We discussed possible alternative therapies. We discussed the risks and benefits of not undergoing therapy.  Patient understands these risks

## 2023-04-06 NOTE — PROGRESS NOTES
Advance Care Planning     Advance Care Planning Inpatient Note  Spiritual Care Department    Today's Date: 4/6/2023  Unit: [facility] MED SURG 8A    Received request from patient and family. Upon review of chart and communication with care team, patient's decision making abilities are not in question. . Patient and Spouse was/were present in the room during visit. Goals of ACP Conversation:  Discuss advance care planning documents    Health Care Decision Makers:     No healthcare decision makers have been documented. Click here to complete [address] including selection of the Healthcare Decision Maker Relationship (ie \"Primary\")  Summary:  No Decision Maker named by patient at this time    Advance Care Planning Documents (Patient Wishes):  Healthcare Power of /Advance Directive Appointment of [address]  Living Will/Advance Directive   information given. Assessment:  Advance Directive Consult: Advance Directive materials were provided to patient and explained. Patient is not ready to complete at this time, gave information for Spiritual Care to be contacted if further assistance is needed. He will call us when ready. Electronically signed by Jeff Askew, [address] on 4/6/2023 at 5:43 PM           04/06/23 1700   Encounter Summary   Encounter Overview/Reason  Spiritual/Emotional Needs; Advance Care Planning   Service Provided For: Patient and family together   Referral/Consult From: Nurse;Patient; Family   Support System Spouse   Last Encounter  04/06/23   Complexity of Encounter Low   Advance Care Planning   Type ACP conversation

## 2023-04-06 NOTE — ANESTHESIA POSTPROCEDURE EVALUATION
Department of Anesthesiology  Postprocedure Note    Patient: Brad López  MRN: [id number]  YOB: 1960  Date of evaluation: 4/6/2023      Procedure Summary     Date: 04/06/23 Room / Location: [facility] / Lidia Pete    Anesthesia Start: 6646 Anesthesia Stop: 3693    Procedure: CYSTOSCOPY LEFT URETERAL STENT INSERTION (Left) Diagnosis:       Flank pain      (Flank pain [R10.9])    Surgeons: Dorie Morrell MD Responsible Provider: Rita Castillo MD    Anesthesia Type: general ASA Status: 2          Anesthesia Type: No value filed.     Sonia Phase I:      Sonia Phase II:        Anesthesia Post Evaluation    Complications: no

## 2023-04-06 NOTE — ED NOTES
Pt transported to 8A16 by cart in stable condition. Called 8A and informed [place] that the patient was on their way to the unit.       Todd De La Cruz LPN  13/78/62 0485

## 2023-04-06 NOTE — ED NOTES
Pt presents to ED with c/o left flank pain. Pt states he had a UTI about 2 weeks ago and has seen urology and is currently taking antibiotics. Pt reports continued frequency, urgency, difficulty starting stream, and inability to fully empty his bladder, but denies any discharge, pain, or burning with urination. Pt states flank pain has gotten much worse the last 2 days, rates 4/10 currently. Pt reports nausea and low grade fever/ night sweats the past 2 days as well. Pt reports a hx of prostate cancer and kidney stones, and states he has a known stone on the left side- CT scheduled for 4/21. Pt up to bathroom, urine sample collected and sent to lab. 20g IV started in the left [facility] [place], operated by [facility], blood drawn and sent to lab. IV site clean, dry, intact, and infusing. CT tech in to take pt for scan.       Zeb Khoury  04/06/23 1024

## 2023-04-07 ENCOUNTER — CARE COORDINATION (OUTPATIENT)
Dept: CASE MANAGEMENT | Age: 63
End: 2023-04-07

## 2023-04-07 ENCOUNTER — TELEPHONE (OUTPATIENT)
Dept: UROLOGY | Age: 63
End: 2023-04-07

## 2023-04-07 ENCOUNTER — PREP FOR PROCEDURE (OUTPATIENT)
Dept: UROLOGY | Age: 63
End: 2023-04-07

## 2023-04-07 ENCOUNTER — HOSPITAL ENCOUNTER (OUTPATIENT)
Age: 63
Discharge: HOME | End: 2023-04-07
Payer: COMMERCIAL

## 2023-04-07 ENCOUNTER — TELEPHONE (OUTPATIENT)
Dept: FAMILY MEDICINE CLINIC | Age: 63
End: 2023-04-07

## 2023-04-07 DIAGNOSIS — Z01.818 PRE-OP TESTING: ICD-10-CM

## 2023-04-07 DIAGNOSIS — N20.0 KIDNEY STONE: Primary | ICD-10-CM

## 2023-04-07 DIAGNOSIS — N20.0 KIDNEY STONE: ICD-10-CM

## 2023-04-07 LAB
EKG ATRIAL RATE: 72 BPM
EKG P AXIS: 45 DEGREES
EKG P-R INTERVAL: 146 MS
EKG Q-T INTERVAL: 382 MS
EKG QRS DURATION: 90 MS
EKG QTC CALCULATION (BAZETT): 418 MS
EKG R AXIS: 93 DEGREES
EKG T AXIS: 78 DEGREES
EKG VENTRICULAR RATE: 72 BPM

## 2023-04-07 PROCEDURE — 93005 ELECTROCARDIOGRAM TRACING: CPT

## 2023-04-07 NOTE — TELEPHONE ENCOUNTER
DO NOT TAKE FISH OIL, IBUPROFEN, MOTRIN-LIKE DRUGS AND ANY MULTIVITAMINS OR OVER THE COUNTER SUPPLEMENTS 14 DAYS PRIOR TO SURGERY. HOLD ASPIRIN FOR 5 DAYS PRIOR TO SURGERY. MUST HAVE AN ADULT OVER THE AGE OF 18 WITH YOU AT THE TIME OF THE PROCEDURE AND WITH YOU AT HOME AFTER THE PROCEDURE FOR 24 HOURS       Elpidioliyah Jossue 1960 Diagnosis:     Surgical Physician: Dr. Samara Dorado have been scheduled for the procedure marked below:      Surgery: Cystoscopy, left ureteroscopy, laser lithotripsy, basket retrieval of stone fragments, and possible left ureteral stent exchange         Date: 4/15/2023     Anesthesia: Anesthesiologist (General/Spinal)     Place of Service: [address] [place] [address] Second Floor Same Day Surgery         Arrive to same day surgery by:  10:45am  (Surgery time is subject to change)      INSTRUCTIONS AS MARKED BELOW:    1.  DO NOT eat or drink anything after midnight before surgery. 2.  We prefer you shower or bathe with an antibacterial soap (Dial) the morning of surgery. 3  Please bring a current medication list, photo ID and insurance card(s) with you  4. Okay to take Tylenol  5. If you take Glucophage, Metformin or Janumet, hold 48-hours prior to surgery  6. Take blood pressure or heart medication as directed, if taken in the morning take with a small sip of water  7. The office will call you in 1-2 days after your procedure to schedule a follow up.               Date: 4/7/2023

## 2023-04-07 NOTE — TELEPHONE ENCOUNTER
SURGERY 826  [address] [place] [address] [address] [facility] [address] [place], [address]      Phone *[phone] *[phone]   Surgical Scheduling Direct Line Phone *[phone] Fax *[phone]      Rosie Men 1960 male    [address] [place]  [address] [facility]   Marital Status:          Home Phone: [phone]      Cell Phone:    Telephone Information:   Mobile [phone]          Surgeon: Dr. Leonardo Dove Surgery Date: 4/15/2023   Time: 12:45pm    Procedure: Cystoscopy, left ureteroscopy, laser lithotripsy, basket retrieval of stone fragments, and possible left ureteral stent exchange    Diagnosis: kidney stone     Important Medical History:  In Epic    Special Inst/Equip:     CPT Codes:    58657  Latex Allergy: No     Cardiac Device:  No    Anesthesia:  General          Admission Type:  Same Day                        Admit Prior to Day of Surgery: No    Case Location:  Main OR            Preadmission Testing:  Phone Call          PAT Date and Time:______________________________________________________    PAT Confirmation #: ______________________________________________________    Post Op Visit: ___________________________________________________________    Need Preop Cardiac Clearance: No    Does Patient have Cardiologist/physician?     none    Surgery Confirmation #: __________________________________________________    : ________________________   Date: __________________________     [employer] Name: Randy Arita

## 2023-04-07 NOTE — TELEPHONE ENCOUNTER
Patient scheduled with Dr. Catie Villagran on 4/15/2023. Surgery consent to be done upon arrival.  Patient to do EKG on 4/7/2023; orders in epic; patient voiced understanding. Surgery instructions gone over verbally with patient on the phone (patient to  a copy from the office following EKG); nothing to eat or drink after midnight the night before, hold aspirin for 5 days prior to surgery; patient voiced understanding.

## 2023-04-07 NOTE — BRIEF OP NOTE
Brief Postoperative Note      Patient: Prasanth Knowles  YOB: 1960  MRN: [id number]    Date of Procedure: 4/6/2023    Pre-Op Diagnosis: Flank pain [R10.9]    Post-Op Diagnosis: Same       Procedure(s):  CYSTOSCOPY LEFT URETERAL STENT INSERTION    Surgeon(s):  Odalis Urbano MD    Assistant:  * No surgical staff found *    Anesthesia: General    Estimated Blood Loss (mL): Minimal    Complications: None    Specimens:   * No specimens in log *    Implants:  Implant Name Type Inv.  Item Serial No.  Lot No. LRB No. Used Action   Franchot Salter 6FR L26CM HYDR+ PGTL [facility] - [id number]  Franchot Salter 6FR L26CM HYDR+ PGTL TAPR TIP GRAD BLDR MRK LO  [facility]- [id number] Left 1 Implanted         Drains: * No LDAs found *    Findings: needs cysto left ureteroscopy w hll and left stent exchange in 1-3 weeks     Electronically signed by Omid Carvajal MD on 4/6/2023 at 8:46 PM

## 2023-04-07 NOTE — TELEPHONE ENCOUNTER
Care Transitions Initial Follow Up Call    Outreach made within 2 business days of discharge: Yes    Patient: Britta Flores Patient : 1960   MRN: [id number]  Reason for Admission: There are no discharge diagnoses documented for the most recent discharge. Discharge Date: 23       Spoke with: patient [facility]    Discharge department/facility: [facility]    TCM Interactive Patient Contact:  Was patient able to fill all prescriptions: Yes  Was patient instructed to bring all medications to the follow-up visit: Yes  Is patient taking all medications as directed in the discharge summary?  Yes  Does patient understand their discharge instructions: Yes  Does patient have questions or concerns that need addressed prior to 7-14 day follow up office visit: no    Scheduled appointment with PCP within 7-14 days    Follow Up  Future Appointments   Date Time Provider Pablo Bennett   2023  2:40 PM STR CT IMAGING RM1  [facility] OUT EXP STR Radiolog   2023  8:00 AM Nallely Odell APRN - [facility]   2023  8:00 AM Syed Austin MD SRPX  RES [facility] - Jeniffer Ortiz   2023 10:30 AM STR MRI RM1 STRZ MRI STR Radiolog   8/10/2023  8:30 AM Mary Bean  [facility], [facility]

## 2023-04-07 NOTE — CARE COORDINATION
Care Transitions Outreach Attempt    Call within 2 business days of discharge: Yes     Attempted to reach patient for transitions of care follow up. Unable to reach patient. Patient: Karina Krishnan Patient : 1960 MRN: <[id number]>    1st attempt to reach for Care Transition discharge call unsuccessful.  HIPAA compliant message left requesting call back to [phone]      Last Discharge [address]       Date Complaint Diagnosis Description Type Department Provider    23 Flank Pain Hydronephrosis concurrent with and due to calculi of kidney and ureter ED to Hosp-Admission (Discharged) (ADMITTED) [facility] 8A Marcia Meyers PA-C          Noted following upcoming appointments from discharge chart review:   [facility] follow up appointment(s):   Future Appointments   Date Time Provider Pablo Bennett   2023  2:40 PM STR CT IMAGING RM1  [facility] EXP STR Radiolog   2023  8:00 AM Ingris Callejas, APRN - [facility]   2023  8:00 AM Rose Arvizu MD SRPX FM [facility]   2023 10:30 AM STR MRI RM1 STRZ MRI STR Radiolog   8/10/2023  8:30 AM Janine Boston MD [facility] 6[facility]     Non-[facility] follow up appointment(s): NGUYEN Merritt RN -[phone]

## 2023-04-08 RX ORDER — SODIUM CHLORIDE 0.9 % (FLUSH) 0.9 %
5-40 SYRINGE (ML) INJECTION PRN
Status: CANCELLED | OUTPATIENT
Start: 2023-04-08

## 2023-04-08 RX ORDER — SODIUM CHLORIDE 9 MG/ML
INJECTION, SOLUTION INTRAVENOUS PRN
Status: CANCELLED | OUTPATIENT
Start: 2023-04-08

## 2023-04-08 RX ORDER — SODIUM CHLORIDE 0.9 % (FLUSH) 0.9 %
5-40 SYRINGE (ML) INJECTION EVERY 12 HOURS SCHEDULED
Status: CANCELLED | OUTPATIENT
Start: 2023-04-09

## 2023-04-15 ENCOUNTER — HOSPITAL ENCOUNTER (OUTPATIENT)
Age: 63
Setting detail: OUTPATIENT SURGERY
Discharge: HOME | End: 2023-04-15
Attending: UROLOGY | Admitting: UROLOGY
Payer: COMMERCIAL

## 2023-04-15 VITALS
TEMPERATURE: 97.5 F | WEIGHT: 187.8 LBS | BODY MASS INDEX: 28.46 KG/M2 | OXYGEN SATURATION: 97 % | DIASTOLIC BLOOD PRESSURE: 82 MMHG | SYSTOLIC BLOOD PRESSURE: 136 MMHG | HEART RATE: 73 BPM | HEIGHT: 68 IN | RESPIRATION RATE: 17 BRPM

## 2023-04-15 DIAGNOSIS — N20.0 LEFT NEPHROLITHIASIS: Primary | ICD-10-CM

## 2023-04-15 PROCEDURE — C1769 GUIDE WIRE: HCPCS | Performed by: UROLOGY

## 2023-04-15 PROCEDURE — 2720000010 HC SURG SUPPLY STERILE: Performed by: UROLOGY

## 2023-04-15 PROCEDURE — 7100000011 HC PHASE II RECOVERY - ADDTL 15 MIN: Performed by: UROLOGY

## 2023-04-15 PROCEDURE — 2709999900 HC NON-CHARGEABLE SUPPLY: Performed by: UROLOGY

## 2023-04-15 PROCEDURE — 3700000000 HC ANESTHESIA ATTENDED CARE: Performed by: UROLOGY

## 2023-04-15 PROCEDURE — C2617 STENT, NON-COR, TEM W/O DEL: HCPCS | Performed by: UROLOGY

## 2023-04-15 PROCEDURE — 7100000000 HC PACU RECOVERY - FIRST 15 MIN: Performed by: UROLOGY

## 2023-04-15 PROCEDURE — 3600000003 HC SURGERY LEVEL 3 BASE: Performed by: UROLOGY

## 2023-04-15 PROCEDURE — C1747 HC ENDOSCOPE, SINGLE, URINARY TRACT: HCPCS | Performed by: UROLOGY

## 2023-04-15 PROCEDURE — 2580000003 HC RX 258: Performed by: UROLOGY

## 2023-04-15 PROCEDURE — 3700000001 HC ADD 15 MINUTES (ANESTHESIA): Performed by: UROLOGY

## 2023-04-15 PROCEDURE — 7100000001 HC PACU RECOVERY - ADDTL 15 MIN: Performed by: UROLOGY

## 2023-04-15 PROCEDURE — C1758 CATHETER, URETERAL: HCPCS | Performed by: UROLOGY

## 2023-04-15 PROCEDURE — 7100000010 HC PHASE II RECOVERY - FIRST 15 MIN: Performed by: UROLOGY

## 2023-04-15 PROCEDURE — 3600000013 HC SURGERY LEVEL 3 ADDTL 15MIN: Performed by: UROLOGY

## 2023-04-15 DEVICE — URETERAL STENT
Type: IMPLANTABLE DEVICE | Status: FUNCTIONAL
Brand: PERCUFLEX™ PLUS

## 2023-04-15 RX ORDER — SODIUM CHLORIDE 9 MG/ML
INJECTION, SOLUTION INTRAVENOUS PRN
Status: DISCONTINUED | OUTPATIENT
Start: 2023-04-15 | End: 2023-04-15 | Stop reason: HOSPADM

## 2023-04-15 RX ORDER — SODIUM CHLORIDE 0.9 % (FLUSH) 0.9 %
5-40 SYRINGE (ML) INJECTION PRN
Status: DISCONTINUED | OUTPATIENT
Start: 2023-04-15 | End: 2023-04-15 | Stop reason: HOSPADM

## 2023-04-15 RX ORDER — SODIUM CHLORIDE 0.9 % (FLUSH) 0.9 %
5-40 SYRINGE (ML) INJECTION EVERY 12 HOURS SCHEDULED
Status: DISCONTINUED | OUTPATIENT
Start: 2023-04-15 | End: 2023-04-15 | Stop reason: HOSPADM

## 2023-04-15 RX ORDER — HYDROCODONE BITARTRATE AND ACETAMINOPHEN 5; 325 MG/1; MG/1
1 TABLET ORAL EVERY 6 HOURS PRN
Qty: 18 TABLET | Refills: 0 | Status: SHIPPED | OUTPATIENT
Start: 2023-04-15 | End: 2023-04-20

## 2023-04-15 RX ADMIN — SODIUM CHLORIDE: 9 INJECTION, SOLUTION INTRAVENOUS at 11:26

## 2023-04-15 ASSESSMENT — PAIN SCALES - GENERAL
PAINLEVEL_OUTOF10: 2

## 2023-04-15 ASSESSMENT — PAIN DESCRIPTION - DESCRIPTORS
DESCRIPTORS: BURNING

## 2023-04-15 ASSESSMENT — PAIN - FUNCTIONAL ASSESSMENT: PAIN_FUNCTIONAL_ASSESSMENT: ACTIVITIES ARE NOT PREVENTED

## 2023-04-15 ASSESSMENT — PAIN DESCRIPTION - LOCATION
LOCATION: PENIS

## 2023-04-15 ASSESSMENT — PAIN DESCRIPTION - ORIENTATION
ORIENTATION: INNER
ORIENTATION: INNER

## 2023-04-15 ASSESSMENT — PAIN DESCRIPTION - PAIN TYPE
TYPE: SURGICAL PAIN

## 2023-04-15 ASSESSMENT — PAIN DESCRIPTION - FREQUENCY: FREQUENCY: CONTINUOUS

## 2023-04-15 NOTE — DISCHARGE INSTRUCTIONS
Pt ok to discharge home in good condition  No heavy lifting, >10 lbs for today  Pt should avoid strenuous activity for today  Pt should walk moderately at home  Pt ok to shower   Pt may resume diet as tolerated  Pt should take Rx as directed  No driving while on narcotics  Please call attending physician or hospital  with questions  Call or Present to ED if fever (> 101F), intractable nausea vomiting or pain.   Rx in chart    Pt should follow up with Gonzalez Rose MD, in 4 weeks, call to confirm appointment

## 2023-04-15 NOTE — PROGRESS NOTES
1210: Pt arrives to pacu, awakens to verbal stimuli. Pt on  room air, respirations easy and unlabored. VSS  1220: Pt resting off and on with eyes closed, easily awakens to voice. Continues to deny pain. 1230: Pt meets criteria for discharge from pacu per Dr. Vasquez Spain, transported to [facility] in stable condition.  VSS

## 2023-04-15 NOTE — H&P
triamcinolone (KENALOG) 0.025 % cream Apply topically 2 times daily.   Patient not taking: Reported on 2/21/2023 6/30/22   CORBY Conley CNP   fluticasone [facility]) 50 MCG/ACT nasal spray 1 spray by Each Nostril route daily 6/9/22   CORBY Conley CNP   aspirin 81 MG tablet Take 1 tablet by mouth daily    Historical Provider, MD       Allergies:  Demerol, Midazolam hcl, Other, and Versed [midazolam]    Social History:    Social History     Socioeconomic History    Marital status:      Spouse name: Not on file    Number of children: Not on file    Years of education: Not on file    Highest education level: Not on file   Occupational History    Not on file   Tobacco Use    Smoking status: Never    Smokeless tobacco: Never   Vaping Use    Vaping Use: Never used   Substance and Sexual Activity    Alcohol use: Yes     Comment: occ beer    Drug use: No    Sexual activity: Yes     Partners: Female   Other Topics Concern    Not on file   Social History Narrative    Not on file     Social Determinants of Health     Financial Resource Strain: Low Risk     Difficulty of Paying Living Expenses: Not hard at all   Food Insecurity: Unknown    Worried About Running Out of Food in the Last Year: Patient refused    Ran Out of Food in the Last Year: Patient refused   Transportation Needs: Not on file   Physical Activity: Not on file   Stress: Not on file   Social Connections: Not on file   Intimate Partner Violence: Not on file   Housing Stability: Not on file       Family History:    Family History   Problem Relation Age of Onset    Cancer Mother 72        lung     Heart Disease Mother 58    Coronary Art Dis Mother 58    Heart Disease Father 68    Heart Attack Father 68    Coronary Art Dis Father 68    High Blood Pressure Father     High Cholesterol Father     Diabetes Father         borderline    High Blood Pressure Sister 45    High Cholesterol Sister 45    Heart Attack Maternal Uncle     Heart

## 2023-04-15 NOTE — PROGRESS NOTES
1235- Pt arrived in Phase 2. Pt awake and rates pain at 2/10. Pt eating and drinking at this time. Pt urinated at this time. 1305- Pt in bed calm. Wife at bedside. Pt's wife stated Dr. Kiara Garcia had spoke w/ her. 1330- Pt ambulated to bathroom no complications. 1400- This RN went over discharge instruction with the pt and the pt's wife. Both verbalized understanding. 1415- Patient meets discharge criteria. Discharged in stable condition. Patient brought to car via wheelchair with assistance from RN. Patient tolerated well. All belongings sent with patient.

## 2023-04-15 NOTE — BRIEF OP NOTE
Brief Postoperative Note      Patient: Velma Nova  YOB: 1960  MRN: [id number]    Date of Procedure: 4/15/2023    Pre-Op Diagnosis Codes:     * Kidney stone [N20.0]    Post-Op Diagnosis: Same       Procedure(s):  CYSTOSCOPY, LEFT URETEROSCOPY, LASER LITHOTRIPSY,  LEFT URETERAL STENT EXCHANGE    Surgeon(s):  Elisa Meek MD    Assistant:  * No surgical staff found *    Anesthesia: General    Estimated Blood Loss (mL): Minimal    Complications: None    Specimens:   * No specimens in log *    Implants:  Implant Name Type Inv. Item Serial No.  Lot No. LRB No. Used Action   STENT URET 6FR L26CM HYDR+ PGTL [facility] - [id number]  [facility] 6FR L26CM HYDR+ PGTL TAPR TIP GRAD BLDR MRK LO  [employer] [id number] N/A 1 Implanted         Drains: * No LDAs found *    Findings: stone treated. Pull stent in five days.  Wai litholink in one month than f/u camilo (already should have this follow up) for his other urologic issues      Electronically signed by Shirley Goldman MD on 4/15/2023 at 12:08 PM

## 2023-04-17 DIAGNOSIS — N20.0 KIDNEY STONE: Primary | ICD-10-CM

## 2023-04-17 NOTE — OP NOTE
safety. we were able to advance our scope up to the stone without difficulty. The stone was located in the proximal ureter. We used a 200 micron laser to fragment all stones into sub 1-2 mm fragments for easy passage and clearance            Repeat nephroscopy and ureteroscopy demonstrated no further stone fragments. In addition, there were no papillary lesions within the kidney, or erythematous patches concerning for malignant disease. With the safety wire in place, the ureteroscope was slowly retracted down the ureter. The entire ureter was surveyed. There was no evidence of stricture, stone disease, ureteral trauma, or papillary lesion. To place the stent a 22 Tajik rigid cystoscopy was back loaded over the wire. Under direct visualization the stent was advanced until it was in proper location. The Glidewire was then removed. A curl could be seen in the Left renal pelvis under using fluoroscopic vision, and in the bladder under direct visualization. A string was left on the stent. The patients bladder was drained. All instrumentation was removed. The patient was then awakened and discharged back to the PACU in good and stable condition.

## 2023-04-18 NOTE — OP NOTE
[address] [place] [address]. [place]    DATE: 4/6/2023  Patient:  Priscila Boswell  MRN: [id number]  YOB: 1960    SURGEON: Jose Rahman MD.    ASSISTANT: none    PREOPERATIVE DIAGNOSIS:  left ureteral obstruction    POSTOPERATIVE DIAGNOSIS: left ureteral obstruction    PROCEDURE PERFORMED:  Cystoscopy, left ureteral stent placement    ANESTHESIA: General    COMPLICATIONS: none    OR BLOOD LOSS:  Minimal    FLUIDS: Cystalloids per Anesthesia    SPECIMENS:  * No specimens in log *  none    DRAINS: 6 x 26 dbl j stent    INDICATIONS FOR PROCEDURE:  The patient is a 59 y.o. male who [facility] here for CYSTOSCOPY LEFT URETERAL STENT INSERTION. After risks, benefits and alternatives of the procedure were discussed with the patient, the patient elected to proceed. DETAILS OF PROCEDURE:  After informed consent was obtained in the preoperative area, the patient was taken back to the operating room and transferred to the operating table in supine position. Anesthesia was induced and antibiotics were given. The patient was placed in modified dorsal lithotomy position and sterilely prepped and draped in a standard fashion. A timeout occurred. Two patient identifiers were used. We entered the urethra with a 22 Western Emily scope. The Left ureteral orifice was then visualized. . A guidewire was carefully advanced into the kidney and position was confirmed with xray. Under direct visualization the stent was advanced over the wire until it was in proper location. The Glidewire was then removed. A curl could be seen in the Left renal pelvis under using fluoroscopic vision, and in the bladder under direct visualization. there was efflux of urine through the stent noted. The patients bladder was drained. All instrumentation was removed. The patient was then awakened and discharged back to the PACU in good and stable condition.

## 2023-05-16 DIAGNOSIS — I10 ESSENTIAL HYPERTENSION: Primary | ICD-10-CM

## 2023-05-16 DIAGNOSIS — E78.00 HYPERCHOLESTEROLEMIA: ICD-10-CM

## 2023-05-16 RX ORDER — PRAVASTATIN SODIUM 40 MG
40 TABLET ORAL DAILY
Qty: 90 TABLET | Refills: 1 | Status: SHIPPED | OUTPATIENT
Start: 2023-05-16

## 2023-05-16 NOTE — TELEPHONE ENCOUNTER
Patient's last appointment was : 11/9/22  Patient's next appointment is :  5/26/23  Last refilled: 2/6/23  Som [id number] Verified    Lab Results   Component Value Date    LABA1C 5.5 07/23/2022     Lab Results   Component Value Date    CHOL 197 11/06/2020    TRIG 174 11/06/2020    HDL 67 07/23/2022    LDLCALC 97 07/23/2022     Lab Results   Component Value Date     04/06/2023    K 3.9 04/06/2023     04/06/2023    CO2 25 04/06/2023    BUN 11 04/06/2023    CREATININE 0.9 04/06/2023    GLUCOSE 135 (H) 04/06/2023    CALCIUM 10.0 04/06/2023    PROT 7.2 04/06/2023    LABALBU 4.4 04/06/2023    BILITOT 0.7 04/06/2023    ALKPHOS 68 04/06/2023    AST 17 04/06/2023    ALT 31 04/06/2023    LABGLOM >60 04/06/2023     Lab Results   Component Value Date    TSH 2.810 11/06/2020     Lab Results   Component Value Date    WBC 11.6 (H) 04/06/2023    HGB 14.6 04/06/2023    HCT 43.7 04/06/2023    MCV 89.7 04/06/2023     04/06/2023

## 2023-05-31 ENCOUNTER — TELEPHONE (OUTPATIENT)
Dept: UROLOGY | Age: 63
End: 2023-05-31

## 2023-05-31 DIAGNOSIS — N39.0 RECURRENT UTI: Primary | ICD-10-CM

## 2023-06-01 ENCOUNTER — NURSE ONLY (OUTPATIENT)
Dept: LAB | Age: 63
End: 2023-06-01

## 2023-06-01 DIAGNOSIS — E78.00 HYPERCHOLESTEROLEMIA: ICD-10-CM

## 2023-06-01 DIAGNOSIS — N39.0 RECURRENT UTI: ICD-10-CM

## 2023-06-01 DIAGNOSIS — I10 ESSENTIAL HYPERTENSION: ICD-10-CM

## 2023-06-01 LAB
BACTERIA: NORMAL
BASOPHILS ABSOLUTE: 0.1 THOU/MM3 (ref 0–0.1)
BASOPHILS NFR BLD AUTO: 0.9 %
BILIRUB UR QL STRIP: NEGATIVE
CASTS #/AREA URNS LPF: NORMAL /LPF
CASTS #/AREA URNS LPF: NORMAL /LPF
CHARACTER UR: CLEAR
CHARCOAL URNS QL MICRO: NORMAL
CHOLESTEROL, FASTING: 193 MG/DL (ref 100–199)
COLOR UR: YELLOW
CRYSTALS URNS QL MICRO: NORMAL
DEPRECATED RDW RBC AUTO: 42.1 FL (ref 35–45)
EOSINOPHIL NFR BLD AUTO: 5.6 %
EOSINOPHILS ABSOLUTE: 0.4 THOU/MM3 (ref 0–0.4)
EPITHELIAL CELLS, UA: NORMAL /HPF
ERYTHROCYTE [DISTWIDTH] IN BLOOD BY AUTOMATED COUNT: 12.7 % (ref 11.5–14.5)
GLUCOSE UR QL STRIP.AUTO: NEGATIVE MG/DL
HCT VFR BLD AUTO: 46.1 % (ref 42–52)
HDLC SERPL-MCNC: 66 MG/DL
HGB BLD-MCNC: 14.8 GM/DL (ref 14–18)
HGB UR QL STRIP.AUTO: NEGATIVE
IMM GRANULOCYTES # BLD AUTO: 0.02 THOU/MM3 (ref 0–0.07)
IMM GRANULOCYTES NFR BLD AUTO: 0.3 %
KETONES UR QL STRIP.AUTO: NEGATIVE
LDLC SERPL CALC-MCNC: 110 MG/DL
LEUKOCYTE ESTERASE UR QL STRIP.AUTO: NEGATIVE
LYMPHOCYTES ABSOLUTE: 2.1 THOU/MM3 (ref 1–4.8)
LYMPHOCYTES NFR BLD AUTO: 33 %
MCH RBC QN AUTO: 29.4 PG (ref 26–33)
MCHC RBC AUTO-ENTMCNC: 32.1 GM/DL (ref 32.2–35.5)
MCV RBC AUTO: 91.5 FL (ref 80–94)
MONOCYTES ABSOLUTE: 0.4 THOU/MM3 (ref 0.4–1.3)
MONOCYTES NFR BLD AUTO: 7 %
NEUTROPHILS NFR BLD AUTO: 53.2 %
NITRITE UR QL STRIP.AUTO: NEGATIVE
NRBC BLD AUTO-RTO: 0 /100 WBC
PH UR STRIP.AUTO: 6.5 [PH] (ref 5–9)
PLATELET # BLD AUTO: 307 THOU/MM3 (ref 130–400)
PMV BLD AUTO: 10 FL (ref 9.4–12.4)
PROT UR STRIP.AUTO-MCNC: NEGATIVE MG/DL
RBC # BLD AUTO: 5.04 MILL/MM3 (ref 4.7–6.1)
RBC #/AREA URNS HPF: NORMAL /HPF
RENAL EPI CELLS #/AREA URNS HPF: NORMAL /[HPF]
SEGMENTED NEUTROPHILS ABSOLUTE COUNT: 3.4 THOU/MM3 (ref 1.8–7.7)
SPECIFIC GRAVITY UA: 1.01 (ref 1–1.03)
TRIGLYCERIDE, FASTING: 84 MG/DL (ref 0–199)
TSH SERPL DL<=0.005 MIU/L-ACNC: 4.13 UIU/ML (ref 0.4–4.2)
UROBILINOGEN, URINE: 0.2 EU/DL (ref 0–1)
WBC # BLD AUTO: 6.4 THOU/MM3 (ref 4.8–10.8)
WBC #/AREA URNS HPF: NORMAL /HPF
YEAST LIKE FUNGI URNS QL MICRO: NORMAL

## 2023-06-01 NOTE — PROGRESS NOTES
[place]  [address]  [address]  [facility] [address]  Dept: [phone]  Loc: [phone]    Visit Date: 6/2/2023        HPI:     Vargas Jenkins is a 59 y.o. male who presents today for:  Chief Complaint   Patient presents with    Prostate Cancer    Follow-up     1 month follow up. Litholink completed prior. Patient feels like he has UTI and wants to check a urine. HPI  Patient presents to urology clinic with history of kidney stones. Yue Shi underwent cystoscopy, left ureteroscopy left holmium laser lithotripsy left stent exchange by Dr. Gerald Mandel on 4/15/2023. He is here today to discuss Litholink results. Results showed: High urine calcium, low urine volume. Recommend to increase to 2-2.5L water intake and follow a low sodium diet. Offered HCTZ but he will try diet modifications first.     He is doing very well. Denies flank pain, suprapubic pressure, gross hematuria, dysuria, fever or chills. Additional History:    Prostate Cancer  On active surveillance with hx of three biopsies (matthew six in one core)  Past decipher results--matthew six one core  Previous biopsy negative by Cheril Blood in past      BPH  LUTS chronic. Not on medications for this. Discussed benefits of starting flomax in past but the patient could not tolerate the medication. Was started on Rapaflo, but has decided not to take it. Recurrent uti  Persistent klebsiella uti in past  No new infections    Current Outpatient Medications   Medication Sig Dispense Refill    pravastatin (PRAVACHOL) 40 MG tablet Take 1 tablet by mouth daily 90 tablet 1    olmesartan (BENICAR) 5 MG tablet Take 2 tablets by mouth once daily 180 tablet 1    fluticasone (FLONASE) 50 MCG/ACT nasal spray 1 spray by Each Nostril route daily 3 each 0    aspirin 81 MG tablet Take 1 tablet by mouth daily       No current facility-administered medications for this visit.        Past Medical

## 2023-06-02 ENCOUNTER — OFFICE VISIT (OUTPATIENT)
Dept: UROLOGY | Age: 63
End: 2023-06-02

## 2023-06-02 VITALS — WEIGHT: 196 LBS | RESPIRATION RATE: 18 BRPM | HEIGHT: 68 IN | BODY MASS INDEX: 29.7 KG/M2

## 2023-06-02 DIAGNOSIS — N39.0 RECURRENT UTI: Primary | ICD-10-CM

## 2023-06-02 DIAGNOSIS — N40.1 BENIGN LOCALIZED PROSTATIC HYPERPLASIA WITH LOWER URINARY TRACT SYMPTOMS (LUTS): ICD-10-CM

## 2023-06-02 DIAGNOSIS — C61 PROSTATE CANCER (HCC): ICD-10-CM

## 2023-06-02 DIAGNOSIS — N20.0 KIDNEY STONE: ICD-10-CM

## 2023-06-02 LAB
BILIRUBIN URINE: NEGATIVE
BLOOD URINE, POC: NEGATIVE
CHARACTER, URINE: CLEAR
COLOR, URINE: YELLOW
GLUCOSE URINE: NEGATIVE MG/DL
KETONES, URINE: NEGATIVE
LEUKOCYTE CLUMPS, URINE: NEGATIVE
NITRITE, URINE: NEGATIVE
PH, URINE: 6 (ref 5–9)
POST VOID RESIDUAL (PVR): 221 ML
PROTEIN, URINE: NEGATIVE MG/DL
SPECIFIC GRAVITY, URINE: 1.02 (ref 1–1.03)
UROBILINOGEN, URINE: 0.2 EU/DL (ref 0–1)

## 2023-06-03 LAB — BACTERIA UR CULT: NORMAL

## 2023-08-02 ENCOUNTER — HOSPITAL ENCOUNTER (OUTPATIENT)
Dept: MRI IMAGING | Age: 63
Discharge: HOME | End: 2023-08-02
Payer: COMMERCIAL

## 2023-08-02 ENCOUNTER — OFFICE VISIT (OUTPATIENT)
Dept: FAMILY MEDICINE CLINIC | Age: 63
End: 2023-08-02
Payer: COMMERCIAL

## 2023-08-02 VITALS
DIASTOLIC BLOOD PRESSURE: 62 MMHG | HEIGHT: 68 IN | HEART RATE: 76 BPM | WEIGHT: 192.8 LBS | TEMPERATURE: 98.4 F | SYSTOLIC BLOOD PRESSURE: 120 MMHG | BODY MASS INDEX: 29.22 KG/M2 | OXYGEN SATURATION: 98 % | RESPIRATION RATE: 16 BRPM

## 2023-08-02 DIAGNOSIS — E55.9 VITAMIN D DEFICIENCY: ICD-10-CM

## 2023-08-02 DIAGNOSIS — C61 PROSTATE CANCER (HCC): ICD-10-CM

## 2023-08-02 DIAGNOSIS — I10 ESSENTIAL HYPERTENSION: Primary | ICD-10-CM

## 2023-08-02 DIAGNOSIS — E78.00 HYPERCHOLESTEROLEMIA: ICD-10-CM

## 2023-08-02 DIAGNOSIS — R73.9 HYPERGLYCEMIA: ICD-10-CM

## 2023-08-02 LAB — POC CREATININE WHOLE BLOOD: 0.8 MG/DL (ref 0.5–1.2)

## 2023-08-02 PROCEDURE — 6360000004 HC RX CONTRAST MEDICATION: Performed by: UROLOGY

## 2023-08-02 PROCEDURE — 82565 ASSAY OF CREATININE: CPT

## 2023-08-02 PROCEDURE — 99214 OFFICE O/P EST MOD 30 MIN: CPT | Performed by: FAMILY MEDICINE

## 2023-08-02 PROCEDURE — A9579 GAD-BASE MR CONTRAST NOS,1ML: HCPCS | Performed by: UROLOGY

## 2023-08-02 PROCEDURE — 3078F DIAST BP <80 MM HG: CPT | Performed by: FAMILY MEDICINE

## 2023-08-02 PROCEDURE — 3074F SYST BP LT 130 MM HG: CPT | Performed by: FAMILY MEDICINE

## 2023-08-02 PROCEDURE — 76377 3D RENDER W/INTRP POSTPROCES: CPT

## 2023-08-02 RX ORDER — OLMESARTAN MEDOXOMIL 5 MG/1
10 TABLET ORAL DAILY
Qty: 180 TABLET | Refills: 0 | Status: SHIPPED | OUTPATIENT
Start: 2023-08-02

## 2023-08-02 RX ADMIN — GADOTERIDOL 20 ML: 279.3 INJECTION, SOLUTION INTRAVENOUS at 11:30

## 2023-08-02 SDOH — ECONOMIC STABILITY: HOUSING INSECURITY
IN THE LAST 12 MONTHS, WAS THERE A TIME WHEN YOU DID NOT HAVE A STEADY PLACE TO SLEEP OR SLEPT IN A SHELTER (INCLUDING NOW)?: PATIENT REFUSED

## 2023-08-02 SDOH — ECONOMIC STABILITY: FOOD INSECURITY: WITHIN THE PAST 12 MONTHS, YOU WORRIED THAT YOUR FOOD WOULD RUN OUT BEFORE YOU GOT MONEY TO BUY MORE.: PATIENT DECLINED

## 2023-08-02 SDOH — ECONOMIC STABILITY: INCOME INSECURITY: HOW HARD IS IT FOR YOU TO PAY FOR THE VERY BASICS LIKE FOOD, HOUSING, MEDICAL CARE, AND HEATING?: PATIENT DECLINED

## 2023-08-02 SDOH — ECONOMIC STABILITY: TRANSPORTATION INSECURITY
IN THE PAST 12 MONTHS, HAS LACK OF TRANSPORTATION KEPT YOU FROM MEETINGS, WORK, OR FROM GETTING THINGS NEEDED FOR DAILY LIVING?: PATIENT DECLINED

## 2023-08-02 SDOH — ECONOMIC STABILITY: FOOD INSECURITY: WITHIN THE PAST 12 MONTHS, THE FOOD YOU BOUGHT JUST DIDN'T LAST AND YOU DIDN'T HAVE MONEY TO GET MORE.: PATIENT DECLINED

## 2023-08-02 ASSESSMENT — PATIENT HEALTH QUESTIONNAIRE - PHQ9
SUM OF ALL RESPONSES TO PHQ QUESTIONS 1-9: 0
1. LITTLE INTEREST OR PLEASURE IN DOING THINGS: 0
SUM OF ALL RESPONSES TO PHQ9 QUESTIONS 1 & 2: 0
SUM OF ALL RESPONSES TO PHQ QUESTIONS 1-9: 0
2. FEELING DOWN, DEPRESSED OR HOPELESS: 0

## 2023-08-02 NOTE — PROGRESS NOTES
Provider:  Dr. Tobias Dance    Patient:  Manoj Benson  YOB: 1960      Visit Date:  8/2/2023     Reason For Visit:   Chief Complaint   Patient presents with    Jacquelineter is a 59 y.o. male who comes today to the office for follow up HTN, Hypercholesterolemia, Vitamin D    He states he has been doing well, taking his medications as prescribed. He denies any side effects from his medications. Hypertension: He is taking Benicar 5 mg 1 tablet by mouth daily. He denies any side effects from the medications. He is adherent to low-sodium diet. His blood pressure is well controlled at home. Today at the office it was 120/62 He denies any chest pain, shortness of breath, or edema. Cardiovascular risk factors: Hypercholesterolemia, family history of premature cardiovascular disease, hypertension, male gender and sedentary lifestyle. Hypercholesterolemia: He is taking Pravachol 40 mg 1 tablet by mouth nightly and denies any side effects from the medication. Last lipid profile done June 1, 2023 showed total cholesterol 193, triglycerides 84, HDL 66 and . Vitamin D:  diagnosed in 5/30/2018 when his level was 15 and he was advised to take vitamin D 3 OTC. He is taking Vitamin D, but does not know the dose. Last level was 17 in November 2020    Significant Past Medical History:    Past Medical History:   Diagnosis Date    Cancer ([address])     CRVO (central retinal vein occlusion) 01/2016    Right eye    Heartburn     Hyperlipidemia 2010    Hypertension 2010           Allergies:  is allergic to demerol, midazolam hcl, other, and versed [midazolam].     Medications:   Current Outpatient Medications   Medication Sig Dispense Refill    olmesartan (BENICAR) 5 MG tablet Take 2 tablets by mouth once daily 180 tablet 0    pravastatin (PRAVACHOL) 40 MG tablet Take 1 tablet by mouth daily 90 tablet 1    fluticasone (FLONASE) 50 MCG/ACT nasal spray 1 spray by Each Nostril route daily

## 2023-08-03 ENCOUNTER — TELEPHONE (OUTPATIENT)
Dept: UROLOGY | Age: 63
End: 2023-08-03

## 2023-08-03 DIAGNOSIS — N39.0 RECURRENT UTI: Primary | ICD-10-CM

## 2023-08-03 NOTE — TELEPHONE ENCOUNTER
Patient left a message stating he has an UTI. Attempted to call patient get to get symptoms. Voicemail left to return the call to the office.

## 2023-08-03 NOTE — TELEPHONE ENCOUNTER
Patient c/o bladder discomfort mainly at night. Orders placed. He will go to the lab tomorrow. He denies fever, chills, burning, urgency, and frequency.

## 2023-08-04 ENCOUNTER — NURSE ONLY (OUTPATIENT)
Dept: LAB | Age: 63
End: 2023-08-04

## 2023-08-04 DIAGNOSIS — E55.9 VITAMIN D DEFICIENCY: ICD-10-CM

## 2023-08-04 DIAGNOSIS — C61 PROSTATE CANCER (HCC): ICD-10-CM

## 2023-08-04 DIAGNOSIS — R73.9 HYPERGLYCEMIA: ICD-10-CM

## 2023-08-04 DIAGNOSIS — N39.0 RECURRENT UTI: ICD-10-CM

## 2023-08-04 LAB
25(OH)D3 SERPL-MCNC: 30 NG/ML (ref 30–100)
BACTERIA: NORMAL
BILIRUB UR QL STRIP: NEGATIVE
CASTS #/AREA URNS LPF: NORMAL /LPF
CASTS #/AREA URNS LPF: NORMAL /LPF
CHARACTER UR: CLEAR
CHARCOAL URNS QL MICRO: NORMAL
COLOR UR: YELLOW
CRYSTALS URNS QL MICRO: NORMAL
DEPRECATED MEAN GLUCOSE BLD GHB EST-ACNC: 111 MG/DL (ref 70–126)
EPITHELIAL CELLS, UA: NORMAL /HPF
GLUCOSE UR QL STRIP.AUTO: NEGATIVE MG/DL
HBA1C MFR BLD HPLC: 5.7 % (ref 4.4–6.4)
HGB UR QL STRIP.AUTO: NEGATIVE
KETONES UR QL STRIP.AUTO: NEGATIVE
LEUKOCYTE ESTERASE UR QL STRIP.AUTO: NEGATIVE
NITRITE UR QL STRIP.AUTO: NEGATIVE
PH UR STRIP.AUTO: 5.5 [PH] (ref 5–9)
PROT UR STRIP.AUTO-MCNC: NEGATIVE MG/DL
PSA SERPL-MCNC: 2.96 NG/ML (ref 0–1)
RBC #/AREA URNS HPF: NORMAL /HPF
RENAL EPI CELLS #/AREA URNS HPF: NORMAL /[HPF]
SPECIFIC GRAVITY UA: 1.02 (ref 1–1.03)
UROBILINOGEN, URINE: 0.2 EU/DL (ref 0–1)
WBC #/AREA URNS HPF: NORMAL /HPF
YEAST LIKE FUNGI URNS QL MICRO: NORMAL

## 2023-08-05 LAB
BACTERIA UR CULT: ABNORMAL
ORGANISM: ABNORMAL

## 2023-08-10 ENCOUNTER — OFFICE VISIT (OUTPATIENT)
Dept: UROLOGY | Age: 63
End: 2023-08-10
Payer: COMMERCIAL

## 2023-08-10 VITALS — RESPIRATION RATE: 16 BRPM | BODY MASS INDEX: 29.1 KG/M2 | WEIGHT: 192 LBS | HEIGHT: 68 IN

## 2023-08-10 DIAGNOSIS — C61 PROSTATE CANCER (HCC): ICD-10-CM

## 2023-08-10 DIAGNOSIS — N39.0 RECURRENT UTI: Primary | ICD-10-CM

## 2023-08-10 DIAGNOSIS — N20.0 KIDNEY STONE: ICD-10-CM

## 2023-08-10 PROCEDURE — 99214 OFFICE O/P EST MOD 30 MIN: CPT | Performed by: UROLOGY

## 2023-08-10 NOTE — PROGRESS NOTES
of the 3 biopsies (negative in the other)        PSA six months (pca). Kub six months (kidney stones)      Prescriptions Ordered:  No orders of the defined types were placed in this encounter. Orders Placed:  No orders of the defined types were placed in this encounter.              Dov Marques MD

## 2023-08-30 ENCOUNTER — HOSPITAL ENCOUNTER (OUTPATIENT)
Dept: ULTRASOUND IMAGING | Age: 63
Discharge: HOME | End: 2023-08-30
Payer: COMMERCIAL

## 2023-08-30 DIAGNOSIS — K76.0 FATTY LIVER: ICD-10-CM

## 2023-08-30 PROCEDURE — 76705 ECHO EXAM OF ABDOMEN: CPT

## 2023-11-30 DIAGNOSIS — E78.00 HYPERCHOLESTEROLEMIA: ICD-10-CM

## 2023-11-30 NOTE — TELEPHONE ENCOUNTER
Patient's last appointment was : 8/2/2023 with our Monika Medeiros  Patient's next appointment is : 2/7/2024 with our Dr. Yu Smith  Last refilled on: 5/16/2023  Which pharmacy does the script need sent to:  [facility] [place]      Lab Results   Component Value Date    LABA1C 5.7 08/04/2023     Lab Results   Component Value Date    CHOL 197 11/06/2020    TRIG 174 11/06/2020    HDL 66 06/01/2023    LDLCALC 110 06/01/2023     Lab Results   Component Value Date     04/06/2023    K 3.9 04/06/2023     04/06/2023    CO2 25 04/06/2023    BUN 11 04/06/2023    CREATININE 0.9 04/06/2023    GLUCOSE 135 (H) 04/06/2023    CALCIUM 10.0 04/06/2023    PROT 7.2 04/06/2023    LABALBU 4.4 04/06/2023    BILITOT 0.7 04/06/2023    ALKPHOS 68 04/06/2023    AST 17 04/06/2023    ALT 31 04/06/2023    LABGLOM >60 04/06/2023     Lab Results   Component Value Date    TSH 4.130 06/01/2023     Lab Results   Component Value Date    WBC 6.4 06/01/2023    HGB 14.8 06/01/2023    HCT 46.1 06/01/2023    MCV 91.5 06/01/2023     06/01/2023

## 2023-12-05 RX ORDER — PRAVASTATIN SODIUM 40 MG
40 TABLET ORAL DAILY
Qty: 90 TABLET | Refills: 0 | Status: SHIPPED | OUTPATIENT
Start: 2023-12-05

## 2024-01-02 DIAGNOSIS — I10 ESSENTIAL HYPERTENSION: ICD-10-CM

## 2024-01-04 RX ORDER — OLMESARTAN MEDOXOMIL 5 MG/1
10 TABLET ORAL DAILY
Qty: 180 TABLET | Refills: 0 | Status: SHIPPED | OUTPATIENT
Start: 2024-01-04

## 2024-01-04 NOTE — TELEPHONE ENCOUNTER
Patient's last appointment was : 8/2/2023 with our   Patient's next appointment is : 2/7/2024 with our Dr. Banks  Last refilled on: 10/10/2023  Which pharmacy does the script need sent to: [facility] [address]      Lab Results   Component Value Date    LABA1C 5.7 08/04/2023     Lab Results   Component Value Date    CHOL 197 11/06/2020    TRIG 174 11/06/2020    HDL 66 06/01/2023    LDLCALC 110 06/01/2023     Lab Results   Component Value Date     04/06/2023    K 3.9 04/06/2023     04/06/2023    CO2 25 04/06/2023    BUN 11 04/06/2023    CREATININE 0.9 04/06/2023    GLUCOSE 135 (H) 04/06/2023    CALCIUM 10.0 04/06/2023    PROT 7.2 04/06/2023    LABALBU 4.4 04/06/2023    BILITOT 0.7 04/06/2023    ALKPHOS 68 04/06/2023    AST 17 04/06/2023    ALT 31 04/06/2023    LABGLOM >60 04/06/2023     Lab Results   Component Value Date    TSH 4.130 06/01/2023     Lab Results   Component Value Date    WBC 6.4 06/01/2023    HGB 14.8 06/01/2023    HCT 46.1 06/01/2023    MCV 91.5 06/01/2023     06/01/2023

## 2024-01-10 ENCOUNTER — HOSPITAL ENCOUNTER (OUTPATIENT)
Dept: CT IMAGING | Age: 64
Discharge: HOME | End: 2024-01-10
Attending: INTERNAL MEDICINE
Payer: COMMERCIAL

## 2024-01-10 DIAGNOSIS — R10.10 UPPER ABDOMINAL PAIN: ICD-10-CM

## 2024-01-10 LAB
CREAT BLD-MCNC: 0.8 MG/DL (ref 0.5–1.2)
GFR SERPL CREATININE-BSD FRML MDRD: > 60 ML/MIN/1.73M2

## 2024-01-10 PROCEDURE — 82565 ASSAY OF CREATININE: CPT

## 2024-01-10 PROCEDURE — 6360000004 HC RX CONTRAST MEDICATION: Performed by: INTERNAL MEDICINE

## 2024-01-10 PROCEDURE — 74177 CT ABD & PELVIS W/CONTRAST: CPT

## 2024-01-10 RX ADMIN — IOPAMIDOL 85 ML: 755 INJECTION, SOLUTION INTRAVENOUS at 10:48

## 2024-01-11 ENCOUNTER — TELEPHONE (OUTPATIENT)
Dept: UROLOGY | Age: 64
End: 2024-01-11

## 2024-01-11 ENCOUNTER — NURSE ONLY (OUTPATIENT)
Dept: LAB | Age: 64
End: 2024-01-11

## 2024-01-11 DIAGNOSIS — R10.9 BILATERAL FLANK PAIN: Primary | ICD-10-CM

## 2024-01-11 DIAGNOSIS — R10.9 BILATERAL FLANK PAIN: ICD-10-CM

## 2024-01-11 LAB
BACTERIA: NORMAL
BILIRUB UR QL STRIP: NEGATIVE
CASTS #/AREA URNS LPF: NORMAL /LPF
CASTS #/AREA URNS LPF: NORMAL /LPF
CHARACTER UR: CLEAR
CHARCOAL URNS QL MICRO: NORMAL
COLOR UR: YELLOW
CRYSTALS URNS QL MICRO: NORMAL
EPITHELIAL CELLS, UA: NORMAL /HPF
GLUCOSE UR QL STRIP.AUTO: NEGATIVE MG/DL
HGB UR QL STRIP.AUTO: NEGATIVE
KETONES UR QL STRIP.AUTO: NEGATIVE
LEUKOCYTE ESTERASE UR QL STRIP.AUTO: NEGATIVE
NITRITE UR QL STRIP.AUTO: NEGATIVE
PH UR STRIP.AUTO: 7 [PH] (ref 5–9)
PROT UR STRIP.AUTO-MCNC: NEGATIVE MG/DL
RBC #/AREA URNS HPF: NORMAL /HPF
RENAL EPI CELLS #/AREA URNS HPF: NORMAL /[HPF]
SPECIFIC GRAVITY UA: 1.01 (ref 1–1.03)
UROBILINOGEN, URINE: 0.2 EU/DL (ref 0–1)
WBC #/AREA URNS HPF: NORMAL /HPF
YEAST LIKE FUNGI URNS QL MICRO: NORMAL

## 2024-01-11 NOTE — TELEPHONE ENCOUNTER
Patient advised to give the urine sample to the lab, push fluids and to continue to have the KUB completed prior to the appointment. He voiced understanding.

## 2024-01-11 NOTE — TELEPHONE ENCOUNTER
Patient had a ct scan completed that showed renal cysts. He still has pain in both kidneys greater on the right. It is worse at night rated 3-4 on scale of 0-10 that is nagging.    He denies fever chills, burning, urgency, or frequency.    He is scheduled to have KUB and PSA prior to appointment on 02/20/2024.    Does he still need the KUB? Should he have a urine checked for infection. He does not always have symptoms with infection.

## 2024-01-11 NOTE — TELEPHONE ENCOUNTER
Send urine for UA and culture.  Continue with KUB since he is complaining  of pain to ensure no stones seen.    CTw with contrast  yesterday showed renal cysts bilat but no stones or hydro.    Increase fluids  May need to consider urine guidance at follow up if pain persists and rest is negative if not already done.

## 2024-01-13 LAB
BACTERIA UR CULT: ABNORMAL
ORGANISM: ABNORMAL

## 2024-01-15 ENCOUNTER — TELEPHONE (OUTPATIENT)
Dept: UROLOGY | Age: 64
End: 2024-01-15

## 2024-01-15 ENCOUNTER — NURSE ONLY (OUTPATIENT)
Dept: UROLOGY | Age: 64
End: 2024-01-15

## 2024-01-15 DIAGNOSIS — N39.0 RECURRENT UTI: Primary | ICD-10-CM

## 2024-01-15 PROCEDURE — NBSRV NON-BILLABLE SERVICE

## 2024-01-15 RX ORDER — CEPHALEXIN 125 MG/5ML
500 POWDER, FOR SUSPENSION ORAL 3 TIMES DAILY
Qty: 600 ML | Refills: 0 | Status: SHIPPED | OUTPATIENT
Start: 2024-01-15 | End: 2024-01-25

## 2024-01-15 RX ORDER — CEPHALEXIN 500 MG/1
500 CAPSULE ORAL 3 TIMES DAILY
Qty: 30 CAPSULE | Refills: 0 | Status: SHIPPED | OUTPATIENT
Start: 2024-01-15 | End: 2024-01-15 | Stop reason: ALTCHOICE

## 2024-01-15 NOTE — TELEPHONE ENCOUNTER
Urine shows mixed growth  I recommend urine guidance test.  I can send antibiotic in , but if able would be good to obtain guidance test before starting antibiotic to see if it helps identify an organism to guide treatment if symptoms persist or return.  Will send Keflex for 10 days

## 2024-01-15 NOTE — TELEPHONE ENCOUNTER
Spoke to patient and Guidance test scheduled today.     Can the keflex be changed to liquid form? He can't swallow pills.

## 2024-01-15 NOTE — PROGRESS NOTES
I have personally verified, reviewed, and approved of these actions as documented by Nitza Castellon LPN.

## 2024-01-23 ENCOUNTER — TELEPHONE (OUTPATIENT)
Dept: UROLOGY | Age: 64
End: 2024-01-23

## 2024-01-23 NOTE — TELEPHONE ENCOUNTER
Culture with E. Coli <10,000.     Keflex sensitive. Continue    Recommend OV for further evaluation    The patient should go to the ED should they develop fever, chills, nausea, vomiting, chest pain, SOB, calf pain, feelings of incomplete emptying, or should they otherwise feel they need evaluated

## 2024-01-23 NOTE — TELEPHONE ENCOUNTER
Patient is calling for the Guidance test results. He is still on keflex. The symptoms are better but not resolved.    If antibiotic needs changed, he prefers liquid form if possible.

## 2024-01-24 ENCOUNTER — NURSE ONLY (OUTPATIENT)
Dept: LAB | Age: 64
End: 2024-01-24

## 2024-01-24 LAB — PSA SERPL-MCNC: 2.83 NG/ML (ref 0–1)

## 2024-01-25 ENCOUNTER — HOSPITAL ENCOUNTER (OUTPATIENT)
Age: 64
Discharge: HOME | End: 2024-01-25
Payer: COMMERCIAL

## 2024-01-25 ENCOUNTER — HOSPITAL ENCOUNTER (OUTPATIENT)
Dept: GENERAL RADIOLOGY | Age: 64
Discharge: HOME | End: 2024-01-25
Payer: COMMERCIAL

## 2024-01-25 DIAGNOSIS — N20.0 KIDNEY STONE: ICD-10-CM

## 2024-01-25 PROCEDURE — 74018 RADEX ABDOMEN 1 VIEW: CPT

## 2024-02-01 ENCOUNTER — OFFICE VISIT (OUTPATIENT)
Dept: UROLOGY | Age: 64
End: 2024-02-01
Payer: COMMERCIAL

## 2024-02-01 VITALS — WEIGHT: 192 LBS | RESPIRATION RATE: 16 BRPM | HEIGHT: 68 IN | BODY MASS INDEX: 29.1 KG/M2

## 2024-02-01 DIAGNOSIS — N39.0 RECURRENT UTI: ICD-10-CM

## 2024-02-01 DIAGNOSIS — N40.1 BENIGN LOCALIZED PROSTATIC HYPERPLASIA WITH LOWER URINARY TRACT SYMPTOMS (LUTS): ICD-10-CM

## 2024-02-01 DIAGNOSIS — N20.0 KIDNEY STONE: ICD-10-CM

## 2024-02-01 DIAGNOSIS — C61 PROSTATE CANCER (HCC): Primary | ICD-10-CM

## 2024-02-01 LAB
BILIRUBIN URINE: NEGATIVE
BLOOD URINE, POC: NEGATIVE
CHARACTER, URINE: CLEAR
COLOR, URINE: YELLOW
GLUCOSE URINE: NEGATIVE MG/DL
KETONES, URINE: NEGATIVE
LEUKOCYTE CLUMPS, URINE: ABNORMAL
NITRITE, URINE: NEGATIVE
PH, URINE: 6.5 (ref 5–9)
POST VOID RESIDUAL (PVR): 88 ML
PROTEIN, URINE: NEGATIVE MG/DL
SPECIFIC GRAVITY, URINE: 1.01 (ref 1–1.03)
UROBILINOGEN, URINE: 0.2 EU/DL (ref 0–1)

## 2024-02-01 PROCEDURE — 99214 OFFICE O/P EST MOD 30 MIN: CPT | Performed by: NURSE PRACTITIONER

## 2024-02-01 PROCEDURE — 51798 US URINE CAPACITY MEASURE: CPT | Performed by: NURSE PRACTITIONER

## 2024-02-01 PROCEDURE — 81003 URINALYSIS AUTO W/O SCOPE: CPT | Performed by: NURSE PRACTITIONER

## 2024-02-01 RX ORDER — PYRIDOXINE HCL (VITAMIN B6) 100 MG
500 TABLET ORAL 2 TIMES DAILY
Qty: 180 CAPSULE | Refills: 3 | Status: SHIPPED | OUTPATIENT
Start: 2024-02-01 | End: 2024-05-01

## 2024-02-01 RX ORDER — SILODOSIN 8 MG/1
8 CAPSULE ORAL EVERY EVENING
Qty: 90 CAPSULE | Refills: 2 | Status: SHIPPED | OUTPATIENT
Start: 2024-02-01 | End: 2024-05-01

## 2024-02-01 NOTE — PROGRESS NOTES
[facility] [place] [facility]  [facility]  [address]  [address]  [facility] [address]  Dept: [phone]  Loc: [phone]    Visit Date: 2/1/2024        HPI:     Koko Chao is a 64 y.o. male who presents today for:  Chief Complaint   Patient presents with    Follow-up    Nephrolithiasis    Prostate Cancer       HPI  Patient presents to urology clinic for follow-up.     Koko reports feeling well today. Denies flank pain, suprapubic pressure, gross hematuria, dysuria, fever or chills.   PVR 88ml in office today. U/a without evidence of significant infection present.     Prostate Cancer  On active surveillance with hx of three biopsies (matthew six in one core)  MRI-- pirads 2  PSA stable     BPH  LUTS chronic.   Not on medications for this. Becoming more bothersome. IPSS 17  Flomax caused GI issues. He has difficulty swallowing pills.   Will start Rapaflo     Recurrent uti  Recent infection     Kidney stone  KUB and CT reviewed   Hx of left urs     Current Outpatient Medications   Medication Sig Dispense Refill    PANTOPRAZOLE SODIUM PO Take by mouth daily      FAMOTIDINE PO Take by mouth daily      Cranberry 500 MG CAPS Take 1 capsule by mouth 2 times daily 180 capsule 3    D-Mannose 500 MG CAPS Take 500 mg by mouth daily 90 capsule 2    silodosin (RAPAFLO) 8 MG CAPS Take 1 capsule by mouth every evening 90 capsule 2    olmesartan (BENICAR) 5 MG tablet Take 2 tablets by mouth once daily 180 tablet 0    pravastatin (PRAVACHOL) 40 MG tablet Take 1 tablet by mouth once daily 90 tablet 0    fluticasone (FLONASE) 50 MCG/ACT nasal spray 1 spray by Each Nostril route daily (Patient taking differently: 1 spray by Each Nostril route as needed) 3 each 0    aspirin 81 MG tablet Take 1 tablet by mouth daily       No current facility-administered medications for this visit.       Past Medical History  Koko  has a past medical history of Cancer (HCC), CRVO (central retinal vein occlusion),

## 2024-02-03 LAB — BACTERIA UR CULT: NORMAL

## 2024-02-07 ENCOUNTER — OFFICE VISIT (OUTPATIENT)
Dept: FAMILY MEDICINE CLINIC | Age: 64
End: 2024-02-07
Payer: COMMERCIAL

## 2024-02-07 VITALS
RESPIRATION RATE: 12 BRPM | HEART RATE: 64 BPM | SYSTOLIC BLOOD PRESSURE: 102 MMHG | BODY MASS INDEX: 28.92 KG/M2 | TEMPERATURE: 97.2 F | HEIGHT: 68 IN | DIASTOLIC BLOOD PRESSURE: 64 MMHG | WEIGHT: 190.8 LBS | OXYGEN SATURATION: 97 %

## 2024-02-07 DIAGNOSIS — E78.00 HYPERCHOLESTEROLEMIA: ICD-10-CM

## 2024-02-07 DIAGNOSIS — C61 PROSTATE CANCER (HCC): ICD-10-CM

## 2024-02-07 DIAGNOSIS — I10 ESSENTIAL HYPERTENSION: Primary | ICD-10-CM

## 2024-02-07 DIAGNOSIS — E55.9 VITAMIN D DEFICIENCY: ICD-10-CM

## 2024-02-07 PROCEDURE — 90471 IMMUNIZATION ADMIN: CPT | Performed by: FAMILY MEDICINE

## 2024-02-07 PROCEDURE — 90677 PCV20 VACCINE IM: CPT | Performed by: FAMILY MEDICINE

## 2024-02-07 PROCEDURE — 3074F SYST BP LT 130 MM HG: CPT | Performed by: FAMILY MEDICINE

## 2024-02-07 PROCEDURE — 3078F DIAST BP <80 MM HG: CPT | Performed by: FAMILY MEDICINE

## 2024-02-07 PROCEDURE — 99214 OFFICE O/P EST MOD 30 MIN: CPT | Performed by: FAMILY MEDICINE

## 2024-02-07 NOTE — PROGRESS NOTES
Provider:  Dr. Hannah Banks    Patient:  Koko Chao  YOB: 1960      Visit Date:  2/7/2024     Reason For Visit:   Chief Complaint   Patient presents with    6 Month Follow-Up        Koko is a 64 y.o. male who comes today to the office for follow up HTN, Hypercholesterolemia, Vitamin D.  Hx of prostate cancer followed by urology    He states he has been doing well, taking his medications as prescribed.  He denies any side effects from his medications.    Hypertension: He is taking Benicar 5 mg 1 tablet by mouth daily.  He denies any side effects from the medications.  He is adherent to low-sodium diet.  His blood pressure is well controlled at home.   Today at the office it was 120/62 He denies any chest pain, shortness of breath, or edema.Cardiovascular risk factors: Hypercholesterolemia, family history of premature cardiovascular disease, hypertension, male gender and sedentary lifestyle.     Hypercholesterolemia: He is taking Pravachol 40 mg 1 tablet by mouth nightly and denies any side effects from the medication.  Last lipid profile done June 1, 2023 showed total cholesterol 193, triglycerides 84, HDL 66 and .      Vitamin D:  diagnosed in 5/30/2018 when his level was 15 and he was advised to take vitamin D 3 OTC.  He is taking Vitamin D, but does not know the dose.  Last level was 17 in November 2020    Significant Past Medical History:    Past Medical History:   Diagnosis Date    Cancer (HCC)     CRVO (central retinal vein occlusion) 01/2016    Right eye    Heartburn     Hyperlipidemia 2010    Hypertension 2010           Allergies:  is allergic to demerol, midazolam hcl, other, and versed [midazolam].    Medications:   Current Outpatient Medications   Medication Sig Dispense Refill    PANTOPRAZOLE SODIUM PO Take by mouth daily      FAMOTIDINE PO Take by mouth daily      Cranberry 500 MG CAPS Take 1 capsule by mouth 2 times daily 180 capsule 3    D-Mannose 500 MG CAPS Take 500 mg by 
Immunizations Administered       Name Date Dose Route    Pneumococcal, PCV20, PREVNAR 20, (age 6w+), IM, 0.5mL 2/7/2024 0.5 mL Intramuscular    Site: Deltoid- Left    Lot: [id number]    NDC: 5916-5322-84         Patient tolerated well  
PT IS HAVING SURGERY TOMORROW/ PLEASE FAX OVER MEDICAL CLEARANCE AND REPEAT URINE TEST LABS -[phone] THANKS  
Urology  Continue Rapaflo 8 mg PO daily as per urology    No orders of the defined types were placed in this encounter.      Follow Up:  Return in about 6 months (around 8/7/2024).    Hannah Banks MD

## 2024-02-28 ENCOUNTER — HOSPITAL ENCOUNTER (OUTPATIENT)
Dept: ULTRASOUND IMAGING | Age: 64
Discharge: HOME | End: 2024-02-28
Payer: COMMERCIAL

## 2024-02-28 DIAGNOSIS — K76.0 FATTY LIVER: ICD-10-CM

## 2024-02-28 PROCEDURE — 76705 ECHO EXAM OF ABDOMEN: CPT

## 2024-03-04 DIAGNOSIS — E78.00 HYPERCHOLESTEROLEMIA: ICD-10-CM

## 2024-03-05 RX ORDER — PRAVASTATIN SODIUM 40 MG
40 TABLET ORAL DAILY
Qty: 90 TABLET | Refills: 0 | Status: SHIPPED | OUTPATIENT
Start: 2024-03-05

## 2024-03-05 NOTE — TELEPHONE ENCOUNTER
Patient's last appointment was : 2/7/2024 with our   Patient's next appointment is : 8/21/2024 with our Dr. Banks  Last refilled on: 12/8/2023  Which pharmacy does the script need sent to: [facility], [address]      Lab Results   Component Value Date    LABA1C 5.7 08/04/2023     Lab Results   Component Value Date    CHOL 197 11/06/2020    TRIG 174 11/06/2020    HDL 66 06/01/2023    LDLCALC 110 06/01/2023     Lab Results   Component Value Date     04/06/2023    K 3.9 04/06/2023     04/06/2023    CO2 25 04/06/2023    BUN 11 04/06/2023    CREATININE 0.8 01/10/2024    GLUCOSE 135 (H) 04/06/2023    CALCIUM 10.0 04/06/2023    PROT 7.5 02/28/2024    LABALBU 4.9 02/28/2024    BILITOT 0.3 02/28/2024    ALKPHOS 74 02/28/2024    AST 22 02/28/2024    ALT 28 02/28/2024    LABGLOM >60 01/10/2024     Lab Results   Component Value Date    TSH 4.130 06/01/2023     Lab Results   Component Value Date    WBC 6.4 06/01/2023    HGB 14.8 06/01/2023    HCT 46.1 06/01/2023    MCV 91.5 06/01/2023     06/01/2023

## 2024-04-08 ENCOUNTER — TELEPHONE (OUTPATIENT)
Dept: UROLOGY | Age: 64
End: 2024-04-08

## 2024-04-08 DIAGNOSIS — N39.0 RECURRENT UTI: Primary | ICD-10-CM

## 2024-04-08 NOTE — TELEPHONE ENCOUNTER
Patient states he thinks he has an infection again and would like a urine checked. C/o bladder pain.    Denies burning, urgency, frequency, fever, or chills.    Orders placed for a urine. Advised if symptoms become sever to be evaluated in the ER.

## 2024-04-09 ENCOUNTER — NURSE ONLY (OUTPATIENT)
Dept: LAB | Age: 64
End: 2024-04-09

## 2024-04-09 DIAGNOSIS — N39.0 RECURRENT UTI: ICD-10-CM

## 2024-04-09 DIAGNOSIS — I10 ESSENTIAL HYPERTENSION: ICD-10-CM

## 2024-04-09 LAB
BACTERIA: ABNORMAL
BILIRUB UR QL STRIP: NEGATIVE
CASTS #/AREA URNS LPF: ABNORMAL /LPF
CASTS #/AREA URNS LPF: ABNORMAL /LPF
CHARACTER UR: ABNORMAL
CHARCOAL URNS QL MICRO: ABNORMAL
COLOR UR: YELLOW
CRYSTALS URNS QL MICRO: ABNORMAL
EPITHELIAL CELLS, UA: ABNORMAL /HPF
GLUCOSE UR QL STRIP.AUTO: NEGATIVE MG/DL
HGB UR QL STRIP.AUTO: NEGATIVE
KETONES UR QL STRIP.AUTO: NEGATIVE
LEUKOCYTE ESTERASE UR QL STRIP.AUTO: NEGATIVE
NITRITE UR QL STRIP.AUTO: NEGATIVE
PH UR STRIP.AUTO: 7.5 [PH] (ref 5–9)
PROT UR STRIP.AUTO-MCNC: NEGATIVE MG/DL
RBC #/AREA URNS HPF: ABNORMAL /HPF
RENAL EPI CELLS #/AREA URNS HPF: ABNORMAL /[HPF]
SPECIFIC GRAVITY UA: 1.01 (ref 1–1.03)
UROBILINOGEN, URINE: 0.2 EU/DL (ref 0–1)
WBC #/AREA URNS HPF: ABNORMAL /HPF
YEAST LIKE FUNGI URNS QL MICRO: ABNORMAL

## 2024-04-09 RX ORDER — OLMESARTAN MEDOXOMIL 5 MG/1
10 TABLET ORAL DAILY
Qty: 180 TABLET | Refills: 0 | Status: SHIPPED | OUTPATIENT
Start: 2024-04-09

## 2024-04-09 NOTE — TELEPHONE ENCOUNTER
Patient's last appointment was : 2/7/2024 with our   Patient's next appointment is : 8/21/2024 with our Dr. Banks  Last refilled on: 01/04/2024  Which pharmacy does the script need sent to: [facility]      Lab Results   Component Value Date    LABA1C 5.7 08/04/2023     Lab Results   Component Value Date    CHOL 197 11/06/2020    TRIG 174 11/06/2020    HDL 66 06/01/2023    LDLCALC 110 06/01/2023     Lab Results   Component Value Date     04/06/2023    K 3.9 04/06/2023     04/06/2023    CO2 25 04/06/2023    BUN 11 04/06/2023    CREATININE 0.8 01/10/2024    GLUCOSE 135 (H) 04/06/2023    CALCIUM 10.0 04/06/2023    PROT 7.5 02/28/2024    LABALBU 4.9 02/28/2024    BILITOT 0.3 02/28/2024    ALKPHOS 74 02/28/2024    AST 22 02/28/2024    ALT 28 02/28/2024    LABGLOM >60 01/10/2024     Lab Results   Component Value Date    TSH 4.130 06/01/2023     Lab Results   Component Value Date    WBC 6.4 06/01/2023    HGB 14.8 06/01/2023    HCT 46.1 06/01/2023    MCV 91.5 06/01/2023     06/01/2023

## 2024-04-11 LAB
BACTERIA UR CULT: ABNORMAL
BACTERIA UR CULT: ABNORMAL
ORGANISM: ABNORMAL

## 2024-04-12 ENCOUNTER — TELEPHONE (OUTPATIENT)
Dept: UROLOGY | Age: 64
End: 2024-04-12

## 2024-04-12 RX ORDER — SULFAMETHOXAZOLE AND TRIMETHOPRIM 200; 40 MG/5ML; MG/5ML
160 SUSPENSION ORAL 2 TIMES DAILY
Qty: 400 ML | Refills: 0 | Status: SHIPPED | OUTPATIENT
Start: 2024-04-12 | End: 2024-04-22

## 2024-04-12 RX ORDER — SULFAMETHOXAZOLE AND TRIMETHOPRIM 800; 160 MG/1; MG/1
1 TABLET ORAL 2 TIMES DAILY
Qty: 20 TABLET | Refills: 0 | Status: SHIPPED | OUTPATIENT
Start: 2024-04-12 | End: 2024-04-12

## 2024-04-14 DIAGNOSIS — I10 ESSENTIAL HYPERTENSION: ICD-10-CM

## 2024-04-15 RX ORDER — OLMESARTAN MEDOXOMIL 5 MG/1
10 TABLET ORAL DAILY
Qty: 180 TABLET | Refills: 0 | OUTPATIENT
Start: 2024-04-15

## 2024-04-17 ENCOUNTER — TELEPHONE (OUTPATIENT)
Dept: UROLOGY | Age: 64
End: 2024-04-17

## 2024-04-17 RX ORDER — ALFUZOSIN HYDROCHLORIDE 10 MG/1
10 TABLET, EXTENDED RELEASE ORAL DAILY
Qty: 30 TABLET | Refills: 3 | Status: SHIPPED | OUTPATIENT
Start: 2024-04-17

## 2024-04-17 NOTE — TELEPHONE ENCOUNTER
Patient c/o retrograde ejaculation and unable to ejaculate. He does not know if it was from the cancer. The erections are fine.  Is that a side effect of silodosin?    He has get a weird sensation after it happens in the stomach. He was concerned because he has been getting a lot of UTI.    Advised retrograde ejaculation is not harmful.

## 2024-04-17 NOTE — TELEPHONE ENCOUNTER
With an alpha blocker like silodosin, retrograde ejaculation can be a side effect. It does not increase risk of infection.     Alternative is alfuzosin, which has the least risk of retrograde ejaculation however is still can be a side effect.

## 2024-06-20 DIAGNOSIS — E78.00 HYPERCHOLESTEROLEMIA: ICD-10-CM

## 2024-06-20 RX ORDER — PRAVASTATIN SODIUM 40 MG
40 TABLET ORAL DAILY
Qty: 90 TABLET | Refills: 0 | OUTPATIENT
Start: 2024-06-20

## 2024-06-21 DIAGNOSIS — E78.00 HYPERCHOLESTEROLEMIA: ICD-10-CM

## 2024-06-21 NOTE — TELEPHONE ENCOUNTER
Patient's last appointment was : 2/7/2024 with our   Patient's next appointment is :8/21/2024 with our Dr. Banks  Last refilled on: 3/7/2024  Which pharmacy does the script need sent to: [facility]      Lab Results   Component Value Date    LABA1C 5.7 08/04/2023     Lab Results   Component Value Date    CHOL 197 11/06/2020    TRIG 174 11/06/2020    HDL 66 06/01/2023     Lab Results   Component Value Date     04/06/2023    K 3.9 04/06/2023     04/06/2023    CO2 25 04/06/2023    BUN 11 04/06/2023    CREATININE 0.8 01/10/2024    GLUCOSE 135 (H) 04/06/2023    CALCIUM 10.0 04/06/2023    BILITOT 0.3 02/28/2024    ALKPHOS 74 02/28/2024    AST 22 02/28/2024    ALT 28 02/28/2024    LABGLOM >60 01/10/2024     Lab Results   Component Value Date    TSH 4.130 06/01/2023     Lab Results   Component Value Date    WBC 6.4 06/01/2023    HGB 14.8 06/01/2023    HCT 46.1 06/01/2023    MCV 91.5 06/01/2023     06/01/2023

## 2024-06-22 RX ORDER — PRAVASTATIN SODIUM 40 MG
40 TABLET ORAL DAILY
Qty: 90 TABLET | Refills: 0 | Status: SHIPPED | OUTPATIENT
Start: 2024-06-22

## 2024-06-26 RX ORDER — PRAVASTATIN SODIUM 40 MG
40 TABLET ORAL DAILY
Qty: 90 TABLET | Refills: 0 | OUTPATIENT
Start: 2024-06-26

## 2024-07-12 DIAGNOSIS — I10 ESSENTIAL HYPERTENSION: ICD-10-CM

## 2024-07-15 NOTE — TELEPHONE ENCOUNTER
Patient's last appointment was : 2/7/2024 with our   Patient's next appointment is : 8/21/2024 with our Dr. Banks  Last refilled on: 4/16/2024  Which pharmacy does the script need sent to: [facility], [address]      Lab Results   Component Value Date    LABA1C 5.7 08/04/2023     Lab Results   Component Value Date    CHOL 197 11/06/2020    TRIG 174 11/06/2020    HDL 66 06/01/2023     Lab Results   Component Value Date     04/06/2023    K 3.9 04/06/2023     04/06/2023    CO2 25 04/06/2023    BUN 11 04/06/2023    CREATININE 0.8 01/10/2024    GLUCOSE 135 (H) 04/06/2023    CALCIUM 10.0 04/06/2023    BILITOT 0.3 02/28/2024    ALKPHOS 74 02/28/2024    AST 22 02/28/2024    ALT 28 02/28/2024    LABGLOM >60 01/10/2024     Lab Results   Component Value Date    TSH 4.130 06/01/2023     Lab Results   Component Value Date    WBC 6.4 06/01/2023    HGB 14.8 06/01/2023    HCT 46.1 06/01/2023    MCV 91.5 06/01/2023     06/01/2023

## 2024-07-16 RX ORDER — OLMESARTAN MEDOXOMIL 5 MG/1
10 TABLET ORAL DAILY
Qty: 180 TABLET | Refills: 0 | Status: SHIPPED | OUTPATIENT
Start: 2024-07-16

## 2024-07-26 ENCOUNTER — LAB (OUTPATIENT)
Dept: LAB | Age: 64
End: 2024-07-26

## 2024-07-26 ENCOUNTER — HOSPITAL ENCOUNTER (OUTPATIENT)
Dept: GENERAL RADIOLOGY | Age: 64
Discharge: HOME | End: 2024-07-26
Payer: COMMERCIAL

## 2024-07-26 ENCOUNTER — HOSPITAL ENCOUNTER (OUTPATIENT)
Age: 64
Discharge: HOME | End: 2024-07-26
Payer: COMMERCIAL

## 2024-07-26 DIAGNOSIS — N20.0 KIDNEY STONE: Primary | ICD-10-CM

## 2024-07-26 DIAGNOSIS — C61 PROSTATE CANCER (HCC): ICD-10-CM

## 2024-07-26 DIAGNOSIS — N20.0 KIDNEY STONE: ICD-10-CM

## 2024-07-26 LAB — PSA SERPL-MCNC: 3.66 NG/ML (ref 0–1)

## 2024-07-26 PROCEDURE — 74018 RADEX ABDOMEN 1 VIEW: CPT

## 2024-08-08 ENCOUNTER — OFFICE VISIT (OUTPATIENT)
Dept: UROLOGY | Age: 64
End: 2024-08-08
Payer: COMMERCIAL

## 2024-08-08 VITALS — HEIGHT: 68 IN | WEIGHT: 190 LBS | RESPIRATION RATE: 16 BRPM | BODY MASS INDEX: 28.79 KG/M2

## 2024-08-08 DIAGNOSIS — N39.0 RECURRENT UTI: Primary | ICD-10-CM

## 2024-08-08 DIAGNOSIS — N20.0 KIDNEY STONE: ICD-10-CM

## 2024-08-08 DIAGNOSIS — N40.1 BENIGN LOCALIZED PROSTATIC HYPERPLASIA WITH LOWER URINARY TRACT SYMPTOMS (LUTS): ICD-10-CM

## 2024-08-08 DIAGNOSIS — C61 PROSTATE CANCER (HCC): ICD-10-CM

## 2024-08-08 LAB
BILIRUBIN, URINE: NEGATIVE
BLOOD URINE, POC: NEGATIVE
CHARACTER, URINE: CLEAR
COLOR, UA: YELLOW
GLUCOSE URINE: NEGATIVE MG/DL
KETONES, URINE: NEGATIVE
LEUKOCYTE CLUMPS, URINE: NEGATIVE
NITRITE, URINE: NEGATIVE
PH, URINE: 6.5 (ref 5–9)
PROTEIN, URINE: NEGATIVE MG/DL
SPECIFIC GRAVITY UA: 1.01 (ref 1–1.03)
UROBILINOGEN, URINE: 0.2 EU/DL (ref 0–1)

## 2024-08-08 PROCEDURE — 99214 OFFICE O/P EST MOD 30 MIN: CPT | Performed by: UROLOGY

## 2024-08-08 PROCEDURE — 81003 URINALYSIS AUTO W/O SCOPE: CPT | Performed by: UROLOGY

## 2024-08-08 NOTE — PROGRESS NOTES
SPEEDY HERNANDEZ MD        [facility]  [address]  [address]  [facility] [address]  Dept: [phone]  Dept Fax: [phone]  Loc: [phone]      [facility] Urology Office Note -     Patient:  Koko Chao  YOB: 1960  Date: 8/8/2024    The patient is a 64 y.o. male who presents today for evaluation of the following problems:   Chief Complaint   Patient presents with    Follow-up    Prostate Cancer     PSA results 3.66    referred/consultation requested by Hannah Banks MD.    HISTORY OF PRESENT ILLNESS:     Prostate Cancer  On active surveillance with hx of three biopsies (matthew six in one core)   Here with a follow-up PSA and MRI-- pirads 2    BPH  LUTS chronic. Auass 14.  Not on medications for this.  Was started on Rapaflo in past, but has decided not to take it.     Recurrent uti  No new infections    Kidney stone  Recent stone treatment  Had [facility]olink reviewed marga pittman      Requested/reviewed records from Hannah Banks MD office and/or outside physician/EMR    (Patient's old records have been requested, reviewed and pertinent findings summarized in today's note.)    Procedures Today: N/A      Last several PSA's:  Lab Results   Component Value Date    PSA 3.66 (H) 07/26/2024    PSA 2.83 (H) 01/24/2024    PSA 2.96 (H) 08/04/2023       Last total testosterone:  No results found for: \"TESTOSTERONE\"    Urinalysis today:  Results for POC orders placed in visit on 08/08/24   POCT Urinalysis No Micro (Auto)   Result Value Ref Range    Glucose, Ur Negative NEGATIVE mg/dl    Bilirubin, Urine Negative     Ketones, Urine Negative NEGATIVE    Specific Gravity, UA 1.015 1.002 - 1.030    Blood, UA POC Negative NEGATIVE    pH, Urine 6.50 5.0 - 9.0    Protein, Urine Negative NEGATIVE mg/dl    Urobilinogen, Urine 0.20 0.0 - 1.0 eu/dl    Nitrite, Urine Negative NEGATIVE    Leukocyte Clumps, Urine Negative NEGATIVE    Color, UA Yellow YELLOW-STRAW

## 2024-08-21 ENCOUNTER — TELEPHONE (OUTPATIENT)
Dept: FAMILY MEDICINE CLINIC | Age: 64
End: 2024-08-21

## 2024-08-21 NOTE — TELEPHONE ENCOUNTER
----- Message from Jabari KIRAN sent at 8/21/2024  1:28 PM EDT -----  Regarding: ECC Appointment Request  ECC Appointment Request    Patient needs appointment for ECC Appointment Type: Existing Condition Follow Up.    Patient Requested Dates(s):anyday except Monday within this year   Patient Requested Time:anytime   Provider Name:   Hannah Banks MD        Reason for Appointment Request: Established Patient - Available appointments did not meet patient need/patient want to see his PCP for his 6 months f/u and medication refills anyday within this year .He cancelled his appointment today since he was at work .  --------------------------------------------------------------------------------------------------------------------------    Relationship to Patient: Self     Call Back Information: OK to leave message on voicemail  Preferred Call Back Number: Phone [phone] (home)

## 2024-08-23 ENCOUNTER — LAB (OUTPATIENT)
Dept: LAB | Age: 64
End: 2024-08-23

## 2024-08-23 ENCOUNTER — TELEPHONE (OUTPATIENT)
Dept: UROLOGY | Age: 64
End: 2024-08-23

## 2024-08-23 DIAGNOSIS — N39.0 RECURRENT UTI: ICD-10-CM

## 2024-08-23 DIAGNOSIS — N39.0 RECURRENT UTI: Primary | ICD-10-CM

## 2024-08-23 LAB
BACTERIA: NORMAL
BILIRUB UR QL STRIP: NEGATIVE
CASTS #/AREA URNS LPF: NORMAL /LPF
CASTS #/AREA URNS LPF: NORMAL /LPF
CHARACTER UR: CLEAR
CHARCOAL URNS QL MICRO: NORMAL
COLOR UR: YELLOW
CRYSTALS URNS QL MICRO: NORMAL
EPITHELIAL CELLS, UA: NORMAL /HPF
GLUCOSE UR QL STRIP.AUTO: NEGATIVE MG/DL
HGB UR QL STRIP.AUTO: NEGATIVE
KETONES UR QL STRIP.AUTO: NEGATIVE
LEUKOCYTE ESTERASE UR QL STRIP.AUTO: NEGATIVE
NITRITE UR QL STRIP.AUTO: NEGATIVE
PH UR STRIP.AUTO: 7 [PH] (ref 5–9)
PROT UR STRIP.AUTO-MCNC: NEGATIVE MG/DL
RBC #/AREA URNS HPF: NORMAL /HPF
RENAL EPI CELLS #/AREA URNS HPF: NORMAL /[HPF]
SPECIFIC GRAVITY UA: 1.02 (ref 1–1.03)
UROBILINOGEN, URINE: 0.2 EU/DL (ref 0–1)
WBC #/AREA URNS HPF: NORMAL /HPF
YEAST LIKE FUNGI URNS QL MICRO: NORMAL

## 2024-08-23 NOTE — TELEPHONE ENCOUNTER
Patient c/o pain with urination.     He denies urgency, frequency, fever, chills.    Orders placed for urine

## 2024-08-25 LAB — BACTERIA UR CULT: NORMAL

## 2024-09-19 ENCOUNTER — HOSPITAL ENCOUNTER (OUTPATIENT)
Dept: ULTRASOUND IMAGING | Age: 64
Discharge: HOME | End: 2024-09-19
Payer: COMMERCIAL

## 2024-09-19 DIAGNOSIS — K76.0 FATTY LIVER: ICD-10-CM

## 2024-09-19 PROCEDURE — 76981 USE PARENCHYMA: CPT

## 2024-09-19 PROCEDURE — 76705 ECHO EXAM OF ABDOMEN: CPT

## 2024-09-23 DIAGNOSIS — E78.00 HYPERCHOLESTEROLEMIA: ICD-10-CM

## 2024-09-27 NOTE — TELEPHONE ENCOUNTER
Patient's last appointment was: 2/7/2024  with our   Patient's next appointment is: Visit date not found  with our    Last refilled on: 6/22/2024  Which pharmacy does the script need sent to: [facility], [place]        Lab Results   Component Value Date    LABA1C 5.7 08/04/2023     Lab Results   Component Value Date    CHOL 197 11/06/2020    TRIG 174 11/06/2020    HDL 66 06/01/2023     Lab Results   Component Value Date     09/19/2024    K 4.4 09/19/2024     09/19/2024    CO2 23 09/19/2024    BUN 11 09/19/2024    CREATININE 0.9 09/19/2024    GLUCOSE 107 09/19/2024    CALCIUM 10.0 09/19/2024    BILITOT 0.3 09/19/2024    ALKPHOS 79 09/19/2024    AST 17 09/19/2024    ALT 23 09/19/2024    LABGLOM > 90 09/19/2024     Lab Results   Component Value Date    TSH 4.130 06/01/2023     Lab Results   Component Value Date    WBC 6.0 09/19/2024    HGB 15.4 09/19/2024    HCT 47.1 09/19/2024    MCV 89.0 09/19/2024     09/19/2024

## 2024-10-01 RX ORDER — PRAVASTATIN SODIUM 40 MG
40 TABLET ORAL DAILY
Qty: 90 TABLET | Refills: 0 | Status: SHIPPED | OUTPATIENT
Start: 2024-10-01

## 2024-10-12 DIAGNOSIS — I10 ESSENTIAL HYPERTENSION: ICD-10-CM

## 2024-10-15 RX ORDER — OLMESARTAN MEDOXOMIL 5 MG/1
10 TABLET ORAL DAILY
Qty: 180 TABLET | Refills: 0 | Status: SHIPPED | OUTPATIENT
Start: 2024-10-15

## 2024-10-15 NOTE — TELEPHONE ENCOUNTER
Patient's last appointment was: 2/7/2024  with our   Patient's next appointment is: Visit date not found  with our    Last refilled on: 7/18/2024  Which pharmacy does the script need sent to: [facility],[address]      Lab Results   Component Value Date    LABA1C 5.7 08/04/2023     Lab Results   Component Value Date    CHOL 197 11/06/2020    TRIG 174 11/06/2020    HDL 66 06/01/2023     Lab Results   Component Value Date     09/19/2024    K 4.4 09/19/2024     09/19/2024    CO2 23 09/19/2024    BUN 11 09/19/2024    CREATININE 0.9 09/19/2024    GLUCOSE 107 09/19/2024    CALCIUM 10.0 09/19/2024    BILITOT 0.3 09/19/2024    ALKPHOS 79 09/19/2024    AST 17 09/19/2024    ALT 23 09/19/2024    LABGLOM > 90 09/19/2024     Lab Results   Component Value Date    TSH 4.130 06/01/2023     Lab Results   Component Value Date    WBC 6.0 09/19/2024    HGB 15.4 09/19/2024    HCT 47.1 09/19/2024    MCV 89.0 09/19/2024     09/19/2024

## 2025-01-05 DIAGNOSIS — E78.00 HYPERCHOLESTEROLEMIA: ICD-10-CM

## 2025-01-06 NOTE — TELEPHONE ENCOUNTER
Patient's last appointment was: 2/7/2024  with our   Patient's next appointment is: Visit date not found    Last refilled on: 10/1/2024  Which pharmacy does the script need sent to: [facility] [place]      Lab Results   Component Value Date    LABA1C 5.7 08/04/2023     Lab Results   Component Value Date    CHOL 197 11/06/2020    TRIG 174 11/06/2020    HDL 66 06/01/2023     Lab Results   Component Value Date     09/19/2024    K 4.4 09/19/2024     09/19/2024    CO2 23 09/19/2024    BUN 11 09/19/2024    CREATININE 0.9 09/19/2024    GLUCOSE 107 09/19/2024    CALCIUM 10.0 09/19/2024    BILITOT 0.3 09/19/2024    ALKPHOS 79 09/19/2024    AST 17 09/19/2024    ALT 23 09/19/2024    LABGLOM > 90 09/19/2024     Lab Results   Component Value Date    TSH 4.130 06/01/2023     Lab Results   Component Value Date    WBC 6.0 09/19/2024    HGB 15.4 09/19/2024    HCT 47.1 09/19/2024    MCV 89.0 09/19/2024     09/19/2024

## 2025-01-07 RX ORDER — PRAVASTATIN SODIUM 40 MG
40 TABLET ORAL DAILY
Qty: 90 TABLET | Refills: 0 | Status: SHIPPED | OUTPATIENT
Start: 2025-01-07

## 2025-01-16 DIAGNOSIS — I10 ESSENTIAL HYPERTENSION: ICD-10-CM

## 2025-01-16 RX ORDER — OLMESARTAN MEDOXOMIL 5 MG/1
10 TABLET ORAL DAILY
Qty: 180 TABLET | Refills: 3 | Status: SHIPPED | OUTPATIENT
Start: 2025-01-16

## 2025-01-16 NOTE — TELEPHONE ENCOUNTER
Patient's last appointment was: 2/7/2024  with our   Patient's next appointment is: Visit date not found  with our    Last refilled on: 10/15/03917  Which pharmacy does the script need sent to: [facility], [address]      Lab Results   Component Value Date    LABA1C 5.7 08/04/2023     Lab Results   Component Value Date    CHOL 197 11/06/2020    TRIG 174 11/06/2020    HDL 66 06/01/2023     Lab Results   Component Value Date     09/19/2024    K 4.4 09/19/2024     09/19/2024    CO2 23 09/19/2024    BUN 11 09/19/2024    CREATININE 0.9 09/19/2024    GLUCOSE 107 09/19/2024    CALCIUM 10.0 09/19/2024    BILITOT 0.3 09/19/2024    ALKPHOS 79 09/19/2024    AST 17 09/19/2024    ALT 23 09/19/2024    LABGLOM > 90 09/19/2024     Lab Results   Component Value Date    TSH 4.130 06/01/2023     Lab Results   Component Value Date    WBC 6.0 09/19/2024    HGB 15.4 09/19/2024    HCT 47.1 09/19/2024    MCV 89.0 09/19/2024     09/19/2024

## 2025-01-17 NOTE — TELEPHONE ENCOUNTER
2nd message left to schedule complete physical this year after 4/28/23   Left message to schedule complete physical April 28 or later this year   Patient advised of the urine results, continue the keflex and follow up appointment moved up to discuss recurrent UTI.   patient returned call to schedule CPE    no

## 2025-01-29 ENCOUNTER — LAB (OUTPATIENT)
Dept: LAB | Age: 65
End: 2025-01-29

## 2025-01-29 DIAGNOSIS — C61 PROSTATE CANCER (HCC): ICD-10-CM

## 2025-01-29 LAB — PSA SERPL-MCNC: 2.72 NG/ML (ref 0–1)

## 2025-02-10 ENCOUNTER — HOSPITAL ENCOUNTER (OUTPATIENT)
Dept: MRI IMAGING | Age: 65
Discharge: HOME | End: 2025-02-10
Attending: UROLOGY
Payer: COMMERCIAL

## 2025-02-10 DIAGNOSIS — C61 PROSTATE CANCER (HCC): ICD-10-CM

## 2025-02-10 LAB — POC CREATININE WHOLE BLOOD: 1 MG/DL (ref 0.5–1.2)

## 2025-02-10 PROCEDURE — 76377 3D RENDER W/INTRP POSTPROCES: CPT

## 2025-02-10 PROCEDURE — 82565 ASSAY OF CREATININE: CPT

## 2025-02-10 PROCEDURE — A9579 GAD-BASE MR CONTRAST NOS,1ML: HCPCS | Performed by: UROLOGY

## 2025-02-10 PROCEDURE — 6360000004 HC RX CONTRAST MEDICATION: Performed by: UROLOGY

## 2025-02-10 RX ADMIN — GADOTERIDOL 20 ML: 279.3 INJECTION, SOLUTION INTRAVENOUS at 08:55

## 2025-02-13 ENCOUNTER — OFFICE VISIT (OUTPATIENT)
Dept: UROLOGY | Age: 65
End: 2025-02-13
Payer: COMMERCIAL

## 2025-02-13 VITALS — HEIGHT: 68 IN | WEIGHT: 190 LBS | BODY MASS INDEX: 28.79 KG/M2 | RESPIRATION RATE: 20 BRPM

## 2025-02-13 DIAGNOSIS — N40.1 BENIGN LOCALIZED PROSTATIC HYPERPLASIA WITH LOWER URINARY TRACT SYMPTOMS (LUTS): ICD-10-CM

## 2025-02-13 DIAGNOSIS — C61 PROSTATE CANCER (HCC): Primary | ICD-10-CM

## 2025-02-13 PROCEDURE — 99214 OFFICE O/P EST MOD 30 MIN: CPT | Performed by: UROLOGY

## 2025-02-13 NOTE — PROGRESS NOTES
8/2/2023, 12/3/2020 TECHNIQUE: Axial T2, coronal T2 and sagittal T2 imaging of the prostate gland. Large field of view axial T2 imaging of the prostate gland. Axial T1, axial T1 VIBE dynamic post tadeo and axial T1 VIBE dynamic subtracted post tadeo images through the prostate gland. Diffusion 50, 800, 1400 and ADC maps through the prostate gland. Axial T1 STAR VIBE post tadeo through the pelvis. 3-D images reconstructed on a separate Optifreeze Cad workstation with MRI TRUS fusion of prostate mass lesions. CONTRAST: 20  mL of ProHance  intravenously. LABORATORY: 1. PSA on 1/29/2025 was 2.72 ng/ml 2. PSA on 7/26/2024 was 3.66 ng/ml 3. PSA on 1/24/2024 was 2.83 ng/ml FINDINGS: PROSTATE SIZE: 1. The prostate gland is mildly enlarged measuring 5.5 x 4.6 x 3.2 cm 2. The prostate volume is 42.1 ml. TRANSITIONAL ZONE: 1. Heterogeneous appearance. PERIPHERAL ZONE: 1. Diffusely intermediate T2 signal. No focal areas of restricted diffusion. SEMINAL VESICLES: Unremarkable. NEUROVASCULAR BUNDLES: Not involved URINARY BLADDER/RECTUM/PELVIC DIAPHRAGM: 1. Mild circumferential bladder wall thickening. 2. The rectum and pelvic diaphragm are unremarkable. PATHOLOGICALLY ENLARGED LYMPH NODES: 1. None. OSSEOUS STRUCTURES: 1. Unremarkable. OTHER: 1. Small fat-containing right inguinal hernia.     1. Heterogeneous prostate gland that greatly limits the sensitivity for detecting clinically significant prostate cancer. This should not preclude further testing and/or tissue sampling if clinically warranted. 2. Overall PI-RADS assessment is PI-RADS 2         PAST MEDICAL, FAMILY AND SOCIAL HISTORY:  Past Medical History:   Diagnosis Date    Cancer (HCC)     CRVO (central retinal vein occlusion) 01/2016    Right eye    Heartburn     Hyperlipidemia 2010    Hypertension 2010     Past Surgical History:   Procedure Laterality Date    COLONOSCOPY  2009    WNL     COLONOSCOPY N/A 3/13/2020    COLONOSCOPY DIAGNOSTIC performed by Percy Cantu MD at [facility]

## 2025-03-22 ENCOUNTER — HOSPITAL ENCOUNTER (EMERGENCY)
Age: 65
Discharge: HOME | End: 2025-03-22
Payer: COMMERCIAL

## 2025-03-22 ENCOUNTER — APPOINTMENT (OUTPATIENT)
Dept: GENERAL RADIOLOGY | Age: 65
End: 2025-03-22
Payer: COMMERCIAL

## 2025-03-22 VITALS
DIASTOLIC BLOOD PRESSURE: 79 MMHG | WEIGHT: 195 LBS | OXYGEN SATURATION: 95 % | HEIGHT: 68 IN | TEMPERATURE: 98.1 F | RESPIRATION RATE: 16 BRPM | SYSTOLIC BLOOD PRESSURE: 143 MMHG | BODY MASS INDEX: 29.55 KG/M2 | HEART RATE: 98 BPM

## 2025-03-22 DIAGNOSIS — J18.9 PNEUMONIA OF LEFT UPPER LOBE DUE TO INFECTIOUS ORGANISM: Primary | ICD-10-CM

## 2025-03-22 PROCEDURE — 71046 X-RAY EXAM CHEST 2 VIEWS: CPT

## 2025-03-22 PROCEDURE — 99213 OFFICE O/P EST LOW 20 MIN: CPT

## 2025-03-22 RX ORDER — AZITHROMYCIN 250 MG/1
TABLET, FILM COATED ORAL
Qty: 1 PACKET | Refills: 0 | Status: SHIPPED | OUTPATIENT
Start: 2025-03-22 | End: 2025-03-26

## 2025-03-22 RX ORDER — PREDNISONE 20 MG/1
20 TABLET ORAL 2 TIMES DAILY
Qty: 10 TABLET | Refills: 0 | Status: SHIPPED | OUTPATIENT
Start: 2025-03-22 | End: 2025-03-27

## 2025-03-22 RX ORDER — ALBUTEROL SULFATE 90 UG/1
2 INHALANT RESPIRATORY (INHALATION) EVERY 4 HOURS PRN
Qty: 18 G | Refills: 0 | Status: SHIPPED | OUTPATIENT
Start: 2025-03-22

## 2025-03-22 ASSESSMENT — ENCOUNTER SYMPTOMS
COUGH: 1
CHEST TIGHTNESS: 1

## 2025-03-22 ASSESSMENT — PAIN - FUNCTIONAL ASSESSMENT: PAIN_FUNCTIONAL_ASSESSMENT: NONE - DENIES PAIN

## 2025-03-22 NOTE — ED PROVIDER NOTES
[facility] URGENT CARE  Urgent Care Encounter       CHIEF COMPLAINT       Chief Complaint   Patient presents with    Cough     productive       Nurses Notes reviewed and I agree except as noted in the HPI.  HISTORY OF PRESENT ILLNESS   Koko Chao is a 65 y.o. male who presents with complaints of productive cough, chest congestion that started last Friday. Pt reports originally having fevers, and congestion but now it is just in his chest. Pt reports shortness of breath when coughing fits occur.     The history is provided by the patient.       REVIEW OF SYSTEMS     Review of Systems   Respiratory:  Positive for cough and chest tightness.    All other systems reviewed and are negative.      PAST MEDICAL HISTORY         Diagnosis Date    Cancer (HCC)     CRVO (central retinal vein occlusion) (HCC) 01/2016    Right eye    Heartburn     Hyperlipidemia 2010    Hypertension 2010       SURGICALHISTORY     Patient  has a past surgical history that includes Testicle removal (1994); Colonoscopy (2009); Prostate biopsy (2012); liver biopsy (1995); Ultrasound Prostate/Transrectal (N/A, 1/16/2020); Colonoscopy (N/A, 3/13/2020); Upper gastrointestinal endoscopy (N/A, 3/13/2020); Cystoscopy (Left, 4/6/2023); and Ureter surgery (N/A, 4/15/2023).    CURRENT MEDICATIONS       Previous Medications    ALFUZOSIN (UROXATRAL) 10 MG EXTENDED RELEASE TABLET    Take 1 tablet by mouth daily    ASPIRIN 81 MG TABLET    Take 1 tablet by mouth daily    CRANBERRY 500 MG CAPS    Take 1 capsule by mouth 2 times daily    D-MANNOSE 500 MG CAPS    Take 500 mg by mouth daily    FAMOTIDINE PO    Take by mouth daily    FLUTICASONE (FLONASE) 50 MCG/ACT NASAL SPRAY    1 spray by Each Nostril route daily    OLMESARTAN (BENICAR) 5 MG TABLET    Take 2 tablets by mouth once daily    PANTOPRAZOLE SODIUM PO    Take by mouth daily    PRAVASTATIN (PRAVACHOL) 40 MG TABLET    Take 1 tablet by mouth once daily       ALLERGIES     Patient is is allergic to  Dr. Gracy Ward            EKG:      URGENT CARE COURSE:     Vitals:    03/22/25 0958   BP: (!) 143/79   Pulse: 98   Resp: 16   Temp: 98.1 °F (36.7 °C)   TempSrc: Oral   SpO2: 95%   Weight: 88.5 kg (195 lb)   Height: 1.727 m (5' 8\")       Medications - No data to display         PROCEDURES:  None    FINAL IMPRESSION      1. Pneumonia of left upper lobe due to infectious organism          DISPOSITION/ PLAN   Pt diagnosed with pneumonia of left upper lobe. Pt prescribed augmentin, azithromycin, and prednisone. Pt educated to follow up with PCP for recheck. ER if symptoms worsen.        PATIENT REFERRED TO:  Hannah Banks MD  [address] [facility] [address] / [facility] [address]      DISCHARGE MEDICATIONS:  New Prescriptions    ALBUTEROL SULFATE HFA (VENTOLIN HFA) 108 (90 BASE) MCG/ACT INHALER    Inhale 2 puffs into the lungs every 4 hours as needed for Wheezing or Shortness of Breath    AMOXICILLIN-CLAVULANATE (AUGMENTIN) 875-125 MG PER TABLET    Take 1 tablet by mouth 2 times daily for 7 days    AZITHROMYCIN (ZITHROMAX Z-NICOLE) 250 MG TABLET    Take 2 tablets (500 mg) on Day 1, and then take 1 tablet (250 mg) on days 2 through 5.    PREDNISONE (DELTASONE) 20 MG TABLET    Take 1 tablet by mouth 2 times daily for 5 days       Discontinued Medications    No medications on file       Current Discharge Medication List          CORBY Espinosa CNP    (Please note that portions of this note were completed with a voice recognition program. Efforts were made to edit the dictations but occasionally words are mis-transcribed.)            Justine Doe APRN - CNP  03/22/25 1101

## 2025-03-22 NOTE — ED TRIAGE NOTES
Patient comes in with productive cough with green sputum after cold symptoms for 6 days. Patient states at the beginning of his illness he had fevers and congestion which have resolved at this time.

## 2025-03-22 NOTE — DISCHARGE INSTRUCTIONS
Antibiotics  Steroids  Albuterol inhaler as needed for wheezing, shortness of breath, coughing fits.  Follow up with PCP next Thursday/ Friday for recheck  ER if symptoms worsen    
Patent

## 2025-04-11 ENCOUNTER — APPOINTMENT (OUTPATIENT)
Dept: CT IMAGING | Age: 65
DRG: 023 | End: 2025-04-11
Payer: COMMERCIAL

## 2025-04-11 ENCOUNTER — APPOINTMENT (OUTPATIENT)
Dept: MRI IMAGING | Age: 65
DRG: 023 | End: 2025-04-11
Payer: COMMERCIAL

## 2025-04-11 ENCOUNTER — HOSPITAL ENCOUNTER (INPATIENT)
Age: 65
LOS: 17 days | Discharge: NURSING, REHAB OR LONG-TERM CARE | DRG: 023 | End: 2025-04-28
Attending: EMERGENCY MEDICINE | Admitting: INTERNAL MEDICINE
Payer: COMMERCIAL

## 2025-04-11 DIAGNOSIS — G93.89 BRAIN MASS: ICD-10-CM

## 2025-04-11 DIAGNOSIS — G93.89 MASS OF BRAIN: Primary | ICD-10-CM

## 2025-04-11 DIAGNOSIS — I10 PRIMARY HYPERTENSION: ICD-10-CM

## 2025-04-11 DIAGNOSIS — G06.0 BRAIN ABSCESS: ICD-10-CM

## 2025-04-11 DIAGNOSIS — G93.89 LEFT PARIETAL MASS: ICD-10-CM

## 2025-04-11 DIAGNOSIS — I82.4Z1 DVT, LOWER EXTREMITY, DISTAL, ACUTE, RIGHT (HCC): ICD-10-CM

## 2025-04-11 LAB
ALBUMIN SERPL BCG-MCNC: 4.1 G/DL (ref 3.4–4.9)
ALP SERPL-CCNC: 80 U/L (ref 40–129)
ALT SERPL W/O P-5'-P-CCNC: 21 U/L (ref 10–50)
ANION GAP SERPL CALC-SCNC: 14 MEQ/L (ref 8–16)
AST SERPL-CCNC: 19 U/L (ref 10–50)
BASOPHILS ABSOLUTE: 0.1 THOU/MM3 (ref 0–0.1)
BASOPHILS NFR BLD AUTO: 0.8 %
BILIRUB SERPL-MCNC: 0.2 MG/DL (ref 0.3–1.2)
BUN SERPL-MCNC: 12 MG/DL (ref 8–23)
CALCIUM SERPL-MCNC: 9.6 MG/DL (ref 8.8–10.2)
CHLORIDE SERPL-SCNC: 103 MEQ/L (ref 98–111)
CO2 SERPL-SCNC: 22 MEQ/L (ref 22–29)
CREAT SERPL-MCNC: 0.8 MG/DL (ref 0.7–1.2)
DEPRECATED RDW RBC AUTO: 40.7 FL (ref 35–45)
EKG ATRIAL RATE: 79 BPM
EKG P AXIS: 46 DEGREES
EKG P-R INTERVAL: 142 MS
EKG Q-T INTERVAL: 382 MS
EKG QRS DURATION: 92 MS
EKG QTC CALCULATION (BAZETT): 438 MS
EKG R AXIS: 91 DEGREES
EKG T AXIS: 79 DEGREES
EKG VENTRICULAR RATE: 79 BPM
EOSINOPHIL NFR BLD AUTO: 4.9 %
EOSINOPHILS ABSOLUTE: 0.4 THOU/MM3 (ref 0–0.4)
ERYTHROCYTE [DISTWIDTH] IN BLOOD BY AUTOMATED COUNT: 12.8 % (ref 11.5–14.5)
GFR SERPL CREATININE-BSD FRML MDRD: > 90 ML/MIN/1.73M2
GLUCOSE SERPL-MCNC: 111 MG/DL (ref 74–109)
HCT VFR BLD AUTO: 44.2 % (ref 42–52)
HGB BLD-MCNC: 14.4 GM/DL (ref 14–18)
IMM GRANULOCYTES # BLD AUTO: 0.03 THOU/MM3 (ref 0–0.07)
IMM GRANULOCYTES NFR BLD AUTO: 0.4 %
LYMPHOCYTES ABSOLUTE: 2.3 THOU/MM3 (ref 1–4.8)
LYMPHOCYTES NFR BLD AUTO: 27.9 %
MAGNESIUM SERPL-MCNC: 2 MG/DL (ref 1.6–2.6)
MCH RBC QN AUTO: 28.6 PG (ref 26–33)
MCHC RBC AUTO-ENTMCNC: 32.6 GM/DL (ref 32.2–35.5)
MCV RBC AUTO: 87.9 FL (ref 80–94)
MONOCYTES ABSOLUTE: 0.5 THOU/MM3 (ref 0.4–1.3)
MONOCYTES NFR BLD AUTO: 6.5 %
NEUTROPHILS ABSOLUTE: 4.9 THOU/MM3 (ref 1.8–7.7)
NEUTROPHILS NFR BLD AUTO: 59.5 %
NRBC BLD AUTO-RTO: 0 /100 WBC
NT-PROBNP SERPL IA-MCNC: < 36 PG/ML (ref 0–124)
OSMOLALITY SERPL CALC.SUM OF ELEC: 278 MOSMOL/KG (ref 275–300)
PLATELET # BLD AUTO: 325 THOU/MM3 (ref 130–400)
PMV BLD AUTO: 9.4 FL (ref 9.4–12.4)
POTASSIUM SERPL-SCNC: 3.9 MEQ/L (ref 3.5–5.2)
PROT SERPL-MCNC: 6.6 G/DL (ref 6.4–8.3)
RBC # BLD AUTO: 5.03 MILL/MM3 (ref 4.7–6.1)
SODIUM SERPL-SCNC: 139 MEQ/L (ref 135–145)
TROPONIN, HIGH SENSITIVITY: 7 NG/L (ref 0–12)
WBC # BLD AUTO: 8.3 THOU/MM3 (ref 4.8–10.8)

## 2025-04-11 PROCEDURE — 6360000002 HC RX W HCPCS

## 2025-04-11 PROCEDURE — 70498 CT ANGIOGRAPHY NECK: CPT

## 2025-04-11 PROCEDURE — 99285 EMERGENCY DEPT VISIT HI MDM: CPT

## 2025-04-11 PROCEDURE — 85025 COMPLETE CBC W/AUTO DIFF WBC: CPT

## 2025-04-11 PROCEDURE — 93010 ELECTROCARDIOGRAM REPORT: CPT | Performed by: NUCLEAR MEDICINE

## 2025-04-11 PROCEDURE — 70496 CT ANGIOGRAPHY HEAD: CPT

## 2025-04-11 PROCEDURE — 96374 THER/PROPH/DIAG INJ IV PUSH: CPT

## 2025-04-11 PROCEDURE — 70553 MRI BRAIN STEM W/O & W/DYE: CPT

## 2025-04-11 PROCEDURE — 80053 COMPREHEN METABOLIC PANEL: CPT

## 2025-04-11 PROCEDURE — 99223 1ST HOSP IP/OBS HIGH 75: CPT | Performed by: STUDENT IN AN ORGANIZED HEALTH CARE EDUCATION/TRAINING PROGRAM

## 2025-04-11 PROCEDURE — 74177 CT ABD & PELVIS W/CONTRAST: CPT

## 2025-04-11 PROCEDURE — 93005 ELECTROCARDIOGRAM TRACING: CPT | Performed by: EMERGENCY MEDICINE

## 2025-04-11 PROCEDURE — 6360000004 HC RX CONTRAST MEDICATION

## 2025-04-11 PROCEDURE — 87040 BLOOD CULTURE FOR BACTERIA: CPT

## 2025-04-11 PROCEDURE — 84484 ASSAY OF TROPONIN QUANT: CPT

## 2025-04-11 PROCEDURE — 83880 ASSAY OF NATRIURETIC PEPTIDE: CPT

## 2025-04-11 PROCEDURE — 71260 CT THORAX DX C+: CPT

## 2025-04-11 PROCEDURE — A9579 GAD-BASE MR CONTRAST NOS,1ML: HCPCS

## 2025-04-11 PROCEDURE — 70450 CT HEAD/BRAIN W/O DYE: CPT

## 2025-04-11 PROCEDURE — 83735 ASSAY OF MAGNESIUM: CPT

## 2025-04-11 PROCEDURE — 96375 TX/PRO/DX INJ NEW DRUG ADDON: CPT

## 2025-04-11 PROCEDURE — 6360000004 HC RX CONTRAST MEDICATION: Performed by: NEUROLOGICAL SURGERY

## 2025-04-11 PROCEDURE — 2500000003 HC RX 250 WO HCPCS

## 2025-04-11 PROCEDURE — 2060000000 HC ICU INTERMEDIATE R&B

## 2025-04-11 PROCEDURE — 36415 COLL VENOUS BLD VENIPUNCTURE: CPT

## 2025-04-11 PROCEDURE — 6370000000 HC RX 637 (ALT 250 FOR IP)

## 2025-04-11 RX ORDER — ALBUTEROL SULFATE 90 UG/1
2 INHALANT RESPIRATORY (INHALATION) EVERY 4 HOURS PRN
Status: DISCONTINUED | OUTPATIENT
Start: 2025-04-11 | End: 2025-04-17

## 2025-04-11 RX ORDER — ACETAMINOPHEN 650 MG/1
650 SUPPOSITORY RECTAL EVERY 6 HOURS PRN
Status: DISCONTINUED | OUTPATIENT
Start: 2025-04-11 | End: 2025-04-16

## 2025-04-11 RX ORDER — LEVETIRACETAM 500 MG/5ML
500 INJECTION, SOLUTION, CONCENTRATE INTRAVENOUS ONCE
Status: COMPLETED | OUTPATIENT
Start: 2025-04-11 | End: 2025-04-11

## 2025-04-11 RX ORDER — IOPAMIDOL 755 MG/ML
80 INJECTION, SOLUTION INTRAVASCULAR
Status: COMPLETED | OUTPATIENT
Start: 2025-04-11 | End: 2025-04-11

## 2025-04-11 RX ORDER — LOSARTAN POTASSIUM 25 MG/1
12.5 TABLET ORAL DAILY
Status: DISCONTINUED | OUTPATIENT
Start: 2025-04-11 | End: 2025-04-11

## 2025-04-11 RX ORDER — SODIUM CHLORIDE 0.9 % (FLUSH) 0.9 %
5-40 SYRINGE (ML) INJECTION PRN
Status: DISCONTINUED | OUTPATIENT
Start: 2025-04-11 | End: 2025-04-28 | Stop reason: HOSPADM

## 2025-04-11 RX ORDER — DEXAMETHASONE SODIUM PHOSPHATE 4 MG/ML
4 INJECTION, SOLUTION INTRA-ARTICULAR; INTRALESIONAL; INTRAMUSCULAR; INTRAVENOUS; SOFT TISSUE ONCE
Status: COMPLETED | OUTPATIENT
Start: 2025-04-11 | End: 2025-04-11

## 2025-04-11 RX ORDER — TAMSULOSIN HYDROCHLORIDE 0.4 MG/1
0.4 CAPSULE ORAL DAILY
Status: DISCONTINUED | OUTPATIENT
Start: 2025-04-11 | End: 2025-04-11

## 2025-04-11 RX ORDER — ACETAMINOPHEN 325 MG/1
650 TABLET ORAL EVERY 6 HOURS PRN
Status: DISCONTINUED | OUTPATIENT
Start: 2025-04-11 | End: 2025-04-16

## 2025-04-11 RX ORDER — LOSARTAN POTASSIUM 25 MG/1
12.5 TABLET ORAL 2 TIMES DAILY
Status: DISCONTINUED | OUTPATIENT
Start: 2025-04-12 | End: 2025-04-11

## 2025-04-11 RX ORDER — PRAVASTATIN SODIUM 40 MG
40 TABLET ORAL NIGHTLY
Status: DISCONTINUED | OUTPATIENT
Start: 2025-04-11 | End: 2025-04-28 | Stop reason: HOSPADM

## 2025-04-11 RX ORDER — SODIUM CHLORIDE 9 MG/ML
INJECTION, SOLUTION INTRAVENOUS PRN
Status: DISCONTINUED | OUTPATIENT
Start: 2025-04-11 | End: 2025-04-28 | Stop reason: HOSPADM

## 2025-04-11 RX ORDER — POTASSIUM CHLORIDE 7.45 MG/ML
10 INJECTION INTRAVENOUS PRN
Status: DISCONTINUED | OUTPATIENT
Start: 2025-04-11 | End: 2025-04-28 | Stop reason: HOSPADM

## 2025-04-11 RX ORDER — ONDANSETRON 4 MG/1
4 TABLET, ORALLY DISINTEGRATING ORAL EVERY 8 HOURS PRN
Status: DISCONTINUED | OUTPATIENT
Start: 2025-04-11 | End: 2025-04-28 | Stop reason: HOSPADM

## 2025-04-11 RX ORDER — SODIUM CHLORIDE 0.9 % (FLUSH) 0.9 %
5-40 SYRINGE (ML) INJECTION EVERY 12 HOURS SCHEDULED
Status: DISCONTINUED | OUTPATIENT
Start: 2025-04-11 | End: 2025-04-28 | Stop reason: HOSPADM

## 2025-04-11 RX ORDER — POLYETHYLENE GLYCOL 3350 17 G/17G
17 POWDER, FOR SOLUTION ORAL DAILY PRN
Status: DISCONTINUED | OUTPATIENT
Start: 2025-04-11 | End: 2025-04-28 | Stop reason: HOSPADM

## 2025-04-11 RX ORDER — MAGNESIUM SULFATE IN WATER 40 MG/ML
2000 INJECTION, SOLUTION INTRAVENOUS PRN
Status: DISCONTINUED | OUTPATIENT
Start: 2025-04-11 | End: 2025-04-28 | Stop reason: HOSPADM

## 2025-04-11 RX ORDER — POTASSIUM CHLORIDE 1500 MG/1
40 TABLET, EXTENDED RELEASE ORAL PRN
Status: DISCONTINUED | OUTPATIENT
Start: 2025-04-11 | End: 2025-04-28 | Stop reason: HOSPADM

## 2025-04-11 RX ORDER — ONDANSETRON 2 MG/ML
4 INJECTION INTRAMUSCULAR; INTRAVENOUS EVERY 6 HOURS PRN
Status: DISCONTINUED | OUTPATIENT
Start: 2025-04-11 | End: 2025-04-28 | Stop reason: HOSPADM

## 2025-04-11 RX ORDER — LEVETIRACETAM 500 MG/5ML
500 INJECTION, SOLUTION, CONCENTRATE INTRAVENOUS EVERY 12 HOURS
Status: DISCONTINUED | OUTPATIENT
Start: 2025-04-12 | End: 2025-04-28 | Stop reason: HOSPADM

## 2025-04-11 RX ORDER — LOSARTAN POTASSIUM 25 MG/1
12.5 TABLET ORAL 2 TIMES DAILY
Status: DISCONTINUED | OUTPATIENT
Start: 2025-04-11 | End: 2025-04-17

## 2025-04-11 RX ORDER — DEXAMETHASONE SODIUM PHOSPHATE 4 MG/ML
4 INJECTION, SOLUTION INTRA-ARTICULAR; INTRALESIONAL; INTRAMUSCULAR; INTRAVENOUS; SOFT TISSUE EVERY 6 HOURS
Status: DISCONTINUED | OUTPATIENT
Start: 2025-04-11 | End: 2025-04-16

## 2025-04-11 RX ADMIN — IOPAMIDOL 80 ML: 755 INJECTION, SOLUTION INTRAVENOUS at 15:12

## 2025-04-11 RX ADMIN — IOPAMIDOL 80 ML: 755 INJECTION, SOLUTION INTRAVENOUS at 18:49

## 2025-04-11 RX ADMIN — LOSARTAN POTASSIUM 12.5 MG: 25 TABLET, FILM COATED ORAL at 21:10

## 2025-04-11 RX ADMIN — GADOTERIDOL 15 ML: 279.3 INJECTION, SOLUTION INTRAVENOUS at 18:52

## 2025-04-11 RX ADMIN — SODIUM CHLORIDE, PRESERVATIVE FREE 10 ML: 5 INJECTION INTRAVENOUS at 19:50

## 2025-04-11 RX ADMIN — DEXAMETHASONE SODIUM PHOSPHATE 4 MG: 4 INJECTION, SOLUTION INTRA-ARTICULAR; INTRALESIONAL; INTRAMUSCULAR; INTRAVENOUS; SOFT TISSUE at 16:44

## 2025-04-11 RX ADMIN — PRAVASTATIN SODIUM 40 MG: 40 TABLET ORAL at 21:10

## 2025-04-11 RX ADMIN — LEVETIRACETAM 500 MG: 100 INJECTION INTRAVENOUS at 16:44

## 2025-04-11 RX ADMIN — DEXAMETHASONE SODIUM PHOSPHATE 4 MG: 4 INJECTION, SOLUTION INTRA-ARTICULAR; INTRALESIONAL; INTRAMUSCULAR; INTRAVENOUS; SOFT TISSUE at 21:10

## 2025-04-11 ASSESSMENT — PAIN - FUNCTIONAL ASSESSMENT: PAIN_FUNCTIONAL_ASSESSMENT: NONE - DENIES PAIN

## 2025-04-11 NOTE — ED PROVIDER NOTES
heard.  Pulmonary:      Effort: Pulmonary effort is normal. No respiratory distress.      Breath sounds: Normal breath sounds. No wheezing.   Abdominal:      General: Abdomen is flat. There is no distension.      Palpations: Abdomen is soft.      Tenderness: There is no abdominal tenderness.   Musculoskeletal:         General: Normal range of motion.      Cervical back: Normal range of motion and neck supple. No rigidity.      Right lower leg: No edema.      Left lower leg: No edema.   Lymphadenopathy:      Cervical: No cervical adenopathy.   Skin:     General: Skin is warm and dry.      Capillary Refill: Capillary refill takes less than 2 seconds.      Coloration: Skin is not jaundiced.   Neurological:      General: No focal deficit present.      Mental Status: He is alert and oriented to person, place, and time.   Psychiatric:         Mood and Affect: Mood normal.         ED RESULTS   Laboratory results (none if blank):  Labs Reviewed   COMPREHENSIVE METABOLIC PANEL - Abnormal; Notable for the following components:       Result Value    Glucose 111 (*)     Total Bilirubin 0.2 (*)     All other components within normal limits   CBC WITH AUTO DIFFERENTIAL   TROPONIN   BRAIN NATRIURETIC PEPTIDE   MAGNESIUM   ANION GAP   GLOMERULAR FILTRATION RATE, ESTIMATED   OSMOLALITY     All laboratory results are individually reviewed and interpreted by me in the clinical context of this patient.  See ED course below for results interpretation if applicable.  (A negative COVID-19 test should be interpreted as COVID no longer suspected unless otherwise noted in this encounter documentation note)  (Any cultures that may have been sent were not resulted at the time of this patient ED visit)      Radiologic studies results available at the moment of this note (None if blank):  CT Head W/O Contrast   Final Result   Suspected intra-axial mass in the left parietal lobe. A brain MRI without and   with contrast is recommended.

## 2025-04-11 NOTE — ED NOTES
ED to inpatient nurses report      Chief Complaint:  Chief Complaint   Patient presents with    Memory Loss     Present to ED from: home    MOA:     LOC: alert and orientated to name, place, date  Mobility: Independent  Oxygen Baseline: ra    Current needs required: ra     Code Status:   Full Code    What abnormal results were found and what did you give/do to treat them? Mass on brain  Any procedures or intervention occur? none    Mental Status:  Level of Consciousness: Alert (0)    Psych Assessment:        Vitals:  Patient Vitals for the past 24 hrs:   BP Temp Pulse Resp SpO2 Weight   04/11/25 1621 (!) 168/100 -- 70 14 96 % --   04/11/25 1606 136/86 -- 68 15 97 % --   04/11/25 1507 126/69 -- 72 18 95 % --   04/11/25 1420 138/81 -- 87 20 95 % --   04/11/25 1337 (!) 159/98 -- 79 19 91 % --   04/11/25 1239 (!) 164/84 97.6 °F (36.4 °C) 71 18 96 % 87.1 kg (192 lb)        LDAs:   Peripheral IV 04/11/25 Right;Anterior Hand (Active)   Site Assessment Clean, dry & intact 04/11/25 1249   Line Status Blood return noted;Flushed;Specimen collected 04/11/25 1249       Peripheral IV 04/11/25 Left;Anterior Forearm (Active)   Site Assessment Clean, dry & intact 04/11/25 1338   Line Status Blood return noted;Flushed 04/11/25 1338       Ambulatory Status:  No data recorded    Diagnosis:  DISPOSITION Admitted 04/11/2025 05:34:10 PM   Final diagnoses:   Mass of brain        Consults:  IP CONSULT TO NEUROSURGERY     Pain Score:  Pain Assessment  Pain Assessment: None - Denies Pain    C-SSRS:        Sepsis Screening:       Plover Fall Risk:       Swallow Screening        Preferred Language:   English      ALLERGIES     Demerol, Midazolam hcl, Other, and Versed [midazolam]    SURGICAL HISTORY       Past Surgical History:   Procedure Laterality Date    COLONOSCOPY  2009    WNL     COLONOSCOPY N/A 3/13/2020    COLONOSCOPY DIAGNOSTIC performed by ePrcy Cnatu MD at [facility]    CYSTOSCOPY Left 4/6/2023    CYSTOSCOPY LEFT URETERAL

## 2025-04-11 NOTE — ED NOTES
Pt to ED c/o memory issue starting last night. Pt states he was lying in bed talking to his wife and had a hard time finishing his sentences and remember his wife's name. Pt states episode lasted about 10 minutes. Pt states he has had similar issues at work and his coworkers would finish his sentences. Pt speech slurred during triage; pt and wife states his speech sounds normal. Pt denies headache or dizziness; states vision has been blurry the last couple of days. Pt ambulatory to room 8. EKG completed. VSS. Pt Aox4. Pt forgetful when asked about his medications and past medical hx; pt states he could not remember.

## 2025-04-12 PROBLEM — G93.6 VASOGENIC EDEMA (HCC): Status: ACTIVE | Noted: 2025-04-12

## 2025-04-12 PROBLEM — G93.89 MASS OF BRAIN: Status: ACTIVE | Noted: 2025-04-12

## 2025-04-12 PROBLEM — J18.9 PNEUMONIA OF LEFT LOWER LOBE DUE TO INFECTIOUS ORGANISM: Status: ACTIVE | Noted: 2025-04-12

## 2025-04-12 PROBLEM — G06.0 BRAIN ABSCESS: Status: ACTIVE | Noted: 2025-04-12

## 2025-04-12 PROBLEM — G04.90 CEREBRITIS: Status: ACTIVE | Noted: 2025-04-12

## 2025-04-12 LAB
ANION GAP SERPL CALC-SCNC: 10 MEQ/L (ref 8–16)
BUN SERPL-MCNC: 11 MG/DL (ref 8–23)
CALCIUM SERPL-MCNC: 9.6 MG/DL (ref 8.8–10.2)
CHLORIDE SERPL-SCNC: 104 MEQ/L (ref 98–111)
CO2 SERPL-SCNC: 23 MEQ/L (ref 22–29)
CREAT SERPL-MCNC: 0.9 MG/DL (ref 0.7–1.2)
DEPRECATED RDW RBC AUTO: 40.7 FL (ref 35–45)
ERYTHROCYTE [DISTWIDTH] IN BLOOD BY AUTOMATED COUNT: 12.7 % (ref 11.5–14.5)
GFR SERPL CREATININE-BSD FRML MDRD: > 90 ML/MIN/1.73M2
GLUCOSE SERPL-MCNC: 165 MG/DL (ref 74–109)
HCT VFR BLD AUTO: 45.5 % (ref 42–52)
HGB BLD-MCNC: 15.1 GM/DL (ref 14–18)
MAGNESIUM SERPL-MCNC: 2.1 MG/DL (ref 1.6–2.6)
MCH RBC QN AUTO: 29.2 PG (ref 26–33)
MCHC RBC AUTO-ENTMCNC: 33.2 GM/DL (ref 32.2–35.5)
MCV RBC AUTO: 87.8 FL (ref 80–94)
PLATELET # BLD AUTO: 335 THOU/MM3 (ref 130–400)
PMV BLD AUTO: 9.5 FL (ref 9.4–12.4)
POTASSIUM SERPL-SCNC: 5.1 MEQ/L (ref 3.5–5.2)
RBC # BLD AUTO: 5.18 MILL/MM3 (ref 4.7–6.1)
SODIUM SERPL-SCNC: 137 MEQ/L (ref 135–145)
WBC # BLD AUTO: 8.8 THOU/MM3 (ref 4.8–10.8)

## 2025-04-12 PROCEDURE — 85027 COMPLETE CBC AUTOMATED: CPT

## 2025-04-12 PROCEDURE — 94761 N-INVAS EAR/PLS OXIMETRY MLT: CPT

## 2025-04-12 PROCEDURE — 99233 SBSQ HOSP IP/OBS HIGH 50: CPT | Performed by: STUDENT IN AN ORGANIZED HEALTH CARE EDUCATION/TRAINING PROGRAM

## 2025-04-12 PROCEDURE — 99223 1ST HOSP IP/OBS HIGH 75: CPT | Performed by: NEUROLOGICAL SURGERY

## 2025-04-12 PROCEDURE — 6360000002 HC RX W HCPCS

## 2025-04-12 PROCEDURE — 2500000003 HC RX 250 WO HCPCS: Performed by: STUDENT IN AN ORGANIZED HEALTH CARE EDUCATION/TRAINING PROGRAM

## 2025-04-12 PROCEDURE — 2580000003 HC RX 258: Performed by: STUDENT IN AN ORGANIZED HEALTH CARE EDUCATION/TRAINING PROGRAM

## 2025-04-12 PROCEDURE — 86777 TOXOPLASMA ANTIBODY: CPT

## 2025-04-12 PROCEDURE — 2500000003 HC RX 250 WO HCPCS

## 2025-04-12 PROCEDURE — 6370000000 HC RX 637 (ALT 250 FOR IP)

## 2025-04-12 PROCEDURE — 2060000000 HC ICU INTERMEDIATE R&B

## 2025-04-12 PROCEDURE — 87385 HISTOPLASMA CAPSUL AG IA: CPT

## 2025-04-12 PROCEDURE — 86778 TOXOPLASMA ANTIBODY IGM: CPT

## 2025-04-12 PROCEDURE — 6360000002 HC RX W HCPCS: Performed by: STUDENT IN AN ORGANIZED HEALTH CARE EDUCATION/TRAINING PROGRAM

## 2025-04-12 PROCEDURE — 99223 1ST HOSP IP/OBS HIGH 75: CPT | Performed by: STUDENT IN AN ORGANIZED HEALTH CARE EDUCATION/TRAINING PROGRAM

## 2025-04-12 PROCEDURE — 80048 BASIC METABOLIC PNL TOTAL CA: CPT

## 2025-04-12 PROCEDURE — 83735 ASSAY OF MAGNESIUM: CPT

## 2025-04-12 PROCEDURE — 36415 COLL VENOUS BLD VENIPUNCTURE: CPT

## 2025-04-12 RX ORDER — METRONIDAZOLE 500 MG/100ML
500 INJECTION, SOLUTION INTRAVENOUS EVERY 8 HOURS
Status: DISCONTINUED | OUTPATIENT
Start: 2025-04-12 | End: 2025-04-22

## 2025-04-12 RX ADMIN — DEXAMETHASONE SODIUM PHOSPHATE 4 MG: 4 INJECTION, SOLUTION INTRA-ARTICULAR; INTRALESIONAL; INTRAMUSCULAR; INTRAVENOUS; SOFT TISSUE at 11:03

## 2025-04-12 RX ADMIN — PRAVASTATIN SODIUM 40 MG: 40 TABLET ORAL at 20:54

## 2025-04-12 RX ADMIN — METRONIDAZOLE 500 MG: 500 INJECTION, SOLUTION INTRAVENOUS at 23:58

## 2025-04-12 RX ADMIN — DEXAMETHASONE SODIUM PHOSPHATE 4 MG: 4 INJECTION, SOLUTION INTRA-ARTICULAR; INTRALESIONAL; INTRAMUSCULAR; INTRAVENOUS; SOFT TISSUE at 16:56

## 2025-04-12 RX ADMIN — LOSARTAN POTASSIUM 12.5 MG: 25 TABLET, FILM COATED ORAL at 08:14

## 2025-04-12 RX ADMIN — METRONIDAZOLE 500 MG: 500 INJECTION, SOLUTION INTRAVENOUS at 16:56

## 2025-04-12 RX ADMIN — LEVETIRACETAM 500 MG: 100 INJECTION INTRAVENOUS at 04:17

## 2025-04-12 RX ADMIN — LOSARTAN POTASSIUM 12.5 MG: 25 TABLET, FILM COATED ORAL at 20:54

## 2025-04-12 RX ADMIN — LEVETIRACETAM 500 MG: 100 INJECTION INTRAVENOUS at 16:56

## 2025-04-12 RX ADMIN — DEXAMETHASONE SODIUM PHOSPHATE 4 MG: 4 INJECTION, SOLUTION INTRA-ARTICULAR; INTRALESIONAL; INTRAMUSCULAR; INTRAVENOUS; SOFT TISSUE at 04:20

## 2025-04-12 RX ADMIN — SODIUM CHLORIDE, PRESERVATIVE FREE 10 ML: 5 INJECTION INTRAVENOUS at 20:53

## 2025-04-12 RX ADMIN — VANCOMYCIN HYDROCHLORIDE 2000 MG: 5 INJECTION, POWDER, LYOPHILIZED, FOR SOLUTION INTRAVENOUS at 16:57

## 2025-04-12 RX ADMIN — WATER 2000 MG: 1 INJECTION INTRAMUSCULAR; INTRAVENOUS; SUBCUTANEOUS at 16:55

## 2025-04-12 RX ADMIN — DEXAMETHASONE SODIUM PHOSPHATE 4 MG: 4 INJECTION, SOLUTION INTRA-ARTICULAR; INTRALESIONAL; INTRAMUSCULAR; INTRAVENOUS; SOFT TISSUE at 23:52

## 2025-04-12 RX ADMIN — SODIUM CHLORIDE, PRESERVATIVE FREE 10 ML: 5 INJECTION INTRAVENOUS at 08:15

## 2025-04-12 ASSESSMENT — PAIN SCALES - GENERAL
PAINLEVEL_OUTOF10: 0
PAINLEVEL_OUTOF10: 0

## 2025-04-12 NOTE — PLAN OF CARE
Problem: Discharge Planning  Goal: Discharge to home or other facility with appropriate resources  4/12/2025 0947 by Myesha Rueda RN  Outcome: Progressing  Flowsheets (Taken 4/12/2025 0947)  Discharge to home or other facility with appropriate resources:   Identify barriers to discharge with patient and caregiver   Arrange for needed discharge resources and transportation as appropriate   Identify discharge learning needs (meds, wound care, etc)   Refer to discharge planning if patient needs post-hospital services based on physician order or complex needs related to functional status, cognitive ability or social support system     Problem: ABCDS Injury Assessment  Goal: Absence of physical injury  4/12/2025 0947 by Myesha Rueda RN  Outcome: Progressing  Flowsheets (Taken 4/12/2025 0947)  Absence of Physical Injury: Implement safety measures based on patient assessment     Problem: Safety - Adult  Goal: Free from fall injury  4/12/2025 0947 by Myesha Rueda RN  Outcome: Progressing  Flowsheets (Taken 4/12/2025 0947)  Free From Fall Injury: Instruct family/caregiver on patient safety     Problem: Pain  Goal: Verbalizes/displays adequate comfort level or baseline comfort level  4/12/2025 0947 by Myesha Rueda RN  Outcome: Progressing  Flowsheets (Taken 4/12/2025 0947)  Verbalizes/displays adequate comfort level or baseline comfort level:   Encourage patient to monitor pain and request assistance   Assess pain using appropriate pain scale   Administer analgesics based on type and severity of pain and evaluate response   Implement non-pharmacological measures as appropriate and evaluate response   Consider cultural and social influences on pain and pain management   Notify Licensed Independent Practitioner if interventions unsuccessful or patient reports new pain     Problem: Neurosensory - Adult  Goal: Achieves stable or improved neurological status  4/12/2025 0947 by Myesha Rueda RN  Outcome:

## 2025-04-12 NOTE — ACP (ADVANCE CARE PLANNING)
Advance Care Planning     Advance Care Planning Inpatient Note  Spiritual Care Department    Today's Date: 4/12/2025  Unit: [facility]    Received request from HealthCare Provider.  Upon review of chart and communication with care team, patient's decision making abilities are not in question.. Patient, Spouse, and Friends was/were present in the room during visit.    Goals of ACP Conversation:  Discuss advance care planning documents  Facilitate a discussion related to patient's goals of care as they align with the patient's values and beliefs.    Health Care Decision Makers:     No healthcare decision makers have been documented.    Summary:  No Decision Maker named by patient at this time    Advance Care Planning Documents (Patient Wishes):  Healthcare Power of /Advance Directive Appointment of Health Care Agent     Assessment:  The patient declined a visit due to a large number of guest arriving. The patient was left with information related to outpatient and inpatient ACP discussion assistance.     Interventions:  Patient DECLINED ACP conversation    Care Preferences Communicated:   No    Outcomes/Plan:  ACP Discussion: Refused    Electronically signed by Chaplain Heide on 4/12/2025 at 9:15 AM

## 2025-04-12 NOTE — PLAN OF CARE
Problem: Discharge Planning  Goal: Discharge to home or other facility with appropriate resources  Outcome: Progressing  Flowsheets (Taken 4/11/2025 2300)  Discharge to home or other facility with appropriate resources:   Identify barriers to discharge with patient and caregiver   Identify discharge learning needs (meds, wound care, etc)   Refer to discharge planning if patient needs post-hospital services based on physician order or complex needs related to functional status, cognitive ability or social support system   Arrange for needed discharge resources and transportation as appropriate     Problem: ABCDS Injury Assessment  Goal: Absence of physical injury  Outcome: Progressing  Flowsheets (Taken 4/11/2025 2300)  Absence of Physical Injury: Implement safety measures based on patient assessment     Problem: Safety - Adult  Goal: Free from fall injury  Outcome: Progressing  Flowsheets (Taken 4/11/2025 2300)  Free From Fall Injury: Instruct family/caregiver on patient safety     Problem: Pain  Goal: Verbalizes/displays adequate comfort level or baseline comfort level  Outcome: Progressing  Flowsheets (Taken 4/11/2025 2300)  Verbalizes/displays adequate comfort level or baseline comfort level:   Encourage patient to monitor pain and request assistance   Administer analgesics based on type and severity of pain and evaluate response   Consider cultural and social influences on pain and pain management   Assess pain using appropriate pain scale   Implement non-pharmacological measures as appropriate and evaluate response   Notify Licensed Independent Practitioner if interventions unsuccessful or patient reports new pain     Problem: Neurosensory - Adult  Goal: Achieves stable or improved neurological status  Outcome: Progressing  Flowsheets (Taken 4/11/2025 2300)  Achieves stable or improved neurological status: Assess for and report changes in neurological status     Problem: Skin/Tissue Integrity - Adult  Goal:

## 2025-04-13 ENCOUNTER — APPOINTMENT (OUTPATIENT)
Age: 65
DRG: 023 | End: 2025-04-13
Payer: COMMERCIAL

## 2025-04-13 PROBLEM — G93.89: Status: ACTIVE | Noted: 2025-04-13

## 2025-04-13 LAB
ANION GAP SERPL CALC-SCNC: 13 MEQ/L (ref 8–16)
BUN SERPL-MCNC: 16 MG/DL (ref 8–23)
CALCIUM SERPL-MCNC: 9 MG/DL (ref 8.8–10.2)
CHLORIDE SERPL-SCNC: 104 MEQ/L (ref 98–111)
CO2 SERPL-SCNC: 20 MEQ/L (ref 22–29)
CREAT SERPL-MCNC: 0.8 MG/DL (ref 0.7–1.2)
DEPRECATED RDW RBC AUTO: 41.3 FL (ref 35–45)
ECHO AO ASC DIAM: 2.8 CM
ECHO AO ASCENDING AORTA INDEX: 1.4 CM/M2
ECHO AV CUSP MM: 2 CM
ECHO AV MEAN GRADIENT: 7 MMHG
ECHO AV MEAN VELOCITY: 1.2 M/S
ECHO AV PEAK GRADIENT: 12 MMHG
ECHO AV PEAK VELOCITY: 1.7 M/S
ECHO AV VELOCITY RATIO: 0.88
ECHO AV VTI: 34.7 CM
ECHO BSA: 2.04 M2
ECHO EST RA PRESSURE: 3 MMHG
ECHO LA AREA 2C: 19.9 CM2
ECHO LA AREA 4C: 21 CM2
ECHO LA DIAMETER INDEX: 2.1 CM/M2
ECHO LA DIAMETER: 4.2 CM
ECHO LA MAJOR AXIS: 6.2 CM
ECHO LA MINOR AXIS: 5.9 CM
ECHO LA VOL BP: 57 ML (ref 18–58)
ECHO LA VOL MOD A2C: 56 ML (ref 18–58)
ECHO LA VOL MOD A4C: 56 ML (ref 18–58)
ECHO LA VOL/BSA BIPLANE: 29 ML/M2 (ref 16–34)
ECHO LA VOLUME INDEX MOD A2C: 28 ML/M2 (ref 16–34)
ECHO LA VOLUME INDEX MOD A4C: 28 ML/M2 (ref 16–34)
ECHO LV E' LATERAL VELOCITY: 9.8 CM/S
ECHO LV E' SEPTAL VELOCITY: 8.7 CM/S
ECHO LV EF PHYSICIAN: 65 %
ECHO LV FRACTIONAL SHORTENING: 36 % (ref 28–44)
ECHO LV INTERNAL DIMENSION DIASTOLE INDEX: 2.2 CM/M2
ECHO LV INTERNAL DIMENSION DIASTOLIC: 4.4 CM (ref 4.2–5.9)
ECHO LV INTERNAL DIMENSION SYSTOLIC INDEX: 1.4 CM/M2
ECHO LV INTERNAL DIMENSION SYSTOLIC: 2.8 CM
ECHO LV ISOVOLUMETRIC RELAXATION TIME (IVRT): 74 MS
ECHO LV IVSD: 1.1 CM (ref 0.6–1)
ECHO LV MASS 2D: 168.9 G (ref 88–224)
ECHO LV MASS INDEX 2D: 84.5 G/M2 (ref 49–115)
ECHO LV POSTERIOR WALL DIASTOLIC: 1.1 CM (ref 0.6–1)
ECHO LV RELATIVE WALL THICKNESS RATIO: 0.5
ECHO LVOT AV VTI INDEX: 0.75
ECHO LVOT MEAN GRADIENT: 4 MMHG
ECHO LVOT PEAK GRADIENT: 9 MMHG
ECHO LVOT PEAK VELOCITY: 1.5 M/S
ECHO LVOT VTI: 26.1 CM
ECHO MV A VELOCITY: 0.77 M/S
ECHO MV E DECELERATION TIME (DT): 196 MS
ECHO MV E VELOCITY: 0.9 M/S
ECHO MV E/A RATIO: 1.17
ECHO MV E/E' LATERAL: 9.18
ECHO MV E/E' RATIO (AVERAGED): 9.76
ECHO MV E/E' SEPTAL: 10.34
ECHO PV MAX VELOCITY: 1.9 M/S
ECHO PV MEAN GRADIENT: 11 MMHG
ECHO PV MEAN VELOCITY: 1.6 M/S
ECHO PV PEAK GRADIENT: 14 MMHG
ECHO PV VTI: 45.8 CM
ECHO RIGHT VENTRICULAR SYSTOLIC PRESSURE (RVSP): 27 MMHG
ECHO RV FREE WALL PEAK S': 15.7 CM/S
ECHO RV INTERNAL DIMENSION: 2.7 CM
ECHO RV TAPSE: 2.7 CM (ref 1.7–?)
ECHO TV E WAVE: 0.6 M/S
ECHO TV REGURGITANT MAX VELOCITY: 2.47 M/S
ECHO TV REGURGITANT PEAK GRADIENT: 24 MMHG
ERYTHROCYTE [DISTWIDTH] IN BLOOD BY AUTOMATED COUNT: 12.9 % (ref 11.5–14.5)
GFR SERPL CREATININE-BSD FRML MDRD: > 90 ML/MIN/1.73M2
GLUCOSE SERPL-MCNC: 151 MG/DL (ref 74–109)
HCT VFR BLD AUTO: 44.2 % (ref 42–52)
HGB BLD-MCNC: 14.8 GM/DL (ref 14–18)
MAGNESIUM SERPL-MCNC: 2 MG/DL (ref 1.6–2.6)
MCH RBC QN AUTO: 29.5 PG (ref 26–33)
MCHC RBC AUTO-ENTMCNC: 33.5 GM/DL (ref 32.2–35.5)
MCV RBC AUTO: 88 FL (ref 80–94)
PLATELET # BLD AUTO: 333 THOU/MM3 (ref 130–400)
PMV BLD AUTO: 9.8 FL (ref 9.4–12.4)
POTASSIUM SERPL-SCNC: 4.2 MEQ/L (ref 3.5–5.2)
RBC # BLD AUTO: 5.02 MILL/MM3 (ref 4.7–6.1)
SODIUM SERPL-SCNC: 137 MEQ/L (ref 135–145)
SODIUM SERPL-SCNC: 139 MEQ/L (ref 135–145)
SODIUM SERPL-SCNC: 140 MEQ/L (ref 135–145)
SODIUM SERPL-SCNC: 141 MEQ/L (ref 135–145)
WBC # BLD AUTO: 19.2 THOU/MM3 (ref 4.8–10.8)

## 2025-04-13 PROCEDURE — 85027 COMPLETE CBC AUTOMATED: CPT

## 2025-04-13 PROCEDURE — 99233 SBSQ HOSP IP/OBS HIGH 50: CPT | Performed by: STUDENT IN AN ORGANIZED HEALTH CARE EDUCATION/TRAINING PROGRAM

## 2025-04-13 PROCEDURE — 2580000003 HC RX 258

## 2025-04-13 PROCEDURE — 36415 COLL VENOUS BLD VENIPUNCTURE: CPT

## 2025-04-13 PROCEDURE — 99223 1ST HOSP IP/OBS HIGH 75: CPT | Performed by: INTERNAL MEDICINE

## 2025-04-13 PROCEDURE — 6360000002 HC RX W HCPCS

## 2025-04-13 PROCEDURE — 6360000004 HC RX CONTRAST MEDICATION

## 2025-04-13 PROCEDURE — 2500000003 HC RX 250 WO HCPCS: Performed by: STUDENT IN AN ORGANIZED HEALTH CARE EDUCATION/TRAINING PROGRAM

## 2025-04-13 PROCEDURE — 2580000003 HC RX 258: Performed by: STUDENT IN AN ORGANIZED HEALTH CARE EDUCATION/TRAINING PROGRAM

## 2025-04-13 PROCEDURE — C8929 TTE W OR WO FOL WCON,DOPPLER: HCPCS

## 2025-04-13 PROCEDURE — 6360000002 HC RX W HCPCS: Performed by: STUDENT IN AN ORGANIZED HEALTH CARE EDUCATION/TRAINING PROGRAM

## 2025-04-13 PROCEDURE — 2500000003 HC RX 250 WO HCPCS

## 2025-04-13 PROCEDURE — 2000000000 HC ICU R&B

## 2025-04-13 PROCEDURE — 84295 ASSAY OF SERUM SODIUM: CPT

## 2025-04-13 PROCEDURE — 80048 BASIC METABOLIC PNL TOTAL CA: CPT

## 2025-04-13 PROCEDURE — 99232 SBSQ HOSP IP/OBS MODERATE 35: CPT | Performed by: NEUROLOGICAL SURGERY

## 2025-04-13 PROCEDURE — 93306 TTE W/DOPPLER COMPLETE: CPT | Performed by: INTERNAL MEDICINE

## 2025-04-13 PROCEDURE — 83735 ASSAY OF MAGNESIUM: CPT

## 2025-04-13 PROCEDURE — 6370000000 HC RX 637 (ALT 250 FOR IP)

## 2025-04-13 RX ORDER — 3% SODIUM CHLORIDE 3 G/100ML
60 INJECTION, SOLUTION INTRAVENOUS CONTINUOUS
Status: DISCONTINUED | OUTPATIENT
Start: 2025-04-13 | End: 2025-04-14

## 2025-04-13 RX ORDER — HYDRALAZINE HYDROCHLORIDE 20 MG/ML
5 INJECTION INTRAMUSCULAR; INTRAVENOUS EVERY 6 HOURS PRN
Status: DISCONTINUED | OUTPATIENT
Start: 2025-04-13 | End: 2025-04-28 | Stop reason: HOSPADM

## 2025-04-13 RX ADMIN — VANCOMYCIN HYDROCHLORIDE 1000 MG: 1 INJECTION, POWDER, LYOPHILIZED, FOR SOLUTION INTRAVENOUS at 15:54

## 2025-04-13 RX ADMIN — WATER 2000 MG: 1 INJECTION INTRAMUSCULAR; INTRAVENOUS; SUBCUTANEOUS at 03:23

## 2025-04-13 RX ADMIN — SODIUM CHLORIDE 55 ML/HR: 3 INJECTION, SOLUTION INTRAVENOUS at 23:34

## 2025-04-13 RX ADMIN — VANCOMYCIN HYDROCHLORIDE 1000 MG: 1 INJECTION, POWDER, LYOPHILIZED, FOR SOLUTION INTRAVENOUS at 03:23

## 2025-04-13 RX ADMIN — METRONIDAZOLE 500 MG: 500 INJECTION, SOLUTION INTRAVENOUS at 23:30

## 2025-04-13 RX ADMIN — HYDRALAZINE HYDROCHLORIDE 5 MG: 20 INJECTION INTRAMUSCULAR; INTRAVENOUS at 23:22

## 2025-04-13 RX ADMIN — LOSARTAN POTASSIUM 12.5 MG: 25 TABLET, FILM COATED ORAL at 08:18

## 2025-04-13 RX ADMIN — LEVETIRACETAM 500 MG: 100 INJECTION INTRAVENOUS at 04:17

## 2025-04-13 RX ADMIN — SODIUM CHLORIDE, PRESERVATIVE FREE 10 ML: 5 INJECTION INTRAVENOUS at 21:01

## 2025-04-13 RX ADMIN — PRAVASTATIN SODIUM 40 MG: 40 TABLET ORAL at 21:00

## 2025-04-13 RX ADMIN — METRONIDAZOLE 500 MG: 500 INJECTION, SOLUTION INTRAVENOUS at 18:08

## 2025-04-13 RX ADMIN — LEVETIRACETAM 500 MG: 100 INJECTION INTRAVENOUS at 17:29

## 2025-04-13 RX ADMIN — WATER 2000 MG: 1 INJECTION INTRAMUSCULAR; INTRAVENOUS; SUBCUTANEOUS at 17:31

## 2025-04-13 RX ADMIN — SULFUR HEXAFLUORIDE 2 ML: KIT at 10:17

## 2025-04-13 RX ADMIN — LOSARTAN POTASSIUM 12.5 MG: 25 TABLET, FILM COATED ORAL at 21:00

## 2025-04-13 RX ADMIN — SODIUM CHLORIDE, PRESERVATIVE FREE 10 ML: 5 INJECTION INTRAVENOUS at 08:18

## 2025-04-13 RX ADMIN — DEXAMETHASONE SODIUM PHOSPHATE 4 MG: 4 INJECTION, SOLUTION INTRA-ARTICULAR; INTRALESIONAL; INTRAMUSCULAR; INTRAVENOUS; SOFT TISSUE at 03:29

## 2025-04-13 RX ADMIN — METRONIDAZOLE 500 MG: 500 INJECTION, SOLUTION INTRAVENOUS at 08:17

## 2025-04-13 RX ADMIN — SODIUM CHLORIDE 25 ML/HR: 3 INJECTION, SOLUTION INTRAVENOUS at 11:12

## 2025-04-13 ASSESSMENT — PAIN SCALES - GENERAL
PAINLEVEL_OUTOF10: 0
PAINLEVEL_OUTOF10: 0

## 2025-04-13 NOTE — H&P
History & Physical  Internal Medicine Resident         Patient: Koko Chao 65 y.o. male      : 1960  Date of Admission: 2025  Date of Service: Pt seen/examined on 25 and Admitted to Inpatient with expected LOS greater than two midnights due to medical therapy.       ASSESSMENT AND PLAN  Intra-axial left parietal lobe mass: Patient presents with aphasia ongoing for 3 weeks. CT head done in ED showed suspected intra-axial mass in the left parietal lobe. Edema is anteriorly of mass. No hydrocephalus, no midline shift.  Neurosurgery consulted.  Given dose of Keppra, Decadron in the ED  Continue Keppra 500 mg twice daily  Continue Decadron 4 mg every 6 hours  MRI brain ordered  CT abdomen pelvis ordered  CT chest ordered  Keep SBP less than 180  Hypertension: Patient on olmesartan 5 mg twice daily at home. Will substitute with Losartan 12.5mg daily  Hyperlipidemia: Last lipid panel 23 showed Cholesterol 193, HDL 66, , Tri 84. Continue home pravastatin 40mg   History of prostate cancer: Pt follows with Dr. Denis, urology  BPH: Continue flomax 0.4mg daily   Hx of central retinal vein occulusion: Pt with blurriness in right eye at times.     Data reviewed (unless otherwise discussed in assessment/plan)  EKG:  I have reviewed the EKG with the following interpretation: Normal sinus rhythm  Imaging: I have reviewed CT Head with the following interpretation: suspected intra-axial mass in the left parietal lobe.   Labs: Reviewed, see chart and plan above.       =======================================================================    SUBJECTIVE    Chief Complaint: Aphasia    History Of Present Illness:  Koko Chao is a 65 y.o. male with PMHx of hypertension, hyperlipidemia, central retinal vein occlusion, prostate cancer who presents to [facility] with chief complaint of aphasia.  Patient reports had 10-minute episode on 4/10 where he was not able to express his language. 
and no drug use.      ROS   Patient denies fever, chills, cough, lightheadedness, dizziness,, blurry vision SOB, chest pain, nausea, vomiting, abdominal pain, constipation, diarrhea, dysuria, lower extremity edema.  He endorses a low-grade headache not requiring medication.      Scheduled Meds:   cefTRIAXone (ROCEPHIN) IV  2,000 mg IntraVENous Q12H    metroNIDAZOLE  500 mg IntraVENous Q8H    vancomycin (VANCOCIN) intermittent dosing (placeholder)   Other RX Placeholder    vancomycin  1,000 mg IntraVENous Q12H    sodium chloride flush  5-40 mL IntraVENous 2 times per day    pravastatin  40 mg Oral Nightly    levETIRAcetam  500 mg IntraVENous Q12H    [Held by provider] dexAMETHasone  4 mg IntraVENous Q6H    losartan  12.5 mg Oral BID     Continuous Infusions:   3% sodium chloride      sodium chloride         PHYSICAL EXAMINATION:  T: 97.6.  P: 68. RR: 16. B/P: 140/77.  O2 Sat: 94.  I/O: 1/0  Body mass index is 28.36 kg/m².   GCS:   15    General:   White male laying in bed  HEENT:  normocephalic and atraumatic.  No scleral icterus. PERR  Neck: supple.  No Thyromegaly.  Lungs: clear to auscultation.  No retractions  Cardiac: RRR.  No JVD.  Abdomen: soft.  Nontender.  Extremities:  No clubbing, cyanosis, or edema x 4.    Vasculature: capillary refill < 3 seconds. Palpable dorsalis pedis pulses.  Skin:  warm and dry.  Psych:  Alert and oriented x3.  Affect appropriate  Lymph:  No supraclavicular adenopathy.  Neurologic:  No focal deficit. No seizures.  Patient has word finding difficulty    Data: (All radiographs, tracings, PFTs, and imaging are personally viewed and interpreted unless otherwise noted).   Sodium 137, K4.2, creatinine 0.8, magnesium 2.0  WBC 19.2, Hb 14.8, platelets 333  Telemetry shows NSR      Meets Continued ICU Level Care Criteria:    [x] Yes   [] No - Transfer Planned to listed location:  [] HOSPITALIST CONTACTED-      Case and plan discussed with Dr. Perez.  Electronically signed by Liang

## 2025-04-13 NOTE — PLAN OF CARE
Problem: Discharge Planning  Goal: Discharge to home or other facility with appropriate resources  Outcome: Progressing  Discharge to home or other facility with appropriate resources:   Identify barriers to discharge with patient and caregiver   Identify discharge learning needs (meds, wound care, etc)   Refer to discharge planning if patient needs post-hospital services based on physician order or complex needs related to functional status, cognitive ability or social support system   Arrange for needed discharge resources and transportation as appropriate     Problem: ABCDS Injury Assessment  Goal: Absence of physical injury  Outcome: Progressing  Absence of Physical Injury: Implement safety measures based on patient assessment     Problem: Safety - Adult  Goal: Free from fall injury  Outcome: Progressing  Free From Fall Injury: Instruct family/caregiver on patient safety     Problem: Pain  Goal: Verbalizes/displays adequate comfort level or baseline comfort level  Outcome: Progressing  Verbalizes/displays adequate comfort level or baseline comfort level:   Encourage patient to monitor pain and request assistance   Administer analgesics based on type and severity of pain and evaluate response   Consider cultural and social influences on pain and pain management   Assess pain using appropriate pain scale   Implement non-pharmacological measures as appropriate and evaluate response   Notify Licensed Independent Practitioner if interventions unsuccessful or patient reports new pain     Problem: Neurosensory - Adult  Goal: Achieves stable or improved neurological status  Outcome: Progressing  Achieves stable or improved neurological status: Assess for and report changes in neurological status     Problem: Skin/Tissue Integrity - Adult  Goal: Skin integrity remains intact  Outcome: Progressing  Skin Integrity Remains Intact: Monitor for areas of redness and/or skin breakdown     Problem: Infection - Adult  Goal:

## 2025-04-14 LAB
% INHIBITION AA: 4.4 %
% INHIBITION ADP: 5.1 %
AA % AGGREGATION: 94.9 %
ADP PERCENT AGGREGATION: 95.6 %
ANION GAP SERPL CALC-SCNC: 7 MEQ/L (ref 8–16)
BUN SERPL-MCNC: 19 MG/DL (ref 8–23)
CALCIUM SERPL-MCNC: 8.7 MG/DL (ref 8.8–10.2)
CHLORIDE SERPL-SCNC: 113 MEQ/L (ref 98–111)
CO2 SERPL-SCNC: 22 MEQ/L (ref 22–29)
CREAT SERPL-MCNC: 0.7 MG/DL (ref 0.7–1.2)
DEPRECATED RDW RBC AUTO: 42.4 FL (ref 35–45)
ERYTHROCYTE [DISTWIDTH] IN BLOOD BY AUTOMATED COUNT: 13.2 % (ref 11.5–14.5)
GFR SERPL CREATININE-BSD FRML MDRD: > 90 ML/MIN/1.73M2
GLUCOSE SERPL-MCNC: 99 MG/DL (ref 74–109)
HCT VFR BLD AUTO: 40.6 % (ref 42–52)
HGB BLD-MCNC: 13.1 GM/DL (ref 14–18)
MA AA BLD RES TEG: 55.8 MM (ref 51–71)
MA ACTF BLD RES TEG: 10.8 MM (ref 2–19)
MA ADP BLD RES TEG: 56.1 MM (ref 45–69)
MA KAOLIN P HPASE BLD RES TEG: 58.2 MM (ref 53–68)
MAGNESIUM SERPL-MCNC: 2.1 MG/DL (ref 1.6–2.6)
MCH RBC QN AUTO: 28.5 PG (ref 26–33)
MCHC RBC AUTO-ENTMCNC: 32.3 GM/DL (ref 32.2–35.5)
MCV RBC AUTO: 88.5 FL (ref 80–94)
PLATELET # BLD AUTO: 275 THOU/MM3 (ref 130–400)
PMV BLD AUTO: 9.9 FL (ref 9.4–12.4)
POTASSIUM SERPL-SCNC: 3.8 MEQ/L (ref 3.5–5.2)
RBC # BLD AUTO: 4.59 MILL/MM3 (ref 4.7–6.1)
SODIUM SERPL-SCNC: 139 MEQ/L (ref 135–145)
SODIUM SERPL-SCNC: 140 MEQ/L (ref 135–145)
SODIUM SERPL-SCNC: 142 MEQ/L (ref 135–145)
SODIUM SERPL-SCNC: 144 MEQ/L (ref 135–145)
SODIUM SERPL-SCNC: 144 MEQ/L (ref 135–145)
SODIUM SERPL-SCNC: 145 MEQ/L (ref 135–145)
VANCOMYCIN SERPL-MCNC: 6.2 UG/ML (ref 10–39.9)
WBC # BLD AUTO: 12.6 THOU/MM3 (ref 4.8–10.8)

## 2025-04-14 PROCEDURE — 6370000000 HC RX 637 (ALT 250 FOR IP)

## 2025-04-14 PROCEDURE — 99291 CRITICAL CARE FIRST HOUR: CPT | Performed by: INTERNAL MEDICINE

## 2025-04-14 PROCEDURE — 2580000003 HC RX 258

## 2025-04-14 PROCEDURE — 92523 SPEECH SOUND LANG COMPREHEN: CPT

## 2025-04-14 PROCEDURE — 2500000003 HC RX 250 WO HCPCS: Performed by: STUDENT IN AN ORGANIZED HEALTH CARE EDUCATION/TRAINING PROGRAM

## 2025-04-14 PROCEDURE — 2500000003 HC RX 250 WO HCPCS

## 2025-04-14 PROCEDURE — 99232 SBSQ HOSP IP/OBS MODERATE 35: CPT | Performed by: STUDENT IN AN ORGANIZED HEALTH CARE EDUCATION/TRAINING PROGRAM

## 2025-04-14 PROCEDURE — 92507 TX SP LANG VOICE COMM INDIV: CPT

## 2025-04-14 PROCEDURE — 80048 BASIC METABOLIC PNL TOTAL CA: CPT

## 2025-04-14 PROCEDURE — 85576 BLOOD PLATELET AGGREGATION: CPT

## 2025-04-14 PROCEDURE — 80202 ASSAY OF VANCOMYCIN: CPT

## 2025-04-14 PROCEDURE — 6360000002 HC RX W HCPCS

## 2025-04-14 PROCEDURE — 2000000000 HC ICU R&B

## 2025-04-14 PROCEDURE — 85027 COMPLETE CBC AUTOMATED: CPT

## 2025-04-14 PROCEDURE — 36415 COLL VENOUS BLD VENIPUNCTURE: CPT

## 2025-04-14 PROCEDURE — 83735 ASSAY OF MAGNESIUM: CPT

## 2025-04-14 PROCEDURE — 6360000002 HC RX W HCPCS: Performed by: STUDENT IN AN ORGANIZED HEALTH CARE EDUCATION/TRAINING PROGRAM

## 2025-04-14 PROCEDURE — 84295 ASSAY OF SERUM SODIUM: CPT

## 2025-04-14 PROCEDURE — 94761 N-INVAS EAR/PLS OXIMETRY MLT: CPT

## 2025-04-14 PROCEDURE — 2580000003 HC RX 258: Performed by: STUDENT IN AN ORGANIZED HEALTH CARE EDUCATION/TRAINING PROGRAM

## 2025-04-14 RX ORDER — 3% SODIUM CHLORIDE 3 G/100ML
70 INJECTION, SOLUTION INTRAVENOUS CONTINUOUS
Status: DISPENSED | OUTPATIENT
Start: 2025-04-14 | End: 2025-04-14

## 2025-04-14 RX ORDER — 3% SODIUM CHLORIDE 3 G/100ML
70 INJECTION, SOLUTION INTRAVENOUS CONTINUOUS
Status: DISPENSED | OUTPATIENT
Start: 2025-04-15 | End: 2025-04-15

## 2025-04-14 RX ORDER — PANTOPRAZOLE SODIUM 40 MG/1
40 TABLET, DELAYED RELEASE ORAL
Status: DISCONTINUED | OUTPATIENT
Start: 2025-04-14 | End: 2025-04-28 | Stop reason: HOSPADM

## 2025-04-14 RX ADMIN — PANTOPRAZOLE SODIUM 40 MG: 40 TABLET, DELAYED RELEASE ORAL at 09:00

## 2025-04-14 RX ADMIN — WATER 2000 MG: 1 INJECTION INTRAMUSCULAR; INTRAVENOUS; SUBCUTANEOUS at 02:49

## 2025-04-14 RX ADMIN — Medication 1750 MG: at 17:12

## 2025-04-14 RX ADMIN — SODIUM CHLORIDE, PRESERVATIVE FREE 10 ML: 5 INJECTION INTRAVENOUS at 20:44

## 2025-04-14 RX ADMIN — LOSARTAN POTASSIUM 12.5 MG: 25 TABLET, FILM COATED ORAL at 20:44

## 2025-04-14 RX ADMIN — LEVETIRACETAM 500 MG: 100 INJECTION INTRAVENOUS at 17:41

## 2025-04-14 RX ADMIN — WATER 2000 MG: 1 INJECTION INTRAMUSCULAR; INTRAVENOUS; SUBCUTANEOUS at 15:30

## 2025-04-14 RX ADMIN — SODIUM CHLORIDE 60 ML/HR: 3 INJECTION, SOLUTION INTRAVENOUS at 09:01

## 2025-04-14 RX ADMIN — LEVETIRACETAM 500 MG: 100 INJECTION INTRAVENOUS at 04:11

## 2025-04-14 RX ADMIN — PRAVASTATIN SODIUM 40 MG: 40 TABLET ORAL at 20:44

## 2025-04-14 RX ADMIN — SODIUM CHLORIDE 70 ML/HR: 3 INJECTION, SOLUTION INTRAVENOUS at 17:46

## 2025-04-14 RX ADMIN — VANCOMYCIN HYDROCHLORIDE 1000 MG: 1 INJECTION, POWDER, LYOPHILIZED, FOR SOLUTION INTRAVENOUS at 03:01

## 2025-04-14 RX ADMIN — METRONIDAZOLE 500 MG: 500 INJECTION, SOLUTION INTRAVENOUS at 08:12

## 2025-04-14 RX ADMIN — METRONIDAZOLE 500 MG: 500 INJECTION, SOLUTION INTRAVENOUS at 15:22

## 2025-04-14 RX ADMIN — LOSARTAN POTASSIUM 12.5 MG: 25 TABLET, FILM COATED ORAL at 08:10

## 2025-04-14 ASSESSMENT — PAIN SCALES - GENERAL
PAINLEVEL_OUTOF10: 0
PAINLEVEL_OUTOF10: 0

## 2025-04-14 NOTE — CARE COORDINATION
Case Management Assessment Initial Evaluation    Date/Time of Evaluation: 2025 12:08 PM  Assessment Completed by: Mary Lange RN    If patient is discharged prior to next notation, then this note serves as note for discharge by case management.    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 Found to have a left parietal mass. Admitted to . Neurosurgery consulted; possible abscess in brain. ID consulted. Transferred to ICU on  and started on 3% saline drip with Q4H Na+ levels. Pt had been on aspirin. Plan for surgery on  for left crani to determine if abscess or lesion.     On room air. Afebrile. NSR. Ox4. SP/PT/OT. Telemetry, SCDs. 3% saline @ 60 ml/hr, IV rocephin, prn IV hydralazine, IV keppra, cozaar, IV flagyl, protonix, pravastatin, IV vancomycin, Electrolyte replacement protocols. Blood cultures sent. WBC 8.3 - 19.2 -now 12.6, hgb 14.4 - now 13.1.     Procedures:   3/13 Echo with EF 60-65%    Imagin/11 CT Head: Suspected intra-axial mass in the left parietal lobe. A brain MRI without and with contrast is recommended.   CTA Head/Neck: 1. Normal CTA of the head and neck.  2. Possible mass in the left parietal lobe. Brain MRI without and with contrast  is recommended.   MRI Brain: 1. Peripherally enhancing lesion measuring up to 2.4 cm within the   posterior left parietal lobe with mild vasogenic edema causes mild mass   effect upon the posterior horn of the

## 2025-04-14 NOTE — PLAN OF CARE
TEG platelet mapping ordered    Electronically signed by Alec Childs DO on 4/14/2025 at 11:36 AM

## 2025-04-14 NOTE — PLAN OF CARE
Problem: Discharge Planning  Goal: Discharge to home or other facility with appropriate resources  4/13/2025 2204 by Yusuf Kirk RN  Outcome: Progressing  4/13/2025 2204 by Yusuf Kirk RN  Outcome: Progressing     Problem: ABCDS Injury Assessment  Goal: Absence of physical injury  4/13/2025 2204 by Yusuf Kirk RN  Outcome: Progressing  4/13/2025 2204 by Yusuf Kirk RN  Outcome: Progressing     Problem: Safety - Adult  Goal: Free from fall injury  4/13/2025 2204 by Yusuf Kirk RN  Outcome: Progressing  4/13/2025 2204 by Yusuf Kirk RN  Outcome: Progressing     Problem: Pain  Goal: Verbalizes/displays adequate comfort level or baseline comfort level  4/13/2025 2204 by Yusuf Kirk RN  Outcome: Progressing  4/13/2025 2204 by Yusuf Kirk RN  Outcome: Progressing     Problem: Neurosensory - Adult  Goal: Achieves stable or improved neurological status  4/13/2025 2204 by Yusuf Kirk RN  Outcome: Progressing  4/13/2025 2204 by Yusuf Kirk RN  Outcome: Progressing     Problem: Skin/Tissue Integrity - Adult  Goal: Skin integrity remains intact  4/13/2025 2204 by Yusuf Kirk RN  Outcome: Progressing  4/13/2025 2204 by Yusuf Kirk RN  Outcome: Progressing     Problem: Infection - Adult  Goal: Absence of infection during hospitalization  4/13/2025 2204 by Yusuf Kirk RN  Outcome: Progressing  4/13/2025 2204 by Yusuf Kirk RN  Outcome: Progressing     Problem: Metabolic/Fluid and Electrolytes - Adult  Goal: Electrolytes maintained within normal limits  4/13/2025 2204 by Yusuf Kirk RN  Outcome: Progressing  4/13/2025 2204 by Yusuf Kirk RN  Outcome: Progressing

## 2025-04-14 NOTE — PLAN OF CARE
Problem: Discharge Planning  Goal: Discharge to home or other facility with appropriate resources  4/14/2025 0910 by Janusz Hardy RN  Outcome: Progressing  4/13/2025 2204 by Yusuf Kirk RN  Outcome: Progressing  Flowsheets (Taken 4/13/2025 2100)  Discharge to home or other facility with appropriate resources: Identify barriers to discharge with patient and caregiver     Problem: ABCDS Injury Assessment  Goal: Absence of physical injury  4/14/2025 0910 by Janusz Hardy RN  Outcome: Progressing  4/13/2025 2204 by Yusuf Kirk RN  Outcome: Progressing     Problem: Safety - Adult  Goal: Free from fall injury  4/14/2025 0910 by Janusz Hardy RN  Outcome: Progressing  4/13/2025 2204 by Yusuf Kirk RN  Outcome: Progressing     Problem: Pain  Goal: Verbalizes/displays adequate comfort level or baseline comfort level  4/14/2025 0910 by Janusz Hardy RN  Outcome: Progressing  4/13/2025 2204 by Yusuf Kirk RN  Outcome: Progressing  Flowsheets (Taken 4/13/2025 2000)  Verbalizes/displays adequate comfort level or baseline comfort level: Encourage patient to monitor pain and request assistance     Problem: Neurosensory - Adult  Goal: Achieves stable or improved neurological status  4/14/2025 0910 by Janusz Hardy RN  Outcome: Progressing  4/13/2025 2204 by Yusuf Kirk RN  Outcome: Progressing     Problem: Skin/Tissue Integrity - Adult  Goal: Skin integrity remains intact  4/14/2025 0910 by Janusz Hardy RN  Outcome: Progressing  4/13/2025 2204 by Yusuf Kirk RN  Outcome: Progressing  Flowsheets (Taken 4/13/2025 2100)  Skin Integrity Remains Intact: Monitor for areas of redness and/or skin breakdown     Problem: Infection - Adult  Goal: Absence of infection during hospitalization  4/14/2025 0910 by Janusz Hardy RN  Outcome: Progressing  4/13/2025 2204 by Yusuf Kirk RN  Outcome: Progressing  Flowsheets (Taken 4/13/2025 2100)  Absence of infection during hospitalization: Assess and monitor for

## 2025-04-15 LAB
ANION GAP SERPL CALC-SCNC: 7 MEQ/L (ref 8–16)
BASOPHILS ABSOLUTE: 0 THOU/MM3 (ref 0–0.1)
BASOPHILS NFR BLD AUTO: 0.2 %
BUN SERPL-MCNC: 13 MG/DL (ref 8–23)
CALCIUM SERPL-MCNC: 8.4 MG/DL (ref 8.8–10.2)
CHLORIDE SERPL-SCNC: 119 MEQ/L (ref 98–111)
CO2 SERPL-SCNC: 21 MEQ/L (ref 22–29)
CREAT SERPL-MCNC: 0.7 MG/DL (ref 0.7–1.2)
DEPRECATED RDW RBC AUTO: 43.9 FL (ref 35–45)
EOSINOPHIL NFR BLD AUTO: 2 %
EOSINOPHILS ABSOLUTE: 0.2 THOU/MM3 (ref 0–0.4)
ERYTHROCYTE [DISTWIDTH] IN BLOOD BY AUTOMATED COUNT: 13.4 % (ref 11.5–14.5)
GFR SERPL CREATININE-BSD FRML MDRD: > 90 ML/MIN/1.73M2
GLUCOSE SERPL-MCNC: 95 MG/DL (ref 74–109)
HCT VFR BLD AUTO: 40 % (ref 42–52)
HGB BLD-MCNC: 12.8 GM/DL (ref 14–18)
IMM GRANULOCYTES # BLD AUTO: 0.03 THOU/MM3 (ref 0–0.07)
IMM GRANULOCYTES NFR BLD AUTO: 0.3 %
LYMPHOCYTES ABSOLUTE: 2.2 THOU/MM3 (ref 1–4.8)
LYMPHOCYTES NFR BLD AUTO: 25.4 %
MCH RBC QN AUTO: 28.6 PG (ref 26–33)
MCHC RBC AUTO-ENTMCNC: 32 GM/DL (ref 32.2–35.5)
MCV RBC AUTO: 89.5 FL (ref 80–94)
MONOCYTES ABSOLUTE: 0.6 THOU/MM3 (ref 0.4–1.3)
MONOCYTES NFR BLD AUTO: 7 %
NEUTROPHILS ABSOLUTE: 5.7 THOU/MM3 (ref 1.8–7.7)
NEUTROPHILS NFR BLD AUTO: 65.1 %
NRBC BLD AUTO-RTO: 0 /100 WBC
PLATELET # BLD AUTO: 247 THOU/MM3 (ref 130–400)
PMV BLD AUTO: 9.9 FL (ref 9.4–12.4)
POTASSIUM SERPL-SCNC: 3.9 MEQ/L (ref 3.5–5.2)
RBC # BLD AUTO: 4.47 MILL/MM3 (ref 4.7–6.1)
SODIUM SERPL-SCNC: 142 MEQ/L (ref 135–145)
SODIUM SERPL-SCNC: 143 MEQ/L (ref 135–145)
SODIUM SERPL-SCNC: 143 MEQ/L (ref 135–145)
SODIUM SERPL-SCNC: 144 MEQ/L (ref 135–145)
SODIUM SERPL-SCNC: 145 MEQ/L (ref 135–145)
SODIUM SERPL-SCNC: 147 MEQ/L (ref 135–145)
T GONDII IGG SER-ACNC: < 3 IU/ML
T GONDII IGM SER-ACNC: < 3 AU/ML
WBC # BLD AUTO: 8.8 THOU/MM3 (ref 4.8–10.8)

## 2025-04-15 PROCEDURE — 2580000003 HC RX 258

## 2025-04-15 PROCEDURE — 6360000002 HC RX W HCPCS: Performed by: STUDENT IN AN ORGANIZED HEALTH CARE EDUCATION/TRAINING PROGRAM

## 2025-04-15 PROCEDURE — 99232 SBSQ HOSP IP/OBS MODERATE 35: CPT | Performed by: NEUROLOGICAL SURGERY

## 2025-04-15 PROCEDURE — 85025 COMPLETE CBC W/AUTO DIFF WBC: CPT

## 2025-04-15 PROCEDURE — 99233 SBSQ HOSP IP/OBS HIGH 50: CPT | Performed by: INTERNAL MEDICINE

## 2025-04-15 PROCEDURE — 99232 SBSQ HOSP IP/OBS MODERATE 35: CPT | Performed by: STUDENT IN AN ORGANIZED HEALTH CARE EDUCATION/TRAINING PROGRAM

## 2025-04-15 PROCEDURE — 2500000003 HC RX 250 WO HCPCS: Performed by: STUDENT IN AN ORGANIZED HEALTH CARE EDUCATION/TRAINING PROGRAM

## 2025-04-15 PROCEDURE — 84295 ASSAY OF SERUM SODIUM: CPT

## 2025-04-15 PROCEDURE — 2500000003 HC RX 250 WO HCPCS

## 2025-04-15 PROCEDURE — 36415 COLL VENOUS BLD VENIPUNCTURE: CPT

## 2025-04-15 PROCEDURE — 2580000003 HC RX 258: Performed by: NURSE PRACTITIONER

## 2025-04-15 PROCEDURE — 6370000000 HC RX 637 (ALT 250 FOR IP)

## 2025-04-15 PROCEDURE — 2000000000 HC ICU R&B

## 2025-04-15 PROCEDURE — 80048 BASIC METABOLIC PNL TOTAL CA: CPT

## 2025-04-15 PROCEDURE — 6360000002 HC RX W HCPCS

## 2025-04-15 RX ORDER — CALCIUM CARBONATE 500 MG/1
500 TABLET, CHEWABLE ORAL 3 TIMES DAILY PRN
Status: DISCONTINUED | OUTPATIENT
Start: 2025-04-15 | End: 2025-04-28 | Stop reason: HOSPADM

## 2025-04-15 RX ORDER — 3% SODIUM CHLORIDE 3 G/100ML
75 INJECTION, SOLUTION INTRAVENOUS CONTINUOUS
Status: DISPENSED | OUTPATIENT
Start: 2025-04-15 | End: 2025-04-16

## 2025-04-15 RX ORDER — 3% SODIUM CHLORIDE 3 G/100ML
70 INJECTION, SOLUTION INTRAVENOUS CONTINUOUS
Status: DISPENSED | OUTPATIENT
Start: 2025-04-15 | End: 2025-04-15

## 2025-04-15 RX ADMIN — METRONIDAZOLE 500 MG: 500 INJECTION, SOLUTION INTRAVENOUS at 00:04

## 2025-04-15 RX ADMIN — SODIUM CHLORIDE 70 ML/HR: 3 INJECTION, SOLUTION INTRAVENOUS at 16:46

## 2025-04-15 RX ADMIN — PRAVASTATIN SODIUM 40 MG: 40 TABLET ORAL at 20:09

## 2025-04-15 RX ADMIN — LOSARTAN POTASSIUM 12.5 MG: 25 TABLET, FILM COATED ORAL at 09:42

## 2025-04-15 RX ADMIN — METRONIDAZOLE 500 MG: 500 INJECTION, SOLUTION INTRAVENOUS at 08:22

## 2025-04-15 RX ADMIN — SODIUM CHLORIDE 70 ML/HR: 3 INJECTION, SOLUTION INTRAVENOUS at 08:48

## 2025-04-15 RX ADMIN — ONDANSETRON 4 MG: 2 INJECTION, SOLUTION INTRAMUSCULAR; INTRAVENOUS at 17:59

## 2025-04-15 RX ADMIN — LEVETIRACETAM 500 MG: 100 INJECTION INTRAVENOUS at 04:17

## 2025-04-15 RX ADMIN — SODIUM CHLORIDE 70 ML/HR: 3 INJECTION, SOLUTION INTRAVENOUS at 00:33

## 2025-04-15 RX ADMIN — LOSARTAN POTASSIUM 12.5 MG: 25 TABLET, FILM COATED ORAL at 20:09

## 2025-04-15 RX ADMIN — Medication 1750 MG: at 04:02

## 2025-04-15 RX ADMIN — Medication 1750 MG: at 15:08

## 2025-04-15 RX ADMIN — METRONIDAZOLE 500 MG: 500 INJECTION, SOLUTION INTRAVENOUS at 15:34

## 2025-04-15 RX ADMIN — WATER 2000 MG: 1 INJECTION INTRAMUSCULAR; INTRAVENOUS; SUBCUTANEOUS at 04:01

## 2025-04-15 RX ADMIN — WATER 2000 MG: 1 INJECTION INTRAMUSCULAR; INTRAVENOUS; SUBCUTANEOUS at 16:33

## 2025-04-15 RX ADMIN — SODIUM CHLORIDE, PRESERVATIVE FREE 10 ML: 5 INJECTION INTRAVENOUS at 20:06

## 2025-04-15 RX ADMIN — LEVETIRACETAM 500 MG: 100 INJECTION INTRAVENOUS at 17:30

## 2025-04-15 RX ADMIN — METRONIDAZOLE 500 MG: 500 INJECTION, SOLUTION INTRAVENOUS at 23:35

## 2025-04-15 RX ADMIN — SODIUM CHLORIDE 75 ML/HR: 3 INJECTION, SOLUTION INTRAVENOUS at 23:57

## 2025-04-15 NOTE — PLAN OF CARE
Problem: Discharge Planning  Goal: Discharge to home or other facility with appropriate resources  4/14/2025 2056 by Yusuf Kirk RN  Outcome: Progressing  Flowsheets (Taken 4/14/2025 2055)  Discharge to home or other facility with appropriate resources: Identify barriers to discharge with patient and caregiver  4/14/2025 0910 by Janusz Hardy RN  Outcome: Progressing     Problem: ABCDS Injury Assessment  Goal: Absence of physical injury  4/14/2025 2056 by Yusuf Kirk RN  Outcome: Progressing  4/14/2025 0910 by Janusz Hardy RN  Outcome: Progressing     Problem: Safety - Adult  Goal: Free from fall injury  4/14/2025 2056 by Yusuf Kirk RN  Outcome: Progressing  4/14/2025 0910 by Janusz Hardy RN  Outcome: Progressing     Problem: Pain  Goal: Verbalizes/displays adequate comfort level or baseline comfort level  4/14/2025 2056 by Yusuf Kirk RN  Outcome: Progressing  Flowsheets (Taken 4/14/2025 2000)  Verbalizes/displays adequate comfort level or baseline comfort level: Encourage patient to monitor pain and request assistance  4/14/2025 0910 by Janusz Hardy RN  Outcome: Progressing     Problem: Neurosensory - Adult  Goal: Achieves stable or improved neurological status  4/14/2025 2056 by Yusuf Kirk RN  Outcome: Progressing  Flowsheets (Taken 4/14/2025 2055)  Achieves stable or improved neurological status: Assess for and report changes in neurological status  4/14/2025 0910 by Janusz Hardy RN  Outcome: Progressing     Problem: Skin/Tissue Integrity - Adult  Goal: Skin integrity remains intact  4/14/2025 2056 by Yusuf Kirk RN  Outcome: Progressing  Flowsheets (Taken 4/14/2025 2055)  Skin Integrity Remains Intact: Monitor for areas of redness and/or skin breakdown  4/14/2025 0910 by Janusz Hardy RN  Outcome: Progressing     Problem: Infection - Adult  Goal: Absence of infection during hospitalization  4/14/2025 2056 by Yusuf Kirk RN  Outcome: Progressing  Flowsheets (Taken

## 2025-04-16 ENCOUNTER — ANESTHESIA EVENT (OUTPATIENT)
Dept: OPERATING ROOM | Age: 65
End: 2025-04-16
Payer: COMMERCIAL

## 2025-04-16 ENCOUNTER — APPOINTMENT (OUTPATIENT)
Dept: CT IMAGING | Age: 65
DRG: 023 | End: 2025-04-16
Payer: COMMERCIAL

## 2025-04-16 ENCOUNTER — ANESTHESIA (OUTPATIENT)
Dept: OPERATING ROOM | Age: 65
End: 2025-04-16
Payer: COMMERCIAL

## 2025-04-16 LAB
ANION GAP SERPL CALC-SCNC: 8 MEQ/L (ref 8–16)
BACTERIA BLD AEROBE CULT: NORMAL
BACTERIA BLD AEROBE CULT: NORMAL
BASOPHILS ABSOLUTE: 0 THOU/MM3 (ref 0–0.1)
BASOPHILS NFR BLD AUTO: 0.3 %
BUN SERPL-MCNC: 11 MG/DL (ref 8–23)
CALCIUM SERPL-MCNC: 8.6 MG/DL (ref 8.8–10.2)
CHLORIDE SERPL-SCNC: 117 MEQ/L (ref 98–111)
CO2 SERPL-SCNC: 19 MEQ/L (ref 22–29)
CREAT SERPL-MCNC: 0.7 MG/DL (ref 0.7–1.2)
DEPRECATED RDW RBC AUTO: 43.2 FL (ref 35–45)
EOSINOPHIL NFR BLD AUTO: 4.1 %
EOSINOPHILS ABSOLUTE: 0.4 THOU/MM3 (ref 0–0.4)
ERYTHROCYTE [DISTWIDTH] IN BLOOD BY AUTOMATED COUNT: 13.2 % (ref 11.5–14.5)
GFR SERPL CREATININE-BSD FRML MDRD: > 90 ML/MIN/1.73M2
GLUCOSE SERPL-MCNC: 111 MG/DL (ref 74–109)
H CAPSUL AG UR QL IA: NOT DETECTED
H CAPSUL AG UR-MCNC: NOT DETECTED NG/ML
HCT VFR BLD AUTO: 37.3 % (ref 42–52)
HGB BLD-MCNC: 12.2 GM/DL (ref 14–18)
IMM GRANULOCYTES # BLD AUTO: 0.03 THOU/MM3 (ref 0–0.07)
IMM GRANULOCYTES NFR BLD AUTO: 0.3 %
LYMPHOCYTES ABSOLUTE: 1.7 THOU/MM3 (ref 1–4.8)
LYMPHOCYTES NFR BLD AUTO: 20 %
MCH RBC QN AUTO: 29.3 PG (ref 26–33)
MCHC RBC AUTO-ENTMCNC: 32.7 GM/DL (ref 32.2–35.5)
MCV RBC AUTO: 89.7 FL (ref 80–94)
MONOCYTES ABSOLUTE: 0.6 THOU/MM3 (ref 0.4–1.3)
MONOCYTES NFR BLD AUTO: 6.6 %
NEUTROPHILS ABSOLUTE: 6 THOU/MM3 (ref 1.8–7.7)
NEUTROPHILS NFR BLD AUTO: 68.7 %
NRBC BLD AUTO-RTO: 0 /100 WBC
PLATELET # BLD AUTO: 232 THOU/MM3 (ref 130–400)
PMV BLD AUTO: 9.7 FL (ref 9.4–12.4)
POTASSIUM SERPL-SCNC: 3.9 MEQ/L (ref 3.5–5.2)
RBC # BLD AUTO: 4.16 MILL/MM3 (ref 4.7–6.1)
SODIUM SERPL-SCNC: 139 MEQ/L (ref 135–145)
SODIUM SERPL-SCNC: 142 MEQ/L (ref 135–145)
SODIUM SERPL-SCNC: 143 MEQ/L (ref 135–145)
SODIUM SERPL-SCNC: 144 MEQ/L (ref 135–145)
SODIUM SERPL-SCNC: 144 MEQ/L (ref 135–145)
SODIUM SERPL-SCNC: 146 MEQ/L (ref 135–145)
VANCOMYCIN TROUGH SERPL-MCNC: 8.1 UG/ML (ref 10–20)
WBC # BLD AUTO: 8.7 THOU/MM3 (ref 4.8–10.8)

## 2025-04-16 PROCEDURE — 6360000002 HC RX W HCPCS: Performed by: PHYSICIAN ASSISTANT

## 2025-04-16 PROCEDURE — 2500000003 HC RX 250 WO HCPCS: Performed by: NEUROLOGICAL SURGERY

## 2025-04-16 PROCEDURE — 6360000002 HC RX W HCPCS: Performed by: STUDENT IN AN ORGANIZED HEALTH CARE EDUCATION/TRAINING PROGRAM

## 2025-04-16 PROCEDURE — C1763 CONN TISS, NON-HUMAN: HCPCS | Performed by: NEUROLOGICAL SURGERY

## 2025-04-16 PROCEDURE — 87206 SMEAR FLUORESCENT/ACID STAI: CPT

## 2025-04-16 PROCEDURE — 3700000001 HC ADD 15 MINUTES (ANESTHESIA): Performed by: NEUROLOGICAL SURGERY

## 2025-04-16 PROCEDURE — 2580000003 HC RX 258

## 2025-04-16 PROCEDURE — 88307 TISSUE EXAM BY PATHOLOGIST: CPT

## 2025-04-16 PROCEDURE — 6360000002 HC RX W HCPCS

## 2025-04-16 PROCEDURE — 6360000002 HC RX W HCPCS: Performed by: NEUROLOGICAL SURGERY

## 2025-04-16 PROCEDURE — 6370000000 HC RX 637 (ALT 250 FOR IP)

## 2025-04-16 PROCEDURE — 80048 BASIC METABOLIC PNL TOTAL CA: CPT

## 2025-04-16 PROCEDURE — 6370000000 HC RX 637 (ALT 250 FOR IP): Performed by: PHYSICIAN ASSISTANT

## 2025-04-16 PROCEDURE — 2700000000 HC OXYGEN THERAPY PER DAY

## 2025-04-16 PROCEDURE — 2709999900 HC NON-CHARGEABLE SUPPLY: Performed by: NEUROLOGICAL SURGERY

## 2025-04-16 PROCEDURE — 87205 SMEAR GRAM STAIN: CPT

## 2025-04-16 PROCEDURE — 84295 ASSAY OF SERUM SODIUM: CPT

## 2025-04-16 PROCEDURE — 00C70ZZ EXTIRPATION OF MATTER FROM CEREBRAL HEMISPHERE, OPEN APPROACH: ICD-10-PCS | Performed by: NEUROLOGICAL SURGERY

## 2025-04-16 PROCEDURE — 2500000003 HC RX 250 WO HCPCS: Performed by: PHYSICIAN ASSISTANT

## 2025-04-16 PROCEDURE — C1713 ANCHOR/SCREW BN/BN,TIS/BN: HCPCS | Performed by: NEUROLOGICAL SURGERY

## 2025-04-16 PROCEDURE — 6360000002 HC RX W HCPCS: Performed by: NURSE ANESTHETIST, CERTIFIED REGISTERED

## 2025-04-16 PROCEDURE — 3600000004 HC SURGERY LEVEL 4 BASE: Performed by: NEUROLOGICAL SURGERY

## 2025-04-16 PROCEDURE — 2580000003 HC RX 258: Performed by: NURSE ANESTHETIST, CERTIFIED REGISTERED

## 2025-04-16 PROCEDURE — 6370000000 HC RX 637 (ALT 250 FOR IP): Performed by: NEUROLOGICAL SURGERY

## 2025-04-16 PROCEDURE — 80202 ASSAY OF VANCOMYCIN: CPT

## 2025-04-16 PROCEDURE — 7100000000 HC PACU RECOVERY - FIRST 15 MIN: Performed by: NEUROLOGICAL SURGERY

## 2025-04-16 PROCEDURE — 99232 SBSQ HOSP IP/OBS MODERATE 35: CPT | Performed by: STUDENT IN AN ORGANIZED HEALTH CARE EDUCATION/TRAINING PROGRAM

## 2025-04-16 PROCEDURE — 2500000003 HC RX 250 WO HCPCS: Performed by: NURSE ANESTHETIST, CERTIFIED REGISTERED

## 2025-04-16 PROCEDURE — 2000000000 HC ICU R&B

## 2025-04-16 PROCEDURE — 87070 CULTURE OTHR SPECIMN AEROBIC: CPT

## 2025-04-16 PROCEDURE — 3600000014 HC SURGERY LEVEL 4 ADDTL 15MIN: Performed by: NEUROLOGICAL SURGERY

## 2025-04-16 PROCEDURE — 87075 CULTR BACTERIA EXCEPT BLOOD: CPT

## 2025-04-16 PROCEDURE — 88331 PATH CONSLTJ SURG 1 BLK 1SPC: CPT

## 2025-04-16 PROCEDURE — 70450 CT HEAD/BRAIN W/O DYE: CPT

## 2025-04-16 PROCEDURE — 7100000001 HC PACU RECOVERY - ADDTL 15 MIN: Performed by: NEUROLOGICAL SURGERY

## 2025-04-16 PROCEDURE — 99291 CRITICAL CARE FIRST HOUR: CPT | Performed by: INTERNAL MEDICINE

## 2025-04-16 PROCEDURE — 2720000010 HC SURG SUPPLY STERILE: Performed by: NEUROLOGICAL SURGERY

## 2025-04-16 PROCEDURE — 2580000003 HC RX 258: Performed by: INTERNAL MEDICINE

## 2025-04-16 PROCEDURE — 61751 BRAIN BIOPSY W/CT/MR GUIDE: CPT | Performed by: NEUROLOGICAL SURGERY

## 2025-04-16 PROCEDURE — 36415 COLL VENOUS BLD VENIPUNCTURE: CPT

## 2025-04-16 PROCEDURE — 2500000003 HC RX 250 WO HCPCS: Performed by: STUDENT IN AN ORGANIZED HEALTH CARE EDUCATION/TRAINING PROGRAM

## 2025-04-16 PROCEDURE — 00970ZZ DRAINAGE OF CEREBRAL HEMISPHERE, OPEN APPROACH: ICD-10-PCS | Performed by: NEUROLOGICAL SURGERY

## 2025-04-16 PROCEDURE — 2500000003 HC RX 250 WO HCPCS

## 2025-04-16 PROCEDURE — 87102 FUNGUS ISOLATION CULTURE: CPT

## 2025-04-16 PROCEDURE — 85025 COMPLETE CBC W/AUTO DIFF WBC: CPT

## 2025-04-16 PROCEDURE — 3700000000 HC ANESTHESIA ATTENDED CARE: Performed by: NEUROLOGICAL SURGERY

## 2025-04-16 PROCEDURE — 61320 CRNEC/CRNOT DRG ICR ABS STTL: CPT | Performed by: NEUROLOGICAL SURGERY

## 2025-04-16 DEVICE — NEURO-PATCH 6X8CM
Type: IMPLANTABLE DEVICE | Status: FUNCTIONAL
Brand: NEURO-PATCH

## 2025-04-16 DEVICE — BURR HOLE COVER, WITH TAB, 10MM
Type: IMPLANTABLE DEVICE | Status: FUNCTIONAL
Brand: UNIVERSAL NEURO 3

## 2025-04-16 DEVICE — IMPLANTABLE DEVICE: Type: IMPLANTABLE DEVICE | Status: FUNCTIONAL

## 2025-04-16 RX ORDER — ONDANSETRON 2 MG/ML
INJECTION INTRAMUSCULAR; INTRAVENOUS
Status: DISCONTINUED | OUTPATIENT
Start: 2025-04-16 | End: 2025-04-16 | Stop reason: SDUPTHER

## 2025-04-16 RX ORDER — IPRATROPIUM BROMIDE AND ALBUTEROL SULFATE 2.5; .5 MG/3ML; MG/3ML
SOLUTION RESPIRATORY (INHALATION)
Status: COMPLETED
Start: 2025-04-16 | End: 2025-04-16

## 2025-04-16 RX ORDER — PHENYLEPHRINE HCL IN 0.9% NACL 1 MG/10 ML
SYRINGE (ML) INTRAVENOUS
Status: DISCONTINUED | OUTPATIENT
Start: 2025-04-16 | End: 2025-04-16 | Stop reason: SDUPTHER

## 2025-04-16 RX ORDER — MORPHINE SULFATE 4 MG/ML
4 INJECTION, SOLUTION INTRAMUSCULAR; INTRAVENOUS
Status: DISCONTINUED | OUTPATIENT
Start: 2025-04-16 | End: 2025-04-17

## 2025-04-16 RX ORDER — MORPHINE SULFATE 2 MG/ML
2 INJECTION, SOLUTION INTRAMUSCULAR; INTRAVENOUS
Status: DISCONTINUED | OUTPATIENT
Start: 2025-04-16 | End: 2025-04-17

## 2025-04-16 RX ORDER — FENTANYL CITRATE 50 UG/ML
INJECTION, SOLUTION INTRAMUSCULAR; INTRAVENOUS
Status: DISCONTINUED | OUTPATIENT
Start: 2025-04-16 | End: 2025-04-16 | Stop reason: SDUPTHER

## 2025-04-16 RX ORDER — TRANEXAMIC ACID 100 MG/ML
INJECTION, SOLUTION INTRAVENOUS
Status: DISCONTINUED | OUTPATIENT
Start: 2025-04-16 | End: 2025-04-16 | Stop reason: SDUPTHER

## 2025-04-16 RX ORDER — ROCURONIUM BROMIDE 10 MG/ML
INJECTION, SOLUTION INTRAVENOUS
Status: DISCONTINUED | OUTPATIENT
Start: 2025-04-16 | End: 2025-04-16 | Stop reason: SDUPTHER

## 2025-04-16 RX ORDER — DROPERIDOL 2.5 MG/ML
INJECTION, SOLUTION INTRAMUSCULAR; INTRAVENOUS
Status: COMPLETED
Start: 2025-04-16 | End: 2025-04-16

## 2025-04-16 RX ORDER — MANNITOL 20 G/100ML
INJECTION, SOLUTION INTRAVENOUS
Status: DISCONTINUED | OUTPATIENT
Start: 2025-04-16 | End: 2025-04-16 | Stop reason: SDUPTHER

## 2025-04-16 RX ORDER — LIDOCAINE HYDROCHLORIDE AND EPINEPHRINE 10; 10 MG/ML; UG/ML
INJECTION, SOLUTION INFILTRATION; PERINEURAL PRN
Status: DISCONTINUED | OUTPATIENT
Start: 2025-04-16 | End: 2025-04-16 | Stop reason: ALTCHOICE

## 2025-04-16 RX ORDER — PROPOFOL 10 MG/ML
INJECTION, EMULSION INTRAVENOUS
Status: DISCONTINUED | OUTPATIENT
Start: 2025-04-16 | End: 2025-04-16 | Stop reason: SDUPTHER

## 2025-04-16 RX ORDER — SODIUM CHLORIDE 0.9 % (FLUSH) 0.9 %
5-40 SYRINGE (ML) INJECTION EVERY 12 HOURS SCHEDULED
Status: DISCONTINUED | OUTPATIENT
Start: 2025-04-16 | End: 2025-04-17 | Stop reason: SDUPTHER

## 2025-04-16 RX ORDER — GINSENG 100 MG
CAPSULE ORAL PRN
Status: DISCONTINUED | OUTPATIENT
Start: 2025-04-16 | End: 2025-04-16 | Stop reason: ALTCHOICE

## 2025-04-16 RX ORDER — DEXAMETHASONE SODIUM PHOSPHATE 4 MG/ML
4 INJECTION, SOLUTION INTRA-ARTICULAR; INTRALESIONAL; INTRAMUSCULAR; INTRAVENOUS; SOFT TISSUE EVERY 6 HOURS
Status: DISCONTINUED | OUTPATIENT
Start: 2025-04-16 | End: 2025-04-25

## 2025-04-16 RX ORDER — SODIUM CHLORIDE 9 MG/ML
INJECTION, SOLUTION INTRAVENOUS PRN
Status: DISCONTINUED | OUTPATIENT
Start: 2025-04-16 | End: 2025-04-17 | Stop reason: SDUPTHER

## 2025-04-16 RX ORDER — IPRATROPIUM BROMIDE AND ALBUTEROL SULFATE 2.5; .5 MG/3ML; MG/3ML
1 SOLUTION RESPIRATORY (INHALATION) ONCE
Status: COMPLETED | OUTPATIENT
Start: 2025-04-16 | End: 2025-04-16

## 2025-04-16 RX ORDER — HYDROCODONE BITARTRATE AND ACETAMINOPHEN 5; 325 MG/1; MG/1
1 TABLET ORAL EVERY 6 HOURS PRN
Status: DISCONTINUED | OUTPATIENT
Start: 2025-04-16 | End: 2025-04-17

## 2025-04-16 RX ORDER — SODIUM CHLORIDE 0.9 % (FLUSH) 0.9 %
5-40 SYRINGE (ML) INJECTION PRN
Status: DISCONTINUED | OUTPATIENT
Start: 2025-04-16 | End: 2025-04-17 | Stop reason: SDUPTHER

## 2025-04-16 RX ORDER — DROPERIDOL 2.5 MG/ML
0.62 INJECTION, SOLUTION INTRAMUSCULAR; INTRAVENOUS ONCE
Status: COMPLETED | OUTPATIENT
Start: 2025-04-16 | End: 2025-04-16

## 2025-04-16 RX ORDER — 3% SODIUM CHLORIDE 3 G/100ML
75 INJECTION, SOLUTION INTRAVENOUS CONTINUOUS
Status: DISCONTINUED | OUTPATIENT
Start: 2025-04-16 | End: 2025-04-17

## 2025-04-16 RX ORDER — LIDOCAINE HCL/PF 100 MG/5ML
SYRINGE (ML) INJECTION
Status: DISCONTINUED | OUTPATIENT
Start: 2025-04-16 | End: 2025-04-16 | Stop reason: SDUPTHER

## 2025-04-16 RX ORDER — SODIUM CHLORIDE 9 MG/ML
INJECTION, SOLUTION INTRAVENOUS
Status: DISCONTINUED | OUTPATIENT
Start: 2025-04-16 | End: 2025-04-16 | Stop reason: SDUPTHER

## 2025-04-16 RX ADMIN — METRONIDAZOLE 500 MG: 500 INJECTION, SOLUTION INTRAVENOUS at 15:49

## 2025-04-16 RX ADMIN — MORPHINE SULFATE 2 MG: 2 INJECTION, SOLUTION INTRAMUSCULAR; INTRAVENOUS at 15:04

## 2025-04-16 RX ADMIN — PROPOFOL 150 MG: 10 INJECTION, EMULSION INTRAVENOUS at 10:21

## 2025-04-16 RX ADMIN — PANTOPRAZOLE SODIUM 40 MG: 40 TABLET, DELAYED RELEASE ORAL at 08:04

## 2025-04-16 RX ADMIN — FENTANYL CITRATE 50 MCG: 50 INJECTION, SOLUTION INTRAMUSCULAR; INTRAVENOUS at 12:20

## 2025-04-16 RX ADMIN — Medication 1250 MG: at 22:49

## 2025-04-16 RX ADMIN — ROCURONIUM BROMIDE 30 MG: 10 INJECTION, SOLUTION INTRAVENOUS at 10:55

## 2025-04-16 RX ADMIN — TRANEXAMIC ACID 1000 MG: 100 INJECTION, SOLUTION INTRAVENOUS at 10:31

## 2025-04-16 RX ADMIN — WATER 2000 MG: 1 INJECTION INTRAMUSCULAR; INTRAVENOUS; SUBCUTANEOUS at 09:43

## 2025-04-16 RX ADMIN — LOSARTAN POTASSIUM 12.5 MG: 25 TABLET, FILM COATED ORAL at 19:48

## 2025-04-16 RX ADMIN — DROPERIDOL 0.62 MG: 2.5 INJECTION, SOLUTION INTRAMUSCULAR; INTRAVENOUS at 12:54

## 2025-04-16 RX ADMIN — FENTANYL CITRATE 50 MCG: 50 INJECTION, SOLUTION INTRAMUSCULAR; INTRAVENOUS at 11:07

## 2025-04-16 RX ADMIN — IPRATROPIUM BROMIDE AND ALBUTEROL SULFATE 1 DOSE: 2.5; .5 SOLUTION RESPIRATORY (INHALATION) at 12:45

## 2025-04-16 RX ADMIN — FENTANYL CITRATE 100 MCG: 50 INJECTION, SOLUTION INTRAMUSCULAR; INTRAVENOUS at 10:21

## 2025-04-16 RX ADMIN — Medication 100 MCG: at 10:40

## 2025-04-16 RX ADMIN — Medication 1750 MG: at 03:29

## 2025-04-16 RX ADMIN — Medication 6 MG: at 00:07

## 2025-04-16 RX ADMIN — SUGAMMADEX 200 MG: 100 INJECTION, SOLUTION INTRAVENOUS at 12:26

## 2025-04-16 RX ADMIN — HYDROCODONE BITARTRATE AND ACETAMINOPHEN 1 TABLET: 5; 325 TABLET ORAL at 19:48

## 2025-04-16 RX ADMIN — LOSARTAN POTASSIUM 12.5 MG: 25 TABLET, FILM COATED ORAL at 08:04

## 2025-04-16 RX ADMIN — PROPOFOL 50 MG: 10 INJECTION, EMULSION INTRAVENOUS at 10:31

## 2025-04-16 RX ADMIN — MANNITOL 50 G: 20 INJECTION, SOLUTION INTRAVENOUS at 11:53

## 2025-04-16 RX ADMIN — Medication 1750 MG: at 15:00

## 2025-04-16 RX ADMIN — ONDANSETRON 4 MG: 2 INJECTION, SOLUTION INTRAMUSCULAR; INTRAVENOUS at 10:21

## 2025-04-16 RX ADMIN — SODIUM CHLORIDE: 9 INJECTION, SOLUTION INTRAVENOUS at 10:16

## 2025-04-16 RX ADMIN — LEVETIRACETAM 500 MG: 100 INJECTION INTRAVENOUS at 18:55

## 2025-04-16 RX ADMIN — PRAVASTATIN SODIUM 40 MG: 40 TABLET ORAL at 19:48

## 2025-04-16 RX ADMIN — LEVETIRACETAM 500 MG: 100 INJECTION INTRAVENOUS at 05:21

## 2025-04-16 RX ADMIN — SODIUM CHLORIDE, PRESERVATIVE FREE 10 ML: 5 INJECTION INTRAVENOUS at 19:48

## 2025-04-16 RX ADMIN — MANNITOL 30 G: 20 INJECTION, SOLUTION INTRAVENOUS at 11:29

## 2025-04-16 RX ADMIN — ROCURONIUM BROMIDE 50 MG: 10 INJECTION, SOLUTION INTRAVENOUS at 10:21

## 2025-04-16 RX ADMIN — IPRATROPIUM BROMIDE AND ALBUTEROL SULFATE 1 DOSE: .5; 3 SOLUTION RESPIRATORY (INHALATION) at 12:45

## 2025-04-16 RX ADMIN — Medication 100 MCG: at 11:31

## 2025-04-16 RX ADMIN — SODIUM CHLORIDE, PRESERVATIVE FREE 10 ML: 5 INJECTION INTRAVENOUS at 08:15

## 2025-04-16 RX ADMIN — ROCURONIUM BROMIDE 20 MG: 10 INJECTION, SOLUTION INTRAVENOUS at 12:06

## 2025-04-16 RX ADMIN — WATER 2000 MG: 1 INJECTION INTRAMUSCULAR; INTRAVENOUS; SUBCUTANEOUS at 05:21

## 2025-04-16 RX ADMIN — WATER 2000 MG: 1 INJECTION INTRAMUSCULAR; INTRAVENOUS; SUBCUTANEOUS at 16:31

## 2025-04-16 RX ADMIN — DEXAMETHASONE SODIUM PHOSPHATE 4 MG: 4 INJECTION, SOLUTION INTRA-ARTICULAR; INTRALESIONAL; INTRAMUSCULAR; INTRAVENOUS; SOFT TISSUE at 19:48

## 2025-04-16 RX ADMIN — SODIUM CHLORIDE 25 ML/HR: 3 INJECTION, SOLUTION INTRAVENOUS at 17:17

## 2025-04-16 RX ADMIN — Medication 100 MG: at 10:21

## 2025-04-16 RX ADMIN — DEXAMETHASONE SODIUM PHOSPHATE 4 MG: 4 INJECTION, SOLUTION INTRA-ARTICULAR; INTRALESIONAL; INTRAMUSCULAR; INTRAVENOUS; SOFT TISSUE at 14:53

## 2025-04-16 RX ADMIN — HYDRALAZINE HYDROCHLORIDE 5 MG: 20 INJECTION INTRAMUSCULAR; INTRAVENOUS at 13:05

## 2025-04-16 RX ADMIN — METRONIDAZOLE 500 MG: 500 INJECTION, SOLUTION INTRAVENOUS at 08:18

## 2025-04-16 RX ADMIN — SODIUM CHLORIDE 75 ML/HR: 3 INJECTION, SOLUTION INTRAVENOUS at 07:08

## 2025-04-16 RX ADMIN — ONDANSETRON 4 MG: 2 INJECTION, SOLUTION INTRAMUSCULAR; INTRAVENOUS at 19:48

## 2025-04-16 ASSESSMENT — PAIN SCALES - GENERAL
PAINLEVEL_OUTOF10: 2
PAINLEVEL_OUTOF10: 0
PAINLEVEL_OUTOF10: 6
PAINLEVEL_OUTOF10: 7

## 2025-04-16 ASSESSMENT — PAIN SCALES - WONG BAKER: WONGBAKER_NUMERICALRESPONSE: NO HURT

## 2025-04-16 ASSESSMENT — PAIN DESCRIPTION - LOCATION: LOCATION: HEAD

## 2025-04-16 ASSESSMENT — PAIN DESCRIPTION - DESCRIPTORS: DESCRIPTORS: ACHING

## 2025-04-16 NOTE — FLOWSHEET NOTE
0800 Mr Chao rests in the bed without apparent distress. He denies pain or SOB. He is able to ambulate in the room without difficulty. He has some aphasia currently but this has evidently improved since his arrival. He has peripheral IV's only with 3% saline infusing at 75 cc/hr. Keep na levels 145 to 150 (146 this AM). He was assisted with a chlorahexadine bath this AM. Plan for OR at 1100 this AM.

## 2025-04-16 NOTE — PLAN OF CARE
Problem: Discharge Planning  Goal: Discharge to home or other facility with appropriate resources  4/16/2025 1959 by Yusuf Kirk RN  Outcome: Progressing  4/16/2025 1912 by Wicho Avalos RN  Outcome: Progressing  Flowsheets (Taken 4/16/2025 1912)  Discharge to home or other facility with appropriate resources:   Identify barriers to discharge with patient and caregiver   Arrange for needed discharge resources and transportation as appropriate     Problem: ABCDS Injury Assessment  Goal: Absence of physical injury  4/16/2025 1959 by Yusuf Kirk RN  Outcome: Progressing  4/16/2025 1912 by Wicho Avalos RN  Outcome: Progressing  Flowsheets (Taken 4/16/2025 1912)  Absence of Physical Injury: Implement safety measures based on patient assessment     Problem: Safety - Adult  Goal: Free from fall injury  4/16/2025 1959 by Yusuf Kirk RN  Outcome: Progressing  4/16/2025 1912 by Wicho Avalos RN  Outcome: Progressing  Flowsheets (Taken 4/16/2025 1912)  Free From Fall Injury:   Instruct family/caregiver on patient safety   Based on caregiver fall risk screen, instruct family/caregiver to ask for assistance with transferring infant if caregiver noted to have fall risk factors     Problem: Pain  Goal: Verbalizes/displays adequate comfort level or baseline comfort level  4/16/2025 1959 by Yusuf Kirk RN  Outcome: Progressing  4/16/2025 1912 by Wicho Avalos RN  Outcome: Progressing  Flowsheets (Taken 4/16/2025 1912)  Verbalizes/displays adequate comfort level or baseline comfort level:   Encourage patient to monitor pain and request assistance   Assess pain using appropriate pain scale   Implement non-pharmacological measures as appropriate and evaluate response     Problem: Neurosensory - Adult  Goal: Achieves stable or improved neurological status  4/16/2025 1959 by Yusuf Kirk RN  Outcome: Progressing  4/16/2025 1912 by Wicho Avalos RN  Outcome: Progressing  Flowsheets (Taken 4/16/2025

## 2025-04-16 NOTE — ANESTHESIA PRE PROCEDURE
Department of Anesthesiology  Preprocedure Note       Name:  Koko Chao   Age:  65 y.o.  :  1960                                          MRN:  [id number]         Date:  2025      Surgeon: Surgeon(s):  Zander Singh MD    Procedure: Procedure(s):  LEFT PARIETAL CRANIOTOMY FOR EVACUATION OF CEREBRAL ABSCESS    Medications prior to admission:   Prior to Admission medications    Medication Sig Start Date End Date Taking? Authorizing Provider   albuterol sulfate HFA (VENTOLIN HFA) 108 (90 Base) MCG/ACT inhaler Inhale 2 puffs into the lungs every 4 hours as needed for Wheezing or Shortness of Breath 3/22/25  Yes Justine Doe APRN - CNP   olmesartan (BENICAR) 5 MG tablet Take 2 tablets by mouth once daily 25  Yes Hannah Banks MD   pravastatin (PRAVACHOL) 40 MG tablet Take 1 tablet by mouth once daily 25  Yes Hannah Banks MD   fluticasone (FLONASE) 50 MCG/ACT nasal spray 1 spray by Each Nostril route daily  Patient taking differently: 1 spray by Each Nostril route as needed 22  Yes Rebecca Noe APRN - CNP   aspirin 81 MG tablet Take 1 tablet by mouth daily   Yes Delfino Calixto MD   alfuzosin (UROXATRAL) 10 MG extended release tablet Take 1 tablet by mouth daily  Patient not taking: Reported on 2025   Denise Aguilar APRN - CNP   PANTOPRAZOLE SODIUM PO Take by mouth daily    Delfino Calixto MD   FAMOTIDINE PO Take by mouth daily    Delfino Calixto MD   Cranberry 500 MG CAPS Take 1 capsule by mouth 2 times daily 24  Denise Aguilar APRN - CNP   D-Mannose 500 MG CAPS Take 500 mg by mouth daily 24  Denise Aguilar APRN - CNP       Current medications:    Current Facility-Administered Medications   Medication Dose Route Frequency Provider Last Rate Last Admin   • 3% sodium chloride (HYPERTONIC) IV infusion  75 mL/hr IntraVENous Continuous Winnie Worley MD 75 mL/hr at 25 0708 75 mL/hr at 25 0708   • calcium

## 2025-04-16 NOTE — PLAN OF CARE
Problem: Discharge Planning  Goal: Discharge to home or other facility with appropriate resources  Outcome: Progressing  Flowsheets (Taken 4/16/2025 1912)  Discharge to home or other facility with appropriate resources:   Identify barriers to discharge with patient and caregiver   Arrange for needed discharge resources and transportation as appropriate     Problem: ABCDS Injury Assessment  Goal: Absence of physical injury  Outcome: Progressing  Flowsheets (Taken 4/16/2025 1912)  Absence of Physical Injury: Implement safety measures based on patient assessment     Problem: Safety - Adult  Goal: Free from fall injury  Outcome: Progressing  Flowsheets (Taken 4/16/2025 1912)  Free From Fall Injury:   Instruct family/caregiver on patient safety   Based on caregiver fall risk screen, instruct family/caregiver to ask for assistance with transferring infant if caregiver noted to have fall risk factors     Problem: Pain  Goal: Verbalizes/displays adequate comfort level or baseline comfort level  Outcome: Progressing  Flowsheets (Taken 4/16/2025 1912)  Verbalizes/displays adequate comfort level or baseline comfort level:   Encourage patient to monitor pain and request assistance   Assess pain using appropriate pain scale   Implement non-pharmacological measures as appropriate and evaluate response     Problem: Neurosensory - Adult  Goal: Achieves stable or improved neurological status  Outcome: Progressing  Flowsheets (Taken 4/16/2025 1912)  Achieves stable or improved neurological status:   Assess for and report changes in neurological status   Initiate measures to prevent increased intracranial pressure     Problem: Skin/Tissue Integrity - Adult  Goal: Skin integrity remains intact  Outcome: Progressing  Flowsheets (Taken 4/16/2025 1912)  Skin Integrity Remains Intact:   Monitor for areas of redness and/or skin breakdown   Assess vascular access sites hourly   Every 4-6 hours minimum:  Change oxygen saturation probe

## 2025-04-16 NOTE — ANESTHESIA POSTPROCEDURE EVALUATION
Department of Anesthesiology  Postprocedure Note    Patient: Koko Chao  MRN: [id number]  YOB: 1960  Date of evaluation: 4/16/2025    Procedure Summary       Date: 04/16/25 Room / Location: [facility] OR [address] [place] [address] OR    Anesthesia Start: 1016 Anesthesia Stop: 1241    Procedure: LEFT PARIETAL CRANIOTOMY FOR EVACUATION OF CEREBRAL ABSCESS (Left: Head) Diagnosis:       Brain mass      (Brain mass [G93.89])    Surgeons: Zander Singh MD Responsible Provider: Moises Diaz DO    Anesthesia Type: general ASA Status: 2            Anesthesia Type: No value filed.    Sonia Phase I: Sonia Score: 8    Sonia Phase II:      Anesthesia Post Evaluation    Patient location during evaluation: PACU  Patient participation: complete - patient participated  Level of consciousness: awake  Airway patency: patent  Nausea & Vomiting: no nausea  Cardiovascular status: hemodynamically stable  Respiratory status: acceptable  Hydration status: stable  Pain management: adequate    No notable events documented.

## 2025-04-16 NOTE — BRIEF OP NOTE
Brief Postoperative Note      Patient: Koko Chao  YOB: 1960  MRN: [id number]    Date of Procedure: 4/16/2025    Pre-Op Diagnosis Codes:      * Brain mass [G93.89]    Post-Op Diagnosis: Same       Procedure(s):  LEFT PARIETAL CRANIOTOMY FOR EVACUATION OF CEREBRAL ABSCESS    Surgeon(s):  Zander Singh MD    Assistant:  Physician Assistant: Davidson Payne PA-C    Anesthesia: General    Estimated Blood Loss (mL): less than 100     Complications: None    Specimens:   ID Type Source Tests Collected by Time Destination   1 : Parietal Abcess Swab Head CULTURE, ANAEROBIC AND AEROBIC (Canceled) Zander Singh MD 4/16/2025 1118    2 :  Swab Head CULTURE, FUNGUS, AFB STAIN, CULTURE, ANAEROBIC AND AEROBIC Zander Singh MD 4/16/2025 1146    A : Parietal Abcess Tissue Brain SURGICAL PATHOLOGY Zander Singh MD 4/16/2025 1131    B : Parietal Abcess Tissue Brain SURGICAL PATHOLOGY, AFB STAIN (Canceled) Zander Singh MD 4/16/2025 1151        Implants:  Implant Name Type Inv. Item Serial No.  Lot No. LRB No. Used Action   PATCH DURAL 6X8CM POLYESTER [employer]RETH[employer]HE[employer] NO - [id number]  PATCH DURAL 6X8CM POLYESTER URETHANE SYNTHETIC SUBSTITUTE NO  [id number] Left 1 Implanted   COVER BUR H [id number] PLT LO PROF W/ TAB UNIV NEURO III - [id number]  COVER BUR H [id number] PLT LO PROF W/ TAB UNIV NEURO III  LESLEY [id number]  Left 2 Implanted         Drains: none  Urinary Catheter 04/16/25 (Active)       Findings:  Infection Present At Time Of Surgery (PATOS) (choose all levels that have infection present):  No infection present  Other Findings: Post left craniotomy and biopsy of intracranial abnormality    Electronically signed by Davidson Payne PA-C on 4/16/2025 at 12:35 PM

## 2025-04-16 NOTE — PLAN OF CARE
Problem: Discharge Planning  Goal: Discharge to home or other facility with appropriate resources  Outcome: Progressing  Flowsheets (Taken 4/15/2025 2000)  Discharge to home or other facility with appropriate resources:   Identify barriers to discharge with patient and caregiver   Arrange for needed discharge resources and transportation as appropriate   Identify discharge learning needs (meds, wound care, etc)   Refer to discharge planning if patient needs post-hospital services based on physician order or complex needs related to functional status, cognitive ability or social support system     Problem: ABCDS Injury Assessment  Goal: Absence of physical injury  Outcome: Progressing  Flowsheets (Taken 4/16/2025 0331)  Absence of Physical Injury: Implement safety measures based on patient assessment     Problem: Safety - Adult  Goal: Free from fall injury  Outcome: Progressing  Flowsheets (Taken 4/16/2025 0331)  Free From Fall Injury:   Instruct family/caregiver on patient safety   Based on caregiver fall risk screen, instruct family/caregiver to ask for assistance with transferring infant if caregiver noted to have fall risk factors     Problem: Pain  Goal: Verbalizes/displays adequate comfort level or baseline comfort level  Outcome: Progressing  Flowsheets (Taken 4/15/2025 2000)  Verbalizes/displays adequate comfort level or baseline comfort level:   Encourage patient to monitor pain and request assistance   Assess pain using appropriate pain scale   Administer analgesics based on type and severity of pain and evaluate response   Implement non-pharmacological measures as appropriate and evaluate response   Consider cultural and social influences on pain and pain management   Notify Licensed Independent Practitioner if interventions unsuccessful or patient reports new pain     Problem: Neurosensory - Adult  Goal: Achieves stable or improved neurological status  Outcome: Progressing  Flowsheets (Taken 4/15/2025

## 2025-04-17 ENCOUNTER — ANESTHESIA (OUTPATIENT)
Dept: OPERATING ROOM | Age: 65
End: 2025-04-17
Payer: COMMERCIAL

## 2025-04-17 ENCOUNTER — APPOINTMENT (OUTPATIENT)
Dept: CT IMAGING | Age: 65
DRG: 023 | End: 2025-04-17
Payer: COMMERCIAL

## 2025-04-17 ENCOUNTER — ANESTHESIA EVENT (OUTPATIENT)
Dept: OPERATING ROOM | Age: 65
End: 2025-04-17
Payer: COMMERCIAL

## 2025-04-17 LAB
ACID FAST STN SPEC: NORMAL
ANION GAP SERPL CALC-SCNC: 9 MEQ/L (ref 8–16)
ARTERIAL PATENCY WRIST A: POSITIVE
BASE EXCESS BLDA CALC-SCNC: -2.3 MMOL/L (ref -2.5–2.5)
BASOPHILS ABSOLUTE: 0 THOU/MM3 (ref 0–0.1)
BASOPHILS NFR BLD AUTO: 0.1 %
BDY SITE: ABNORMAL
BUN SERPL-MCNC: 13 MG/DL (ref 8–23)
CALCIUM SERPL-MCNC: 8.7 MG/DL (ref 8.8–10.2)
CHLORIDE 24H UR-SRATE: 242 MEQ/L
CHLORIDE SERPL-SCNC: 115 MEQ/L (ref 98–111)
CO2 SERPL-SCNC: 19 MEQ/L (ref 22–29)
COLLECTED BY:: ABNORMAL
CREAT SERPL-MCNC: 0.7 MG/DL (ref 0.7–1.2)
DEPRECATED RDW RBC AUTO: 41.5 FL (ref 35–45)
DEVICE: ABNORMAL
EOSINOPHIL NFR BLD AUTO: 0 %
EOSINOPHILS ABSOLUTE: 0 THOU/MM3 (ref 0–0.4)
ERYTHROCYTE [DISTWIDTH] IN BLOOD BY AUTOMATED COUNT: 13 % (ref 11.5–14.5)
FLUAV RNA RESP QL NAA+PROBE: NOT DETECTED
FLUBV RNA RESP QL NAA+PROBE: NOT DETECTED
GFR SERPL CREATININE-BSD FRML MDRD: > 90 ML/MIN/1.73M2
GLUCOSE SERPL-MCNC: 151 MG/DL (ref 74–109)
HCO3 BLDA-SCNC: 22 MMOL/L (ref 23–28)
HCT VFR BLD AUTO: 37.8 % (ref 42–52)
HGB BLD-MCNC: 12.5 GM/DL (ref 14–18)
IMM GRANULOCYTES # BLD AUTO: 0.07 THOU/MM3 (ref 0–0.07)
IMM GRANULOCYTES NFR BLD AUTO: 0.5 %
LYMPHOCYTES ABSOLUTE: 0.6 THOU/MM3 (ref 1–4.8)
LYMPHOCYTES NFR BLD AUTO: 4.1 %
MCH RBC QN AUTO: 29.1 PG (ref 26–33)
MCHC RBC AUTO-ENTMCNC: 33.1 GM/DL (ref 32.2–35.5)
MCV RBC AUTO: 87.9 FL (ref 80–94)
MONOCYTES ABSOLUTE: 0.4 THOU/MM3 (ref 0.4–1.3)
MONOCYTES NFR BLD AUTO: 2.6 %
NEUTROPHILS ABSOLUTE: 12.6 THOU/MM3 (ref 1.8–7.7)
NEUTROPHILS NFR BLD AUTO: 92.7 %
NRBC BLD AUTO-RTO: 0 /100 WBC
OSMOLALITY SERPL: 303 MOSMOL/KG (ref 275–295)
OSMOLALITY UR: 827 MOSMOL/KG (ref 250–750)
PCO2 BLDA: 34 MMHG (ref 35–45)
PH BLDA: 7.41 [PH] (ref 7.35–7.45)
PLATELET # BLD AUTO: 270 THOU/MM3 (ref 130–400)
PMV BLD AUTO: 10.1 FL (ref 9.4–12.4)
PO2 BLDA: 65 MMHG (ref 71–104)
POTASSIUM SERPL-SCNC: 4.3 MEQ/L (ref 3.5–5.2)
POTASSIUM UR-SCNC: 24.7 MEQ/L
RBC # BLD AUTO: 4.3 MILL/MM3 (ref 4.7–6.1)
SAO2 % BLDA: 93 %
SARS-COV-2 RNA RESP QL NAA+PROBE: NOT DETECTED
SODIUM SERPL-SCNC: 141 MEQ/L (ref 135–145)
SODIUM SERPL-SCNC: 142 MEQ/L (ref 135–145)
SODIUM SERPL-SCNC: 143 MEQ/L (ref 135–145)
SODIUM UR-SCNC: 223 MEQ/L
UUN 24H UR-MCNC: 466 MG/DL
VANCOMYCIN SERPL-MCNC: 8.9 UG/ML (ref 10–39.9)
VENTILATION MODE VENT: ABNORMAL
WBC # BLD AUTO: 13.6 THOU/MM3 (ref 4.8–10.8)

## 2025-04-17 PROCEDURE — 6370000000 HC RX 637 (ALT 250 FOR IP): Performed by: PHYSICIAN ASSISTANT

## 2025-04-17 PROCEDURE — 36600 WITHDRAWAL OF ARTERIAL BLOOD: CPT

## 2025-04-17 PROCEDURE — 97162 PT EVAL MOD COMPLEX 30 MIN: CPT

## 2025-04-17 PROCEDURE — 2500000003 HC RX 250 WO HCPCS

## 2025-04-17 PROCEDURE — 6360000002 HC RX W HCPCS

## 2025-04-17 PROCEDURE — 83930 ASSAY OF BLOOD OSMOLALITY: CPT

## 2025-04-17 PROCEDURE — 82803 BLOOD GASES ANY COMBINATION: CPT

## 2025-04-17 PROCEDURE — C1763 CONN TISS, NON-HUMAN: HCPCS | Performed by: NEUROLOGICAL SURGERY

## 2025-04-17 PROCEDURE — 74177 CT ABD & PELVIS W/CONTRAST: CPT

## 2025-04-17 PROCEDURE — 2500000003 HC RX 250 WO HCPCS: Performed by: PHYSICIAN ASSISTANT

## 2025-04-17 PROCEDURE — 2720000010 HC SURG SUPPLY STERILE: Performed by: NEUROLOGICAL SURGERY

## 2025-04-17 PROCEDURE — 00BC0ZX EXCISION OF CEREBELLUM, OPEN APPROACH, DIAGNOSTIC: ICD-10-PCS | Performed by: NEUROLOGICAL SURGERY

## 2025-04-17 PROCEDURE — 6360000002 HC RX W HCPCS: Performed by: PHYSICIAN ASSISTANT

## 2025-04-17 PROCEDURE — 97116 GAIT TRAINING THERAPY: CPT

## 2025-04-17 PROCEDURE — 3700000001 HC ADD 15 MINUTES (ANESTHESIA): Performed by: NEUROLOGICAL SURGERY

## 2025-04-17 PROCEDURE — 2580000003 HC RX 258: Performed by: NURSE ANESTHETIST, CERTIFIED REGISTERED

## 2025-04-17 PROCEDURE — 2500000003 HC RX 250 WO HCPCS: Performed by: NURSE ANESTHETIST, CERTIFIED REGISTERED

## 2025-04-17 PROCEDURE — 6370000000 HC RX 637 (ALT 250 FOR IP): Performed by: NEUROLOGICAL SURGERY

## 2025-04-17 PROCEDURE — 2700000000 HC OXYGEN THERAPY PER DAY

## 2025-04-17 PROCEDURE — 84300 ASSAY OF URINE SODIUM: CPT

## 2025-04-17 PROCEDURE — 2709999900 HC NON-CHARGEABLE SUPPLY: Performed by: NEUROLOGICAL SURGERY

## 2025-04-17 PROCEDURE — 97535 SELF CARE MNGMENT TRAINING: CPT

## 2025-04-17 PROCEDURE — 70450 CT HEAD/BRAIN W/O DYE: CPT

## 2025-04-17 PROCEDURE — 80048 BASIC METABOLIC PNL TOTAL CA: CPT

## 2025-04-17 PROCEDURE — 82436 ASSAY OF URINE CHLORIDE: CPT

## 2025-04-17 PROCEDURE — 94640 AIRWAY INHALATION TREATMENT: CPT

## 2025-04-17 PROCEDURE — 99232 SBSQ HOSP IP/OBS MODERATE 35: CPT | Performed by: STUDENT IN AN ORGANIZED HEALTH CARE EDUCATION/TRAINING PROGRAM

## 2025-04-17 PROCEDURE — 83935 ASSAY OF URINE OSMOLALITY: CPT

## 2025-04-17 PROCEDURE — 85025 COMPLETE CBC W/AUTO DIFF WBC: CPT

## 2025-04-17 PROCEDURE — 2580000003 HC RX 258: Performed by: STUDENT IN AN ORGANIZED HEALTH CARE EDUCATION/TRAINING PROGRAM

## 2025-04-17 PROCEDURE — 87636 SARSCOV2 & INF A&B AMP PRB: CPT

## 2025-04-17 PROCEDURE — 3600000004 HC SURGERY LEVEL 4 BASE: Performed by: NEUROLOGICAL SURGERY

## 2025-04-17 PROCEDURE — 3600000014 HC SURGERY LEVEL 4 ADDTL 15MIN: Performed by: NEUROLOGICAL SURGERY

## 2025-04-17 PROCEDURE — 94761 N-INVAS EAR/PLS OXIMETRY MLT: CPT

## 2025-04-17 PROCEDURE — 87081 CULTURE SCREEN ONLY: CPT

## 2025-04-17 PROCEDURE — 6370000000 HC RX 637 (ALT 250 FOR IP)

## 2025-04-17 PROCEDURE — 84540 ASSAY OF URINE/UREA-N: CPT

## 2025-04-17 PROCEDURE — 71260 CT THORAX DX C+: CPT

## 2025-04-17 PROCEDURE — 6360000002 HC RX W HCPCS: Performed by: NURSE ANESTHETIST, CERTIFIED REGISTERED

## 2025-04-17 PROCEDURE — 36415 COLL VENOUS BLD VENIPUNCTURE: CPT

## 2025-04-17 PROCEDURE — 84295 ASSAY OF SERUM SODIUM: CPT

## 2025-04-17 PROCEDURE — 3700000000 HC ANESTHESIA ATTENDED CARE: Performed by: NEUROLOGICAL SURGERY

## 2025-04-17 PROCEDURE — C1713 ANCHOR/SCREW BN/BN,TIS/BN: HCPCS | Performed by: NEUROLOGICAL SURGERY

## 2025-04-17 PROCEDURE — 2000000000 HC ICU R&B

## 2025-04-17 PROCEDURE — 80202 ASSAY OF VANCOMYCIN: CPT

## 2025-04-17 PROCEDURE — 6360000004 HC RX CONTRAST MEDICATION: Performed by: INTERNAL MEDICINE

## 2025-04-17 PROCEDURE — 94002 VENT MGMT INPAT INIT DAY: CPT

## 2025-04-17 PROCEDURE — 2580000003 HC RX 258

## 2025-04-17 PROCEDURE — 84133 ASSAY OF URINE POTASSIUM: CPT

## 2025-04-17 PROCEDURE — 2500000003 HC RX 250 WO HCPCS: Performed by: NEUROLOGICAL SURGERY

## 2025-04-17 PROCEDURE — 94669 MECHANICAL CHEST WALL OSCILL: CPT

## 2025-04-17 PROCEDURE — 97166 OT EVAL MOD COMPLEX 45 MIN: CPT

## 2025-04-17 PROCEDURE — 99291 CRITICAL CARE FIRST HOUR: CPT | Performed by: INTERNAL MEDICINE

## 2025-04-17 DEVICE — LYOPLANT ONLAY 5.0X5.0CM
Type: IMPLANTABLE DEVICE | Site: PARIETAL | Status: FUNCTIONAL
Brand: LYOPLANT ONLAY

## 2025-04-17 DEVICE — SCREW UN3 SLFTP 1.5X4MM: Type: IMPLANTABLE DEVICE | Status: FUNCTIONAL

## 2025-04-17 DEVICE — SCREW, AXS, SELF-DRILLING
Type: IMPLANTABLE DEVICE | Status: FUNCTIONAL
Brand: UNIVERSAL NEURO 3

## 2025-04-17 RX ORDER — MANNITOL 250 MG/ML
INJECTION, SOLUTION INTRAVENOUS
Status: DISCONTINUED | OUTPATIENT
Start: 2025-04-17 | End: 2025-04-18 | Stop reason: SDUPTHER

## 2025-04-17 RX ORDER — LOSARTAN POTASSIUM 25 MG/1
25 TABLET ORAL 2 TIMES DAILY
Status: DISCONTINUED | OUTPATIENT
Start: 2025-04-17 | End: 2025-04-22

## 2025-04-17 RX ORDER — MANNITOL 250 MG/ML
50 INJECTION, SOLUTION INTRAVENOUS ONCE
Status: DISCONTINUED | OUTPATIENT
Start: 2025-04-17 | End: 2025-04-17

## 2025-04-17 RX ORDER — PROPOFOL 10 MG/ML
5-50 INJECTION, EMULSION INTRAVENOUS CONTINUOUS
Status: DISCONTINUED | OUTPATIENT
Start: 2025-04-18 | End: 2025-04-21

## 2025-04-17 RX ORDER — FENTANYL CITRATE-0.9 % NACL/PF 10 MCG/ML
25-200 PLASTIC BAG, INJECTION (ML) INTRAVENOUS CONTINUOUS
Refills: 0 | Status: DISCONTINUED | OUTPATIENT
Start: 2025-04-18 | End: 2025-04-21

## 2025-04-17 RX ORDER — PROPOFOL 10 MG/ML
INJECTION, EMULSION INTRAVENOUS
Status: DISCONTINUED | OUTPATIENT
Start: 2025-04-17 | End: 2025-04-18 | Stop reason: SDUPTHER

## 2025-04-17 RX ORDER — TRANEXAMIC ACID 100 MG/ML
INJECTION, SOLUTION INTRAVENOUS
Status: DISCONTINUED | OUTPATIENT
Start: 2025-04-17 | End: 2025-04-18 | Stop reason: SDUPTHER

## 2025-04-17 RX ORDER — SODIUM CHLORIDE 9 MG/ML
INJECTION, SOLUTION INTRAVENOUS
Status: DISCONTINUED | OUTPATIENT
Start: 2025-04-17 | End: 2025-04-18 | Stop reason: SDUPTHER

## 2025-04-17 RX ORDER — LABETALOL HYDROCHLORIDE 5 MG/ML
5 INJECTION, SOLUTION INTRAVENOUS EVERY 4 HOURS PRN
Status: DISCONTINUED | OUTPATIENT
Start: 2025-04-17 | End: 2025-04-28 | Stop reason: HOSPADM

## 2025-04-17 RX ORDER — PHENYLEPHRINE HCL IN 0.9% NACL 1 MG/10 ML
SYRINGE (ML) INTRAVENOUS
Status: DISCONTINUED | OUTPATIENT
Start: 2025-04-17 | End: 2025-04-18 | Stop reason: SDUPTHER

## 2025-04-17 RX ORDER — IOPAMIDOL 755 MG/ML
80 INJECTION, SOLUTION INTRAVASCULAR
Status: COMPLETED | OUTPATIENT
Start: 2025-04-17 | End: 2025-04-17

## 2025-04-17 RX ORDER — HYDRALAZINE HYDROCHLORIDE 20 MG/ML
5 INJECTION INTRAMUSCULAR; INTRAVENOUS ONCE
Status: COMPLETED | OUTPATIENT
Start: 2025-04-17 | End: 2025-04-17

## 2025-04-17 RX ORDER — ROCURONIUM BROMIDE 10 MG/ML
INJECTION, SOLUTION INTRAVENOUS
Status: DISCONTINUED | OUTPATIENT
Start: 2025-04-17 | End: 2025-04-18 | Stop reason: SDUPTHER

## 2025-04-17 RX ORDER — CHLORHEXIDINE GLUCONATE ORAL RINSE 1.2 MG/ML
15 SOLUTION DENTAL 2 TIMES DAILY
Status: DISCONTINUED | OUTPATIENT
Start: 2025-04-18 | End: 2025-04-19

## 2025-04-17 RX ORDER — MANNITOL 20 G/100ML
50 INJECTION, SOLUTION INTRAVENOUS ONCE
Status: COMPLETED | OUTPATIENT
Start: 2025-04-17 | End: 2025-04-17

## 2025-04-17 RX ORDER — ALBUTEROL SULFATE 0.83 MG/ML
2.5 SOLUTION RESPIRATORY (INHALATION) EVERY 6 HOURS PRN
Status: DISCONTINUED | OUTPATIENT
Start: 2025-04-17 | End: 2025-04-28 | Stop reason: HOSPADM

## 2025-04-17 RX ORDER — CEFAZOLIN SODIUM 1 G/3ML
INJECTION, POWDER, FOR SOLUTION INTRAMUSCULAR; INTRAVENOUS
Status: DISCONTINUED | OUTPATIENT
Start: 2025-04-17 | End: 2025-04-18 | Stop reason: SDUPTHER

## 2025-04-17 RX ORDER — HYDROMORPHONE HYDROCHLORIDE 2 MG/ML
INJECTION, SOLUTION INTRAMUSCULAR; INTRAVENOUS; SUBCUTANEOUS
Status: DISCONTINUED | OUTPATIENT
Start: 2025-04-17 | End: 2025-04-18 | Stop reason: SDUPTHER

## 2025-04-17 RX ORDER — FENTANYL CITRATE 50 UG/ML
INJECTION, SOLUTION INTRAMUSCULAR; INTRAVENOUS
Status: DISCONTINUED | OUTPATIENT
Start: 2025-04-17 | End: 2025-04-18 | Stop reason: SDUPTHER

## 2025-04-17 RX ADMIN — DEXAMETHASONE SODIUM PHOSPHATE 4 MG: 4 INJECTION, SOLUTION INTRA-ARTICULAR; INTRALESIONAL; INTRAMUSCULAR; INTRAVENOUS; SOFT TISSUE at 09:18

## 2025-04-17 RX ADMIN — TRANEXAMIC ACID 1000 MG: 100 INJECTION, SOLUTION INTRAVENOUS at 22:27

## 2025-04-17 RX ADMIN — PROPOFOL 50 MG: 10 INJECTION, EMULSION INTRAVENOUS at 22:45

## 2025-04-17 RX ADMIN — LABETALOL HYDROCHLORIDE 5 MG: 5 INJECTION, SOLUTION INTRAVENOUS at 09:04

## 2025-04-17 RX ADMIN — LABETALOL HYDROCHLORIDE 5 MG: 5 INJECTION, SOLUTION INTRAVENOUS at 15:04

## 2025-04-17 RX ADMIN — Medication 100 MCG: at 23:02

## 2025-04-17 RX ADMIN — Medication 100 MCG: at 22:25

## 2025-04-17 RX ADMIN — MORPHINE SULFATE 2 MG: 2 INJECTION, SOLUTION INTRAMUSCULAR; INTRAVENOUS at 04:52

## 2025-04-17 RX ADMIN — Medication 100 MCG: at 22:02

## 2025-04-17 RX ADMIN — HYDROCODONE BITARTRATE AND ACETAMINOPHEN 1 TABLET: 5; 325 TABLET ORAL at 00:59

## 2025-04-17 RX ADMIN — Medication 100 MCG: at 22:07

## 2025-04-17 RX ADMIN — SODIUM CHLORIDE: 9 INJECTION, SOLUTION INTRAVENOUS at 21:34

## 2025-04-17 RX ADMIN — SODIUM CHLORIDE, PRESERVATIVE FREE 10 ML: 5 INJECTION INTRAVENOUS at 17:34

## 2025-04-17 RX ADMIN — Medication 6 MG: at 00:59

## 2025-04-17 RX ADMIN — WATER 2000 MG: 1 INJECTION INTRAMUSCULAR; INTRAVENOUS; SUBCUTANEOUS at 17:35

## 2025-04-17 RX ADMIN — PROPOFOL 50 MG: 10 INJECTION, EMULSION INTRAVENOUS at 21:50

## 2025-04-17 RX ADMIN — DEXAMETHASONE SODIUM PHOSPHATE 4 MG: 4 INJECTION, SOLUTION INTRA-ARTICULAR; INTRALESIONAL; INTRAMUSCULAR; INTRAVENOUS; SOFT TISSUE at 15:32

## 2025-04-17 RX ADMIN — PROPOFOL 50 MG: 10 INJECTION, EMULSION INTRAVENOUS at 22:17

## 2025-04-17 RX ADMIN — IOPAMIDOL 80 ML: 755 INJECTION, SOLUTION INTRAVENOUS at 04:35

## 2025-04-17 RX ADMIN — HYDROMORPHONE HYDROCHLORIDE 1 MG: 2 INJECTION INTRAMUSCULAR; INTRAVENOUS; SUBCUTANEOUS at 22:19

## 2025-04-17 RX ADMIN — SODIUM CHLORIDE 75 ML/HR: 3 INJECTION, SOLUTION INTRAVENOUS at 09:00

## 2025-04-17 RX ADMIN — METRONIDAZOLE 500 MG: 500 INJECTION, SOLUTION INTRAVENOUS at 17:11

## 2025-04-17 RX ADMIN — SODIUM CHLORIDE 75 ML/HR: 3 INJECTION, SOLUTION INTRAVENOUS at 00:56

## 2025-04-17 RX ADMIN — METRONIDAZOLE 500 MG: 500 INJECTION, SOLUTION INTRAVENOUS at 00:24

## 2025-04-17 RX ADMIN — WATER 2000 MG: 1 INJECTION INTRAMUSCULAR; INTRAVENOUS; SUBCUTANEOUS at 04:08

## 2025-04-17 RX ADMIN — PROPOFOL 150 MG: 10 INJECTION, EMULSION INTRAVENOUS at 21:45

## 2025-04-17 RX ADMIN — TIOTROPIUM BROMIDE AND OLODATEROL 2 PUFF: 3.124; 2.736 SPRAY, METERED RESPIRATORY (INHALATION) at 14:23

## 2025-04-17 RX ADMIN — CEFAZOLIN 2 G: 1 INJECTION, POWDER, FOR SOLUTION INTRAMUSCULAR; INTRAVENOUS at 22:10

## 2025-04-17 RX ADMIN — HYDROCODONE BITARTRATE AND ACETAMINOPHEN 1 TABLET: 5; 325 TABLET ORAL at 15:19

## 2025-04-17 RX ADMIN — Medication 100 MCG: at 22:50

## 2025-04-17 RX ADMIN — MANNITOL 25 G: 250 INJECTION, SOLUTION INTRAVENOUS at 22:21

## 2025-04-17 RX ADMIN — LOSARTAN POTASSIUM 12.5 MG: 25 TABLET, FILM COATED ORAL at 09:15

## 2025-04-17 RX ADMIN — LEVETIRACETAM 500 MG: 100 INJECTION INTRAVENOUS at 17:32

## 2025-04-17 RX ADMIN — ONDANSETRON 4 MG: 2 INJECTION, SOLUTION INTRAMUSCULAR; INTRAVENOUS at 20:24

## 2025-04-17 RX ADMIN — ONDANSETRON 4 MG: 2 INJECTION, SOLUTION INTRAMUSCULAR; INTRAVENOUS at 15:22

## 2025-04-17 RX ADMIN — METRONIDAZOLE 500 MG: 500 INJECTION, SOLUTION INTRAVENOUS at 09:26

## 2025-04-17 RX ADMIN — FENTANYL CITRATE 100 MCG: 50 INJECTION, SOLUTION INTRAMUSCULAR; INTRAVENOUS at 21:42

## 2025-04-17 RX ADMIN — SODIUM CHLORIDE: 9 INJECTION, SOLUTION INTRAVENOUS at 23:42

## 2025-04-17 RX ADMIN — Medication 1250 MG: at 15:10

## 2025-04-17 RX ADMIN — PROPOFOL 20 MCG/KG/MIN: 10 INJECTION, EMULSION INTRAVENOUS at 23:58

## 2025-04-17 RX ADMIN — DEXAMETHASONE SODIUM PHOSPHATE 4 MG: 4 INJECTION, SOLUTION INTRA-ARTICULAR; INTRALESIONAL; INTRAMUSCULAR; INTRAVENOUS; SOFT TISSUE at 01:02

## 2025-04-17 RX ADMIN — HYDRALAZINE HYDROCHLORIDE 5 MG: 20 INJECTION INTRAMUSCULAR; INTRAVENOUS at 21:19

## 2025-04-17 RX ADMIN — Medication 100 MCG: at 22:09

## 2025-04-17 RX ADMIN — ROCURONIUM BROMIDE 50 MG: 10 INJECTION, SOLUTION INTRAVENOUS at 23:15

## 2025-04-17 RX ADMIN — ROCURONIUM BROMIDE 50 MG: 10 INJECTION, SOLUTION INTRAVENOUS at 21:45

## 2025-04-17 RX ADMIN — Medication 200 MCG: at 23:42

## 2025-04-17 RX ADMIN — HYDRALAZINE HYDROCHLORIDE 5 MG: 20 INJECTION INTRAMUSCULAR; INTRAVENOUS at 20:34

## 2025-04-17 RX ADMIN — PROPOFOL 50 MG: 10 INJECTION, EMULSION INTRAVENOUS at 23:50

## 2025-04-17 RX ADMIN — LEVETIRACETAM 500 MG: 100 INJECTION INTRAVENOUS at 04:08

## 2025-04-17 RX ADMIN — Medication 1250 MG: at 06:34

## 2025-04-17 RX ADMIN — MORPHINE SULFATE 2 MG: 2 INJECTION, SOLUTION INTRAMUSCULAR; INTRAVENOUS at 19:47

## 2025-04-17 RX ADMIN — PROPOFOL 50 MG: 10 INJECTION, EMULSION INTRAVENOUS at 23:55

## 2025-04-17 RX ADMIN — MANNITOL 50 G: 20 INJECTION, SOLUTION INTRAVENOUS at 15:57

## 2025-04-17 RX ADMIN — HYDROMORPHONE HYDROCHLORIDE 1 MG: 2 INJECTION INTRAMUSCULAR; INTRAVENOUS; SUBCUTANEOUS at 23:58

## 2025-04-17 RX ADMIN — Medication 100 MCG: at 23:13

## 2025-04-17 RX ADMIN — Medication 100 MCG: at 22:04

## 2025-04-17 ASSESSMENT — PAIN SCALES - GENERAL
PAINLEVEL_OUTOF10: 0
PAINLEVEL_OUTOF10: 5
PAINLEVEL_OUTOF10: 6
PAINLEVEL_OUTOF10: 6
PAINLEVEL_OUTOF10: 5
PAINLEVEL_OUTOF10: 0

## 2025-04-17 ASSESSMENT — PAIN DESCRIPTION - LOCATION
LOCATION: HEAD

## 2025-04-17 ASSESSMENT — PULMONARY FUNCTION TESTS: PIF_VALUE: 16

## 2025-04-17 ASSESSMENT — PAIN DESCRIPTION - ORIENTATION
ORIENTATION: LEFT;UPPER
ORIENTATION: LEFT

## 2025-04-17 ASSESSMENT — PAIN - FUNCTIONAL ASSESSMENT
PAIN_FUNCTIONAL_ASSESSMENT: ACTIVITIES ARE NOT PREVENTED
PAIN_FUNCTIONAL_ASSESSMENT: ACTIVITIES ARE NOT PREVENTED

## 2025-04-17 ASSESSMENT — PAIN DESCRIPTION - DESCRIPTORS
DESCRIPTORS: ACHING
DESCRIPTORS: DULL;ACHING;DISCOMFORT
DESCRIPTORS: ACHING
DESCRIPTORS: POUNDING

## 2025-04-17 NOTE — PLAN OF CARE
Problem: Discharge Planning  Goal: Discharge to home or other facility with appropriate resources  Outcome: Progressing  Flowsheets (Taken 4/16/2025 2003 by Yusuf Kirk, RN)  Discharge to home or other facility with appropriate resources: Identify barriers to discharge with patient and caregiver     Problem: ABCDS Injury Assessment  Goal: Absence of physical injury  Outcome: Progressing  Flowsheets (Taken 4/16/2025 1912 by Wicho Avalos, RN)  Absence of Physical Injury: Implement safety measures based on patient assessment     Problem: Safety - Adult  Goal: Free from fall injury  Outcome: Progressing  Flowsheets (Taken 4/16/2025 1912 by Wicho Avalos, RN)  Free From Fall Injury:   Instruct family/caregiver on patient safety   Based on caregiver fall risk screen, instruct family/caregiver to ask for assistance with transferring infant if caregiver noted to have fall risk factors     Problem: Pain  Goal: Verbalizes/displays adequate comfort level or baseline comfort level  Outcome: Progressing  Flowsheets (Taken 4/16/2025 1948 by Yusuf Kirk RN)  Verbalizes/displays adequate comfort level or baseline comfort level: Encourage patient to monitor pain and request assistance     Problem: Neurosensory - Adult  Goal: Achieves stable or improved neurological status  Outcome: Progressing  Flowsheets (Taken 4/16/2025 1912 by Wicho Avalos RN)  Achieves stable or improved neurological status:   Assess for and report changes in neurological status   Initiate measures to prevent increased intracranial pressure     Problem: Skin/Tissue Integrity - Adult  Goal: Skin integrity remains intact  Outcome: Progressing  Flowsheets (Taken 4/16/2025 2003 by Yusuf Kirk, RN)  Skin Integrity Remains Intact: Monitor for areas of redness and/or skin breakdown     Problem: Infection - Adult  Goal: Absence of infection during hospitalization  Outcome: Progressing  Flowsheets (Taken 4/16/2025 2003 by Yusuf Kirk, RN)  Absence of

## 2025-04-17 NOTE — FLOWSHEET NOTE
0800  Alert and oriented x 4. Wife and daughter at bedside and updated. Pt denies any pain.   BP reading 185/101. Rechecked BP and 176/93.     0820  /88.  Noted pt having more trouble finding words. Unable to give his age or birthdate at this time. Perforned NIH and score 2. Pt unable to read the sentences or words. No slurring. Speech not fluent. Pictures he could name but delayed response. Rest of neuro assessment intact.   After assessment completed sent message to Teresa Rice CNP in regards to above.     0840  Dr Childs notified.    0855  DR Singh in to see pt and assessed him. Talked with family at length answering questions and explained due to swelling. Dr Singh and Dr Childs discussed case.     0904  Given labetolol 5mg IV for BP - see flowsheet. Noted O2 sats 90%. Notified resp therapy of order for ABG. Instructed in use of incentive spirometer. Pt able to get to 2250.    1100  Assisted to bathroom with PT and RN. No dizziness. No c/o head pressure or headache.     1115  Assisted to chair. Gait steady. No dizziness.     1125  Bp 205 systolic. Denies any complaints.    1130  Dr Childs informed of BP and also clarified 3% saline order to discontinue. Dr Childs stated to stop 3% for now due to pleural effusions. Dr Childs spoke with daughter and wife about lungs and stopping the 3% at this time.     1145 /76. Sitting up in chair.

## 2025-04-17 NOTE — CARE COORDINATION
4/17/25, 3:57 PM EDT    DISCHARGE ON GOING EVALUATION    [facility] day: 6  Location: -17/017-A Reason for admit: Mass of brain [G93.89]  Left parietal mass [G93.89]     Procedures:   3/13 Echo with EF 60-65%   4/16 LEFT PARIETAL CRANIOTOMY FOR EVACUATION OF CEREBRAL ABSCESS     Imaging since last note:   4/16 CT Head: post removal of the left parietal lobe mass   4/17 CT head: 1. Stable appearing surgical resection site in the left parietal lobe  2. Increased soft tissue swelling of the left parietal scalp. Continued follow-up is recommended.  4/17 CT Chest/Abd/pelvis: 1. No evidence of a primary malignancy in the chest, abdomen or pelvis.  2. New bilateral pleural effusions with dependent atelectasis in the lungs.  3. Nonobstructing left renal calculi.    Barriers to Discharge: POD #1. 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 Stopped 3% saline today. Received 50g 20% IV mannitol x1 today.     On room air. Afebrile. NSR. Ox4. SLP/PT/OT. Telemetry, SCDs. IV rocephin, IV decadron 4 mg Q6H, prn IV hydralazine, prn norco, prn IV labetalol, IV keppra, cozaar, IV flagyl, prn IV morphine, prn zofran, protonix, pravastatin, inhaler, IV vancomycin, Electrolyte replacement protocols. WBC 13.6, hgb 12.5.     PCP: Hannah Banks MD  Readmission Risk Score: 8.6    Patient Goals/Plan/Treatment Preferences: Home w/wife. Monitor for needs. Pt states if need IV ATB at discharge, pt prefers to go home with HH and HI, wife does not work and can be taught to give IV ATB.

## 2025-04-18 ENCOUNTER — APPOINTMENT (OUTPATIENT)
Dept: CT IMAGING | Age: 65
DRG: 023 | End: 2025-04-18
Payer: COMMERCIAL

## 2025-04-18 ENCOUNTER — APPOINTMENT (OUTPATIENT)
Dept: GENERAL RADIOLOGY | Age: 65
DRG: 023 | End: 2025-04-18
Payer: COMMERCIAL

## 2025-04-18 LAB
ACINETOBACTER CALCOACETICUS-BAUMANNII BY PCR: NOT DETECTED
ANION GAP SERPL CALC-SCNC: 5 MEQ/L (ref 8–16)
ANION GAP SERPL CALC-SCNC: 7 MEQ/L (ref 8–16)
ANION GAP SERPL CALC-SCNC: 8 MEQ/L (ref 8–16)
ANION GAP SERPL CALC-SCNC: 8 MEQ/L (ref 8–16)
BASOPHILS ABSOLUTE: 0 THOU/MM3 (ref 0–0.1)
BASOPHILS NFR BLD AUTO: 0.1 %
BLACTX-M ISLT/SPM QL: NORMAL
BLAIMP ISLT/SPM QL: NORMAL
BLAKPC ISLT/SPM QL: NORMAL
BLAOXA-48-LIKE ISLT/SPM QL: NORMAL
BLAVIM ISLT/SPM QL: NORMAL
BUN SERPL-MCNC: 13 MG/DL (ref 8–23)
BUN SERPL-MCNC: 13 MG/DL (ref 8–23)
BUN SERPL-MCNC: 15 MG/DL (ref 8–23)
BUN SERPL-MCNC: 17 MG/DL (ref 8–23)
BUN SERPL-MCNC: 18 MG/DL (ref 8–23)
BUN SERPL-MCNC: 20 MG/DL (ref 8–23)
C PNEUM DNA LOWER RESP QL NAA+NON-PROBE: NOT DETECTED
CALCIUM SERPL-MCNC: 7.7 MG/DL (ref 8.8–10.2)
CALCIUM SERPL-MCNC: 7.8 MG/DL (ref 8.8–10.2)
CALCIUM SERPL-MCNC: 8 MG/DL (ref 8.8–10.2)
CALCIUM SERPL-MCNC: 8 MG/DL (ref 8.8–10.2)
CALCIUM SERPL-MCNC: 8.1 MG/DL (ref 8.8–10.2)
CALCIUM SERPL-MCNC: 8.2 MG/DL (ref 8.8–10.2)
CHLORIDE SERPL-SCNC: 114 MEQ/L (ref 98–111)
CHLORIDE SERPL-SCNC: 115 MEQ/L (ref 98–111)
CHLORIDE SERPL-SCNC: 116 MEQ/L (ref 98–111)
CHLORIDE SERPL-SCNC: 116 MEQ/L (ref 98–111)
CHLORIDE SERPL-SCNC: 119 MEQ/L (ref 98–111)
CHLORIDE SERPL-SCNC: 120 MEQ/L (ref 98–111)
CO2 SERPL-SCNC: 21 MEQ/L (ref 22–29)
CO2 SERPL-SCNC: 21 MEQ/L (ref 22–29)
CO2 SERPL-SCNC: 22 MEQ/L (ref 22–29)
CREAT SERPL-MCNC: 0.6 MG/DL (ref 0.7–1.2)
CREAT SERPL-MCNC: 0.6 MG/DL (ref 0.7–1.2)
CREAT SERPL-MCNC: 0.7 MG/DL (ref 0.7–1.2)
DEPRECATED RDW RBC AUTO: 43.5 FL (ref 35–45)
ENTEROBACTER CLOACAE COMPLEX BY PCR: NOT DETECTED
EOSINOPHIL NFR BLD AUTO: 0.1 %
EOSINOPHILS ABSOLUTE: 0 THOU/MM3 (ref 0–0.4)
ERYTHROCYTE [DISTWIDTH] IN BLOOD BY AUTOMATED COUNT: 13.4 % (ref 11.5–14.5)
ESCHERICHIA COLI BY PCR: NOT DETECTED
FLUAV RNA LOWER RESP QL NAA+NON-PROBE: NOT DETECTED
FLUBV RNA LOWER RESP QL NAA+NON-PROBE: NOT DETECTED
GFR SERPL CREATININE-BSD FRML MDRD: > 90 ML/MIN/1.73M2
GLUCOSE SERPL-MCNC: 131 MG/DL (ref 74–109)
GLUCOSE SERPL-MCNC: 136 MG/DL (ref 74–109)
GLUCOSE SERPL-MCNC: 140 MG/DL (ref 74–109)
GLUCOSE SERPL-MCNC: 142 MG/DL (ref 74–109)
GLUCOSE SERPL-MCNC: 143 MG/DL (ref 74–109)
GLUCOSE SERPL-MCNC: 146 MG/DL (ref 74–109)
HADV DNA LOWER RESP QL NAA+NON-PROBE: NOT DETECTED
HAEMOPHILUS INFLUENZAE BY PCR: NOT DETECTED
HCOV RNA LOWER RESP QL NAA+NON-PROBE: NOT DETECTED
HCT VFR BLD AUTO: 34.3 % (ref 42–52)
HGB BLD-MCNC: 11.2 GM/DL (ref 14–18)
HMPV RNA LOWER RESP QL NAA+NON-PROBE: NOT DETECTED
HPIV RNA LOWER RESP QL NAA+NON-PROBE: NOT DETECTED
IMM GRANULOCYTES # BLD AUTO: 0.11 THOU/MM3 (ref 0–0.07)
IMM GRANULOCYTES NFR BLD AUTO: 0.7 %
KLEBSIELLA AEROGENES BY PCR: NOT DETECTED
KLEBSIELLA OXYTOCA BY PCR: NOT DETECTED
KLEBSIELLA PNEUMONIAE GROUP BY PCR: NOT DETECTED
L PNEUMO DNA LOWER RESP QL NAA+NON-PROBE: NOT DETECTED
LYMPHOCYTES ABSOLUTE: 1.3 THOU/MM3 (ref 1–4.8)
LYMPHOCYTES NFR BLD AUTO: 8.3 %
M PNEUMO DNA LOWER RESP QL NAA+NON-PROBE: NOT DETECTED
MCH RBC QN AUTO: 29.1 PG (ref 26–33)
MCHC RBC AUTO-ENTMCNC: 32.7 GM/DL (ref 32.2–35.5)
MCV RBC AUTO: 89.1 FL (ref 80–94)
MONOCYTES ABSOLUTE: 1.2 THOU/MM3 (ref 0.4–1.3)
MONOCYTES NFR BLD AUTO: 7.3 %
MORAXELLA CATARRHALIS BY PCR: NOT DETECTED
MRSA SPEC QL CULT: NORMAL
NEUTROPHILS ABSOLUTE: 13.5 THOU/MM3 (ref 1.8–7.7)
NEUTROPHILS NFR BLD AUTO: 83.5 %
NRBC BLD AUTO-RTO: 0 /100 WBC
OSMOLALITY SERPL: 303 MOSMOL/KG (ref 275–295)
PLATELET # BLD AUTO: 252 THOU/MM3 (ref 130–400)
PMV BLD AUTO: 10.1 FL (ref 9.4–12.4)
POTASSIUM SERPL-SCNC: 3.9 MEQ/L (ref 3.5–5.2)
POTASSIUM SERPL-SCNC: 4 MEQ/L (ref 3.5–5.2)
POTASSIUM SERPL-SCNC: 4 MEQ/L (ref 3.5–5.2)
POTASSIUM SERPL-SCNC: 4.1 MEQ/L (ref 3.5–5.2)
POTASSIUM SERPL-SCNC: 4.1 MEQ/L (ref 3.5–5.2)
POTASSIUM SERPL-SCNC: 4.5 MEQ/L (ref 3.5–5.2)
PROTEUS SPECIES BY PCR: NOT DETECTED
PSEUDOMONAS AERUGINOSA BY PCR: NOT DETECTED
RBC # BLD AUTO: 3.85 MILL/MM3 (ref 4.7–6.1)
RESISTANT GENE MECA/C & MREJ BY PCR: NORMAL
RESISTANT GENE NDM BY PCR: NORMAL
RSV RNA LOWER RESP QL NAA+NON-PROBE: NOT DETECTED
RV+EV RNA LOWER RESP QL NAA+NON-PROBE: NOT DETECTED
SERRATIA MARCESCENS BY PCR: NOT DETECTED
SODIUM SERPL-SCNC: 141 MEQ/L (ref 135–145)
SODIUM SERPL-SCNC: 144 MEQ/L (ref 135–145)
SODIUM SERPL-SCNC: 144 MEQ/L (ref 135–145)
SODIUM SERPL-SCNC: 146 MEQ/L (ref 135–145)
SODIUM SERPL-SCNC: 148 MEQ/L (ref 135–145)
SODIUM SERPL-SCNC: 149 MEQ/L (ref 135–145)
SOURCE: NORMAL
SPECIMEN ACCEPTABILITY: NORMAL
STAPH AUREUS BY PCR: NOT DETECTED
STREP AGALACTIAE BY PCR: NOT DETECTED
STREP PNEUMONIAE BY PCR: NOT DETECTED
STREP PYOGENES BY PCR: NOT DETECTED
TRIGL SERPL-MCNC: 62 MG/DL (ref 0–199)
VANCOMYCIN SERPL-MCNC: 20.4 UG/ML (ref 10–39.9)
WBC # BLD AUTO: 16.2 THOU/MM3 (ref 4.8–10.8)

## 2025-04-18 PROCEDURE — 6360000002 HC RX W HCPCS: Performed by: PHYSICIAN ASSISTANT

## 2025-04-18 PROCEDURE — 99232 SBSQ HOSP IP/OBS MODERATE 35: CPT | Performed by: STUDENT IN AN ORGANIZED HEALTH CARE EDUCATION/TRAINING PROGRAM

## 2025-04-18 PROCEDURE — 71045 X-RAY EXAM CHEST 1 VIEW: CPT

## 2025-04-18 PROCEDURE — 2700000000 HC OXYGEN THERAPY PER DAY

## 2025-04-18 PROCEDURE — 6360000002 HC RX W HCPCS

## 2025-04-18 PROCEDURE — 2580000003 HC RX 258

## 2025-04-18 PROCEDURE — 2000000000 HC ICU R&B

## 2025-04-18 PROCEDURE — 87581 M.PNEUMON DNA AMP PROBE: CPT

## 2025-04-18 PROCEDURE — 36415 COLL VENOUS BLD VENIPUNCTURE: CPT

## 2025-04-18 PROCEDURE — 70450 CT HEAD/BRAIN W/O DYE: CPT

## 2025-04-18 PROCEDURE — 87486 CHLMYD PNEUM DNA AMP PROBE: CPT

## 2025-04-18 PROCEDURE — 85025 COMPLETE CBC W/AUTO DIFF WBC: CPT

## 2025-04-18 PROCEDURE — 83930 ASSAY OF BLOOD OSMOLALITY: CPT

## 2025-04-18 PROCEDURE — 2500000003 HC RX 250 WO HCPCS

## 2025-04-18 PROCEDURE — 94003 VENT MGMT INPAT SUBQ DAY: CPT

## 2025-04-18 PROCEDURE — 2580000003 HC RX 258: Performed by: NEUROLOGICAL SURGERY

## 2025-04-18 PROCEDURE — 80202 ASSAY OF VANCOMYCIN: CPT

## 2025-04-18 PROCEDURE — 6370000000 HC RX 637 (ALT 250 FOR IP): Performed by: PHYSICIAN ASSISTANT

## 2025-04-18 PROCEDURE — 87541 LEGION PNEUMO DNA AMP PROB: CPT

## 2025-04-18 PROCEDURE — 94640 AIRWAY INHALATION TREATMENT: CPT

## 2025-04-18 PROCEDURE — 87205 SMEAR GRAM STAIN: CPT

## 2025-04-18 PROCEDURE — 2500000003 HC RX 250 WO HCPCS: Performed by: PHYSICIAN ASSISTANT

## 2025-04-18 PROCEDURE — 87070 CULTURE OTHR SPECIMN AEROBIC: CPT

## 2025-04-18 PROCEDURE — 80048 BASIC METABOLIC PNL TOTAL CA: CPT

## 2025-04-18 PROCEDURE — 84478 ASSAY OF TRIGLYCERIDES: CPT

## 2025-04-18 PROCEDURE — 6370000000 HC RX 637 (ALT 250 FOR IP)

## 2025-04-18 PROCEDURE — 87086 URINE CULTURE/COLONY COUNT: CPT

## 2025-04-18 PROCEDURE — 37799 UNLISTED PX VASCULAR SURGERY: CPT

## 2025-04-18 PROCEDURE — 94761 N-INVAS EAR/PLS OXIMETRY MLT: CPT

## 2025-04-18 PROCEDURE — 87631 RESP VIRUS 3-5 TARGETS: CPT

## 2025-04-18 PROCEDURE — 2580000003 HC RX 258: Performed by: PHYSICIAN ASSISTANT

## 2025-04-18 PROCEDURE — 87798 DETECT AGENT NOS DNA AMP: CPT

## 2025-04-18 PROCEDURE — 99291 CRITICAL CARE FIRST HOUR: CPT | Performed by: INTERNAL MEDICINE

## 2025-04-18 RX ORDER — SODIUM CHLORIDE 0.9 % (FLUSH) 0.9 %
5-40 SYRINGE (ML) INJECTION PRN
Status: DISCONTINUED | OUTPATIENT
Start: 2025-04-18 | End: 2025-04-18 | Stop reason: SDUPTHER

## 2025-04-18 RX ORDER — SODIUM CHLORIDE 9 MG/ML
INJECTION, SOLUTION INTRAVENOUS PRN
Status: DISCONTINUED | OUTPATIENT
Start: 2025-04-18 | End: 2025-04-18 | Stop reason: SDUPTHER

## 2025-04-18 RX ORDER — 3% SODIUM CHLORIDE 3 G/100ML
50 INJECTION, SOLUTION INTRAVENOUS CONTINUOUS
Status: DISPENSED | OUTPATIENT
Start: 2025-04-18 | End: 2025-04-19

## 2025-04-18 RX ORDER — SODIUM CHLORIDE 0.9 % (FLUSH) 0.9 %
5-40 SYRINGE (ML) INJECTION EVERY 12 HOURS SCHEDULED
Status: DISCONTINUED | OUTPATIENT
Start: 2025-04-18 | End: 2025-04-18 | Stop reason: SDUPTHER

## 2025-04-18 RX ORDER — BUMETANIDE 0.25 MG/ML
0.5 INJECTION, SOLUTION INTRAMUSCULAR; INTRAVENOUS DAILY
Status: DISCONTINUED | OUTPATIENT
Start: 2025-04-18 | End: 2025-04-25

## 2025-04-18 RX ORDER — MORPHINE SULFATE 2 MG/ML
2 INJECTION, SOLUTION INTRAMUSCULAR; INTRAVENOUS
Status: DISCONTINUED | OUTPATIENT
Start: 2025-04-18 | End: 2025-04-21

## 2025-04-18 RX ORDER — MORPHINE SULFATE 4 MG/ML
4 INJECTION, SOLUTION INTRAMUSCULAR; INTRAVENOUS
Status: DISCONTINUED | OUTPATIENT
Start: 2025-04-18 | End: 2025-04-21

## 2025-04-18 RX ADMIN — WATER 2000 MG: 1 INJECTION INTRAMUSCULAR; INTRAVENOUS; SUBCUTANEOUS at 16:37

## 2025-04-18 RX ADMIN — METRONIDAZOLE 500 MG: 500 INJECTION, SOLUTION INTRAVENOUS at 16:36

## 2025-04-18 RX ADMIN — PROPOFOL 30 MCG/KG/MIN: 10 INJECTION, EMULSION INTRAVENOUS at 22:11

## 2025-04-18 RX ADMIN — BUMETANIDE 0.5 MG: 0.25 INJECTION INTRAMUSCULAR; INTRAVENOUS at 10:42

## 2025-04-18 RX ADMIN — DEXAMETHASONE SODIUM PHOSPHATE 4 MG: 4 INJECTION, SOLUTION INTRA-ARTICULAR; INTRALESIONAL; INTRAMUSCULAR; INTRAVENOUS; SOFT TISSUE at 08:46

## 2025-04-18 RX ADMIN — 0.12% CHLORHEXIDINE GLUCONATE 15 ML: 1.2 RINSE ORAL at 02:31

## 2025-04-18 RX ADMIN — SODIUM CHLORIDE: 0.9 INJECTION, SOLUTION INTRAVENOUS at 21:10

## 2025-04-18 RX ADMIN — Medication 1250 MG: at 01:09

## 2025-04-18 RX ADMIN — Medication 1250 MG: at 06:52

## 2025-04-18 RX ADMIN — PANTOPRAZOLE SODIUM 40 MG: 40 TABLET, DELAYED RELEASE ORAL at 06:55

## 2025-04-18 RX ADMIN — SODIUM CHLORIDE 50 ML/HR: 3 INJECTION, SOLUTION INTRAVENOUS at 18:01

## 2025-04-18 RX ADMIN — LEVETIRACETAM 500 MG: 100 INJECTION INTRAVENOUS at 05:06

## 2025-04-18 RX ADMIN — LEVETIRACETAM 500 MG: 100 INJECTION INTRAVENOUS at 17:51

## 2025-04-18 RX ADMIN — PROPOFOL 35 MCG/KG/MIN: 10 INJECTION, EMULSION INTRAVENOUS at 09:17

## 2025-04-18 RX ADMIN — SODIUM CHLORIDE 25 ML/HR: 3 INJECTION, SOLUTION INTRAVENOUS at 03:28

## 2025-04-18 RX ADMIN — PRAVASTATIN SODIUM 40 MG: 40 TABLET ORAL at 19:59

## 2025-04-18 RX ADMIN — METRONIDAZOLE 500 MG: 500 INJECTION, SOLUTION INTRAVENOUS at 00:41

## 2025-04-18 RX ADMIN — DEXAMETHASONE SODIUM PHOSPHATE 4 MG: 4 INJECTION, SOLUTION INTRA-ARTICULAR; INTRALESIONAL; INTRAMUSCULAR; INTRAVENOUS; SOFT TISSUE at 19:59

## 2025-04-18 RX ADMIN — DEXAMETHASONE SODIUM PHOSPHATE 4 MG: 4 INJECTION, SOLUTION INTRA-ARTICULAR; INTRALESIONAL; INTRAMUSCULAR; INTRAVENOUS; SOFT TISSUE at 02:31

## 2025-04-18 RX ADMIN — 0.12% CHLORHEXIDINE GLUCONATE 15 ML: 1.2 RINSE ORAL at 08:45

## 2025-04-18 RX ADMIN — Medication 75 MCG/HR: at 12:31

## 2025-04-18 RX ADMIN — TIOTROPIUM BROMIDE AND OLODATEROL 2 PUFF: 3.124; 2.736 SPRAY, METERED RESPIRATORY (INHALATION) at 07:54

## 2025-04-18 RX ADMIN — METRONIDAZOLE 500 MG: 500 INJECTION, SOLUTION INTRAVENOUS at 08:44

## 2025-04-18 RX ADMIN — WATER 2000 MG: 1 INJECTION INTRAMUSCULAR; INTRAVENOUS; SUBCUTANEOUS at 05:04

## 2025-04-18 RX ADMIN — LOSARTAN POTASSIUM 25 MG: 25 TABLET, FILM COATED ORAL at 19:59

## 2025-04-18 RX ADMIN — DEXAMETHASONE SODIUM PHOSPHATE 4 MG: 4 INJECTION, SOLUTION INTRA-ARTICULAR; INTRALESIONAL; INTRAMUSCULAR; INTRAVENOUS; SOFT TISSUE at 16:24

## 2025-04-18 RX ADMIN — PROPOFOL 30 MCG/KG/MIN: 10 INJECTION, EMULSION INTRAVENOUS at 15:34

## 2025-04-18 RX ADMIN — 0.12% CHLORHEXIDINE GLUCONATE 15 ML: 1.2 RINSE ORAL at 20:01

## 2025-04-18 RX ADMIN — NICARDIPINE HYDROCHLORIDE 5 MG/HR: 25 INJECTION INTRAVENOUS at 00:51

## 2025-04-18 RX ADMIN — SODIUM CHLORIDE, PRESERVATIVE FREE 10 ML: 5 INJECTION INTRAVENOUS at 20:00

## 2025-04-18 RX ADMIN — PROPOFOL 35 MCG/KG/MIN: 10 INJECTION, EMULSION INTRAVENOUS at 04:46

## 2025-04-18 RX ADMIN — SODIUM CHLORIDE, PRESERVATIVE FREE 10 ML: 5 INJECTION INTRAVENOUS at 08:45

## 2025-04-18 RX ADMIN — Medication 50 MCG/HR: at 00:09

## 2025-04-18 ASSESSMENT — PULMONARY FUNCTION TESTS
PIF_VALUE: 14
PIF_VALUE: 14
PIF_VALUE: 15
PIF_VALUE: 16
PIF_VALUE: 14
PIF_VALUE: 13

## 2025-04-18 NOTE — ANESTHESIA PRE PROCEDURE
Department of Anesthesiology  Preprocedure Note       Name:  Koko Chao   Age:  65 y.o.  :  1960                                          MRN:  [id number]         Date:  2025      Surgeon: Surgeon(s):  Zander Singh MD    Procedure: Procedure(s):  LEFT CRANIOTOMY HEMATOMA EVACUATION    Medications prior to admission:   Prior to Admission medications    Medication Sig Start Date End Date Taking? Authorizing Provider   albuterol sulfate HFA (VENTOLIN HFA) 108 (90 Base) MCG/ACT inhaler Inhale 2 puffs into the lungs every 4 hours as needed for Wheezing or Shortness of Breath 3/22/25  Yes Justine Doe APRN - CNP   olmesartan (BENICAR) 5 MG tablet Take 2 tablets by mouth once daily 25  Yes Hannah Banks MD   pravastatin (PRAVACHOL) 40 MG tablet Take 1 tablet by mouth once daily 25  Yes Hannah Banks MD   fluticasone (FLONASE) 50 MCG/ACT nasal spray 1 spray by Each Nostril route daily  Patient taking differently: 1 spray by Each Nostril route as needed 22  Yes Rebecca Noe APRN - CNP   aspirin 81 MG tablet Take 1 tablet by mouth daily   Yes ProviderDelfino MD   alfuzosin (UROXATRAL) 10 MG extended release tablet Take 1 tablet by mouth daily  Patient not taking: Reported on 2025   Denise Aguilar APRN - CNP   PANTOPRAZOLE SODIUM PO Take by mouth daily    ProviderDelfino MD   FAMOTIDINE PO Take by mouth daily    Provider, MD Delfino   Cranberry 500 MG CAPS Take 1 capsule by mouth 2 times daily 24  Denise Aguilar APRN - CNP   D-Mannose 500 MG CAPS Take 500 mg by mouth daily 24  Denise Aguilar APRN - CNP       Current medications:    Current Facility-Administered Medications   Medication Dose Route Frequency Provider Last Rate Last Admin   • labetalol (NORMODYNE;TRANDATE) injection 5 mg  5 mg IntraVENous Q4H PRN Alec Childs DO   5 mg at 25 1504   • tiotropium-olodaterol (STIOLTO) 2.5-2.5 MCG/ACT inhaler 2 puff

## 2025-04-18 NOTE — PLAN OF CARE
Problem: Respiratory - Adult  Goal: Achieves optimal ventilation and oxygenation  4/18/2025 1012 by Cynthia Guerra RCP  Outcome: Progressing  Flowsheets (Taken 4/18/2025 0754)  Achieves optimal ventilation and oxygenation:   Assess for changes in respiratory status   Oxygen supplementation based on oxygen saturation or arterial blood gases   Assess the need for suctioning and aspirate as needed   Respiratory therapy support as indicated                                                Patient Weaning Progress    The patient's vent settings was able to be weaned this shift.      Ventilator settings that were weaned              [x] Mode   [] Pressure support weaned   [] Fio2 weaned   [] Peep weaned      Spontaneous weaning trial  was attempted.       *Results of SBT documented under SBTOUTCOME note.    Reason that defined ventilator parameters for SBT was not met              [] Patient condition requires increased ventilator settings  [x] Requires increased sedation   [] Settings not within weaning range   [] SAT not completed   [] Physician orders    The patient was not able to tolerate SBT.  Spontanteous VT was 890 and RR 5 breaths/min.  The patient was on SBT for 10 minutes     Evac tube was not  hooked up with continuous low suction(20-30mmHg)      Cuff was not  deflated to determine cuff leak. Family mutually agreed on goals.

## 2025-04-18 NOTE — OP NOTE
Operative Note      Patient: Koko Chao  YOB: 1960  MRN: [id number]    Date of Procedure: 4/17/2025    Pre-Op Diagnosis Codes:      * Intracranial bleeding (HCC) [I62.9]    Post-Op Diagnosis: Same       Procedure(s):  LEFT PARIETAL CRANIOTOMY HEMATOMA EVACUATION    Surgeon(s):  Zander Singh MD    Assistant:   Physician Assistant: Davidson Payne PA-C    Anesthesia: General    Estimated Blood Loss (mL): 200     Complications: None    Specimens:   * No specimens in log *    Implants:  Implant Name Type Inv. Item Serial No.  Lot No. LRB No. Used Action   PATCH DURAL SUB 5X5CM ONLAY LYCOPLANT - [id number]  PATCH DURAL SUB 5X5CM ONLAY LYCOPLANT  [employer] [id number] Left 1 Implanted         Drains:   [REMOVED] Urinary Catheter 04/16/25 (Removed)       Findings:  Infection Present At Time Of Surgery (PATOS) (choose all levels that have infection present):  No infection present  Other Findings: Hematoma    Detailed Description of Procedure:   Mr. Chao was placed in the supine position and his head was tilted to the right exposing the left parietal area where his incision had been made for his resection of left parietal mass.  A sterile prep and drape then ensued.  After the sterile prep and prep and drape the skin incision was made using #10 blade.  At this point the sutures were removed with a mosquito clamp.  At this point too large self-retaining retractors were used to retract the linear incision.  There was a small amount of hematoma underneath the galea.  At this point I was then able to irrigate and visualize the prior craniotomy flap.  I used the [employer] cranial plating system to remove the screws that attach the bone flap to the native skull.  I then handed this off the field.  I then irrigated and was able to retract the leaves of the dura using 4-0 Nurolon.  At this time there was a large egress of hematoma which was evacuated with suction.  At this point I used a 
system and registered it.  Once the neuronavigation was registered I was then able to locate the lesion which is approximately 3 centimeter superior to the transverse sinus on the left.  After the lesion was localized on the skin, the hair was then trimmed.  I planned a linear incision extending from the about the midway point of the year headed toward the vertex on the left-hand side.  After sterile prep drape skin incision was made using #10 blade.  Subtenons dissection was carried out with monopolar electrocautery and the temporalis muscle posterior portion was then dissected out.  At this point I placed 2 large self-retaining retractors in the field and was able to locate the lesion using the [employer] navigation system.  I made a senthil on the skull corresponding to the approximate location of the lesion.  This point I then used the [place] navigation to locate the junction of the transverse and sigmoid sinus.  I was careful to make my first bur hole several millimeter superior to this area.  I used a high-speed drill with a perforating drill bit to create the first bur hole inferiorly and then 1 approximately six 5 cm superior to the first bur hole.  I used a curved curette to excise size any bone and use a [place] #3 to separate the dura from the overlying bone.  At this point I was then able to use the craniotome to elevate a circular bone flap.  I handed this off the field.  I then lined the edges with fibrillar Surgicel.  I then drilled for tack up sutures and used 4-0 Nurolon suture to tack up the dura.  At this point I then made a cruciate incision of the dura and reflected leaves of the dura using 4-0 Nurolon in an interrupted manner.  At this point I was then able to use the [place] navigation to adjust a trajectory toward the lesion.  I was able to interdigitate between the gyri and use an inferior sulcus to gain access to the lesion.  When I reach the lesion I did not encounter nirav pus.  I was

## 2025-04-18 NOTE — CARE COORDINATION
4/18/25, 3:31 PM EDT    DISCHARGE ON GOING EVALUATION    [facility] day: 7  Location: -17/017-A Reason for admit: Mass of brain [G93.89]  Left parietal mass [G93.89]     Procedures:   3/13 Echo with EF 60-65%   4/16 LEFT PARIETAL CRANIOTOMY FOR EVACUATION OF CEREBRAL ABSCESS  4/17 LEFT PARIETAL CRANIOTOMY HEMATOMA EVACUATION   4/17 Intubated    Imaging since last note:   4/17 CT Head: Enlarging posterior left parietal parenchymal hemorrhage   4/18 CT Head: Interval decreased blood surrounding the left parietal operative bed.  The remainder of the examination is grossly unchanged.  4/18 CXR: 1. ETT tip terminates 3 cm above jovany.  2. Dense left lower lobe opacity with small effusion suspected.    Barriers to Discharge: POD #2. Pt started having bleeding at incision site, not resolved with quick clot. He also started having headache, increased aphasia, and increased confusion. Pt taken for emergent surgery for evacuation of hematoma. Returned to ICU on vent. 3% saline restarted and on Q4H BMP. Becomes very hypertensive when sedation weaned; but able to follow commands when sedation is weaned. Plan to keep on vent until Sunday, then re-evaluate.     Remains on vent w/ETT on AC/PC, peep 6, FIO2 30%, sats 94%. Afebrile. NSR. Unable to follow commands. SLP/PT/OT. Telemetry, LETA higuera, temo, SCDs. 3% saline @ 50 ml/hr, Fentanyl @ 75 mcg/hr, diprivan @ 30 mcg/kg/min, IV bumex 0.5 mg daily,  IV rocephin, peridex, IV decadron 4 mg Q6H, prn IV hydralazine, prn IV labetalol, IV keppra, cozaar, IV flagyl, prn zofran, protonix, pravastatin, inhaler, Electrolyte replacement protocols. WBC 16.2, hgb 11.2.     PCP: Hannah Banks MD  Readmission Risk Score: 11    Patient Goals/Plan/Treatment Preferences: From home w/wife. Monitor for needs as course progresses.

## 2025-04-18 NOTE — SIGNIFICANT EVENT
Called at bedside by nursing staff around 1800 to evaluate patient with mild bleeding from incision side from the left parietal craniotomy. The dressing was changed per nursing and quick clot gauze was applied. Patient soaked through the second gauze as well. Dr. Singh neurosurgery was notified of the event per nursing staff.   Around 1930, nursing staff notified that patient continues to soak through the gauze but has had increase in AMS. Evaluated patient at bedside, he did appear more confused than prior. STAT CT head was ordered. NS updated on the events.     Plan: OR with Dr. Singh tonestella due to acute hemorrhage

## 2025-04-18 NOTE — ANESTHESIA POSTPROCEDURE EVALUATION
Department of Anesthesiology  Postprocedure Note    Patient: Koko Chao  MRN: [id number]  YOB: 1960  Date of evaluation: 4/18/2025    Procedure Summary       Date: 04/17/25 Room / Location: [facility] OR  / [facility] OR    Anesthesia Start: 2134 Anesthesia Stop: 2355    Procedure: LEFT PARIETAL CRANIOTOMY HEMATOMA EVACUATION (Left: Head) Diagnosis:       Intracranial bleeding (HCC)      (Intracranial bleeding (HCC) [I62.9])    Surgeons: Zander Singh MD Responsible Provider: Xavier Cruz MD    Anesthesia Type: general ASA Status: 3 - Emergent            Anesthesia Type: No value filed.    Sonia Phase I: Sonia Score: 9    Sonia Phase II:      Anesthesia Post Evaluation    Patient location during evaluation: ICU  Patient participation: complete - patient cannot participate  Level of consciousness: sedated and ventilated  Airway patency: patent  Cardiovascular status: hemodynamically stable  Respiratory status: ventilator  Pain management: adequate        No notable events documented.

## 2025-04-18 NOTE — BRIEF OP NOTE
Brief Postoperative Note      Patient: Koko Chao  YOB: 1960  MRN: [id number]    Date of Procedure: 4/17/2025    Pre-Op Diagnosis Codes:      * Intracranial bleeding (HCC) [I62.9]    Post-Op Diagnosis: Same       Procedure(s):  LEFT PARIETAL CRANIOTOMY HEMATOMA EVACUATION    Surgeon(s):  Zander Singh MD    Assistant:  Physician Assistant: Davidson Payne PA-C    Anesthesia: General    Estimated Blood Loss (mL): 200     Complications: None    Specimens:   * No specimens in log *    Implants:  Implant Name Type Inv. Item Serial No.  Lot No. LRB No. Used Action   PATCH DURAL SUB 5X5CM ONLAY LYCOPLANT - [id number]  PATCH DURAL SUB 5X5CM ONLAY LYCOPLANT  [employer] [id number] Left 1 Implanted   [employer] - [facility]   [id number]   Left 1 Implanted         Drains: none  [REMOVED] Urinary Catheter 04/16/25 (Removed)       Findings:  Infection Present At Time Of Surgery (PATOS) (choose all levels that have infection present):  No infection present  Other Findings: Very send and revision of left craniotomy for evacuation of intracranial hemorrhage    Electronically signed by Davidson Payne PA-C on 4/17/2025 at 11:26 PM

## 2025-04-18 NOTE — PLAN OF CARE
Problem: Discharge Planning  Goal: Discharge to home or other facility with appropriate resources  Outcome: Progressing  Flowsheets (Taken 4/18/2025 1441)  Discharge to home or other facility with appropriate resources:   Identify barriers to discharge with patient and caregiver   Identify discharge learning needs (meds, wound care, etc)  Note: No plans for discharge at this time. Patient is currently intubated in the ICU post craniotomy.      Problem: Safety - Medical Restraint  Goal: Remains free of injury from restraints (Restraint for Interference with Medical Device)  Description: INTERVENTIONS:1. Determine that other, less restrictive measures have been tried or would not be effective before applying the restraint2. Evaluate the patient's condition at the time of restraint application3. Inform patient/family regarding the reason for restraint4. Q2H: Monitor safety, psychosocial status, comfort, nutrition and hydration  4/18/2025 1441 by Courtney Nj RN  Outcome: Progressing  Flowsheets (Taken 4/18/2025 1441)  Remains free of injury from restraints (restraint for interference with medical device):   Determine that other, less restrictive measures have been tried or would not be effective before applying the restraint   Evaluate the patient's condition at the time of restraint application   Inform patient/family regarding the reason for restraint   Every 2 hours: Monitor safety, psychosocial status, comfort, nutrition and hydration  Note: Patient is currently has bilateral soft wrist restraints in place. Patient is intubated and has multiple lines and tubes. Patient is sedated and disoriented. Patient will reach for ETT, lines and tubes when unrestrained.

## 2025-04-18 NOTE — ANESTHESIA PROCEDURE NOTES
Arterial Line:    An arterial line was placed using surface landmarks, in the OR for the following indication(s): continuous blood pressure monitoring and blood sampling needed.    A 20 gauge (size) (length), Arrow (type) catheter was placed, Seldinger technique used, into the right radial artery, secured by tape and Tegaderm.  Anesthesia type: General    Events:  patient tolerated procedure well with no complications.  Anesthesiologist: Xavier Cruz MD  Performed: Anesthesiologist   Preanesthetic Checklist  Completed: patient identified, IV checked, site marked, risks and benefits discussed, surgical/procedural consents, equipment checked, pre-op evaluation, timeout performed, anesthesia consent given, oxygen available, monitors applied/VS acknowledged, fire risk safety assessment completed and verbalized and blood product R/B/A discussed and consented

## 2025-04-18 NOTE — PLAN OF CARE
Problem: Safety - Medical Restraint  Goal: Remains free of injury from restraints (Restraint for Interference with Medical Device)  Description: INTERVENTIONS:1. Determine that other, less restrictive measures have been tried or would not be effective before applying the restraint2. Evaluate the patient's condition at the time of restraint application3. Inform patient/family regarding the reason for restraint4. Q2H: Monitor safety, psychosocial status, comfort, nutrition and hydration  Outcome: Progressing  Flowsheets (Taken 4/18/2025 0600)  Remains free of injury from restraints (restraint for interference with medical device):   Determine that other, less restrictive measures have been tried or would not be effective before applying the restraint   Evaluate the patient's condition at the time of restraint application   Inform patient/family regarding the reason for restraint   Every 2 hours: Monitor safety, psychosocial status, comfort, nutrition and hydration

## 2025-04-19 ENCOUNTER — APPOINTMENT (OUTPATIENT)
Dept: GENERAL RADIOLOGY | Age: 65
DRG: 023 | End: 2025-04-19
Payer: COMMERCIAL

## 2025-04-19 LAB
ANION GAP SERPL CALC-SCNC: 8 MEQ/L (ref 8–16)
BASOPHILS ABSOLUTE: 0 THOU/MM3 (ref 0–0.1)
BASOPHILS NFR BLD AUTO: 0.1 %
BUN SERPL-MCNC: 22 MG/DL (ref 8–23)
BUN SERPL-MCNC: 23 MG/DL (ref 8–23)
BUN SERPL-MCNC: 25 MG/DL (ref 8–23)
CALCIUM SERPL-MCNC: 8.1 MG/DL (ref 8.8–10.2)
CALCIUM SERPL-MCNC: 8.4 MG/DL (ref 8.8–10.2)
CALCIUM SERPL-MCNC: 8.4 MG/DL (ref 8.8–10.2)
CHLORIDE SERPL-SCNC: 119 MEQ/L (ref 98–111)
CHLORIDE SERPL-SCNC: 119 MEQ/L (ref 98–111)
CHLORIDE SERPL-SCNC: 121 MEQ/L (ref 98–111)
CO2 SERPL-SCNC: 22 MEQ/L (ref 22–29)
CO2 SERPL-SCNC: 23 MEQ/L (ref 22–29)
CO2 SERPL-SCNC: 24 MEQ/L (ref 22–29)
CREAT SERPL-MCNC: 0.7 MG/DL (ref 0.7–1.2)
DEPRECATED RDW RBC AUTO: 47.2 FL (ref 35–45)
EOSINOPHIL NFR BLD AUTO: 0 %
EOSINOPHILS ABSOLUTE: 0 THOU/MM3 (ref 0–0.4)
ERYTHROCYTE [DISTWIDTH] IN BLOOD BY AUTOMATED COUNT: 13.8 % (ref 11.5–14.5)
GFR SERPL CREATININE-BSD FRML MDRD: > 90 ML/MIN/1.73M2
GLUCOSE SERPL-MCNC: 140 MG/DL (ref 74–109)
GLUCOSE SERPL-MCNC: 141 MG/DL (ref 74–109)
GLUCOSE SERPL-MCNC: 169 MG/DL (ref 74–109)
HCT VFR BLD AUTO: 32.7 % (ref 42–52)
HGB BLD-MCNC: 10.4 GM/DL (ref 14–18)
IMM GRANULOCYTES # BLD AUTO: 0.12 THOU/MM3 (ref 0–0.07)
IMM GRANULOCYTES NFR BLD AUTO: 0.9 %
LYMPHOCYTES ABSOLUTE: 0.6 THOU/MM3 (ref 1–4.8)
LYMPHOCYTES NFR BLD AUTO: 4.7 %
MCH RBC QN AUTO: 29.3 PG (ref 26–33)
MCHC RBC AUTO-ENTMCNC: 31.8 GM/DL (ref 32.2–35.5)
MCV RBC AUTO: 92.1 FL (ref 80–94)
MONOCYTES ABSOLUTE: 0.8 THOU/MM3 (ref 0.4–1.3)
MONOCYTES NFR BLD AUTO: 6.1 %
NEUTROPHILS ABSOLUTE: 11.2 THOU/MM3 (ref 1.8–7.7)
NEUTROPHILS NFR BLD AUTO: 88.2 %
NRBC BLD AUTO-RTO: 0 /100 WBC
OSMOLALITY SERPL: 318 MOSMOL/KG (ref 275–295)
PLATELET # BLD AUTO: 249 THOU/MM3 (ref 130–400)
PMV BLD AUTO: 10.9 FL (ref 9.4–12.4)
POTASSIUM SERPL-SCNC: 3.8 MEQ/L (ref 3.5–5.2)
POTASSIUM SERPL-SCNC: 4.1 MEQ/L (ref 3.5–5.2)
POTASSIUM SERPL-SCNC: 4.1 MEQ/L (ref 3.5–5.2)
RBC # BLD AUTO: 3.55 MILL/MM3 (ref 4.7–6.1)
SODIUM SERPL-SCNC: 150 MEQ/L (ref 135–145)
SODIUM SERPL-SCNC: 151 MEQ/L (ref 135–145)
SODIUM SERPL-SCNC: 151 MEQ/L (ref 135–145)
WBC # BLD AUTO: 12.7 THOU/MM3 (ref 4.8–10.8)

## 2025-04-19 PROCEDURE — 2500000003 HC RX 250 WO HCPCS: Performed by: PHYSICIAN ASSISTANT

## 2025-04-19 PROCEDURE — 94640 AIRWAY INHALATION TREATMENT: CPT

## 2025-04-19 PROCEDURE — 2700000000 HC OXYGEN THERAPY PER DAY

## 2025-04-19 PROCEDURE — 6370000000 HC RX 637 (ALT 250 FOR IP)

## 2025-04-19 PROCEDURE — 6360000002 HC RX W HCPCS

## 2025-04-19 PROCEDURE — 99291 CRITICAL CARE FIRST HOUR: CPT | Performed by: SURGERY

## 2025-04-19 PROCEDURE — 80048 BASIC METABOLIC PNL TOTAL CA: CPT

## 2025-04-19 PROCEDURE — 83930 ASSAY OF BLOOD OSMOLALITY: CPT

## 2025-04-19 PROCEDURE — 71045 X-RAY EXAM CHEST 1 VIEW: CPT

## 2025-04-19 PROCEDURE — 6360000002 HC RX W HCPCS: Performed by: PHYSICIAN ASSISTANT

## 2025-04-19 PROCEDURE — 2580000003 HC RX 258

## 2025-04-19 PROCEDURE — 6370000000 HC RX 637 (ALT 250 FOR IP): Performed by: PHYSICIAN ASSISTANT

## 2025-04-19 PROCEDURE — 2580000003 HC RX 258: Performed by: STUDENT IN AN ORGANIZED HEALTH CARE EDUCATION/TRAINING PROGRAM

## 2025-04-19 PROCEDURE — 2500000003 HC RX 250 WO HCPCS

## 2025-04-19 PROCEDURE — 85025 COMPLETE CBC W/AUTO DIFF WBC: CPT

## 2025-04-19 PROCEDURE — 37799 UNLISTED PX VASCULAR SURGERY: CPT

## 2025-04-19 PROCEDURE — 2000000000 HC ICU R&B

## 2025-04-19 PROCEDURE — 36415 COLL VENOUS BLD VENIPUNCTURE: CPT

## 2025-04-19 PROCEDURE — 94003 VENT MGMT INPAT SUBQ DAY: CPT

## 2025-04-19 RX ORDER — NYSTATIN 100000 [USP'U]/ML
5 SUSPENSION ORAL 4 TIMES DAILY
Status: DISCONTINUED | OUTPATIENT
Start: 2025-04-19 | End: 2025-04-19

## 2025-04-19 RX ORDER — CLOTRIMAZOLE 10 MG/1
10 LOZENGE ORAL
Status: DISCONTINUED | OUTPATIENT
Start: 2025-04-19 | End: 2025-04-28 | Stop reason: HOSPADM

## 2025-04-19 RX ORDER — 3% SODIUM CHLORIDE 3 G/100ML
40 INJECTION, SOLUTION INTRAVENOUS CONTINUOUS
Status: DISPENSED | OUTPATIENT
Start: 2025-04-19 | End: 2025-04-20

## 2025-04-19 RX ORDER — DEXMEDETOMIDINE HYDROCHLORIDE 4 UG/ML
.1-1.5 INJECTION, SOLUTION INTRAVENOUS CONTINUOUS
Status: DISCONTINUED | OUTPATIENT
Start: 2025-04-19 | End: 2025-04-21

## 2025-04-19 RX ADMIN — Medication 50 MCG/HR: at 09:13

## 2025-04-19 RX ADMIN — NICARDIPINE HYDROCHLORIDE 10 MG/HR: 25 INJECTION INTRAVENOUS at 16:30

## 2025-04-19 RX ADMIN — SODIUM CHLORIDE 40 ML/HR: 3 INJECTION, SOLUTION INTRAVENOUS at 20:07

## 2025-04-19 RX ADMIN — DEXAMETHASONE SODIUM PHOSPHATE 4 MG: 4 INJECTION, SOLUTION INTRA-ARTICULAR; INTRALESIONAL; INTRAMUSCULAR; INTRAVENOUS; SOFT TISSUE at 02:15

## 2025-04-19 RX ADMIN — LEVETIRACETAM 500 MG: 100 INJECTION INTRAVENOUS at 05:38

## 2025-04-19 RX ADMIN — DEXAMETHASONE SODIUM PHOSPHATE 4 MG: 4 INJECTION, SOLUTION INTRA-ARTICULAR; INTRALESIONAL; INTRAMUSCULAR; INTRAVENOUS; SOFT TISSUE at 09:06

## 2025-04-19 RX ADMIN — LOSARTAN POTASSIUM 25 MG: 25 TABLET, FILM COATED ORAL at 20:56

## 2025-04-19 RX ADMIN — SODIUM CHLORIDE 50 ML/HR: 3 INJECTION, SOLUTION INTRAVENOUS at 03:40

## 2025-04-19 RX ADMIN — DEXAMETHASONE SODIUM PHOSPHATE 4 MG: 4 INJECTION, SOLUTION INTRA-ARTICULAR; INTRALESIONAL; INTRAMUSCULAR; INTRAVENOUS; SOFT TISSUE at 20:55

## 2025-04-19 RX ADMIN — DEXAMETHASONE SODIUM PHOSPHATE 4 MG: 4 INJECTION, SOLUTION INTRA-ARTICULAR; INTRALESIONAL; INTRAMUSCULAR; INTRAVENOUS; SOFT TISSUE at 14:30

## 2025-04-19 RX ADMIN — METRONIDAZOLE 500 MG: 500 INJECTION, SOLUTION INTRAVENOUS at 14:36

## 2025-04-19 RX ADMIN — WATER 2000 MG: 1 INJECTION INTRAMUSCULAR; INTRAVENOUS; SUBCUTANEOUS at 16:33

## 2025-04-19 RX ADMIN — WATER 2000 MG: 1 INJECTION INTRAMUSCULAR; INTRAVENOUS; SUBCUTANEOUS at 03:39

## 2025-04-19 RX ADMIN — 0.12% CHLORHEXIDINE GLUCONATE 15 ML: 1.2 RINSE ORAL at 09:06

## 2025-04-19 RX ADMIN — SODIUM CHLORIDE, PRESERVATIVE FREE 10 ML: 5 INJECTION INTRAVENOUS at 20:56

## 2025-04-19 RX ADMIN — METRONIDAZOLE 500 MG: 500 INJECTION, SOLUTION INTRAVENOUS at 00:34

## 2025-04-19 RX ADMIN — TIOTROPIUM BROMIDE AND OLODATEROL 2 PUFF: 3.124; 2.736 SPRAY, METERED RESPIRATORY (INHALATION) at 10:09

## 2025-04-19 RX ADMIN — NICARDIPINE HYDROCHLORIDE 5 MG/HR: 25 INJECTION INTRAVENOUS at 19:58

## 2025-04-19 RX ADMIN — PANTOPRAZOLE SODIUM 40 MG: 40 TABLET, DELAYED RELEASE ORAL at 05:38

## 2025-04-19 RX ADMIN — PROPOFOL 30 MCG/KG/MIN: 10 INJECTION, EMULSION INTRAVENOUS at 03:41

## 2025-04-19 RX ADMIN — CLOTRIMAZOLE 10 MG: 10 LOZENGE ORAL at 21:04

## 2025-04-19 RX ADMIN — PRAVASTATIN SODIUM 40 MG: 40 TABLET ORAL at 20:56

## 2025-04-19 RX ADMIN — BUMETANIDE 0.5 MG: 0.25 INJECTION INTRAMUSCULAR; INTRAVENOUS at 09:06

## 2025-04-19 RX ADMIN — NICARDIPINE HYDROCHLORIDE 2.5 MG/HR: 25 INJECTION INTRAVENOUS at 12:24

## 2025-04-19 RX ADMIN — LEVETIRACETAM 500 MG: 100 INJECTION INTRAVENOUS at 18:39

## 2025-04-19 RX ADMIN — Medication 0.2 MCG/KG/HR: at 14:39

## 2025-04-19 RX ADMIN — METRONIDAZOLE 500 MG: 500 INJECTION, SOLUTION INTRAVENOUS at 09:17

## 2025-04-19 ASSESSMENT — PULMONARY FUNCTION TESTS
PIF_VALUE: 12
PIF_VALUE: 13
PIF_VALUE: 14

## 2025-04-19 NOTE — PLAN OF CARE
Problem: Safety - Medical Restraint  Goal: Remains free of injury from restraints (Restraint for Interference with Medical Device)  Description: INTERVENTIONS:1. Determine that other, less restrictive measures have been tried or would not be effective before applying the restraint2. Evaluate the patient's condition at the time of restraint application3. Inform patient/family regarding the reason for restraint4. Q2H: Monitor safety, psychosocial status, comfort, nutrition and hydration  4/19/2025 1057 by Courtney Nj RN  Outcome: Progressing  Flowsheets (Taken 4/19/2025 1057)  Remains free of injury from restraints (restraint for interference with medical device):   Determine that other, less restrictive measures have been tried or would not be effective before applying the restraint   Evaluate the patient's condition at the time of restraint application   Every 2 hours: Monitor safety, psychosocial status, comfort, nutrition and hydration   Inform patient/family regarding the reason for restraint  Note: Patient is currently has bilateral soft wrist restraints in place. Patient is intubated and has multiple lines and tubes. Patient is sedated and disoriented. Patient will reach for ETT, lines and tubes when unrestrained.

## 2025-04-19 NOTE — PLAN OF CARE
patient/family regarding the reason for restraint   Every 2 hours: Monitor safety, psychosocial status, comfort, nutrition and hydration     Problem: Nutrition Deficit:  Goal: Optimize nutritional status  4/18/2025 2230 by Gali Robertson, RN  Outcome: Progressing  Flowsheets (Taken 4/18/2025 1045 by Abida Wesley, RD, LD)  Nutrient intake appropriate for improving, restoring, or maintaining nutritional needs:   Assess nutritional status and recommend course of action   Monitor oral intake, labs, and treatment plans   Recommend, monitor, and adjust tube feedings and TPN/PPN based on assessed needs     Problem: Skin/Tissue Integrity  Goal: Skin integrity remains intact  Description: 1.  Monitor for areas of redness and/or skin breakdown2.  Assess vascular access sites hourly3.  Every 4-6 hours minimum:  Change oxygen saturation probe site4.  Every 4-6 hours:  If on nasal continuous positive airway pressure, respiratory therapy assess nares and determine need for appliance change or resting period  Outcome: Progressing  Flowsheets (Taken 4/16/2025 2003 by Yusuf Kirk, RN)  Skin Integrity Remains Intact: Monitor for areas of redness and/or skin breakdown

## 2025-04-20 ENCOUNTER — APPOINTMENT (OUTPATIENT)
Dept: CT IMAGING | Age: 65
DRG: 023 | End: 2025-04-20
Payer: COMMERCIAL

## 2025-04-20 ENCOUNTER — APPOINTMENT (OUTPATIENT)
Dept: GENERAL RADIOLOGY | Age: 65
DRG: 023 | End: 2025-04-20
Payer: COMMERCIAL

## 2025-04-20 LAB
ANION GAP SERPL CALC-SCNC: 11 MEQ/L (ref 8–16)
ANION GAP SERPL CALC-SCNC: 8 MEQ/L (ref 8–16)
ANION GAP SERPL CALC-SCNC: 8 MEQ/L (ref 8–16)
ANION GAP SERPL CALC-SCNC: 9 MEQ/L (ref 8–16)
BACTERIA SPEC RESP CULT: NORMAL
BACTERIA UR CULT: NORMAL
BASOPHILS ABSOLUTE: 0 THOU/MM3 (ref 0–0.1)
BASOPHILS NFR BLD AUTO: 0.1 %
BUN SERPL-MCNC: 26 MG/DL (ref 8–23)
BUN SERPL-MCNC: 26 MG/DL (ref 8–23)
BUN SERPL-MCNC: 28 MG/DL (ref 8–23)
BUN SERPL-MCNC: 28 MG/DL (ref 8–23)
CALCIUM SERPL-MCNC: 8.1 MG/DL (ref 8.8–10.2)
CALCIUM SERPL-MCNC: 8.3 MG/DL (ref 8.8–10.2)
CALCIUM SERPL-MCNC: 8.3 MG/DL (ref 8.8–10.2)
CALCIUM SERPL-MCNC: 8.4 MG/DL (ref 8.8–10.2)
CHLORIDE SERPL-SCNC: 118 MEQ/L (ref 98–111)
CHLORIDE SERPL-SCNC: 119 MEQ/L (ref 98–111)
CHLORIDE SERPL-SCNC: 119 MEQ/L (ref 98–111)
CHLORIDE SERPL-SCNC: 120 MEQ/L (ref 98–111)
CO2 SERPL-SCNC: 21 MEQ/L (ref 22–29)
CO2 SERPL-SCNC: 22 MEQ/L (ref 22–29)
CO2 SERPL-SCNC: 23 MEQ/L (ref 22–29)
CO2 SERPL-SCNC: 23 MEQ/L (ref 22–29)
CREAT SERPL-MCNC: 0.6 MG/DL (ref 0.7–1.2)
CREAT SERPL-MCNC: 0.6 MG/DL (ref 0.7–1.2)
CREAT SERPL-MCNC: 0.7 MG/DL (ref 0.7–1.2)
CREAT SERPL-MCNC: 0.7 MG/DL (ref 0.7–1.2)
DEPRECATED RDW RBC AUTO: 45.2 FL (ref 35–45)
EOSINOPHIL NFR BLD AUTO: 0 %
EOSINOPHILS ABSOLUTE: 0 THOU/MM3 (ref 0–0.4)
ERYTHROCYTE [DISTWIDTH] IN BLOOD BY AUTOMATED COUNT: 13.4 % (ref 11.5–14.5)
GFR SERPL CREATININE-BSD FRML MDRD: > 90 ML/MIN/1.73M2
GLUCOSE SERPL-MCNC: 154 MG/DL (ref 74–109)
GLUCOSE SERPL-MCNC: 154 MG/DL (ref 74–109)
GLUCOSE SERPL-MCNC: 155 MG/DL (ref 74–109)
GLUCOSE SERPL-MCNC: 156 MG/DL (ref 74–109)
GRAM STN SPEC: NORMAL
HCT VFR BLD AUTO: 35.5 % (ref 42–52)
HGB BLD-MCNC: 11.2 GM/DL (ref 14–18)
IMM GRANULOCYTES # BLD AUTO: 0.09 THOU/MM3 (ref 0–0.07)
IMM GRANULOCYTES NFR BLD AUTO: 0.8 %
LYMPHOCYTES ABSOLUTE: 0.6 THOU/MM3 (ref 1–4.8)
LYMPHOCYTES NFR BLD AUTO: 6 %
MCH RBC QN AUTO: 28.9 PG (ref 26–33)
MCHC RBC AUTO-ENTMCNC: 31.5 GM/DL (ref 32.2–35.5)
MCV RBC AUTO: 91.5 FL (ref 80–94)
MONOCYTES ABSOLUTE: 0.5 THOU/MM3 (ref 0.4–1.3)
MONOCYTES NFR BLD AUTO: 4.9 %
NEUTROPHILS ABSOLUTE: 9.4 THOU/MM3 (ref 1.8–7.7)
NEUTROPHILS NFR BLD AUTO: 88.2 %
NRBC BLD AUTO-RTO: 0 /100 WBC
OSMOLALITY SERPL: 314 MOSMOL/KG (ref 275–295)
PLATELET # BLD AUTO: 224 THOU/MM3 (ref 130–400)
PMV BLD AUTO: 10.6 FL (ref 9.4–12.4)
POTASSIUM SERPL-SCNC: 3.7 MEQ/L (ref 3.5–5.2)
POTASSIUM SERPL-SCNC: 3.7 MEQ/L (ref 3.5–5.2)
POTASSIUM SERPL-SCNC: 4 MEQ/L (ref 3.5–5.2)
POTASSIUM SERPL-SCNC: 4.1 MEQ/L (ref 3.5–5.2)
RBC # BLD AUTO: 3.88 MILL/MM3 (ref 4.7–6.1)
SODIUM SERPL-SCNC: 150 MEQ/L (ref 135–145)
SODIUM SERPL-SCNC: 151 MEQ/L (ref 135–145)
TRIGL SERPL-MCNC: 87 MG/DL (ref 0–199)
WBC # BLD AUTO: 10.7 THOU/MM3 (ref 4.8–10.8)

## 2025-04-20 PROCEDURE — 94640 AIRWAY INHALATION TREATMENT: CPT

## 2025-04-20 PROCEDURE — 83930 ASSAY OF BLOOD OSMOLALITY: CPT

## 2025-04-20 PROCEDURE — 2580000003 HC RX 258

## 2025-04-20 PROCEDURE — 70450 CT HEAD/BRAIN W/O DYE: CPT

## 2025-04-20 PROCEDURE — 2000000000 HC ICU R&B

## 2025-04-20 PROCEDURE — 94761 N-INVAS EAR/PLS OXIMETRY MLT: CPT

## 2025-04-20 PROCEDURE — 99291 CRITICAL CARE FIRST HOUR: CPT | Performed by: SURGERY

## 2025-04-20 PROCEDURE — 6360000002 HC RX W HCPCS

## 2025-04-20 PROCEDURE — 85025 COMPLETE CBC W/AUTO DIFF WBC: CPT

## 2025-04-20 PROCEDURE — 36415 COLL VENOUS BLD VENIPUNCTURE: CPT

## 2025-04-20 PROCEDURE — 99232 SBSQ HOSP IP/OBS MODERATE 35: CPT | Performed by: STUDENT IN AN ORGANIZED HEALTH CARE EDUCATION/TRAINING PROGRAM

## 2025-04-20 PROCEDURE — 6370000000 HC RX 637 (ALT 250 FOR IP): Performed by: PHYSICIAN ASSISTANT

## 2025-04-20 PROCEDURE — 6370000000 HC RX 637 (ALT 250 FOR IP): Performed by: STUDENT IN AN ORGANIZED HEALTH CARE EDUCATION/TRAINING PROGRAM

## 2025-04-20 PROCEDURE — 2500000003 HC RX 250 WO HCPCS

## 2025-04-20 PROCEDURE — APPSS60 APP SPLIT SHARED TIME 46-60 MINUTES: Performed by: PHYSICIAN ASSISTANT

## 2025-04-20 PROCEDURE — 6360000002 HC RX W HCPCS: Performed by: PHYSICIAN ASSISTANT

## 2025-04-20 PROCEDURE — 71045 X-RAY EXAM CHEST 1 VIEW: CPT

## 2025-04-20 PROCEDURE — 6370000000 HC RX 637 (ALT 250 FOR IP)

## 2025-04-20 PROCEDURE — 2700000000 HC OXYGEN THERAPY PER DAY

## 2025-04-20 PROCEDURE — 2500000003 HC RX 250 WO HCPCS: Performed by: PHYSICIAN ASSISTANT

## 2025-04-20 PROCEDURE — 84478 ASSAY OF TRIGLYCERIDES: CPT

## 2025-04-20 PROCEDURE — 80048 BASIC METABOLIC PNL TOTAL CA: CPT

## 2025-04-20 RX ORDER — HYDROXYZINE HYDROCHLORIDE 10 MG/1
10 TABLET, FILM COATED ORAL 3 TIMES DAILY PRN
Status: DISCONTINUED | OUTPATIENT
Start: 2025-04-20 | End: 2025-04-21

## 2025-04-20 RX ORDER — 3% SODIUM CHLORIDE 3 G/100ML
30 INJECTION, SOLUTION INTRAVENOUS CONTINUOUS
Status: DISCONTINUED | OUTPATIENT
Start: 2025-04-20 | End: 2025-04-21

## 2025-04-20 RX ADMIN — METRONIDAZOLE 500 MG: 500 INJECTION, SOLUTION INTRAVENOUS at 00:10

## 2025-04-20 RX ADMIN — PANTOPRAZOLE SODIUM 40 MG: 40 TABLET, DELAYED RELEASE ORAL at 06:29

## 2025-04-20 RX ADMIN — CLOTRIMAZOLE 10 MG: 10 LOZENGE ORAL at 14:11

## 2025-04-20 RX ADMIN — DEXAMETHASONE SODIUM PHOSPHATE 4 MG: 4 INJECTION, SOLUTION INTRA-ARTICULAR; INTRALESIONAL; INTRAMUSCULAR; INTRAVENOUS; SOFT TISSUE at 02:19

## 2025-04-20 RX ADMIN — CLOTRIMAZOLE 10 MG: 10 LOZENGE ORAL at 00:12

## 2025-04-20 RX ADMIN — Medication 0.2 MCG/KG/HR: at 00:28

## 2025-04-20 RX ADMIN — LOSARTAN POTASSIUM 25 MG: 25 TABLET, FILM COATED ORAL at 21:07

## 2025-04-20 RX ADMIN — WATER 2000 MG: 1 INJECTION INTRAMUSCULAR; INTRAVENOUS; SUBCUTANEOUS at 03:39

## 2025-04-20 RX ADMIN — CLOTRIMAZOLE 10 MG: 10 LOZENGE ORAL at 21:05

## 2025-04-20 RX ADMIN — SODIUM CHLORIDE, PRESERVATIVE FREE 10 ML: 5 INJECTION INTRAVENOUS at 08:35

## 2025-04-20 RX ADMIN — DEXAMETHASONE SODIUM PHOSPHATE 4 MG: 4 INJECTION, SOLUTION INTRA-ARTICULAR; INTRALESIONAL; INTRAMUSCULAR; INTRAVENOUS; SOFT TISSUE at 08:36

## 2025-04-20 RX ADMIN — WATER 2000 MG: 1 INJECTION INTRAMUSCULAR; INTRAVENOUS; SUBCUTANEOUS at 17:15

## 2025-04-20 RX ADMIN — SODIUM CHLORIDE 40 ML/HR: 3 INJECTION, SOLUTION INTRAVENOUS at 22:54

## 2025-04-20 RX ADMIN — DEXAMETHASONE SODIUM PHOSPHATE 4 MG: 4 INJECTION, SOLUTION INTRA-ARTICULAR; INTRALESIONAL; INTRAMUSCULAR; INTRAVENOUS; SOFT TISSUE at 21:05

## 2025-04-20 RX ADMIN — NICARDIPINE HYDROCHLORIDE 10 MG/HR: 25 INJECTION INTRAVENOUS at 21:19

## 2025-04-20 RX ADMIN — DEXAMETHASONE SODIUM PHOSPHATE 4 MG: 4 INJECTION, SOLUTION INTRA-ARTICULAR; INTRALESIONAL; INTRAMUSCULAR; INTRAVENOUS; SOFT TISSUE at 14:11

## 2025-04-20 RX ADMIN — LOSARTAN POTASSIUM 25 MG: 25 TABLET, FILM COATED ORAL at 08:36

## 2025-04-20 RX ADMIN — BUMETANIDE 0.5 MG: 0.25 INJECTION INTRAMUSCULAR; INTRAVENOUS at 08:35

## 2025-04-20 RX ADMIN — ONDANSETRON 4 MG: 2 INJECTION, SOLUTION INTRAMUSCULAR; INTRAVENOUS at 00:17

## 2025-04-20 RX ADMIN — METRONIDAZOLE 500 MG: 500 INJECTION, SOLUTION INTRAVENOUS at 08:41

## 2025-04-20 RX ADMIN — TIOTROPIUM BROMIDE AND OLODATEROL 2 PUFF: 3.124; 2.736 SPRAY, METERED RESPIRATORY (INHALATION) at 08:39

## 2025-04-20 RX ADMIN — SODIUM CHLORIDE 40 ML/HR: 3 INJECTION, SOLUTION INTRAVENOUS at 09:59

## 2025-04-20 RX ADMIN — METRONIDAZOLE 500 MG: 500 INJECTION, SOLUTION INTRAVENOUS at 23:03

## 2025-04-20 RX ADMIN — SODIUM CHLORIDE, PRESERVATIVE FREE 10 ML: 5 INJECTION INTRAVENOUS at 21:04

## 2025-04-20 RX ADMIN — METRONIDAZOLE 500 MG: 500 INJECTION, SOLUTION INTRAVENOUS at 17:12

## 2025-04-20 RX ADMIN — LEVETIRACETAM 500 MG: 100 INJECTION INTRAVENOUS at 17:14

## 2025-04-20 RX ADMIN — NICARDIPINE HYDROCHLORIDE 7.5 MG/HR: 25 INJECTION INTRAVENOUS at 17:20

## 2025-04-20 RX ADMIN — HYDROXYZINE HYDROCHLORIDE 10 MG: 10 TABLET ORAL at 21:04

## 2025-04-20 RX ADMIN — PRAVASTATIN SODIUM 40 MG: 40 TABLET ORAL at 21:07

## 2025-04-20 RX ADMIN — LEVETIRACETAM 500 MG: 100 INJECTION INTRAVENOUS at 04:02

## 2025-04-20 RX ADMIN — Medication 6 MG: at 21:04

## 2025-04-21 ENCOUNTER — APPOINTMENT (OUTPATIENT)
Dept: INTERVENTIONAL RADIOLOGY/VASCULAR | Age: 65
DRG: 023 | End: 2025-04-21
Payer: COMMERCIAL

## 2025-04-21 LAB
ANION GAP SERPL CALC-SCNC: 12 MEQ/L (ref 8–16)
ANION GAP SERPL CALC-SCNC: 7 MEQ/L (ref 8–16)
BACTERIA SPEC AEROBE CULT: NORMAL
BACTERIA SPEC ANAEROBE CULT: NORMAL
BASOPHILS ABSOLUTE: 0 THOU/MM3 (ref 0–0.1)
BASOPHILS NFR BLD AUTO: 0.2 %
BUN SERPL-MCNC: 27 MG/DL (ref 8–23)
BUN SERPL-MCNC: 31 MG/DL (ref 8–23)
CA-I BLD ISE-SCNC: 1.17 MMOL/L (ref 1.12–1.32)
CALCIUM SERPL-MCNC: 8.2 MG/DL (ref 8.8–10.2)
CALCIUM SERPL-MCNC: 8.3 MG/DL (ref 8.8–10.2)
CHLORIDE SERPL-SCNC: 116 MEQ/L (ref 98–111)
CHLORIDE SERPL-SCNC: 120 MEQ/L (ref 98–111)
CO2 SERPL-SCNC: 20 MEQ/L (ref 22–29)
CO2 SERPL-SCNC: 23 MEQ/L (ref 22–29)
CREAT SERPL-MCNC: 0.6 MG/DL (ref 0.7–1.2)
CREAT SERPL-MCNC: 0.8 MG/DL (ref 0.7–1.2)
DEPRECATED RDW RBC AUTO: 43.6 FL (ref 35–45)
ECHO BSA: 2.04 M2
EOSINOPHIL NFR BLD AUTO: 0 %
EOSINOPHILS ABSOLUTE: 0 THOU/MM3 (ref 0–0.4)
ERYTHROCYTE [DISTWIDTH] IN BLOOD BY AUTOMATED COUNT: 13.3 % (ref 11.5–14.5)
FUNGUS SPEC CULT: NORMAL
FUNGUS SPEC FUNGUS STN: NORMAL
GFR SERPL CREATININE-BSD FRML MDRD: > 90 ML/MIN/1.73M2
GFR SERPL CREATININE-BSD FRML MDRD: > 90 ML/MIN/1.73M2
GLUCOSE SERPL-MCNC: 159 MG/DL (ref 74–109)
GLUCOSE SERPL-MCNC: 168 MG/DL (ref 74–109)
GRAM STN SPEC: NORMAL
HCT VFR BLD AUTO: 33.4 % (ref 42–52)
HGB BLD-MCNC: 10.8 GM/DL (ref 14–18)
IMM GRANULOCYTES # BLD AUTO: 0.2 THOU/MM3 (ref 0–0.07)
IMM GRANULOCYTES NFR BLD AUTO: 1.6 %
LYMPHOCYTES ABSOLUTE: 0.5 THOU/MM3 (ref 1–4.8)
LYMPHOCYTES NFR BLD AUTO: 3.9 %
MCH RBC QN AUTO: 29.1 PG (ref 26–33)
MCHC RBC AUTO-ENTMCNC: 32.3 GM/DL (ref 32.2–35.5)
MCV RBC AUTO: 90 FL (ref 80–94)
MONOCYTES ABSOLUTE: 0.6 THOU/MM3 (ref 0.4–1.3)
MONOCYTES NFR BLD AUTO: 4.7 %
NEUTROPHILS ABSOLUTE: 11.3 THOU/MM3 (ref 1.8–7.7)
NEUTROPHILS NFR BLD AUTO: 89.6 %
NRBC BLD AUTO-RTO: 0 /100 WBC
OSMOLALITY SERPL: 316 MOSMOL/KG (ref 275–295)
OSMOLALITY SERPL: 321 MOSMOL/KG (ref 275–295)
OSMOLALITY SERPL: 322 MOSMOL/KG (ref 275–295)
PLATELET # BLD AUTO: 203 THOU/MM3 (ref 130–400)
PMV BLD AUTO: 10.4 FL (ref 9.4–12.4)
POTASSIUM SERPL-SCNC: 3.9 MEQ/L (ref 3.5–5.2)
POTASSIUM SERPL-SCNC: 4 MEQ/L (ref 3.5–5.2)
RBC # BLD AUTO: 3.71 MILL/MM3 (ref 4.7–6.1)
SODIUM SERPL-SCNC: 143 MEQ/L (ref 135–145)
SODIUM SERPL-SCNC: 148 MEQ/L (ref 135–145)
SODIUM SERPL-SCNC: 150 MEQ/L (ref 135–145)
WBC # BLD AUTO: 12.6 THOU/MM3 (ref 4.8–10.8)

## 2025-04-21 PROCEDURE — 6370000000 HC RX 637 (ALT 250 FOR IP): Performed by: PHYSICIAN ASSISTANT

## 2025-04-21 PROCEDURE — 85025 COMPLETE CBC W/AUTO DIFF WBC: CPT

## 2025-04-21 PROCEDURE — 6360000002 HC RX W HCPCS: Performed by: PHYSICIAN ASSISTANT

## 2025-04-21 PROCEDURE — 6370000000 HC RX 637 (ALT 250 FOR IP): Performed by: STUDENT IN AN ORGANIZED HEALTH CARE EDUCATION/TRAINING PROGRAM

## 2025-04-21 PROCEDURE — 94640 AIRWAY INHALATION TREATMENT: CPT

## 2025-04-21 PROCEDURE — 99291 CRITICAL CARE FIRST HOUR: CPT | Performed by: INTERNAL MEDICINE

## 2025-04-21 PROCEDURE — 83930 ASSAY OF BLOOD OSMOLALITY: CPT

## 2025-04-21 PROCEDURE — 2500000003 HC RX 250 WO HCPCS: Performed by: PHYSICIAN ASSISTANT

## 2025-04-21 PROCEDURE — 36415 COLL VENOUS BLD VENIPUNCTURE: CPT

## 2025-04-21 PROCEDURE — 80048 BASIC METABOLIC PNL TOTAL CA: CPT

## 2025-04-21 PROCEDURE — 2000000000 HC ICU R&B

## 2025-04-21 PROCEDURE — 99232 SBSQ HOSP IP/OBS MODERATE 35: CPT | Performed by: STUDENT IN AN ORGANIZED HEALTH CARE EDUCATION/TRAINING PROGRAM

## 2025-04-21 PROCEDURE — 92523 SPEECH SOUND LANG COMPREHEN: CPT

## 2025-04-21 PROCEDURE — 97129 THER IVNTJ 1ST 15 MIN: CPT

## 2025-04-21 PROCEDURE — 84295 ASSAY OF SERUM SODIUM: CPT

## 2025-04-21 PROCEDURE — 94761 N-INVAS EAR/PLS OXIMETRY MLT: CPT

## 2025-04-21 PROCEDURE — 6360000002 HC RX W HCPCS

## 2025-04-21 PROCEDURE — 6370000000 HC RX 637 (ALT 250 FOR IP)

## 2025-04-21 PROCEDURE — 6370000000 HC RX 637 (ALT 250 FOR IP): Performed by: INTERNAL MEDICINE

## 2025-04-21 PROCEDURE — 93970 EXTREMITY STUDY: CPT

## 2025-04-21 PROCEDURE — 2700000000 HC OXYGEN THERAPY PER DAY

## 2025-04-21 PROCEDURE — 2500000003 HC RX 250 WO HCPCS: Performed by: INTERNAL MEDICINE

## 2025-04-21 PROCEDURE — 92610 EVALUATE SWALLOWING FUNCTION: CPT

## 2025-04-21 PROCEDURE — 82330 ASSAY OF CALCIUM: CPT

## 2025-04-21 PROCEDURE — 97535 SELF CARE MNGMENT TRAINING: CPT

## 2025-04-21 RX ORDER — MANNITOL 20 G/100ML
50 INJECTION, SOLUTION INTRAVENOUS ONCE
Status: COMPLETED | OUTPATIENT
Start: 2025-04-21 | End: 2025-04-21

## 2025-04-21 RX ORDER — AMLODIPINE BESYLATE 10 MG/1
10 TABLET ORAL DAILY
Status: DISCONTINUED | OUTPATIENT
Start: 2025-04-21 | End: 2025-04-28 | Stop reason: HOSPADM

## 2025-04-21 RX ORDER — CLONIDINE 0.1 MG/24H
1 PATCH, EXTENDED RELEASE TRANSDERMAL WEEKLY
Status: DISCONTINUED | OUTPATIENT
Start: 2025-04-21 | End: 2025-04-28 | Stop reason: HOSPADM

## 2025-04-21 RX ORDER — MANNITOL 20 G/100ML
25 INJECTION, SOLUTION INTRAVENOUS EVERY 6 HOURS
Status: DISCONTINUED | OUTPATIENT
Start: 2025-04-21 | End: 2025-04-24

## 2025-04-21 RX ORDER — CLONIDINE HYDROCHLORIDE 0.1 MG/1
0.1 TABLET ORAL 2 TIMES DAILY
Status: COMPLETED | OUTPATIENT
Start: 2025-04-21 | End: 2025-04-21

## 2025-04-21 RX ORDER — POLYVINYL ALCOHOL 14 MG/ML
1 SOLUTION/ DROPS OPHTHALMIC PRN
Status: DISCONTINUED | OUTPATIENT
Start: 2025-04-21 | End: 2025-04-22

## 2025-04-21 RX ORDER — ACETAMINOPHEN 325 MG/1
650 TABLET ORAL EVERY 4 HOURS PRN
Status: DISCONTINUED | OUTPATIENT
Start: 2025-04-21 | End: 2025-04-28 | Stop reason: HOSPADM

## 2025-04-21 RX ORDER — HYDROXYZINE HYDROCHLORIDE 10 MG/1
10 TABLET, FILM COATED ORAL 3 TIMES DAILY
Status: DISCONTINUED | OUTPATIENT
Start: 2025-04-21 | End: 2025-04-21

## 2025-04-21 RX ADMIN — DEXAMETHASONE SODIUM PHOSPHATE 4 MG: 4 INJECTION, SOLUTION INTRA-ARTICULAR; INTRALESIONAL; INTRAMUSCULAR; INTRAVENOUS; SOFT TISSUE at 20:29

## 2025-04-21 RX ADMIN — SODIUM CHLORIDE, PRESERVATIVE FREE 10 ML: 5 INJECTION INTRAVENOUS at 17:26

## 2025-04-21 RX ADMIN — METRONIDAZOLE 500 MG: 500 INJECTION, SOLUTION INTRAVENOUS at 10:28

## 2025-04-21 RX ADMIN — SODIUM CHLORIDE, PRESERVATIVE FREE 10 ML: 5 INJECTION INTRAVENOUS at 20:29

## 2025-04-21 RX ADMIN — CLOTRIMAZOLE 10 MG: 10 LOZENGE ORAL at 10:09

## 2025-04-21 RX ADMIN — PRAVASTATIN SODIUM 40 MG: 40 TABLET ORAL at 20:29

## 2025-04-21 RX ADMIN — LOSARTAN POTASSIUM 25 MG: 25 TABLET, FILM COATED ORAL at 20:29

## 2025-04-21 RX ADMIN — PANTOPRAZOLE SODIUM 40 MG: 40 TABLET, DELAYED RELEASE ORAL at 05:17

## 2025-04-21 RX ADMIN — AMLODIPINE BESYLATE 10 MG: 10 TABLET ORAL at 10:29

## 2025-04-21 RX ADMIN — DEXAMETHASONE SODIUM PHOSPHATE 4 MG: 4 INJECTION, SOLUTION INTRA-ARTICULAR; INTRALESIONAL; INTRAMUSCULAR; INTRAVENOUS; SOFT TISSUE at 02:22

## 2025-04-21 RX ADMIN — LEVETIRACETAM 500 MG: 100 INJECTION INTRAVENOUS at 17:21

## 2025-04-21 RX ADMIN — CLONIDINE HYDROCHLORIDE 0.1 MG: 0.1 TABLET ORAL at 21:30

## 2025-04-21 RX ADMIN — BUMETANIDE 0.5 MG: 0.25 INJECTION INTRAMUSCULAR; INTRAVENOUS at 10:29

## 2025-04-21 RX ADMIN — WATER 2000 MG: 1 INJECTION INTRAMUSCULAR; INTRAVENOUS; SUBCUTANEOUS at 17:17

## 2025-04-21 RX ADMIN — HYDROXYZINE HYDROCHLORIDE 10 MG: 10 TABLET ORAL at 04:59

## 2025-04-21 RX ADMIN — SODIUM CHLORIDE, PRESERVATIVE FREE 10 ML: 5 INJECTION INTRAVENOUS at 13:43

## 2025-04-21 RX ADMIN — SODIUM CHLORIDE, PRESERVATIVE FREE 10 ML: 5 INJECTION INTRAVENOUS at 10:36

## 2025-04-21 RX ADMIN — MANNITOL 25 G: 20 INJECTION, SOLUTION INTRAVENOUS at 19:44

## 2025-04-21 RX ADMIN — METRONIDAZOLE 500 MG: 500 INJECTION, SOLUTION INTRAVENOUS at 17:15

## 2025-04-21 RX ADMIN — WATER 2000 MG: 1 INJECTION INTRAMUSCULAR; INTRAVENOUS; SUBCUTANEOUS at 05:00

## 2025-04-21 RX ADMIN — DEXAMETHASONE SODIUM PHOSPHATE 4 MG: 4 INJECTION, SOLUTION INTRA-ARTICULAR; INTRALESIONAL; INTRAMUSCULAR; INTRAVENOUS; SOFT TISSUE at 13:43

## 2025-04-21 RX ADMIN — LEVETIRACETAM 500 MG: 100 INJECTION INTRAVENOUS at 04:59

## 2025-04-21 RX ADMIN — CLONIDINE HYDROCHLORIDE 0.1 MG: 0.1 TABLET ORAL at 10:14

## 2025-04-21 RX ADMIN — CLOTRIMAZOLE 10 MG: 10 LOZENGE ORAL at 14:05

## 2025-04-21 RX ADMIN — CLOTRIMAZOLE 10 MG: 10 LOZENGE ORAL at 13:44

## 2025-04-21 RX ADMIN — DEXAMETHASONE SODIUM PHOSPHATE 4 MG: 4 INJECTION, SOLUTION INTRA-ARTICULAR; INTRALESIONAL; INTRAMUSCULAR; INTRAVENOUS; SOFT TISSUE at 10:30

## 2025-04-21 RX ADMIN — MANNITOL 50 G: 20 INJECTION, SOLUTION INTRAVENOUS at 14:08

## 2025-04-21 RX ADMIN — CLOTRIMAZOLE 10 MG: 10 LOZENGE ORAL at 05:00

## 2025-04-21 RX ADMIN — TIOTROPIUM BROMIDE AND OLODATEROL 2 PUFF: 3.124; 2.736 SPRAY, METERED RESPIRATORY (INHALATION) at 08:00

## 2025-04-21 RX ADMIN — CLOTRIMAZOLE 10 MG: 10 LOZENGE ORAL at 20:29

## 2025-04-21 RX ADMIN — SODIUM CHLORIDE, PRESERVATIVE FREE 10 ML: 5 INJECTION INTRAVENOUS at 10:31

## 2025-04-21 RX ADMIN — LOSARTAN POTASSIUM 25 MG: 25 TABLET, FILM COATED ORAL at 10:08

## 2025-04-21 NOTE — PLAN OF CARE
4/21 vascular duplex lower extremity venous bilateral showing acute deep venous thrombosis limited to a single peroneal vein in the right calf, with an otherwise normal study.  Patient has a significant bleeding risk with recent intracranial bleed on top of having a presumed malignancy in the left parietal region.  Given the contraindications for any acute anticoagulation, patient will likely need an IVC filter placed to limit the risk of a PE.    Electronically signed by Alec Childs DO on 4/21/2025 at 4:09 PM

## 2025-04-21 NOTE — PLAN OF CARE
Dr. Lázaro Mora was given a [facility] to transfer patient.  He has graciously accepted the transfer.  Patient is going to bed for a 15.  ICU will sign off.    Electronically signed by Alec Childs DO on 4/21/2025 at 9:54 AM

## 2025-04-21 NOTE — PLAN OF CARE
Patient is a 65-year-old male past medical history of GERD, hypertension, hyperlipidemia, central retinal vein occlusion, prostate cancer who presented with aphasia.  His last known well was 4/10/2025 where he was unable to express any language.  On admission this was noted to be ongoing for 3 weeks and progressively worsening.  In the ED a CT scan was done which showed intra-axial mass in the left parietal lobe, post MRI revealed possible abscess measuring 2.4 cm with edema in the anterior talus.  Neurosurgery was consulted who recommended Decadron.  Infectious disease was also consulted who were concerned for abscess who initially started the patient on vancomycin, ceftriaxone and Flagyl.  He was given hypertonic saline while in the ICU to help with cerebral edema.  On 4/18/2025 found to have a intracranial bleed surrounding area of biopsy.  Left craniotomy with hematoma evacuation was completed.  4/21/2025 hypertonic saline was discontinued.  He was started on steroid therapy.  Transferred to stepdown for further management and care.  Neurosurgery continues to follow.  Woke with neurosurgery who state to continue steroids for now.  Infectious diseases following.  Patient is on Keppra for seizure prophylaxis, ceftriaxone and metronidazole for brain abscess.  Added hold parameters to his blood pressure medications.  Biopsy results are pending at this time.

## 2025-04-21 NOTE — PLAN OF CARE
There are new, concerning findings on a reread of CT head which show a 4.7 x 3 x 3.8 cm abnormal   density in the left posterior temporal/parietal region. There is surrounding edema and mass effect upon the left lateral ventricle. There is mild midline deviation towards the right side.  Neurosurgery has requested transfer to stepdown unit be canceled, and for patient to be kept on ICU service for further management of vasogenic edema with 3% hypertonic saline versus mannitol.    Transfer canceled.  Attending is now switched back to Dr. Dennis, ICU team to follow.    Electronically signed by Alec Childs DO on 4/21/2025 at 11:56 AM

## 2025-04-22 LAB
ANION GAP SERPL CALC-SCNC: 9 MEQ/L (ref 8–16)
BASOPHILS ABSOLUTE: 0 THOU/MM3 (ref 0–0.1)
BASOPHILS NFR BLD AUTO: 0.2 %
BUN SERPL-MCNC: 28 MG/DL (ref 8–23)
CALCIUM SERPL-MCNC: 8.8 MG/DL (ref 8.8–10.2)
CHLORIDE SERPL-SCNC: 110 MEQ/L (ref 98–111)
CO2 SERPL-SCNC: 23 MEQ/L (ref 22–29)
CREAT SERPL-MCNC: 0.6 MG/DL (ref 0.7–1.2)
DEPRECATED RDW RBC AUTO: 43 FL (ref 35–45)
EOSINOPHIL NFR BLD AUTO: 0.1 %
EOSINOPHILS ABSOLUTE: 0 THOU/MM3 (ref 0–0.4)
ERYTHROCYTE [DISTWIDTH] IN BLOOD BY AUTOMATED COUNT: 13.2 % (ref 11.5–14.5)
GFR SERPL CREATININE-BSD FRML MDRD: > 90 ML/MIN/1.73M2
GLUCOSE SERPL-MCNC: 145 MG/DL (ref 74–109)
HCT VFR BLD AUTO: 39.9 % (ref 42–52)
HGB BLD-MCNC: 12.8 GM/DL (ref 14–18)
IMM GRANULOCYTES # BLD AUTO: 0.28 THOU/MM3 (ref 0–0.07)
IMM GRANULOCYTES NFR BLD AUTO: 1.4 %
LYMPHOCYTES ABSOLUTE: 0.7 THOU/MM3 (ref 1–4.8)
LYMPHOCYTES NFR BLD AUTO: 3.5 %
MCH RBC QN AUTO: 28.7 PG (ref 26–33)
MCHC RBC AUTO-ENTMCNC: 32.1 GM/DL (ref 32.2–35.5)
MCV RBC AUTO: 89.5 FL (ref 80–94)
MONOCYTES ABSOLUTE: 0.7 THOU/MM3 (ref 0.4–1.3)
MONOCYTES NFR BLD AUTO: 3.7 %
NEUTROPHILS ABSOLUTE: 18 THOU/MM3 (ref 1.8–7.7)
NEUTROPHILS NFR BLD AUTO: 91.1 %
NRBC BLD AUTO-RTO: 0 /100 WBC
OSMOLALITY SERPL: 308 MOSMOL/KG (ref 275–295)
OSMOLALITY SERPL: 309 MOSMOL/KG (ref 275–295)
OSMOLALITY SERPL: 312 MOSMOL/KG (ref 275–295)
OSMOLALITY SERPL: 320 MOSMOL/KG (ref 275–295)
PLATELET # BLD AUTO: 183 THOU/MM3 (ref 130–400)
PMV BLD AUTO: 10.8 FL (ref 9.4–12.4)
POTASSIUM SERPL-SCNC: 4.1 MEQ/L (ref 3.5–5.2)
RBC # BLD AUTO: 4.46 MILL/MM3 (ref 4.7–6.1)
SODIUM SERPL-SCNC: 140 MEQ/L (ref 135–145)
SODIUM SERPL-SCNC: 141 MEQ/L (ref 135–145)
SODIUM SERPL-SCNC: 142 MEQ/L (ref 135–145)
SODIUM SERPL-SCNC: 142 MEQ/L (ref 135–145)
WBC # BLD AUTO: 19.8 THOU/MM3 (ref 4.8–10.8)

## 2025-04-22 PROCEDURE — 6370000000 HC RX 637 (ALT 250 FOR IP): Performed by: PHYSICIAN ASSISTANT

## 2025-04-22 PROCEDURE — 2500000003 HC RX 250 WO HCPCS: Performed by: PHYSICIAN ASSISTANT

## 2025-04-22 PROCEDURE — 99232 SBSQ HOSP IP/OBS MODERATE 35: CPT | Performed by: STUDENT IN AN ORGANIZED HEALTH CARE EDUCATION/TRAINING PROGRAM

## 2025-04-22 PROCEDURE — 6360000002 HC RX W HCPCS: Performed by: PHYSICIAN ASSISTANT

## 2025-04-22 PROCEDURE — 36415 COLL VENOUS BLD VENIPUNCTURE: CPT

## 2025-04-22 PROCEDURE — 94640 AIRWAY INHALATION TREATMENT: CPT

## 2025-04-22 PROCEDURE — 6370000000 HC RX 637 (ALT 250 FOR IP)

## 2025-04-22 PROCEDURE — 83930 ASSAY OF BLOOD OSMOLALITY: CPT

## 2025-04-22 PROCEDURE — 94669 MECHANICAL CHEST WALL OSCILL: CPT

## 2025-04-22 PROCEDURE — 85025 COMPLETE CBC W/AUTO DIFF WBC: CPT

## 2025-04-22 PROCEDURE — 84295 ASSAY OF SERUM SODIUM: CPT

## 2025-04-22 PROCEDURE — 92507 TX SP LANG VOICE COMM INDIV: CPT

## 2025-04-22 PROCEDURE — 2500000003 HC RX 250 WO HCPCS: Performed by: INTERNAL MEDICINE

## 2025-04-22 PROCEDURE — 99291 CRITICAL CARE FIRST HOUR: CPT | Performed by: INTERNAL MEDICINE

## 2025-04-22 PROCEDURE — 97110 THERAPEUTIC EXERCISES: CPT

## 2025-04-22 PROCEDURE — 2000000000 HC ICU R&B

## 2025-04-22 PROCEDURE — 6360000002 HC RX W HCPCS

## 2025-04-22 PROCEDURE — 80048 BASIC METABOLIC PNL TOTAL CA: CPT

## 2025-04-22 PROCEDURE — 97164 PT RE-EVAL EST PLAN CARE: CPT

## 2025-04-22 RX ORDER — LOSARTAN POTASSIUM 25 MG/1
37.5 TABLET ORAL ONCE
Status: COMPLETED | OUTPATIENT
Start: 2025-04-22 | End: 2025-04-22

## 2025-04-22 RX ORDER — POLYVINYL ALCOHOL 14 MG/ML
1 SOLUTION/ DROPS OPHTHALMIC PRN
Status: DISCONTINUED | OUTPATIENT
Start: 2025-04-22 | End: 2025-04-28 | Stop reason: HOSPADM

## 2025-04-22 RX ORDER — LOSARTAN POTASSIUM 25 MG/1
37.5 TABLET ORAL 2 TIMES DAILY
Status: DISCONTINUED | OUTPATIENT
Start: 2025-04-23 | End: 2025-04-28 | Stop reason: HOSPADM

## 2025-04-22 RX ADMIN — DEXAMETHASONE SODIUM PHOSPHATE 4 MG: 4 INJECTION, SOLUTION INTRA-ARTICULAR; INTRALESIONAL; INTRAMUSCULAR; INTRAVENOUS; SOFT TISSUE at 02:22

## 2025-04-22 RX ADMIN — MANNITOL 25 G: 20 INJECTION, SOLUTION INTRAVENOUS at 12:01

## 2025-04-22 RX ADMIN — CLOTRIMAZOLE 10 MG: 10 LOZENGE ORAL at 03:57

## 2025-04-22 RX ADMIN — CLOTRIMAZOLE 10 MG: 10 LOZENGE ORAL at 11:56

## 2025-04-22 RX ADMIN — METRONIDAZOLE 500 MG: 500 INJECTION, SOLUTION INTRAVENOUS at 00:19

## 2025-04-22 RX ADMIN — CLOTRIMAZOLE 10 MG: 10 LOZENGE ORAL at 17:01

## 2025-04-22 RX ADMIN — BUMETANIDE 0.5 MG: 0.25 INJECTION INTRAMUSCULAR; INTRAVENOUS at 08:32

## 2025-04-22 RX ADMIN — MANNITOL 25 G: 20 INJECTION, SOLUTION INTRAVENOUS at 01:59

## 2025-04-22 RX ADMIN — LEVETIRACETAM 500 MG: 100 INJECTION INTRAVENOUS at 16:41

## 2025-04-22 RX ADMIN — WATER 2000 MG: 1 INJECTION INTRAMUSCULAR; INTRAVENOUS; SUBCUTANEOUS at 03:57

## 2025-04-22 RX ADMIN — PRAVASTATIN SODIUM 40 MG: 40 TABLET ORAL at 20:13

## 2025-04-22 RX ADMIN — MANNITOL 25 G: 20 INJECTION, SOLUTION INTRAVENOUS at 18:25

## 2025-04-22 RX ADMIN — DEXAMETHASONE SODIUM PHOSPHATE 4 MG: 4 INJECTION, SOLUTION INTRA-ARTICULAR; INTRALESIONAL; INTRAMUSCULAR; INTRAVENOUS; SOFT TISSUE at 20:12

## 2025-04-22 RX ADMIN — MANNITOL 25 G: 20 INJECTION, SOLUTION INTRAVENOUS at 06:40

## 2025-04-22 RX ADMIN — CLOTRIMAZOLE 10 MG: 10 LOZENGE ORAL at 08:32

## 2025-04-22 RX ADMIN — ACETAMINOPHEN 650 MG: 325 TABLET ORAL at 19:32

## 2025-04-22 RX ADMIN — CLOTRIMAZOLE 10 MG: 10 LOZENGE ORAL at 20:13

## 2025-04-22 RX ADMIN — DEXAMETHASONE SODIUM PHOSPHATE 4 MG: 4 INJECTION, SOLUTION INTRA-ARTICULAR; INTRALESIONAL; INTRAMUSCULAR; INTRAVENOUS; SOFT TISSUE at 08:32

## 2025-04-22 RX ADMIN — TIOTROPIUM BROMIDE AND OLODATEROL 2 PUFF: 3.124; 2.736 SPRAY, METERED RESPIRATORY (INHALATION) at 10:07

## 2025-04-22 RX ADMIN — LOSARTAN POTASSIUM 25 MG: 25 TABLET, FILM COATED ORAL at 08:32

## 2025-04-22 RX ADMIN — LOSARTAN POTASSIUM 37.5 MG: 25 TABLET, FILM COATED ORAL at 20:12

## 2025-04-22 RX ADMIN — SODIUM CHLORIDE, PRESERVATIVE FREE 10 ML: 5 INJECTION INTRAVENOUS at 08:33

## 2025-04-22 RX ADMIN — SODIUM CHLORIDE, PRESERVATIVE FREE 10 ML: 5 INJECTION INTRAVENOUS at 20:13

## 2025-04-22 RX ADMIN — AMLODIPINE BESYLATE 10 MG: 10 TABLET ORAL at 08:32

## 2025-04-22 RX ADMIN — DEXAMETHASONE SODIUM PHOSPHATE 4 MG: 4 INJECTION, SOLUTION INTRA-ARTICULAR; INTRALESIONAL; INTRAMUSCULAR; INTRAVENOUS; SOFT TISSUE at 14:05

## 2025-04-22 RX ADMIN — PANTOPRAZOLE SODIUM 40 MG: 40 TABLET, DELAYED RELEASE ORAL at 05:32

## 2025-04-22 RX ADMIN — LEVETIRACETAM 500 MG: 100 INJECTION INTRAVENOUS at 05:32

## 2025-04-22 ASSESSMENT — PAIN DESCRIPTION - ORIENTATION
ORIENTATION: ANTERIOR;MID
ORIENTATION: ANTERIOR;MID

## 2025-04-22 ASSESSMENT — PAIN DESCRIPTION - PAIN TYPE
TYPE: ACUTE PAIN
TYPE: ACUTE PAIN

## 2025-04-22 ASSESSMENT — PAIN DESCRIPTION - ONSET
ONSET: PROGRESSIVE
ONSET: PROGRESSIVE

## 2025-04-22 ASSESSMENT — PAIN DESCRIPTION - LOCATION
LOCATION: HEAD
LOCATION: HEAD

## 2025-04-22 ASSESSMENT — PAIN DESCRIPTION - DESCRIPTORS
DESCRIPTORS: ACHING
DESCRIPTORS: ACHING

## 2025-04-22 ASSESSMENT — PAIN SCALES - GENERAL
PAINLEVEL_OUTOF10: 0
PAINLEVEL_OUTOF10: 3
PAINLEVEL_OUTOF10: 0

## 2025-04-22 ASSESSMENT — PAIN DESCRIPTION - FREQUENCY
FREQUENCY: INTERMITTENT
FREQUENCY: INTERMITTENT

## 2025-04-22 NOTE — FLOWSHEET NOTE
0900  Dr Singh in to see pt and spoke with wife and daughter and discussed plan with Dr Childs.  Dr Singh ordered to stop the 3% saline.    Dr Childs states to pull art line and take plascencia out as pt is to be transferred to .   What Type Of Note Output Would You Prefer (Optional)?: Standard Output How Severe Is Your Skin Lesion?: mild Has Your Skin Lesion Been Treated?: not been treated Is This A New Presentation, Or A Follow-Up?: Skin Lesions

## 2025-04-22 NOTE — FLOWSHEET NOTE
1130  Dr Singh in and states cancelling transfer. Spoke to wife at length and updated on scans and reason for not transferring. Wife tearful and emotional support given. After speaking to wife, Dr Singh spoke with Dr Dennis regarding case.

## 2025-04-22 NOTE — PLAN OF CARE
Patient DVT in right calf. Obtain duplex in 72 hours to assess for propagation. If increasing in size, candidate for IVC filter given bleeding risk.     Electronically signed by Alec Childs DO on 4/22/2025 at 9:01 AM

## 2025-04-22 NOTE — PLAN OF CARE
Problem: Discharge Planning  Goal: Discharge to home or other facility with appropriate resources  Outcome: Progressing  Flowsheets (Taken 4/22/2025 0231)  Discharge to home or other facility with appropriate resources: Identify barriers to discharge with patient and caregiver     Problem: ABCDS Injury Assessment  Goal: Absence of physical injury  Outcome: Progressing  Flowsheets (Taken 4/22/2025 0231)  Absence of Physical Injury: Implement safety measures based on patient assessment     Problem: Safety - Adult  Goal: Free from fall injury  Outcome: Progressing  Flowsheets (Taken 4/22/2025 0231)  Free From Fall Injury: Instruct family/caregiver on patient safety     Problem: Pain  Goal: Verbalizes/displays adequate comfort level or baseline comfort level  Outcome: Progressing  Flowsheets (Taken 4/22/2025 0231)  Verbalizes/displays adequate comfort level or baseline comfort level:   Encourage patient to monitor pain and request assistance   Administer analgesics based on type and severity of pain and evaluate response   Consider cultural and social influences on pain and pain management   Assess pain using appropriate pain scale   Implement non-pharmacological measures as appropriate and evaluate response     Problem: Neurosensory - Adult  Goal: Achieves stable or improved neurological status  Outcome: Progressing  Flowsheets (Taken 4/22/2025 0231)  Achieves stable or improved neurological status:   Assess for and report changes in neurological status   Initiate measures to prevent increased intracranial pressure     Problem: Skin/Tissue Integrity - Adult  Goal: Skin integrity remains intact  Description: 1.  Monitor for areas of redness and/or skin breakdown2.  Assess vascular access sites hourly3.  Every 4-6 hours minimum:  Change oxygen saturation probe site4.  Every 4-6 hours:  If on nasal continuous positive airway pressure, respiratory therapy assess nares and determine need for appliance change or resting

## 2025-04-23 PROBLEM — J96.00 ACUTE RESPIRATORY FAILURE (HCC): Status: ACTIVE | Noted: 2025-04-23

## 2025-04-23 LAB
ANION GAP SERPL CALC-SCNC: 10 MEQ/L (ref 8–16)
BASOPHILS ABSOLUTE: 0 THOU/MM3 (ref 0–0.1)
BASOPHILS NFR BLD AUTO: 0.2 %
BUN SERPL-MCNC: 26 MG/DL (ref 8–23)
CALCIUM SERPL-MCNC: 8.9 MG/DL (ref 8.8–10.2)
CHLORIDE SERPL-SCNC: 106 MEQ/L (ref 98–111)
CO2 SERPL-SCNC: 23 MEQ/L (ref 22–29)
CREAT SERPL-MCNC: 0.6 MG/DL (ref 0.7–1.2)
DEPRECATED RDW RBC AUTO: 40.5 FL (ref 35–45)
EOSINOPHIL NFR BLD AUTO: 0 %
EOSINOPHILS ABSOLUTE: 0 THOU/MM3 (ref 0–0.4)
ERYTHROCYTE [DISTWIDTH] IN BLOOD BY AUTOMATED COUNT: 13 % (ref 11.5–14.5)
GFR SERPL CREATININE-BSD FRML MDRD: > 90 ML/MIN/1.73M2
GLUCOSE SERPL-MCNC: 131 MG/DL (ref 74–109)
HCT VFR BLD AUTO: 39.6 % (ref 42–52)
HGB BLD-MCNC: 13.2 GM/DL (ref 14–18)
IMM GRANULOCYTES # BLD AUTO: 0.38 THOU/MM3 (ref 0–0.07)
IMM GRANULOCYTES NFR BLD AUTO: 1.6 %
LYMPHOCYTES ABSOLUTE: 0.9 THOU/MM3 (ref 1–4.8)
LYMPHOCYTES NFR BLD AUTO: 3.9 %
MCH RBC QN AUTO: 29 PG (ref 26–33)
MCHC RBC AUTO-ENTMCNC: 33.3 GM/DL (ref 32.2–35.5)
MCV RBC AUTO: 87 FL (ref 80–94)
MONOCYTES ABSOLUTE: 0.7 THOU/MM3 (ref 0.4–1.3)
MONOCYTES NFR BLD AUTO: 3.2 %
NEUTROPHILS ABSOLUTE: 21.2 THOU/MM3 (ref 1.8–7.7)
NEUTROPHILS NFR BLD AUTO: 91.1 %
NRBC BLD AUTO-RTO: 0 /100 WBC
OSMOLALITY SERPL: 298 MOSMOL/KG (ref 275–295)
OSMOLALITY SERPL: 299 MOSMOL/KG (ref 275–295)
OSMOLALITY SERPL: 302 MOSMOL/KG (ref 275–295)
OSMOLALITY SERPL: 304 MOSMOL/KG (ref 275–295)
PLATELET # BLD AUTO: 170 THOU/MM3 (ref 130–400)
PLATELET BLD QL SMEAR: ADEQUATE
PMV BLD AUTO: 11 FL (ref 9.4–12.4)
POTASSIUM SERPL-SCNC: 4.1 MEQ/L (ref 3.5–5.2)
RBC # BLD AUTO: 4.55 MILL/MM3 (ref 4.7–6.1)
SCAN OF BLOOD SMEAR: NORMAL
SODIUM SERPL-SCNC: 136 MEQ/L (ref 135–145)
SODIUM SERPL-SCNC: 137 MEQ/L (ref 135–145)
SODIUM SERPL-SCNC: 139 MEQ/L (ref 135–145)
SODIUM SERPL-SCNC: 142 MEQ/L (ref 135–145)
TOXIC GRANULES BLD QL SMEAR: PRESENT
WBC # BLD AUTO: 23.3 THOU/MM3 (ref 4.8–10.8)

## 2025-04-23 PROCEDURE — 2000000000 HC ICU R&B

## 2025-04-23 PROCEDURE — 97112 NEUROMUSCULAR REEDUCATION: CPT

## 2025-04-23 PROCEDURE — 80048 BASIC METABOLIC PNL TOTAL CA: CPT

## 2025-04-23 PROCEDURE — 84295 ASSAY OF SERUM SODIUM: CPT

## 2025-04-23 PROCEDURE — 99231 SBSQ HOSP IP/OBS SF/LOW 25: CPT | Performed by: SURGERY

## 2025-04-23 PROCEDURE — 6360000002 HC RX W HCPCS: Performed by: PHYSICIAN ASSISTANT

## 2025-04-23 PROCEDURE — 97116 GAIT TRAINING THERAPY: CPT

## 2025-04-23 PROCEDURE — 2700000000 HC OXYGEN THERAPY PER DAY

## 2025-04-23 PROCEDURE — 6370000000 HC RX 637 (ALT 250 FOR IP): Performed by: NURSE PRACTITIONER

## 2025-04-23 PROCEDURE — 94669 MECHANICAL CHEST WALL OSCILL: CPT

## 2025-04-23 PROCEDURE — 6370000000 HC RX 637 (ALT 250 FOR IP)

## 2025-04-23 PROCEDURE — 99232 SBSQ HOSP IP/OBS MODERATE 35: CPT | Performed by: STUDENT IN AN ORGANIZED HEALTH CARE EDUCATION/TRAINING PROGRAM

## 2025-04-23 PROCEDURE — 6370000000 HC RX 637 (ALT 250 FOR IP): Performed by: PHYSICIAN ASSISTANT

## 2025-04-23 PROCEDURE — 2500000003 HC RX 250 WO HCPCS: Performed by: INTERNAL MEDICINE

## 2025-04-23 PROCEDURE — 36415 COLL VENOUS BLD VENIPUNCTURE: CPT

## 2025-04-23 PROCEDURE — 85025 COMPLETE CBC W/AUTO DIFF WBC: CPT

## 2025-04-23 PROCEDURE — 94640 AIRWAY INHALATION TREATMENT: CPT

## 2025-04-23 PROCEDURE — 83930 ASSAY OF BLOOD OSMOLALITY: CPT

## 2025-04-23 PROCEDURE — 2500000003 HC RX 250 WO HCPCS: Performed by: PHYSICIAN ASSISTANT

## 2025-04-23 PROCEDURE — 99222 1ST HOSP IP/OBS MODERATE 55: CPT | Performed by: NURSE PRACTITIONER

## 2025-04-23 RX ORDER — TRAZODONE HYDROCHLORIDE 50 MG/1
50 TABLET ORAL NIGHTLY PRN
Status: DISCONTINUED | OUTPATIENT
Start: 2025-04-23 | End: 2025-04-28 | Stop reason: HOSPADM

## 2025-04-23 RX ADMIN — LEVETIRACETAM 500 MG: 100 INJECTION INTRAVENOUS at 05:20

## 2025-04-23 RX ADMIN — DEXAMETHASONE SODIUM PHOSPHATE 4 MG: 4 INJECTION, SOLUTION INTRA-ARTICULAR; INTRALESIONAL; INTRAMUSCULAR; INTRAVENOUS; SOFT TISSUE at 02:39

## 2025-04-23 RX ADMIN — MANNITOL 25 G: 20 INJECTION, SOLUTION INTRAVENOUS at 00:36

## 2025-04-23 RX ADMIN — DEXAMETHASONE SODIUM PHOSPHATE 4 MG: 4 INJECTION, SOLUTION INTRA-ARTICULAR; INTRALESIONAL; INTRAMUSCULAR; INTRAVENOUS; SOFT TISSUE at 15:23

## 2025-04-23 RX ADMIN — MANNITOL 25 G: 20 INJECTION, SOLUTION INTRAVENOUS at 17:37

## 2025-04-23 RX ADMIN — CLOTRIMAZOLE 10 MG: 10 LOZENGE ORAL at 20:20

## 2025-04-23 RX ADMIN — CLOTRIMAZOLE 10 MG: 10 LOZENGE ORAL at 15:23

## 2025-04-23 RX ADMIN — CLOTRIMAZOLE 10 MG: 10 LOZENGE ORAL at 08:27

## 2025-04-23 RX ADMIN — MANNITOL 25 G: 20 INJECTION, SOLUTION INTRAVENOUS at 13:29

## 2025-04-23 RX ADMIN — DEXAMETHASONE SODIUM PHOSPHATE 4 MG: 4 INJECTION, SOLUTION INTRA-ARTICULAR; INTRALESIONAL; INTRAMUSCULAR; INTRAVENOUS; SOFT TISSUE at 08:26

## 2025-04-23 RX ADMIN — PRAVASTATIN SODIUM 40 MG: 40 TABLET ORAL at 20:20

## 2025-04-23 RX ADMIN — LOSARTAN POTASSIUM 37.5 MG: 25 TABLET, FILM COATED ORAL at 08:26

## 2025-04-23 RX ADMIN — SODIUM CHLORIDE, PRESERVATIVE FREE 10 ML: 5 INJECTION INTRAVENOUS at 20:21

## 2025-04-23 RX ADMIN — DEXAMETHASONE SODIUM PHOSPHATE 4 MG: 4 INJECTION, SOLUTION INTRA-ARTICULAR; INTRALESIONAL; INTRAMUSCULAR; INTRAVENOUS; SOFT TISSUE at 20:20

## 2025-04-23 RX ADMIN — LOSARTAN POTASSIUM 37.5 MG: 25 TABLET, FILM COATED ORAL at 20:20

## 2025-04-23 RX ADMIN — Medication 6 MG: at 21:36

## 2025-04-23 RX ADMIN — POLYVINYL ALCOHOL 1 DROP: 1.4 SOLUTION/ DROPS OPHTHALMIC at 11:57

## 2025-04-23 RX ADMIN — AMLODIPINE BESYLATE 10 MG: 10 TABLET ORAL at 08:27

## 2025-04-23 RX ADMIN — CLOTRIMAZOLE 10 MG: 10 LOZENGE ORAL at 12:00

## 2025-04-23 RX ADMIN — TIOTROPIUM BROMIDE AND OLODATEROL 2 PUFF: 3.124; 2.736 SPRAY, METERED RESPIRATORY (INHALATION) at 09:47

## 2025-04-23 RX ADMIN — PANTOPRAZOLE SODIUM 40 MG: 40 TABLET, DELAYED RELEASE ORAL at 05:20

## 2025-04-23 RX ADMIN — MANNITOL 25 G: 20 INJECTION, SOLUTION INTRAVENOUS at 06:30

## 2025-04-23 RX ADMIN — CLOTRIMAZOLE 10 MG: 10 LOZENGE ORAL at 05:21

## 2025-04-23 RX ADMIN — LEVETIRACETAM 500 MG: 100 INJECTION INTRAVENOUS at 17:34

## 2025-04-23 ASSESSMENT — PAIN SCALES - GENERAL
PAINLEVEL_OUTOF10: 0
PAINLEVEL_OUTOF10: 0

## 2025-04-23 ASSESSMENT — 9 HOLE PEG TEST
TESTTIME_SECONDS: 42.69
TESTTIME_SECONDS: 38.93

## 2025-04-23 NOTE — CARE COORDINATION
4/23/25, 3:45 PM EDT    DISCHARGE ON GOING EVALUATION    [place] [facility] day: 12  Location: -17/017-A Reason for admit: Mass of brain [G93.89]  Left parietal mass [G93.89]     Procedures:   3/13 Echo with EF 60-65%   4/16 LEFT PARIETAL CRANIOTOMY FOR EVACUATION OF CEREBRAL ABSCESS  4/17 LEFT PARIETAL CRANIOTOMY HEMATOMA EVACUATION   4/17 Intubated  4/19 Extubated    Imaging since last note:   4/20 CXR: Improved aeration in the left lower lobe with residual opacities and small   effusion.  4/21 CT Head: 1. Postoperative changes in the left posterior temporal/parietal region.  2. Large residual abnormal density in the left posterior temporal/parietal  region with  surrounding edema, mass effect upon the left lateral ventricle and  mild midline deviation. MRI scan of the brain would be helpful for better  evaluation.  3. Large arachnoid cyst in the left middle cranial fossa.  4/21 BLE Venous doppler: 1. Acute deep venous thrombosis limited to a single peroneal vein in the right calf.    Barriers to Discharge: Extubated on 4/19. On 4/21 3% saline drip was stopped, found to be +CVA and RLE DVT. Started on mannitol Q6H, possibly to be stopped tomorrow. Physiatry consulted for possible IPR.     On 2L O2. Afebrile. NSR. Ox2-3. Follows commands x4. SLP/PT/OT. Telemetry, wound care, n/v checks, external urinary catheter, SCDs. Norvasc, catapres, mycelex, IV decadron 4 mg Q6H, IV keppra, cozaar, melatonin, IV mannitol 20% 25g Q6H, protonix, pravastatin, inhaler, prn trazodone, Electrolyte replacement protocols. WBC 23.3, hgb 13.2.     PCP: Hannah Banks MD  Readmission Risk Score: 10.6    Patient Goals/Plan/Treatment Preferences: From home w/wife. Physiatry following for possible IPR; will require precert.

## 2025-04-23 NOTE — CONSULTS
[place], [place]                                          NEUROSURGICAL CONSULTATION NOTE       Koko Chao   YOB: 1960  Account Number: [id number]   Date of Examination: 4/12/2025    ASSESSMENT:  Koko is a 65-year-old man who was seen in the emergency department with a 2 to 3-week history of word finding difficulties.  Koko also significantly had a otitis media as well as pneumonia approximately 4 to 5 weeks ago.  He was treated with steroids as well as antibiotics at that time.  Koko was worked up in the emergency department and was found to have a mass with vasogenic edema on the left parietal lobe.  Initially this was thought to be a likely primary glioma.  Koko was admitted and received an MRI of the brain which showed more likely that the lesion in the left parietal lobe is an abscess.  This is based on the diffusion-weighted imaging.  The lesion is putting mild mass effect on the atrium of the lateral ventricle on the left.  The lesion is in fairly close proximity to the atrium of the left lateral ventricle but still has space.  CT of the chest abdomen and pelvis on this patient was unremarkable.  He does have a history of prostate cancer.    PLAN:  Koko is a 65-year-old man who as above presented with word finding difficulties and was found to have a likely left parietal abscess based on his MRI diffusion-weighted imaging.  I had a discussion with Koko and his family in the patient's room this morning and recommended surgery for evacuation of this likely abscess.  I also suggested that infectious disease be consulted as soon as possible to determine empiric therapy as well as to determine a likely cause of this likely brain abscess.  I did have a more pointed discussion and I recommended surgery tomorrow despite the patient having taken aspirin because the abscess is fairly close to the left lateral ventricle.  Having said that, 
                               Hx and P/E :Infectious D.       Patient - Koko Chao,  Age - 65 y.o.    - 1960      Room Number - 4A-15/015-A   N -  [id number]   [facility] # - [id number]  Date of Admission -  2025 12:34 PM  Patient's PCP: Hannah Banks MD     Requesting Physician: Yvonne Gusman MD    CHIEF COMPLAINT:     Brain abscess    ASSESSMENT AND PLAN     Patient is a 65-year-old male who I am consulted for a possible brain abscess in the left parietal lobe.    At this point, I would recommend empiric initiation of antimicrobials.  Ideally, brain biopsy for diagnosis is recommended as soon as able though neurosurgery discussed with patient and family and patient would prefer to discuss further and defer biopsy until .  There is the potential risk of sterilization of the abscess which I have explained to patient.  Unfortunately, I would not recommend deferring antimicrobials as the use of steroids with a potential brain abscess can lead to progression and it is generally recommended to jointly use antimicrobials with adjunctive steroids.     The potential source of infection is unknown.  It is possible this is otogenic/pulmonary in nature given his recent infection.  This would generally raise a suspicion for Streptococcus, haemophilus.  He does have odontogenic concerns with loosening of a tooth though no obvious evidence of periapical abscess.  This can increase the likelihood for strep as well as oral anaerobe infections.  Given the unknown source, I would also consider the possibility of Staph aureus spreading hematogenously.  I would therefore recommend therapy with vancomycin, ceftriaxone and metronidazole.  I would recommend checking blood cultures.  I would also consider a transthoracic echo to assess for vegetation.  Duration of therapy will be decided.  Case discussed with neurosurgery.      HISTORY OF PRESENT ILLNESS       This is a very pleasant 65 y.o. male who was admitted to 
304 (H) 275 - 295 mosmol/kg   Anion Gap    Collection Time: 04/23/25  4:04 AM   Result Value Ref Range    Anion Gap 10.0 8.0 - 16.0 meq/L   Glomerular Filtration Rate, Estimated    Collection Time: 04/23/25  4:04 AM   Result Value Ref Range    Est, Glom Filt Rate > 90 >60 ml/min/1.73m2   Scan of Blood Smear    Collection Time: 04/23/25  4:04 AM   Result Value Ref Range    SCAN OF BLOOD SMEAR see below          Vascular duplex lower extremity venous bilateral  Result Date: 4/21/2025  FINDINGS: RIGHT LOWER EXTREMITY: There is no flow and noncompressibility involving one of the peroneal veins in the right calf which is dilated and filled with hypoechoic acute appearing thrombus. All of the other deep veins of the right lower extremity are widely patent with normal phasic flow and normal compressibility. LEFT LOWER EXTREMITY:All the  deep veins of the left lower extremity are widely patent with normal phasic flow and normal compressibility.   1. Acute deep venous thrombosis limited to a single peroneal vein in the right calf. 2. Study otherwise unremarkable.        Impression:  Left parietal cerebral abscess  S/p left parietal craniotomy for evacuation of cerebral abscess 4/16/25 with Dr Singh  Left parietal parenchymal hemorrhage  S/p left parietal craniotomy hematoma evacuation 4/17/2025 with Dr Singh  Expressive Aphasia  Gait disturbance  Acute DVT RLE  Anxiety  Leukocytosis  Bilateral pleural effusion  Hypertension  Prostate cancer, surveillance  BPH  History of kidney stone  Nocturnal hypoxia  Hyperlipidemia  Retinal central vein occlusion    Recommendations:  Continue current therapy  follow-up duplex for propagation of DVT, IVC filter planning - 4/25/25  Continues on Decadron IV every 6 hours, Keppra IV BID, mannitol IV every 6 hours  Melatonin changed to nightly scheduled, trazodone added nightly PRN  Patient likely to be appropriate for IPR. Await medical stability for IPR pre certification with

## 2025-04-24 LAB
ANION GAP SERPL CALC-SCNC: 12 MEQ/L (ref 8–16)
BASOPHILS ABSOLUTE: 0 THOU/MM3 (ref 0–0.1)
BASOPHILS NFR BLD AUTO: 0.2 %
BUN SERPL-MCNC: 25 MG/DL (ref 8–23)
BURR CELLS BLD QL SMEAR: ABNORMAL
CALCIUM SERPL-MCNC: 8.2 MG/DL (ref 8.8–10.2)
CHLORIDE SERPL-SCNC: 102 MEQ/L (ref 98–111)
CO2 SERPL-SCNC: 21 MEQ/L (ref 22–29)
CREAT SERPL-MCNC: 0.6 MG/DL (ref 0.7–1.2)
DEPRECATED RDW RBC AUTO: 40.2 FL (ref 35–45)
EOSINOPHIL NFR BLD AUTO: 0 %
EOSINOPHILS ABSOLUTE: 0 THOU/MM3 (ref 0–0.4)
ERYTHROCYTE [DISTWIDTH] IN BLOOD BY AUTOMATED COUNT: 12.9 % (ref 11.5–14.5)
GFR SERPL CREATININE-BSD FRML MDRD: > 90 ML/MIN/1.73M2
GLUCOSE SERPL-MCNC: 129 MG/DL (ref 74–109)
HCT VFR BLD AUTO: 37.9 % (ref 42–52)
HGB BLD-MCNC: 12.7 GM/DL (ref 14–18)
IMM GRANULOCYTES # BLD AUTO: 0.34 THOU/MM3 (ref 0–0.07)
IMM GRANULOCYTES NFR BLD AUTO: 1.4 %
LYMPHOCYTES ABSOLUTE: 0.9 THOU/MM3 (ref 1–4.8)
LYMPHOCYTES NFR BLD AUTO: 3.9 %
MCH RBC QN AUTO: 29.1 PG (ref 26–33)
MCHC RBC AUTO-ENTMCNC: 33.5 GM/DL (ref 32.2–35.5)
MCV RBC AUTO: 86.7 FL (ref 80–94)
MONOCYTES ABSOLUTE: 0.7 THOU/MM3 (ref 0.4–1.3)
MONOCYTES NFR BLD AUTO: 2.8 %
NEUTROPHILS ABSOLUTE: 22.2 THOU/MM3 (ref 1.8–7.7)
NEUTROPHILS NFR BLD AUTO: 91.7 %
NRBC BLD AUTO-RTO: 0 /100 WBC
OSMOLALITY SERPL: 294 MOSMOL/KG (ref 275–295)
OSMOLALITY SERPL: 299 MOSMOL/KG (ref 275–295)
OSMOLALITY SERPL: 301 MOSMOL/KG (ref 275–295)
OSMOLALITY SERPL: 303 MOSMOL/KG (ref 275–295)
PLATELET # BLD AUTO: 170 THOU/MM3 (ref 130–400)
PLATELET BLD QL SMEAR: ADEQUATE
PMV BLD AUTO: 11.3 FL (ref 9.4–12.4)
POLYCHROMASIA BLD QL SMEAR: ABNORMAL
POTASSIUM SERPL-SCNC: 4.1 MEQ/L (ref 3.5–5.2)
RBC # BLD AUTO: 4.37 MILL/MM3 (ref 4.7–6.1)
SCAN OF BLOOD SMEAR: NORMAL
SODIUM SERPL-SCNC: 134 MEQ/L (ref 135–145)
SODIUM SERPL-SCNC: 135 MEQ/L (ref 135–145)
SODIUM SERPL-SCNC: 136 MEQ/L (ref 135–145)
WBC # BLD AUTO: 24.2 THOU/MM3 (ref 4.8–10.8)

## 2025-04-24 PROCEDURE — 2500000003 HC RX 250 WO HCPCS

## 2025-04-24 PROCEDURE — 97530 THERAPEUTIC ACTIVITIES: CPT

## 2025-04-24 PROCEDURE — 6370000000 HC RX 637 (ALT 250 FOR IP)

## 2025-04-24 PROCEDURE — 2500000003 HC RX 250 WO HCPCS: Performed by: PHYSICIAN ASSISTANT

## 2025-04-24 PROCEDURE — 6360000002 HC RX W HCPCS: Performed by: PHYSICIAN ASSISTANT

## 2025-04-24 PROCEDURE — 83930 ASSAY OF BLOOD OSMOLALITY: CPT

## 2025-04-24 PROCEDURE — 85025 COMPLETE CBC W/AUTO DIFF WBC: CPT

## 2025-04-24 PROCEDURE — 94640 AIRWAY INHALATION TREATMENT: CPT

## 2025-04-24 PROCEDURE — 6370000000 HC RX 637 (ALT 250 FOR IP): Performed by: NURSE PRACTITIONER

## 2025-04-24 PROCEDURE — 6370000000 HC RX 637 (ALT 250 FOR IP): Performed by: PHYSICIAN ASSISTANT

## 2025-04-24 PROCEDURE — 97535 SELF CARE MNGMENT TRAINING: CPT

## 2025-04-24 PROCEDURE — 2500000003 HC RX 250 WO HCPCS: Performed by: INTERNAL MEDICINE

## 2025-04-24 PROCEDURE — 80048 BASIC METABOLIC PNL TOTAL CA: CPT

## 2025-04-24 PROCEDURE — 99232 SBSQ HOSP IP/OBS MODERATE 35: CPT | Performed by: STUDENT IN AN ORGANIZED HEALTH CARE EDUCATION/TRAINING PROGRAM

## 2025-04-24 PROCEDURE — 99233 SBSQ HOSP IP/OBS HIGH 50: CPT | Performed by: INTERNAL MEDICINE

## 2025-04-24 PROCEDURE — 94761 N-INVAS EAR/PLS OXIMETRY MLT: CPT

## 2025-04-24 PROCEDURE — 84295 ASSAY OF SERUM SODIUM: CPT

## 2025-04-24 PROCEDURE — 2060000000 HC ICU INTERMEDIATE R&B

## 2025-04-24 PROCEDURE — 2700000000 HC OXYGEN THERAPY PER DAY

## 2025-04-24 PROCEDURE — 36415 COLL VENOUS BLD VENIPUNCTURE: CPT

## 2025-04-24 PROCEDURE — 94669 MECHANICAL CHEST WALL OSCILL: CPT

## 2025-04-24 RX ORDER — MANNITOL 20 G/100ML
25 INJECTION, SOLUTION INTRAVENOUS EVERY 6 HOURS
Status: COMPLETED | OUTPATIENT
Start: 2025-04-24 | End: 2025-04-25

## 2025-04-24 RX ADMIN — LOSARTAN POTASSIUM 37.5 MG: 25 TABLET, FILM COATED ORAL at 20:32

## 2025-04-24 RX ADMIN — PANTOPRAZOLE SODIUM 40 MG: 40 TABLET, DELAYED RELEASE ORAL at 05:16

## 2025-04-24 RX ADMIN — TIOTROPIUM BROMIDE AND OLODATEROL 2 PUFF: 3.124; 2.736 SPRAY, METERED RESPIRATORY (INHALATION) at 09:07

## 2025-04-24 RX ADMIN — CLOTRIMAZOLE 10 MG: 10 LOZENGE ORAL at 05:16

## 2025-04-24 RX ADMIN — CLOTRIMAZOLE 10 MG: 10 LOZENGE ORAL at 20:32

## 2025-04-24 RX ADMIN — DEXAMETHASONE SODIUM PHOSPHATE 4 MG: 4 INJECTION, SOLUTION INTRA-ARTICULAR; INTRALESIONAL; INTRAMUSCULAR; INTRAVENOUS; SOFT TISSUE at 01:29

## 2025-04-24 RX ADMIN — LOSARTAN POTASSIUM 37.5 MG: 25 TABLET, FILM COATED ORAL at 08:36

## 2025-04-24 RX ADMIN — SODIUM CHLORIDE, PRESERVATIVE FREE 10 ML: 5 INJECTION INTRAVENOUS at 08:37

## 2025-04-24 RX ADMIN — SODIUM CHLORIDE, PRESERVATIVE FREE 10 ML: 5 INJECTION INTRAVENOUS at 20:34

## 2025-04-24 RX ADMIN — ONDANSETRON 4 MG: 2 INJECTION, SOLUTION INTRAMUSCULAR; INTRAVENOUS at 23:43

## 2025-04-24 RX ADMIN — Medication 6 MG: at 20:33

## 2025-04-24 RX ADMIN — AMLODIPINE BESYLATE 10 MG: 10 TABLET ORAL at 08:36

## 2025-04-24 RX ADMIN — MANNITOL 25 G: 20 INJECTION, SOLUTION INTRAVENOUS at 06:40

## 2025-04-24 RX ADMIN — CLOTRIMAZOLE 10 MG: 10 LOZENGE ORAL at 17:15

## 2025-04-24 RX ADMIN — CLOTRIMAZOLE 10 MG: 10 LOZENGE ORAL at 12:51

## 2025-04-24 RX ADMIN — DEXAMETHASONE SODIUM PHOSPHATE 4 MG: 4 INJECTION, SOLUTION INTRA-ARTICULAR; INTRALESIONAL; INTRAMUSCULAR; INTRAVENOUS; SOFT TISSUE at 14:54

## 2025-04-24 RX ADMIN — DEXAMETHASONE SODIUM PHOSPHATE 4 MG: 4 INJECTION, SOLUTION INTRA-ARTICULAR; INTRALESIONAL; INTRAMUSCULAR; INTRAVENOUS; SOFT TISSUE at 08:36

## 2025-04-24 RX ADMIN — LEVETIRACETAM 500 MG: 100 INJECTION INTRAVENOUS at 17:16

## 2025-04-24 RX ADMIN — MANNITOL 25 G: 20 INJECTION, SOLUTION INTRAVENOUS at 12:29

## 2025-04-24 RX ADMIN — CLOTRIMAZOLE 10 MG: 10 LOZENGE ORAL at 08:35

## 2025-04-24 RX ADMIN — LEVETIRACETAM 500 MG: 100 INJECTION INTRAVENOUS at 05:16

## 2025-04-24 RX ADMIN — DEXAMETHASONE SODIUM PHOSPHATE 4 MG: 4 INJECTION, SOLUTION INTRA-ARTICULAR; INTRALESIONAL; INTRAMUSCULAR; INTRAVENOUS; SOFT TISSUE at 20:33

## 2025-04-24 RX ADMIN — MANNITOL 25 G: 20 INJECTION, SOLUTION INTRAVENOUS at 18:27

## 2025-04-24 RX ADMIN — PRAVASTATIN SODIUM 40 MG: 40 TABLET ORAL at 20:33

## 2025-04-24 RX ADMIN — MANNITOL 25 G: 20 INJECTION, SOLUTION INTRAVENOUS at 23:42

## 2025-04-24 RX ADMIN — MANNITOL 25 G: 20 INJECTION, SOLUTION INTRAVENOUS at 01:35

## 2025-04-24 ASSESSMENT — PAIN SCALES - GENERAL
PAINLEVEL_OUTOF10: 0

## 2025-04-24 ASSESSMENT — PAIN SCALES - WONG BAKER
WONGBAKER_NUMERICALRESPONSE: NO HURT

## 2025-04-24 NOTE — PLAN OF CARE
Transfer out of ICU initiated, patient has a bed on 4A-4.  Transfer graciously accepted by Dr. Mora.  I see to sign off.    Electronically signed by Alec Childs DO on 4/24/2025 at 8:31 AM

## 2025-04-24 NOTE — PLAN OF CARE
days for aspirin washout.  Tag panel shows normal platelet clotting 4/15.  4/16: Patient undergoes biopsy today.  Per surgeon there was no nirav pus when he reached lesion.  Swab was negative for any bacteria. Patient returned to ICU, continues on 3%.  4/17: Patient complains of headache, head CT shows above bleed.  Returns to surgery for evacuation.  Is to remain intubated and sedated until 4/20 to reduce risk of rebleed.  4/20: Patient doing well today extubated.  4/21: Patient transferred from ICU.  However, neurosurg canceled transfer.  Received duplex ultrasound which showed right calf DVT.  4/22: No evidence of infection, antibiotics DC'd.  Due to DVT above this \"abscess\" is considered malignancy at this point.  Labs shows possible gemistocytes.  Being sent to [facility] for further review.  4/23: Patient remains on mannitol, is evaluated by IPR.  4/24 patient stable on room transferred out of ICU.  Per handoff note patient to remain on IV Decadron 4 mg at this time.  Being managed by neurosurg.      A&P  Left parietal lobe abscess: Aphasia 2/2 abscess 3 weeks PTA; peripheral enhancing lesion measuring up to 2.4 cm within posterior left parietal lobe.  There is significant concern for malignancy. Neurosurgery performed biopsy, send out to [facility]. ID following, TTE ordered to rule out endocarditis, no valvular vegetation or mass noted. WBC count steadily increasing since steroids, patient largely afebrile. All infectious workup negative. Off Abx. TEG mapping within normal limits. Sodium goals 145-150 during ICU stay. Initially on 3%  hypertonic, transitioned to mannitol with IV Bumex to help augment sodium/serum sOSM goal. Also on decadron 4mg IV q6h. SBP goals 110-160. On home meds Cozaar. Added Norvasc, clonidine patch. Seizure prophylaxis.  Follow biopsy results.  Recommending palliative consult.   Continue Decadron 4 Mg IV every 6 hours.  Every 4 hours neurochecks.  Keppra 500 mg IV every 12

## 2025-04-24 NOTE — PLAN OF CARE
Problem: Discharge Planning  Goal: Discharge to home or other facility with appropriate resources  Outcome: Progressing  Flowsheets (Taken 4/24/2025 0228)  Discharge to home or other facility with appropriate resources: Identify barriers to discharge with patient and caregiver     Problem: ABCDS Injury Assessment  Goal: Absence of physical injury  Outcome: Progressing  Flowsheets (Taken 4/24/2025 0228)  Absence of Physical Injury: Implement safety measures based on patient assessment     Problem: Safety - Adult  Goal: Free from fall injury  Outcome: Progressing  Flowsheets (Taken 4/24/2025 0228)  Free From Fall Injury: Instruct family/caregiver on patient safety     Problem: Pain  Goal: Verbalizes/displays adequate comfort level or baseline comfort level  Outcome: Progressing  Flowsheets (Taken 4/24/2025 0228)  Verbalizes/displays adequate comfort level or baseline comfort level:   Encourage patient to monitor pain and request assistance   Administer analgesics based on type and severity of pain and evaluate response   Consider cultural and social influences on pain and pain management   Assess pain using appropriate pain scale   Implement non-pharmacological measures as appropriate and evaluate response     Problem: Neurosensory - Adult  Goal: Achieves stable or improved neurological status  Outcome: Progressing  Flowsheets (Taken 4/24/2025 0228)  Achieves stable or improved neurological status:   Assess for and report changes in neurological status   Initiate measures to prevent increased intracranial pressure   Maintain blood pressure and fluid volume within ordered parameters to optimize cerebral perfusion and minimize risk of hemorrhage   Monitor temperature, glucose, and sodium. Initiate appropriate interventions as ordered     Problem: Skin/Tissue Integrity - Adult  Goal: Skin integrity remains intact  Description: 1.  Monitor for areas of redness and/or skin breakdown2.  Assess vascular access sites

## 2025-04-25 ENCOUNTER — APPOINTMENT (OUTPATIENT)
Dept: INTERVENTIONAL RADIOLOGY/VASCULAR | Age: 65
DRG: 023 | End: 2025-04-25
Payer: COMMERCIAL

## 2025-04-25 LAB
ANION GAP SERPL CALC-SCNC: 9 MEQ/L (ref 8–16)
BASOPHILS ABSOLUTE: 0 THOU/MM3 (ref 0–0.1)
BASOPHILS NFR BLD AUTO: 0.1 %
BUN SERPL-MCNC: 25 MG/DL (ref 8–23)
CALCIUM SERPL-MCNC: 8.5 MG/DL (ref 8.8–10.2)
CHLORIDE SERPL-SCNC: 105 MEQ/L (ref 98–111)
CO2 SERPL-SCNC: 21 MEQ/L (ref 22–29)
CREAT SERPL-MCNC: 0.6 MG/DL (ref 0.7–1.2)
DEPRECATED RDW RBC AUTO: 40.7 FL (ref 35–45)
ECHO BSA: 2.04 M2
EOSINOPHIL NFR BLD AUTO: 0.1 %
EOSINOPHILS ABSOLUTE: 0 THOU/MM3 (ref 0–0.4)
ERYTHROCYTE [DISTWIDTH] IN BLOOD BY AUTOMATED COUNT: 13.1 % (ref 11.5–14.5)
GFR SERPL CREATININE-BSD FRML MDRD: > 90 ML/MIN/1.73M2
GLUCOSE BLD STRIP.AUTO-MCNC: 138 MG/DL (ref 70–108)
GLUCOSE SERPL-MCNC: 125 MG/DL (ref 74–109)
HCT VFR BLD AUTO: 36.1 % (ref 42–52)
HGB BLD-MCNC: 12 GM/DL (ref 14–18)
IMM GRANULOCYTES # BLD AUTO: 0.27 THOU/MM3 (ref 0–0.07)
IMM GRANULOCYTES NFR BLD AUTO: 1.4 %
LYMPHOCYTES ABSOLUTE: 0.9 THOU/MM3 (ref 1–4.8)
LYMPHOCYTES NFR BLD AUTO: 4.8 %
MCH RBC QN AUTO: 28.9 PG (ref 26–33)
MCHC RBC AUTO-ENTMCNC: 33.2 GM/DL (ref 32.2–35.5)
MCV RBC AUTO: 87 FL (ref 80–94)
MONOCYTES ABSOLUTE: 0.8 THOU/MM3 (ref 0.4–1.3)
MONOCYTES NFR BLD AUTO: 4.4 %
NEUTROPHILS ABSOLUTE: 17.2 THOU/MM3 (ref 1.8–7.7)
NEUTROPHILS NFR BLD AUTO: 89.2 %
NRBC BLD AUTO-RTO: 0 /100 WBC
OSMOLALITY SERPL: 284 MOSMOL/KG (ref 275–295)
OSMOLALITY SERPL: 291 MOSMOL/KG (ref 275–295)
OSMOLALITY SERPL: 292 MOSMOL/KG (ref 275–295)
OSMOLALITY SERPL: 298 MOSMOL/KG (ref 275–295)
PLATELET # BLD AUTO: 170 THOU/MM3 (ref 130–400)
PMV BLD AUTO: 11.2 FL (ref 9.4–12.4)
POTASSIUM SERPL-SCNC: 4.6 MEQ/L (ref 3.5–5.2)
RBC # BLD AUTO: 4.15 MILL/MM3 (ref 4.7–6.1)
SODIUM SERPL-SCNC: 135 MEQ/L (ref 135–145)
WBC # BLD AUTO: 19.3 THOU/MM3 (ref 4.8–10.8)

## 2025-04-25 PROCEDURE — 2060000000 HC ICU INTERMEDIATE R&B

## 2025-04-25 PROCEDURE — 97116 GAIT TRAINING THERAPY: CPT

## 2025-04-25 PROCEDURE — 6370000000 HC RX 637 (ALT 250 FOR IP): Performed by: NURSE PRACTITIONER

## 2025-04-25 PROCEDURE — 97112 NEUROMUSCULAR REEDUCATION: CPT

## 2025-04-25 PROCEDURE — 83930 ASSAY OF BLOOD OSMOLALITY: CPT

## 2025-04-25 PROCEDURE — 97530 THERAPEUTIC ACTIVITIES: CPT

## 2025-04-25 PROCEDURE — 99232 SBSQ HOSP IP/OBS MODERATE 35: CPT | Performed by: STUDENT IN AN ORGANIZED HEALTH CARE EDUCATION/TRAINING PROGRAM

## 2025-04-25 PROCEDURE — 99233 SBSQ HOSP IP/OBS HIGH 50: CPT | Performed by: STUDENT IN AN ORGANIZED HEALTH CARE EDUCATION/TRAINING PROGRAM

## 2025-04-25 PROCEDURE — 92507 TX SP LANG VOICE COMM INDIV: CPT

## 2025-04-25 PROCEDURE — 6370000000 HC RX 637 (ALT 250 FOR IP)

## 2025-04-25 PROCEDURE — 6360000002 HC RX W HCPCS: Performed by: PHYSICIAN ASSISTANT

## 2025-04-25 PROCEDURE — 82948 REAGENT STRIP/BLOOD GLUCOSE: CPT

## 2025-04-25 PROCEDURE — 85025 COMPLETE CBC W/AUTO DIFF WBC: CPT

## 2025-04-25 PROCEDURE — 94640 AIRWAY INHALATION TREATMENT: CPT

## 2025-04-25 PROCEDURE — 80048 BASIC METABOLIC PNL TOTAL CA: CPT

## 2025-04-25 PROCEDURE — 2500000003 HC RX 250 WO HCPCS

## 2025-04-25 PROCEDURE — 6370000000 HC RX 637 (ALT 250 FOR IP): Performed by: PHYSICIAN ASSISTANT

## 2025-04-25 PROCEDURE — 6360000002 HC RX W HCPCS

## 2025-04-25 PROCEDURE — 36415 COLL VENOUS BLD VENIPUNCTURE: CPT

## 2025-04-25 PROCEDURE — 2500000003 HC RX 250 WO HCPCS: Performed by: PHYSICIAN ASSISTANT

## 2025-04-25 PROCEDURE — 93970 EXTREMITY STUDY: CPT

## 2025-04-25 RX ORDER — GLUCAGON 1 MG/ML
1 KIT INJECTION PRN
Status: DISCONTINUED | OUTPATIENT
Start: 2025-04-25 | End: 2025-04-28 | Stop reason: HOSPADM

## 2025-04-25 RX ORDER — DEXAMETHASONE 4 MG/1
2 TABLET ORAL EVERY 6 HOURS SCHEDULED
Status: DISCONTINUED | OUTPATIENT
Start: 2025-04-25 | End: 2025-04-28 | Stop reason: HOSPADM

## 2025-04-25 RX ORDER — BUMETANIDE 0.5 MG/1
0.5 TABLET ORAL DAILY
Status: DISCONTINUED | OUTPATIENT
Start: 2025-04-25 | End: 2025-04-28 | Stop reason: HOSPADM

## 2025-04-25 RX ORDER — ENOXAPARIN SODIUM 100 MG/ML
40 INJECTION SUBCUTANEOUS DAILY
Status: DISCONTINUED | OUTPATIENT
Start: 2025-04-25 | End: 2025-04-28 | Stop reason: HOSPADM

## 2025-04-25 RX ORDER — DEXAMETHASONE 4 MG/1
4 TABLET ORAL EVERY 6 HOURS SCHEDULED
Status: DISCONTINUED | OUTPATIENT
Start: 2025-04-25 | End: 2025-04-25

## 2025-04-25 RX ORDER — INSULIN LISPRO 100 [IU]/ML
0-4 INJECTION, SOLUTION INTRAVENOUS; SUBCUTANEOUS
Status: DISCONTINUED | OUTPATIENT
Start: 2025-04-25 | End: 2025-04-25

## 2025-04-25 RX ORDER — DEXTROSE MONOHYDRATE 100 MG/ML
INJECTION, SOLUTION INTRAVENOUS CONTINUOUS PRN
Status: DISCONTINUED | OUTPATIENT
Start: 2025-04-25 | End: 2025-04-28 | Stop reason: HOSPADM

## 2025-04-25 RX ADMIN — CLOTRIMAZOLE 10 MG: 10 LOZENGE ORAL at 13:43

## 2025-04-25 RX ADMIN — ENOXAPARIN SODIUM 40 MG: 100 INJECTION SUBCUTANEOUS at 14:43

## 2025-04-25 RX ADMIN — LOSARTAN POTASSIUM 37.5 MG: 25 TABLET, FILM COATED ORAL at 08:09

## 2025-04-25 RX ADMIN — LOSARTAN POTASSIUM 37.5 MG: 25 TABLET, FILM COATED ORAL at 20:34

## 2025-04-25 RX ADMIN — DEXAMETHASONE SODIUM PHOSPHATE 4 MG: 4 INJECTION, SOLUTION INTRA-ARTICULAR; INTRALESIONAL; INTRAMUSCULAR; INTRAVENOUS; SOFT TISSUE at 03:19

## 2025-04-25 RX ADMIN — PANTOPRAZOLE SODIUM 40 MG: 40 TABLET, DELAYED RELEASE ORAL at 05:40

## 2025-04-25 RX ADMIN — SODIUM CHLORIDE, PRESERVATIVE FREE 10 ML: 5 INJECTION INTRAVENOUS at 20:37

## 2025-04-25 RX ADMIN — BUMETANIDE 0.5 MG: 0.5 TABLET ORAL at 08:09

## 2025-04-25 RX ADMIN — Medication 6 MG: at 20:26

## 2025-04-25 RX ADMIN — CLOTRIMAZOLE 10 MG: 10 LOZENGE ORAL at 03:19

## 2025-04-25 RX ADMIN — MANNITOL 25 G: 20 INJECTION, SOLUTION INTRAVENOUS at 05:59

## 2025-04-25 RX ADMIN — DEXAMETHASONE 2 MG: 4 TABLET ORAL at 16:29

## 2025-04-25 RX ADMIN — TIOTROPIUM BROMIDE AND OLODATEROL 2 PUFF: 3.124; 2.736 SPRAY, METERED RESPIRATORY (INHALATION) at 08:15

## 2025-04-25 RX ADMIN — LEVETIRACETAM 500 MG: 100 INJECTION INTRAVENOUS at 05:40

## 2025-04-25 RX ADMIN — PRAVASTATIN SODIUM 40 MG: 40 TABLET ORAL at 20:34

## 2025-04-25 RX ADMIN — LEVETIRACETAM 500 MG: 100 INJECTION INTRAVENOUS at 16:29

## 2025-04-25 RX ADMIN — DEXAMETHASONE 4 MG: 4 TABLET ORAL at 08:09

## 2025-04-25 RX ADMIN — AMLODIPINE BESYLATE 10 MG: 10 TABLET ORAL at 08:09

## 2025-04-25 RX ADMIN — CLOTRIMAZOLE 10 MG: 10 LOZENGE ORAL at 20:34

## 2025-04-25 RX ADMIN — CLOTRIMAZOLE 10 MG: 10 LOZENGE ORAL at 08:09

## 2025-04-25 ASSESSMENT — PAIN SCALES - GENERAL
PAINLEVEL_OUTOF10: 0

## 2025-04-25 ASSESSMENT — PAIN SCALES - WONG BAKER
WONGBAKER_NUMERICALRESPONSE: NO HURT
WONGBAKER_NUMERICALRESPONSE: NO HURT

## 2025-04-25 NOTE — PLAN OF CARE
Problem: Respiratory - Adult  Goal: Achieves optimal ventilation and oxygenation  Outcome: Progressing

## 2025-04-25 NOTE — CARE COORDINATION
4/25/25, 11:31 AM EDT    DISCHARGE ON GOING EVALUATION    [place] [facility] day: 14  Location: -04/004-A Reason for admit: Mass of brain [G93.89]  Left parietal mass [G93.89]     Procedures:   3/13 Echo with EF 60-65%   4/16 LEFT PARIETAL CRANIOTOMY FOR EVACUATION OF CEREBRAL ABSCESS  4/17 LEFT PARIETAL CRANIOTOMY HEMATOMA EVACUATION   4/17 Intubated  4/19 Extubated    Imaging since last note: none    Barriers to Discharge: PT/OT/SLP, Physiatry, Neurosurgery following. WBC 19.3, nebs, Bumex, Keppra, pain and nausea control, oral Decadron.     PCP: Hannah Banks MD  Readmission Risk Score: 14.5    Patient Goals/Plan/Treatment Preferences: From home. Precert initiated for IPR (4/24)

## 2025-04-26 LAB
BASOPHILS ABSOLUTE: 0 THOU/MM3 (ref 0–0.1)
BASOPHILS NFR BLD AUTO: 0.1 %
DEPRECATED RDW RBC AUTO: 40.4 FL (ref 35–45)
EOSINOPHIL NFR BLD AUTO: 0.1 %
EOSINOPHILS ABSOLUTE: 0 THOU/MM3 (ref 0–0.4)
ERYTHROCYTE [DISTWIDTH] IN BLOOD BY AUTOMATED COUNT: 13.2 % (ref 11.5–14.5)
GLUCOSE BLD STRIP.AUTO-MCNC: 100 MG/DL (ref 70–108)
GLUCOSE BLD STRIP.AUTO-MCNC: 100 MG/DL (ref 70–108)
GLUCOSE BLD STRIP.AUTO-MCNC: 115 MG/DL (ref 70–108)
GLUCOSE BLD STRIP.AUTO-MCNC: 125 MG/DL (ref 70–108)
HCT VFR BLD AUTO: 35.4 % (ref 42–52)
HGB BLD-MCNC: 11.8 GM/DL (ref 14–18)
IMM GRANULOCYTES # BLD AUTO: 0.23 THOU/MM3 (ref 0–0.07)
IMM GRANULOCYTES NFR BLD AUTO: 1.3 %
LYMPHOCYTES ABSOLUTE: 1.2 THOU/MM3 (ref 1–4.8)
LYMPHOCYTES NFR BLD AUTO: 6.5 %
MCH RBC QN AUTO: 28.6 PG (ref 26–33)
MCHC RBC AUTO-ENTMCNC: 33.3 GM/DL (ref 32.2–35.5)
MCV RBC AUTO: 85.7 FL (ref 80–94)
MONOCYTES ABSOLUTE: 1 THOU/MM3 (ref 0.4–1.3)
MONOCYTES NFR BLD AUTO: 5.3 %
NEUTROPHILS ABSOLUTE: 15.6 THOU/MM3 (ref 1.8–7.7)
NEUTROPHILS NFR BLD AUTO: 86.7 %
NRBC BLD AUTO-RTO: 0 /100 WBC
OSMOLALITY SERPL: 284 MOSMOL/KG (ref 275–295)
OSMOLALITY SERPL: 288 MOSMOL/KG (ref 275–295)
OSMOLALITY SERPL: 292 MOSMOL/KG (ref 275–295)
OSMOLALITY SERPL: 293 MOSMOL/KG (ref 275–295)
OSMOLALITY SERPL: 293 MOSMOL/KG (ref 275–295)
PLATELET # BLD AUTO: 185 THOU/MM3 (ref 130–400)
PMV BLD AUTO: 11.2 FL (ref 9.4–12.4)
RBC # BLD AUTO: 4.13 MILL/MM3 (ref 4.7–6.1)
WBC # BLD AUTO: 18 THOU/MM3 (ref 4.8–10.8)

## 2025-04-26 PROCEDURE — 94640 AIRWAY INHALATION TREATMENT: CPT

## 2025-04-26 PROCEDURE — 6370000000 HC RX 637 (ALT 250 FOR IP): Performed by: NURSE PRACTITIONER

## 2025-04-26 PROCEDURE — 6370000000 HC RX 637 (ALT 250 FOR IP): Performed by: PHYSICIAN ASSISTANT

## 2025-04-26 PROCEDURE — 99232 SBSQ HOSP IP/OBS MODERATE 35: CPT | Performed by: STUDENT IN AN ORGANIZED HEALTH CARE EDUCATION/TRAINING PROGRAM

## 2025-04-26 PROCEDURE — 83930 ASSAY OF BLOOD OSMOLALITY: CPT

## 2025-04-26 PROCEDURE — 82948 REAGENT STRIP/BLOOD GLUCOSE: CPT

## 2025-04-26 PROCEDURE — 6360000002 HC RX W HCPCS: Performed by: PHYSICIAN ASSISTANT

## 2025-04-26 PROCEDURE — 6370000000 HC RX 637 (ALT 250 FOR IP)

## 2025-04-26 PROCEDURE — 94760 N-INVAS EAR/PLS OXIMETRY 1: CPT

## 2025-04-26 PROCEDURE — 2500000003 HC RX 250 WO HCPCS: Performed by: PHYSICIAN ASSISTANT

## 2025-04-26 PROCEDURE — 97535 SELF CARE MNGMENT TRAINING: CPT

## 2025-04-26 PROCEDURE — 6360000002 HC RX W HCPCS

## 2025-04-26 PROCEDURE — 85025 COMPLETE CBC W/AUTO DIFF WBC: CPT

## 2025-04-26 PROCEDURE — 36415 COLL VENOUS BLD VENIPUNCTURE: CPT

## 2025-04-26 PROCEDURE — 2060000000 HC ICU INTERMEDIATE R&B

## 2025-04-26 RX ADMIN — LOSARTAN POTASSIUM 37.5 MG: 25 TABLET, FILM COATED ORAL at 08:50

## 2025-04-26 RX ADMIN — DEXAMETHASONE 2 MG: 4 TABLET ORAL at 05:13

## 2025-04-26 RX ADMIN — PANTOPRAZOLE SODIUM 40 MG: 40 TABLET, DELAYED RELEASE ORAL at 05:13

## 2025-04-26 RX ADMIN — DEXAMETHASONE 2 MG: 4 TABLET ORAL at 00:16

## 2025-04-26 RX ADMIN — ENOXAPARIN SODIUM 40 MG: 100 INJECTION SUBCUTANEOUS at 08:50

## 2025-04-26 RX ADMIN — LEVETIRACETAM 500 MG: 100 INJECTION INTRAVENOUS at 05:14

## 2025-04-26 RX ADMIN — Medication 6 MG: at 19:29

## 2025-04-26 RX ADMIN — SODIUM CHLORIDE, PRESERVATIVE FREE 10 ML: 5 INJECTION INTRAVENOUS at 08:50

## 2025-04-26 RX ADMIN — TIOTROPIUM BROMIDE AND OLODATEROL 2 PUFF: 3.124; 2.736 SPRAY, METERED RESPIRATORY (INHALATION) at 09:46

## 2025-04-26 RX ADMIN — PRAVASTATIN SODIUM 40 MG: 40 TABLET ORAL at 19:29

## 2025-04-26 RX ADMIN — SODIUM CHLORIDE, PRESERVATIVE FREE 10 ML: 5 INJECTION INTRAVENOUS at 19:29

## 2025-04-26 RX ADMIN — CLOTRIMAZOLE 10 MG: 10 LOZENGE ORAL at 11:52

## 2025-04-26 RX ADMIN — DEXAMETHASONE 2 MG: 4 TABLET ORAL at 17:25

## 2025-04-26 RX ADMIN — LEVETIRACETAM 500 MG: 100 INJECTION INTRAVENOUS at 17:25

## 2025-04-26 RX ADMIN — DEXAMETHASONE 2 MG: 4 TABLET ORAL at 11:54

## 2025-04-26 RX ADMIN — CLOTRIMAZOLE 10 MG: 10 LOZENGE ORAL at 17:25

## 2025-04-26 RX ADMIN — LOSARTAN POTASSIUM 37.5 MG: 25 TABLET, FILM COATED ORAL at 19:28

## 2025-04-26 RX ADMIN — CLOTRIMAZOLE 10 MG: 10 LOZENGE ORAL at 19:28

## 2025-04-26 RX ADMIN — CLOTRIMAZOLE 10 MG: 10 LOZENGE ORAL at 05:14

## 2025-04-26 RX ADMIN — CLOTRIMAZOLE 10 MG: 10 LOZENGE ORAL at 08:49

## 2025-04-26 RX ADMIN — AMLODIPINE BESYLATE 10 MG: 10 TABLET ORAL at 08:50

## 2025-04-26 RX ADMIN — BUMETANIDE 0.5 MG: 0.5 TABLET ORAL at 08:49

## 2025-04-26 ASSESSMENT — PAIN SCALES - GENERAL
PAINLEVEL_OUTOF10: 0

## 2025-04-27 LAB
ANION GAP SERPL CALC-SCNC: 10 MEQ/L (ref 8–16)
BASOPHILS ABSOLUTE: 0 THOU/MM3 (ref 0–0.1)
BASOPHILS NFR BLD AUTO: 0.1 %
BUN SERPL-MCNC: 23 MG/DL (ref 8–23)
CALCIUM SERPL-MCNC: 8.6 MG/DL (ref 8.8–10.2)
CHLORIDE SERPL-SCNC: 101 MEQ/L (ref 98–111)
CO2 SERPL-SCNC: 24 MEQ/L (ref 22–29)
CREAT SERPL-MCNC: 0.7 MG/DL (ref 0.7–1.2)
DEPRECATED RDW RBC AUTO: 40.9 FL (ref 35–45)
EOSINOPHIL NFR BLD AUTO: 0.1 %
EOSINOPHILS ABSOLUTE: 0 THOU/MM3 (ref 0–0.4)
ERYTHROCYTE [DISTWIDTH] IN BLOOD BY AUTOMATED COUNT: 13.2 % (ref 11.5–14.5)
GFR SERPL CREATININE-BSD FRML MDRD: > 90 ML/MIN/1.73M2
GLUCOSE BLD STRIP.AUTO-MCNC: 113 MG/DL (ref 70–108)
GLUCOSE BLD STRIP.AUTO-MCNC: 123 MG/DL (ref 70–108)
GLUCOSE BLD STRIP.AUTO-MCNC: 127 MG/DL (ref 70–108)
GLUCOSE BLD STRIP.AUTO-MCNC: 127 MG/DL (ref 70–108)
GLUCOSE SERPL-MCNC: 111 MG/DL (ref 74–109)
HCT VFR BLD AUTO: 34.4 % (ref 42–52)
HGB BLD-MCNC: 11.3 GM/DL (ref 14–18)
IMM GRANULOCYTES # BLD AUTO: 0.15 THOU/MM3 (ref 0–0.07)
IMM GRANULOCYTES NFR BLD AUTO: 1.1 %
LYMPHOCYTES ABSOLUTE: 1.1 THOU/MM3 (ref 1–4.8)
LYMPHOCYTES NFR BLD AUTO: 8 %
MCH RBC QN AUTO: 28.5 PG (ref 26–33)
MCHC RBC AUTO-ENTMCNC: 32.8 GM/DL (ref 32.2–35.5)
MCV RBC AUTO: 86.6 FL (ref 80–94)
MONOCYTES ABSOLUTE: 0.7 THOU/MM3 (ref 0.4–1.3)
MONOCYTES NFR BLD AUTO: 5.3 %
NEUTROPHILS ABSOLUTE: 12 THOU/MM3 (ref 1.8–7.7)
NEUTROPHILS NFR BLD AUTO: 85.4 %
NRBC BLD AUTO-RTO: 0 /100 WBC
OSMOLALITY SERPL: 294 MOSMOL/KG (ref 275–295)
PLATELET # BLD AUTO: 197 THOU/MM3 (ref 130–400)
PMV BLD AUTO: 11.3 FL (ref 9.4–12.4)
POTASSIUM SERPL-SCNC: 4.9 MEQ/L (ref 3.5–5.2)
RBC # BLD AUTO: 3.97 MILL/MM3 (ref 4.7–6.1)
SODIUM SERPL-SCNC: 135 MEQ/L (ref 135–145)
WBC # BLD AUTO: 14.1 THOU/MM3 (ref 4.8–10.8)

## 2025-04-27 PROCEDURE — 97110 THERAPEUTIC EXERCISES: CPT

## 2025-04-27 PROCEDURE — 94640 AIRWAY INHALATION TREATMENT: CPT

## 2025-04-27 PROCEDURE — 6370000000 HC RX 637 (ALT 250 FOR IP)

## 2025-04-27 PROCEDURE — 6360000002 HC RX W HCPCS

## 2025-04-27 PROCEDURE — 85025 COMPLETE CBC W/AUTO DIFF WBC: CPT

## 2025-04-27 PROCEDURE — 2500000003 HC RX 250 WO HCPCS: Performed by: PHYSICIAN ASSISTANT

## 2025-04-27 PROCEDURE — 80048 BASIC METABOLIC PNL TOTAL CA: CPT

## 2025-04-27 PROCEDURE — 2060000000 HC ICU INTERMEDIATE R&B

## 2025-04-27 PROCEDURE — 6370000000 HC RX 637 (ALT 250 FOR IP): Performed by: NURSE PRACTITIONER

## 2025-04-27 PROCEDURE — 99232 SBSQ HOSP IP/OBS MODERATE 35: CPT | Performed by: STUDENT IN AN ORGANIZED HEALTH CARE EDUCATION/TRAINING PROGRAM

## 2025-04-27 PROCEDURE — 6360000002 HC RX W HCPCS: Performed by: PHYSICIAN ASSISTANT

## 2025-04-27 PROCEDURE — 83930 ASSAY OF BLOOD OSMOLALITY: CPT

## 2025-04-27 PROCEDURE — 6370000000 HC RX 637 (ALT 250 FOR IP): Performed by: PHYSICIAN ASSISTANT

## 2025-04-27 PROCEDURE — 36415 COLL VENOUS BLD VENIPUNCTURE: CPT

## 2025-04-27 PROCEDURE — 97112 NEUROMUSCULAR REEDUCATION: CPT

## 2025-04-27 PROCEDURE — 82948 REAGENT STRIP/BLOOD GLUCOSE: CPT

## 2025-04-27 RX ADMIN — CLOTRIMAZOLE 10 MG: 10 LOZENGE ORAL at 16:38

## 2025-04-27 RX ADMIN — PRAVASTATIN SODIUM 40 MG: 40 TABLET ORAL at 20:59

## 2025-04-27 RX ADMIN — LOSARTAN POTASSIUM 37.5 MG: 25 TABLET, FILM COATED ORAL at 20:59

## 2025-04-27 RX ADMIN — ENOXAPARIN SODIUM 40 MG: 100 INJECTION SUBCUTANEOUS at 08:37

## 2025-04-27 RX ADMIN — LEVETIRACETAM 500 MG: 100 INJECTION INTRAVENOUS at 05:03

## 2025-04-27 RX ADMIN — DEXAMETHASONE 2 MG: 4 TABLET ORAL at 00:00

## 2025-04-27 RX ADMIN — TIOTROPIUM BROMIDE AND OLODATEROL 2 PUFF: 3.124; 2.736 SPRAY, METERED RESPIRATORY (INHALATION) at 09:39

## 2025-04-27 RX ADMIN — CLOTRIMAZOLE 10 MG: 10 LOZENGE ORAL at 05:03

## 2025-04-27 RX ADMIN — PANTOPRAZOLE SODIUM 40 MG: 40 TABLET, DELAYED RELEASE ORAL at 05:03

## 2025-04-27 RX ADMIN — LEVETIRACETAM 500 MG: 100 INJECTION INTRAVENOUS at 16:38

## 2025-04-27 RX ADMIN — AMLODIPINE BESYLATE 10 MG: 10 TABLET ORAL at 08:38

## 2025-04-27 RX ADMIN — CLOTRIMAZOLE 10 MG: 10 LOZENGE ORAL at 11:58

## 2025-04-27 RX ADMIN — CLOTRIMAZOLE 10 MG: 10 LOZENGE ORAL at 20:59

## 2025-04-27 RX ADMIN — SODIUM CHLORIDE, PRESERVATIVE FREE 10 ML: 5 INJECTION INTRAVENOUS at 08:40

## 2025-04-27 RX ADMIN — BUMETANIDE 0.5 MG: 0.5 TABLET ORAL at 08:38

## 2025-04-27 RX ADMIN — DEXAMETHASONE 2 MG: 4 TABLET ORAL at 11:58

## 2025-04-27 RX ADMIN — DEXAMETHASONE 2 MG: 4 TABLET ORAL at 05:03

## 2025-04-27 RX ADMIN — LOSARTAN POTASSIUM 37.5 MG: 25 TABLET, FILM COATED ORAL at 08:37

## 2025-04-27 RX ADMIN — DEXAMETHASONE 2 MG: 4 TABLET ORAL at 16:38

## 2025-04-27 RX ADMIN — CLOTRIMAZOLE 10 MG: 10 LOZENGE ORAL at 08:38

## 2025-04-27 RX ADMIN — Medication 6 MG: at 21:52

## 2025-04-27 RX ADMIN — SODIUM CHLORIDE, PRESERVATIVE FREE 10 ML: 5 INJECTION INTRAVENOUS at 20:59

## 2025-04-27 ASSESSMENT — PAIN SCALES - GENERAL: PAINLEVEL_OUTOF10: 0

## 2025-04-27 NOTE — PLAN OF CARE
Problem: Discharge Planning  Goal: Discharge to home or other facility with appropriate resources  Outcome: Progressing  Flowsheets (Taken 4/24/2025 0800 by Liam Phan, RN)  Discharge to home or other facility with appropriate resources: Identify barriers to discharge with patient and caregiver     Problem: ABCDS Injury Assessment  Goal: Absence of physical injury  Outcome: Progressing  Flowsheets (Taken 4/24/2025 0228 by Martine Abbasi, RN)  Absence of Physical Injury: Implement safety measures based on patient assessment     Problem: Safety - Adult  Goal: Free from fall injury  Outcome: Progressing  Flowsheets (Taken 4/24/2025 0228 by Martine Abbasi RN)  Free From Fall Injury: Instruct family/caregiver on patient safety     Problem: Pain  Goal: Verbalizes/displays adequate comfort level or baseline comfort level  Outcome: Progressing  Flowsheets (Taken 4/24/2025 0800 by Liam Phan RN)  Verbalizes/displays adequate comfort level or baseline comfort level: Assess pain using appropriate pain scale     Problem: Neurosensory - Adult  Goal: Achieves stable or improved neurological status  Outcome: Progressing  Flowsheets (Taken 4/24/2025 0800 by Liam Phan RN)  Achieves stable or improved neurological status: Assess for and report changes in neurological status     Problem: Skin/Tissue Integrity - Adult  Goal: Skin integrity remains intact  Description: 1.  Monitor for areas of redness and/or skin breakdown2.  Assess vascular access sites hourly3.  Every 4-6 hours minimum:  Change oxygen saturation probe site4.  Every 4-6 hours:  If on nasal continuous positive airway pressure, respiratory therapy assess nares and determine need for appliance change or resting period  Outcome: Progressing  Flowsheets (Taken 4/24/2025 0800 by Liam Phan RN)  Skin Integrity Remains Intact: Monitor for areas of redness and/or skin breakdown  Note: No signs of skin breakdown.  Skin warm, dry, and intact.  Mucous membranes

## 2025-04-27 NOTE — PLAN OF CARE
Problem: Respiratory - Adult  Goal: Achieves optimal ventilation and oxygenation  Outcome: Progressing   Continue inhaler to improve breath sounds.  Pt agrees with plan of care

## 2025-04-28 ENCOUNTER — HOSPITAL ENCOUNTER (INPATIENT)
Age: 65
LOS: 11 days | Discharge: HOME | End: 2025-05-09
Attending: STUDENT IN AN ORGANIZED HEALTH CARE EDUCATION/TRAINING PROGRAM | Admitting: STUDENT IN AN ORGANIZED HEALTH CARE EDUCATION/TRAINING PROGRAM
Payer: COMMERCIAL

## 2025-04-28 VITALS
WEIGHT: 211.64 LBS | HEART RATE: 73 BPM | DIASTOLIC BLOOD PRESSURE: 64 MMHG | OXYGEN SATURATION: 96 % | RESPIRATION RATE: 14 BRPM | SYSTOLIC BLOOD PRESSURE: 102 MMHG | BODY MASS INDEX: 32.08 KG/M2 | TEMPERATURE: 98.1 F | HEIGHT: 68 IN

## 2025-04-28 DIAGNOSIS — C71.3 MALIGNANT NEOPLASM OF PARIETAL LOBE OF BRAIN (HCC): ICD-10-CM

## 2025-04-28 DIAGNOSIS — G06.0 BRAIN ABSCESS: ICD-10-CM

## 2025-04-28 DIAGNOSIS — G93.89 BRAIN MASS: ICD-10-CM

## 2025-04-28 DIAGNOSIS — I82.401 DEEP VEIN THROMBOSIS (DVT) OF RIGHT LOWER EXTREMITY, UNSPECIFIED CHRONICITY, UNSPECIFIED VEIN (HCC): Primary | ICD-10-CM

## 2025-04-28 LAB
BASOPHILS ABSOLUTE: 0 THOU/MM3 (ref 0–0.1)
BASOPHILS NFR BLD AUTO: 0.2 %
DEPRECATED RDW RBC AUTO: 43.2 FL (ref 35–45)
EOSINOPHIL NFR BLD AUTO: 1 %
EOSINOPHILS ABSOLUTE: 0.1 THOU/MM3 (ref 0–0.4)
ERYTHROCYTE [DISTWIDTH] IN BLOOD BY AUTOMATED COUNT: 13.4 % (ref 11.5–14.5)
GLUCOSE BLD STRIP.AUTO-MCNC: 123 MG/DL (ref 70–108)
GLUCOSE BLD STRIP.AUTO-MCNC: 128 MG/DL (ref 70–108)
HCT VFR BLD AUTO: 35.3 % (ref 42–52)
HGB BLD-MCNC: 11.6 GM/DL (ref 14–18)
IMM GRANULOCYTES # BLD AUTO: 0.16 THOU/MM3 (ref 0–0.07)
IMM GRANULOCYTES NFR BLD AUTO: 1.2 %
LYMPHOCYTES ABSOLUTE: 1.3 THOU/MM3 (ref 1–4.8)
LYMPHOCYTES NFR BLD AUTO: 10.1 %
MCH RBC QN AUTO: 29.1 PG (ref 26–33)
MCHC RBC AUTO-ENTMCNC: 32.9 GM/DL (ref 32.2–35.5)
MCV RBC AUTO: 88.5 FL (ref 80–94)
MONOCYTES ABSOLUTE: 0.8 THOU/MM3 (ref 0.4–1.3)
MONOCYTES NFR BLD AUTO: 6.5 %
NEUTROPHILS ABSOLUTE: 10.4 THOU/MM3 (ref 1.8–7.7)
NEUTROPHILS NFR BLD AUTO: 81 %
NRBC BLD AUTO-RTO: 0 /100 WBC
PLATELET # BLD AUTO: 219 THOU/MM3 (ref 130–400)
PMV BLD AUTO: 11.2 FL (ref 9.4–12.4)
RBC # BLD AUTO: 3.99 MILL/MM3 (ref 4.7–6.1)
WBC # BLD AUTO: 12.9 THOU/MM3 (ref 4.8–10.8)

## 2025-04-28 PROCEDURE — 85025 COMPLETE CBC W/AUTO DIFF WBC: CPT

## 2025-04-28 PROCEDURE — 1180000000 HC REHAB R&B

## 2025-04-28 PROCEDURE — 6370000000 HC RX 637 (ALT 250 FOR IP)

## 2025-04-28 PROCEDURE — 97112 NEUROMUSCULAR REEDUCATION: CPT

## 2025-04-28 PROCEDURE — 2500000003 HC RX 250 WO HCPCS: Performed by: PHYSICIAN ASSISTANT

## 2025-04-28 PROCEDURE — 6370000000 HC RX 637 (ALT 250 FOR IP): Performed by: PHYSICIAN ASSISTANT

## 2025-04-28 PROCEDURE — 94640 AIRWAY INHALATION TREATMENT: CPT

## 2025-04-28 PROCEDURE — 97110 THERAPEUTIC EXERCISES: CPT

## 2025-04-28 PROCEDURE — 36415 COLL VENOUS BLD VENIPUNCTURE: CPT

## 2025-04-28 PROCEDURE — 6360000002 HC RX W HCPCS: Performed by: STUDENT IN AN ORGANIZED HEALTH CARE EDUCATION/TRAINING PROGRAM

## 2025-04-28 PROCEDURE — 2500000003 HC RX 250 WO HCPCS: Performed by: STUDENT IN AN ORGANIZED HEALTH CARE EDUCATION/TRAINING PROGRAM

## 2025-04-28 PROCEDURE — 6370000000 HC RX 637 (ALT 250 FOR IP): Performed by: STUDENT IN AN ORGANIZED HEALTH CARE EDUCATION/TRAINING PROGRAM

## 2025-04-28 PROCEDURE — 82948 REAGENT STRIP/BLOOD GLUCOSE: CPT

## 2025-04-28 PROCEDURE — 99239 HOSP IP/OBS DSCHRG MGMT >30: CPT | Performed by: STUDENT IN AN ORGANIZED HEALTH CARE EDUCATION/TRAINING PROGRAM

## 2025-04-28 PROCEDURE — 6360000002 HC RX W HCPCS

## 2025-04-28 PROCEDURE — 94761 N-INVAS EAR/PLS OXIMETRY MLT: CPT

## 2025-04-28 PROCEDURE — 6370000000 HC RX 637 (ALT 250 FOR IP): Performed by: INTERNAL MEDICINE

## 2025-04-28 PROCEDURE — 6360000002 HC RX W HCPCS: Performed by: PHYSICIAN ASSISTANT

## 2025-04-28 PROCEDURE — 97116 GAIT TRAINING THERAPY: CPT

## 2025-04-28 PROCEDURE — 99222 1ST HOSP IP/OBS MODERATE 55: CPT | Performed by: STUDENT IN AN ORGANIZED HEALTH CARE EDUCATION/TRAINING PROGRAM

## 2025-04-28 RX ORDER — POLYVINYL ALCOHOL 14 MG/ML
1 SOLUTION/ DROPS OPHTHALMIC PRN
Status: DISCONTINUED | OUTPATIENT
Start: 2025-04-28 | End: 2025-05-09 | Stop reason: HOSPADM

## 2025-04-28 RX ORDER — POTASSIUM CHLORIDE 7.45 MG/ML
10 INJECTION INTRAVENOUS PRN
Status: CANCELLED | OUTPATIENT
Start: 2025-04-28

## 2025-04-28 RX ORDER — CALCIUM CARBONATE 500 MG/1
500 TABLET, CHEWABLE ORAL 3 TIMES DAILY PRN
Status: DISCONTINUED | OUTPATIENT
Start: 2025-04-28 | End: 2025-05-09 | Stop reason: HOSPADM

## 2025-04-28 RX ORDER — POTASSIUM CHLORIDE 7.45 MG/ML
10 INJECTION INTRAVENOUS PRN
Status: DISCONTINUED | OUTPATIENT
Start: 2025-04-28 | End: 2025-05-09 | Stop reason: HOSPADM

## 2025-04-28 RX ORDER — ACETAMINOPHEN 325 MG/1
650 TABLET ORAL EVERY 4 HOURS PRN
Status: DISCONTINUED | OUTPATIENT
Start: 2025-04-28 | End: 2025-05-09 | Stop reason: HOSPADM

## 2025-04-28 RX ORDER — PRAVASTATIN SODIUM 40 MG
40 TABLET ORAL NIGHTLY
Status: DISCONTINUED | OUTPATIENT
Start: 2025-04-28 | End: 2025-05-09 | Stop reason: HOSPADM

## 2025-04-28 RX ORDER — POTASSIUM CHLORIDE 1500 MG/1
40 TABLET, EXTENDED RELEASE ORAL PRN
Status: DISCONTINUED | OUTPATIENT
Start: 2025-04-28 | End: 2025-05-09 | Stop reason: HOSPADM

## 2025-04-28 RX ORDER — CLOTRIMAZOLE 10 MG/1
10 LOZENGE ORAL
Status: CANCELLED | OUTPATIENT
Start: 2025-04-28

## 2025-04-28 RX ORDER — POTASSIUM CHLORIDE 1500 MG/1
40 TABLET, EXTENDED RELEASE ORAL PRN
Status: CANCELLED | OUTPATIENT
Start: 2025-04-28

## 2025-04-28 RX ORDER — CALCIUM CARBONATE 500 MG/1
500 TABLET, CHEWABLE ORAL 3 TIMES DAILY PRN
Status: CANCELLED | OUTPATIENT
Start: 2025-04-28

## 2025-04-28 RX ORDER — DEXAMETHASONE 4 MG/1
2 TABLET ORAL EVERY 6 HOURS SCHEDULED
Status: DISCONTINUED | OUTPATIENT
Start: 2025-04-28 | End: 2025-05-09 | Stop reason: HOSPADM

## 2025-04-28 RX ORDER — TRAZODONE HYDROCHLORIDE 50 MG/1
50 TABLET ORAL NIGHTLY PRN
Status: CANCELLED | OUTPATIENT
Start: 2025-04-28

## 2025-04-28 RX ORDER — POLYETHYLENE GLYCOL 3350 17 G/17G
17 POWDER, FOR SOLUTION ORAL DAILY PRN
Status: DISCONTINUED | OUTPATIENT
Start: 2025-04-28 | End: 2025-05-09 | Stop reason: HOSPADM

## 2025-04-28 RX ORDER — DEXAMETHASONE 4 MG/1
2 TABLET ORAL EVERY 6 HOURS SCHEDULED
Status: CANCELLED | OUTPATIENT
Start: 2025-04-28

## 2025-04-28 RX ORDER — LOSARTAN POTASSIUM 25 MG/1
37.5 TABLET ORAL 2 TIMES DAILY
Status: CANCELLED | OUTPATIENT
Start: 2025-04-28

## 2025-04-28 RX ORDER — DEXTROSE MONOHYDRATE 100 MG/ML
INJECTION, SOLUTION INTRAVENOUS CONTINUOUS PRN
Status: DISCONTINUED | OUTPATIENT
Start: 2025-04-28 | End: 2025-05-09 | Stop reason: HOSPADM

## 2025-04-28 RX ORDER — GLUCAGON 1 MG/ML
1 KIT INJECTION PRN
Status: DISCONTINUED | OUTPATIENT
Start: 2025-04-28 | End: 2025-05-09 | Stop reason: HOSPADM

## 2025-04-28 RX ORDER — PANTOPRAZOLE SODIUM 40 MG/1
40 TABLET, DELAYED RELEASE ORAL
Status: DISCONTINUED | OUTPATIENT
Start: 2025-04-29 | End: 2025-05-09 | Stop reason: HOSPADM

## 2025-04-28 RX ORDER — ENOXAPARIN SODIUM 100 MG/ML
40 INJECTION SUBCUTANEOUS DAILY
Status: CANCELLED | OUTPATIENT
Start: 2025-04-29

## 2025-04-28 RX ORDER — TRAZODONE HYDROCHLORIDE 50 MG/1
50 TABLET ORAL NIGHTLY PRN
Status: DISCONTINUED | OUTPATIENT
Start: 2025-04-28 | End: 2025-05-08

## 2025-04-28 RX ORDER — MAGNESIUM SULFATE IN WATER 40 MG/ML
2000 INJECTION, SOLUTION INTRAVENOUS PRN
Status: CANCELLED | OUTPATIENT
Start: 2025-04-28

## 2025-04-28 RX ORDER — DEXTROSE MONOHYDRATE 100 MG/ML
INJECTION, SOLUTION INTRAVENOUS CONTINUOUS PRN
Status: CANCELLED | OUTPATIENT
Start: 2025-04-28

## 2025-04-28 RX ORDER — ALBUTEROL SULFATE 0.83 MG/ML
2.5 SOLUTION RESPIRATORY (INHALATION) EVERY 6 HOURS PRN
Status: DISCONTINUED | OUTPATIENT
Start: 2025-04-28 | End: 2025-05-09 | Stop reason: HOSPADM

## 2025-04-28 RX ORDER — PANTOPRAZOLE SODIUM 40 MG/1
40 TABLET, DELAYED RELEASE ORAL
Status: CANCELLED | OUTPATIENT
Start: 2025-04-29

## 2025-04-28 RX ORDER — LOSARTAN POTASSIUM 25 MG/1
37.5 TABLET ORAL 2 TIMES DAILY
Status: DISCONTINUED | OUTPATIENT
Start: 2025-04-28 | End: 2025-05-09 | Stop reason: HOSPADM

## 2025-04-28 RX ORDER — SODIUM CHLORIDE 0.9 % (FLUSH) 0.9 %
5-40 SYRINGE (ML) INJECTION EVERY 12 HOURS SCHEDULED
Status: CANCELLED | OUTPATIENT
Start: 2025-04-28

## 2025-04-28 RX ORDER — ACETAMINOPHEN 325 MG/1
650 TABLET ORAL EVERY 4 HOURS PRN
Status: CANCELLED | OUTPATIENT
Start: 2025-04-28

## 2025-04-28 RX ORDER — LEVETIRACETAM 500 MG/5ML
500 INJECTION, SOLUTION, CONCENTRATE INTRAVENOUS EVERY 12 HOURS
Status: CANCELLED | OUTPATIENT
Start: 2025-04-28

## 2025-04-28 RX ORDER — ONDANSETRON 4 MG/1
4 TABLET, ORALLY DISINTEGRATING ORAL EVERY 8 HOURS PRN
Status: CANCELLED | OUTPATIENT
Start: 2025-04-28

## 2025-04-28 RX ORDER — BUMETANIDE 0.5 MG/1
0.5 TABLET ORAL DAILY
Status: CANCELLED | OUTPATIENT
Start: 2025-04-29

## 2025-04-28 RX ORDER — CLONIDINE 0.1 MG/24H
1 PATCH, EXTENDED RELEASE TRANSDERMAL WEEKLY
Status: CANCELLED | OUTPATIENT
Start: 2025-05-05

## 2025-04-28 RX ORDER — SODIUM CHLORIDE 0.9 % (FLUSH) 0.9 %
5-40 SYRINGE (ML) INJECTION PRN
Status: DISCONTINUED | OUTPATIENT
Start: 2025-04-28 | End: 2025-05-09 | Stop reason: HOSPADM

## 2025-04-28 RX ORDER — ENOXAPARIN SODIUM 100 MG/ML
40 INJECTION SUBCUTANEOUS DAILY
Status: DISCONTINUED | OUTPATIENT
Start: 2025-04-29 | End: 2025-05-02

## 2025-04-28 RX ORDER — BUMETANIDE 0.5 MG/1
0.5 TABLET ORAL DAILY
Status: DISCONTINUED | OUTPATIENT
Start: 2025-04-29 | End: 2025-05-09 | Stop reason: HOSPADM

## 2025-04-28 RX ORDER — GLUCAGON 1 MG/ML
1 KIT INJECTION PRN
Status: CANCELLED | OUTPATIENT
Start: 2025-04-28

## 2025-04-28 RX ORDER — AMLODIPINE BESYLATE 10 MG/1
10 TABLET ORAL DAILY
Status: CANCELLED | OUTPATIENT
Start: 2025-04-29

## 2025-04-28 RX ORDER — POLYVINYL ALCOHOL 14 MG/ML
1 SOLUTION/ DROPS OPHTHALMIC PRN
Status: CANCELLED | OUTPATIENT
Start: 2025-04-28

## 2025-04-28 RX ORDER — CLONIDINE 0.1 MG/24H
1 PATCH, EXTENDED RELEASE TRANSDERMAL WEEKLY
Status: DISCONTINUED | OUTPATIENT
Start: 2025-05-05 | End: 2025-05-05

## 2025-04-28 RX ORDER — SODIUM CHLORIDE 0.9 % (FLUSH) 0.9 %
5-40 SYRINGE (ML) INJECTION EVERY 12 HOURS SCHEDULED
Status: DISCONTINUED | OUTPATIENT
Start: 2025-04-28 | End: 2025-04-30

## 2025-04-28 RX ORDER — LEVETIRACETAM 500 MG/5ML
500 INJECTION, SOLUTION, CONCENTRATE INTRAVENOUS EVERY 12 HOURS
Status: DISCONTINUED | OUTPATIENT
Start: 2025-04-28 | End: 2025-04-28 | Stop reason: CLARIF

## 2025-04-28 RX ORDER — AMLODIPINE BESYLATE 10 MG/1
10 TABLET ORAL DAILY
Status: DISCONTINUED | OUTPATIENT
Start: 2025-04-29 | End: 2025-05-09 | Stop reason: HOSPADM

## 2025-04-28 RX ORDER — POLYETHYLENE GLYCOL 3350 17 G/17G
17 POWDER, FOR SOLUTION ORAL DAILY PRN
Status: CANCELLED | OUTPATIENT
Start: 2025-04-28

## 2025-04-28 RX ORDER — SODIUM CHLORIDE 0.9 % (FLUSH) 0.9 %
5-40 SYRINGE (ML) INJECTION PRN
Status: CANCELLED | OUTPATIENT
Start: 2025-04-28

## 2025-04-28 RX ORDER — CLOTRIMAZOLE 10 MG/1
10 LOZENGE ORAL
Status: DISCONTINUED | OUTPATIENT
Start: 2025-04-28 | End: 2025-04-28

## 2025-04-28 RX ORDER — SODIUM CHLORIDE 9 MG/ML
INJECTION, SOLUTION INTRAVENOUS PRN
Status: CANCELLED | OUTPATIENT
Start: 2025-04-28

## 2025-04-28 RX ORDER — MAGNESIUM SULFATE IN WATER 40 MG/ML
2000 INJECTION, SOLUTION INTRAVENOUS PRN
Status: DISCONTINUED | OUTPATIENT
Start: 2025-04-28 | End: 2025-05-09 | Stop reason: HOSPADM

## 2025-04-28 RX ORDER — ONDANSETRON 4 MG/1
4 TABLET, ORALLY DISINTEGRATING ORAL EVERY 8 HOURS PRN
Status: DISCONTINUED | OUTPATIENT
Start: 2025-04-28 | End: 2025-05-09 | Stop reason: HOSPADM

## 2025-04-28 RX ORDER — LEVETIRACETAM 500 MG/1
500 TABLET ORAL EVERY 12 HOURS
Status: DISCONTINUED | OUTPATIENT
Start: 2025-04-28 | End: 2025-04-30 | Stop reason: SDUPTHER

## 2025-04-28 RX ORDER — SODIUM CHLORIDE 9 MG/ML
INJECTION, SOLUTION INTRAVENOUS PRN
Status: DISCONTINUED | OUTPATIENT
Start: 2025-04-28 | End: 2025-05-09 | Stop reason: HOSPADM

## 2025-04-28 RX ORDER — PRAVASTATIN SODIUM 40 MG
40 TABLET ORAL NIGHTLY
Status: CANCELLED | OUTPATIENT
Start: 2025-04-28

## 2025-04-28 RX ORDER — ALBUTEROL SULFATE 0.83 MG/ML
2.5 SOLUTION RESPIRATORY (INHALATION) EVERY 6 HOURS PRN
Status: CANCELLED | OUTPATIENT
Start: 2025-04-28

## 2025-04-28 RX ADMIN — DEXAMETHASONE 2 MG: 4 TABLET ORAL at 12:19

## 2025-04-28 RX ADMIN — DEXAMETHASONE 2 MG: 4 TABLET ORAL at 23:50

## 2025-04-28 RX ADMIN — CLOTRIMAZOLE 10 MG: 10 LOZENGE ORAL at 08:05

## 2025-04-28 RX ADMIN — CLOTRIMAZOLE 10 MG: 10 LOZENGE ORAL at 12:18

## 2025-04-28 RX ADMIN — DEXAMETHASONE 2 MG: 4 TABLET ORAL at 04:45

## 2025-04-28 RX ADMIN — TIOTROPIUM BROMIDE AND OLODATEROL 2 PUFF: 3.124; 2.736 SPRAY, METERED RESPIRATORY (INHALATION) at 07:29

## 2025-04-28 RX ADMIN — LEVETIRACETAM 500 MG: 500 TABLET, FILM COATED ORAL at 18:43

## 2025-04-28 RX ADMIN — Medication 6 MG: at 20:44

## 2025-04-28 RX ADMIN — SODIUM CHLORIDE, PRESERVATIVE FREE 10 ML: 5 INJECTION INTRAVENOUS at 20:44

## 2025-04-28 RX ADMIN — DEXAMETHASONE 2 MG: 4 TABLET ORAL at 01:29

## 2025-04-28 RX ADMIN — ENOXAPARIN SODIUM 40 MG: 100 INJECTION SUBCUTANEOUS at 08:04

## 2025-04-28 RX ADMIN — PANTOPRAZOLE SODIUM 40 MG: 40 TABLET, DELAYED RELEASE ORAL at 04:07

## 2025-04-28 RX ADMIN — BUMETANIDE 0.5 MG: 0.5 TABLET ORAL at 08:04

## 2025-04-28 RX ADMIN — LOSARTAN POTASSIUM 37.5 MG: 25 TABLET, FILM COATED ORAL at 21:08

## 2025-04-28 RX ADMIN — PRAVASTATIN SODIUM 40 MG: 40 TABLET ORAL at 20:44

## 2025-04-28 RX ADMIN — LEVETIRACETAM 500 MG: 100 INJECTION INTRAVENOUS at 04:45

## 2025-04-28 RX ADMIN — CLOTRIMAZOLE 10 MG: 10 LOZENGE ORAL at 04:08

## 2025-04-28 RX ADMIN — SODIUM CHLORIDE, PRESERVATIVE FREE 10 ML: 5 INJECTION INTRAVENOUS at 08:05

## 2025-04-28 RX ADMIN — DEXAMETHASONE 2 MG: 4 TABLET ORAL at 18:42

## 2025-04-28 NOTE — PLAN OF CARE
Problem: ABCDS Injury Assessment  Goal: Absence of physical injury  Outcome: Progressing     Problem: Nutrition Deficit:  Goal: Optimize nutritional status  Outcome: Progressing     Problem: Safety - Adult  Goal: Free from fall injury  Outcome: Progressing     Problem: Discharge Planning  Goal: Discharge to home or other facility with appropriate resources  Outcome: Progressing  Flowsheets (Taken 4/28/2025 3282)  Discharge to home or other facility with appropriate resources:   Identify barriers to discharge with patient and caregiver   Arrange for needed discharge resources and transportation as appropriate   Identify discharge learning needs (meds, wound care, etc)

## 2025-04-28 NOTE — PROGRESS NOTES
Admitted to the Inpatient Rehabilitation Unit via wheelchair.  Patient was then oriented to room and unit.  Education provided on the rehabilitation routine: three hours of therapy five days per week.      Explained patients right to have family, representative or physician notified of their admission.  Patient has Declined for physician to be notified.  Patient has Declined for family/representative to be notified.    Admitting medication orders compared with acute stay medications; home medication list reviewed with patient/family.  Medication issues identified No  Medication issue: Several home medication removed from list d/t old, temporary urinary medications from prostate/urinary history  If yes, physician notified Dr Robison.    Vaccination Status  Have you ever received a COVID-19 vaccine? Yes  date of last vaccine: 2021, Brand: Pfizer      Transportation:   Has transportation kept you from medical appointments, meetings, work, or from getting things needed for daily living? (Check all that apply)  No.      Health Literacy:   How often do you need to have someone help you when you read instructions, pamphlets, or other written material from your doctor or pharmacy?  1. - Rarely at baseline, but currently has issues with reading comprehension, per wife    Social Isolation:  How often do you feel lonely or isolated from those around you?  1. Rarely    Patient Mood Interview (PHQ-2 to 9) (from [employer]©)   Say to Patient: \"Over the last 2 weeks, have you been bothered by any of the following problems?\"   If symptom is present, enter yes in column 1 (Symptom Presence)  If yes in column 1, then ask the patient: “About how often have you been bothered by this?” Indicate response in column 2, Symptom Frequency.   Symptom Presence  No    Yes   9.    No response  Symptom Frequency  Never or 1 day  2-6 days (several days)  7-11 days (half or more of the days)  12-14 days (nearly every day)    Symptom Presence

## 2025-04-28 NOTE — H&P
CTAB, breathing comfortably on room air without increased work of breathing  Abdomen: non-distended  Skin: warm and dry, no rash or erythema on exposed skin.  Single staple to patient's occiput (away from his surgical incision).  Wife states that he had a similar one on his forehead that has already been removed.    Diagnostics:  Recent Results (from the past 24 hours)   POCT Glucose    Collection Time: 04/27/25  8:32 PM   Result Value Ref Range    POC Glucose 113 (H) 70 - 108 mg/dl   CBC with Auto Differential    Collection Time: 04/28/25  4:07 AM   Result Value Ref Range    WBC 12.9 (H) 4.8 - 10.8 thou/mm3    RBC 3.99 (L) 4.70 - 6.10 mill/mm3    Hemoglobin 11.6 (L) 14.0 - 18.0 gm/dl    Hematocrit 35.3 (L) 42.0 - 52.0 %    MCV 88.5 80.0 - 94.0 fL    MCH 29.1 26.0 - 33.0 pg    MCHC 32.9 32.2 - 35.5 gm/dl    RDW-CV 13.4 11.5 - 14.5 %    RDW-SD 43.2 35.0 - 45.0 fL    Platelets 219 130 - 400 thou/mm3    MPV 11.2 9.4 - 12.4 fL    Seg Neutrophils 81.0 %    Lymphocytes 10.1 %    Monocytes % 6.5 %    Eosinophils 1.0 %    Basophils 0.2 %    Immature Granulocytes % 1.2 %    Neutrophils Absolute 10.4 (H) 1.8 - 7.7 thou/mm3    Lymphocytes Absolute 1.3 1.0 - 4.8 thou/mm3    Monocytes Absolute 0.8 0.4 - 1.3 thou/mm3    Eosinophils Absolute 0.1 0.0 - 0.4 thou/mm3    Basophils Absolute 0.0 0.0 - 0.1 thou/mm3    Immature Grans (Abs) 0.16 (H) 0.00 - 0.07 thou/mm3    nRBC 0 /100 wbc   POCT Glucose    Collection Time: 04/28/25  7:45 AM   Result Value Ref Range    POC Glucose 123 (H) 70 - 108 mg/dl   POCT Glucose    Collection Time: 04/28/25 11:22 AM   Result Value Ref Range    POC Glucose 128 (H) 70 - 108 mg/dl       Impression:  Left parietal  brain mass   S/p left parietal craniotomy for resection 4/16/25 with Dr Singh  Left parietal parenchymal hemorrhage  S/p left parietal craniotomy hematoma evacuation 4/17/2025 with Dr Singh  Expressive Aphasia  Gait disturbance  Acute DVT RLE  Anxiety  Leukocytosis  Bilateral pleural  Trending down. 12.9 on day of admission to rehab.  Likely related to steroids.  Will plan to assess on admission labs  HTN: Continue amlodipine 10 mg daily, losartan 37.5 mg twice daily, Bumex 0.5 mg daily, and clonidine patch 0.1 mg  HLD: Continue pravastatin 40 mg nightly  Prophylaxis:  DVT: Lovenox.  GI: Pantoprazole.  Pain: As needed Tylenol  Sleep: Continue scheduled melatonin and as needed trazodone  Nutrition:  Consultation to dietician for nutritional counseling and recommendations.  Prealbumin will be checked on admission.    Bladder: Will monitor for signs and symptoms of infection/retention  Bowel: As needed MiraLAX   and case management consultations for coordination of care and discharge planning    The main medical problem(s) and comorbidities being actively managed by the physicians and requiring 24 hour rehabilitation nursing care during this stay include brain mass, left parietal parenchymal hemorrhage, leukocytosis, acute DVT, HTN, impaired mobility and ADLs.      The domains of functional impairment present in this patient which will require an intensive and interdisciplinary rehabilitation environment include self care, mobility, motor dysfunction, bowel/bladder management, pain management, safety, and communication.    Estimated length of stay for this admission 2-3 weeks    Anticipated disposition: Home.  The potential to achieve that is good.    The post admission physician evaluation (CELIO) is consistent with the pre-admission assessment.  See above findings to reflect the elements required in the CELIO.  Patient's admitting condition is consistent with the findings of the preadmission assessment by the rehabilitation admissions coordinator.    Sonya Robison DO

## 2025-04-28 NOTE — DISCHARGE SUMMARY
evaluation.   3. Large arachnoid cyst in the left middle cranial fossa.               **This report has been created using voice recognition software. It may contain   minor errors which are inherent in voice recognition technology.**      Electronically signed by Dr. Rosio Toscano      XR CHEST PORTABLE   Final Result   Improved aeration in the left lower lobe with residual opacities and small    effusion.      This document has been electronically signed by: Dwayne Cole MD on    04/20/2025 06:02 AM      XR CHEST PORTABLE   Final Result   1. The endotracheal  tube terminates 5.8 cm from the jovany. The nasogastric    feeding tube terminates below the diaphragms likely within the stomach.   2. A large left retrocardiac opacity seen that can relate to atelectasis and/or   infiltrate.   3. Small left pleural effusion.   4. Otherwise, there has been no other significant interval change in the   radiographic appearance of the chest  from chest radiograph of 4/18/2025.               **This report has been created using voice recognition software.  It may contain   minor errors which are inherent in voice recognition technology.**      Electronically signed by Dr. Luma Henson      CT HEAD WO CONTRAST   Final Result      Interval decreased blood surrounding the left parietal operative bed.      The remainder of the examination is grossly unchanged.      This document has been electronically signed by: Jamie Johnson MD on    04/18/2025 07:42 AM      All CTs at this facility use dose modulation techniques and iterative    reconstructions, and/or weight-based dosing   when appropriate to reduce radiation to a low as reasonably achievable.      XR CHEST PORTABLE   Final Result   1. ETT tip terminates 3 cm above jovany.   2. Dense left lower lobe opacity with small effusion suspected.      This document has been electronically signed by: Dwayne Cole MD on    04/18/2025 02:13 AM      CT HEAD WO CONTRAST   Final Result

## 2025-04-28 NOTE — CARE COORDINATION
4/28/25, 1:37 PM EDT    Patient goals/plan/ treatment preferences discussed by  and .  Patient goals/plan/ treatment preferences reviewed with patient/ family.  Patient/ family verbalize understanding of discharge plan and are in agreement with goal/plan/treatment preferences.  Understanding was demonstrated using the teach back method.  AVS provided by RN at time of discharge, which includes all necessary medical information pertaining to the patients current course of illness, treatment, post-discharge goals of care, and treatment preferences.     Services At/After Discharge: Inpatient rehab

## 2025-04-28 NOTE — PLAN OF CARE
Problem: Discharge Planning  Goal: Discharge to home or other facility with appropriate resources  4/28/2025 0235 by Christi Gar RN  Outcome: Progressing  Flowsheets (Taken 4/28/2025 0235)  Discharge to home or other facility with appropriate resources: Identify barriers to discharge with patient and caregiver     Problem: ABCDS Injury Assessment  Goal: Absence of physical injury  4/28/2025 0235 by Christi Gar RN  Outcome: Progressing  Flowsheets (Taken 4/28/2025 0235)  Absence of Physical Injury: Implement safety measures based on patient assessment     Problem: Safety - Adult  Goal: Free from fall injury  4/28/2025 0235 by Christi Gar RN  Outcome: Progressing  Flowsheets (Taken 4/28/2025 0235)  Free From Fall Injury: Instruct family/caregiver on patient safety     Problem: Pain  Goal: Verbalizes/displays adequate comfort level or baseline comfort level  4/28/2025 0235 by Christi Gar RN  Outcome: Progressing  Flowsheets (Taken 4/28/2025 0235)  Verbalizes/displays adequate comfort level or baseline comfort level:   Encourage patient to monitor pain and request assistance   Assess pain using appropriate pain scale   Implement non-pharmacological measures as appropriate and evaluate response   Consider cultural and social influences on pain and pain management   Administer analgesics based on type and severity of pain and evaluate response     Problem: Neurosensory - Adult  Goal: Achieves stable or improved neurological status  4/28/2025 0235 by Christi Gar RN  Outcome: Progressing  Flowsheets (Taken 4/28/2025 0235)  Achieves stable or improved neurological status: Assess for and report changes in neurological status     Problem: Skin/Tissue Integrity - Adult  Goal: Skin integrity remains intact  Description: 1.  Monitor for areas of redness and/or skin breakdown2.  Assess vascular access sites hourly3.  Every 4-6 hours minimum:  Change oxygen saturation probe site4.  Every 4-6 hours:  If on

## 2025-04-28 NOTE — PROGRESS NOTES
Physical Medicine & Rehabilitation   Progress Note    Chief Complaint:  Brain Mass     Subjective: Patient seen and examined.  No acute events overnight.  Patient reports that overall he is feeling well.  He reports good sleep.  No reports of any chest pain or shortness of breath.  No reports of any cough or dysuria.  We discussed elevated WBC that has been trending down is likely related to steroids in the setting of no signs or symptoms of infection.  He tolerated his first sessions of therapy this morning.    Rehabilitation:  PT 4/28/2025:  Transfers:  Sit to Stand: Stand By Assistance  Stand to Sit:Stand By Assistance     Ambulation:  Stand By Assistance, Contact Guard Assistance  Distance: 34 feet hallway  Surface: Level Tile  Device: No Device  Gait Deviations: Decreased Arm Swing and hyperextended trunk      Stairs:  Not Tested     Balance:  Dynamic Standing Balance: Stand By Assistance, occasional mod A x 1 due to imbalances with dynamic activity.   Patient performed unsupported standing act, incorporating trunk rotation with crossing midline to retrieve and place cones outside MARY to improve righting reactions. Primarily patient required CGA during activity. While crossing midline and reaching outside MARY, he had 3 episodes of right lateral LOB in which required mod A for correction and prevention of fall  Alternate step taps completed x 20 reps to object on floor to improve weight shifting and balance control for gait and transfers  Unsupported forward, retro and side stepping completed to reduce fall risk with ambulation with CGA required   Standing activity completed while on foam balance pad, reaching right lateral for item retrieval (ring toss) and tossing object to improve balance reactions and reduce fall risk. Patient completed task with CGA     Exercise:  Patient was guided in 1 set(s) 20 reps of exercises to both lower extremities: ,Standing heel/toe raises, Standing marches, Standing hip  patient stating \"Yanna\" and \"Erick\"; however, unable to verify d/t absence of family at bedside.      ST Name: 1/2 phonemic cueing 1/2 partial word cueing     Picture Identification:  Fo6: 6/6 indep  By function: 4/5 indep 1/5 min cues     Picture scene description: min verbal cues     Pizza order: total assist     Colors: partial word cues 1/1     INTERVENTIONS:  Object ID  By Function: 5/6 indep, 1/6 min cues  Subjective yes/no questions: indep  Call light identification indep      Review of Systems:  CONSTITUTIONAL:  negative for  fevers and chills  EYES:  positive for  glasses; negative for diplopia or blurred vision  HEENT:  negative for  hearing loss  RESPIRATORY:  negative for  dyspnea and wheezing  CARDIOVASCULAR:  negative for  chest pain, palpitations  GASTROINTESTINAL:  negative for nausea, vomiting, and diarrhea  GENITOURINARY:  negative for dysuria  SKIN:  negative for rash  NEUROLOGICAL:  positive for speech problems; negative for paresthesias   10 point system review otherwise negative      Objective:  /76   Pulse 74   Temp 98.1 °F (36.7 °C) (Oral)   Resp 16   Ht 1.727 m (5' 8\")   Wt 79 kg (174 lb 2.6 oz)   SpO2 99%   BMI 26.48 kg/m²   Patient is awake and alert, sitting up in bedside chair  Orientation: Not formally assessed.  However, seems oriented within conversation  Mood: within normal limits  Affect: calm  General appearance: Patient is well nourished, well developed, well groomed and in no acute distress     Memory: Difficult to assess given patient's aphasia.  Wife assists with history  Attention/Concentration: Patient follows content of conversation.  Language: Expressive aphasia     Cranial Nerves:  cranial nerves II-XII are grossly intact  ROM:  normal  Motor Exam: Moving all extremities easily against gravity extremities.  No focal deficits noted  Tone:  normal  Muscle bulk: within normal limits  Sensory:  Sensory intact to light touch  Gait: Not assessed     Heart: RRR, no

## 2025-04-28 NOTE — PLAN OF CARE
Problem: Discharge Planning  Goal: Discharge to home or other facility with appropriate resources  4/28/2025 0909 by Jewell William RN  Outcome: Progressing  Flowsheets (Taken 4/28/2025 0909)  Discharge to home or other facility with appropriate resources: Identify barriers to discharge with patient and caregiver  4/28/2025 0235 by Christi Gar RN  Outcome: Progressing  Flowsheets (Taken 4/28/2025 0235)  Discharge to home or other facility with appropriate resources: Identify barriers to discharge with patient and caregiver     Problem: ABCDS Injury Assessment  Goal: Absence of physical injury  4/28/2025 0909 by Jewell William RN  Outcome: Progressing  Flowsheets (Taken 4/28/2025 0909)  Absence of Physical Injury: Implement safety measures based on patient assessment  4/28/2025 0235 by Christi Gar RN  Outcome: Progressing  Flowsheets (Taken 4/28/2025 0235)  Absence of Physical Injury: Implement safety measures based on patient assessment     Problem: Safety - Adult  Goal: Free from fall injury  4/28/2025 0909 by Jewell William RN  Outcome: Progressing  Flowsheets (Taken 4/28/2025 0909)  Free From Fall Injury: Instruct family/caregiver on patient safety  4/28/2025 0235 by Christi Gar RN  Outcome: Progressing  Flowsheets (Taken 4/28/2025 0235)  Free From Fall Injury: Instruct family/caregiver on patient safety     Problem: Pain  Goal: Verbalizes/displays adequate comfort level or baseline comfort level  4/28/2025 0909 by Jewell William RN  Outcome: Progressing  Flowsheets (Taken 4/28/2025 0909)  Verbalizes/displays adequate comfort level or baseline comfort level:   Encourage patient to monitor pain and request assistance   Assess pain using appropriate pain scale   Implement non-pharmacological measures as appropriate and evaluate response  4/28/2025 0235 by Christi Gar RN  Outcome: Progressing  Flowsheets (Taken 4/28/2025 0235)  Verbalizes/displays adequate comfort level or baseline comfort

## 2025-04-28 NOTE — PROGRESS NOTES
Patient Weaning Progress    The patient's vent settings was able to be weaned this shift.    Ventilator settings that were weaned              [] Mode   [] Pressure support weaned   [x] Fio2 weaned   [] Peep weaned    Spontaneous weaning trial was not attempted due to defined parameters for SBT (spontaneous breathing trial) not being met.     Reason that defined ventilator parameters for SBT was not met              [x] Patient condition requires increased ventilator settings  [] Requires increased sedation   [] Settings not within weaning range   [] SAT not completed   [] Physician orders    Cuff was not deflated to determine cuff leak.    Unable to get agreement for goals because no family is present and patient cannot respond.        Problem: Respiratory - Adult    Goal: Achieves optimal ventilation and oxygenation  Achieves optimal ventilation and oxygenation:   Assess for changes in respiratory status   Position to facilitate oxygenation and minimize respiratory effort   Assess the need for suctioning and aspirate as needed   Respiratory therapy support as indicated   Oxygen supplementation based on oxygen saturation or arterial blood gases    Outcome: Progressing  
                  Internal Medicine Resident  Progress Note      Patient:  Koko Chao    Unit/Bed:4A-04/004-A  YOB: 1960  MRN: [id number]   Acct: [id number]   PCP: Hannah Banks MD  Date of Admission: 4/11/2025  Date of Service: Pt seen/examined on 04/25/25      Assessment/Plan:  Left parietal lobe abscess: Aphasia 2/2 abscess 3 weeks PTA; peripheral enhancing lesion measuring up to 2.4 cm within posterior left parietal lobe.  There is significant concern for malignancy. Neurosurgery performed biopsy, send out to [facility]. ID following, TTE ordered to rule out endocarditis, no valvular vegetation or mass noted. WBC count steadily increasing since steroids, patient largely afebrile. All infectious workup negative. Off Abx. TEG mapping within normal limits. Sodium goals 145-150 during ICU stay. Initially on 3%  hypertonic, transitioned to mannitol with IV Bumex to help augment sodium/serum sOSM goal. Also on decadron 4mg IV q6h. SBP goals 110-160. On home meds Cozaar. Added Norvasc, clonidine patch. Seizure prophylaxis.  Follow biopsy results.  Decadron switched to 2 mg p.o. every 6 hours per neurosurgery  Keppra 500 mg IV every 12 hours for seizure prophylaxis, seizure precaution.  Mannitol discontinued today     Left parietal parenchymal hemorrhage:  Overnight 4/16-4/17, patient experienced an acute hypertensive episode with -220. Accompanied by sudden onset headache and worsening aphasia.  Neurosurgery obtained head CT showing hemorrhage in edematous area surrounding prior resection site, with a bleed-free biopsy site.  Taken in for emergency surgical evacuation, tolerated successfully.  Intubated in OR, remained intubated on return to ICU.  Patient sedated with propofol, fentanyl for analgesia.  Patient subsequently weaned from sedation, and extubated over the weekend 4/19-4/20. SBP strictly monitored for goal 110-160. Nicardipine drip used for backup blood pressure control, as 
          [facility] Infectious Disease         Progress Note      Koko Chao  MEDICAL RECORD NUMBER:  [id number]  AGE: 65 y.o.   GENDER: male  : 1960  EPISODE DATE:  2025      Assessment:     Patient is a 65-year-old male who I am consulted for a left parietal lobe brain abscess.    Reviewed operative note from  by neurosurgery.  There is no evidence of pus on entrance to the area in the parietal collection.  The area was completely excised with culture swabs and pathology sent for further samples.  These remain in process and thus far no evidence of growth from culture.    Continuing on broad-spectrum coverage with vancomycin, ceftriaxone and metronidazole  We will decide on final duration of therapy  Reviewed CT scans obtained of chest as well as abdomen and pelvis   Initial set from , repeats performed on         Subjective:     Chief Complaint   Patient presents with    Memory Loss         Patient is a 65-year-old male who I am following for a left parietal lobe brain abscess.    No acute events overnight.  No new complaints or concerns this morning.      Patient Active Problem List   Diagnosis Code    Elevated PSA R97.20    Hypertension I10    Heartburn R12    Hyperlipidemia E78.5    Angina effort I20.89    COVID U07.1    Left nephrolithiasis N20.0    Mass of brain G93.89    Brain abscess G06.0    Cerebritis G04.90    Vasogenic edema (HCC) G93.6    Pneumonia of left lower lobe due to infectious organism J18.9    Left parietal mass G93.89             PAST MEDICAL HISTORY        Diagnosis Date    Cancer (HCC)     CRVO (central retinal vein occlusion) (HCC) 2016    Right eye    Heartburn     Hyperlipidemia 2010    Hypertension 2010       PAST SURGICAL HISTORY    Past Surgical History:   Procedure Laterality Date    COLONOSCOPY      WNL     COLONOSCOPY N/A 3/13/2020    COLONOSCOPY DIAGNOSTIC performed by Percy Cantu MD at [facility] Endoscopy    CYSTOSCOPY Left 
          [facility] Infectious Disease         Progress Note      Koko Chao  MEDICAL RECORD NUMBER:  [id number]  AGE: 65 y.o.   GENDER: male  : 1960  EPISODE DATE:  2025      Assessment:     Patient is a 65-year-old male who I am consulted for a left parietal lobe brain abscess.    On discussion with patient and family, I would recommend discontinuing steroid therapy and transitioning to hypertonic saline.  Patient will likely require transfer to ICU for further evaluation.  I would continue empiric therapy with vancomycin, ceftriaxone and metronidazole.  There is the risk for sterilization of cultures with the use of antimicrobials.  Biopsy is tentatively planned for .  Echo in process, blood cultures thus far negative.  ID service will continue to follow.  I did discuss case with ICU service.    Continue broad-spectrum antimicrobials while awaiting biopsy  Tentative plan for biopsy on   Currently in ICU on hypertonic saline  No significant change from exam on       Subjective:     Chief Complaint   Patient presents with    Memory Loss         Patient is a 65-year-old male who I am following for a left parietal lobe brain abscess.    Patient resting comfortably in bed in ICU.  He denies any changes from overnight.  He is otherwise doing well this morning.      Patient Active Problem List   Diagnosis Code    Elevated PSA R97.20    Hypertension I10    Heartburn R12    Hyperlipidemia E78.5    Angina effort I20.89    COVID U07.1    Left nephrolithiasis N20.0    Mass of brain G93.89    Brain abscess G06.0    Cerebritis G04.90    Vasogenic edema (HCC) G93.6    Pneumonia of left lower lobe due to infectious organism J18.9    Left parietal mass G93.89             PAST MEDICAL HISTORY        Diagnosis Date    Cancer (HCC)     CRVO (central retinal vein occlusion) (HCC) 2016    Right eye    Heartburn     Hyperlipidemia 2010    Hypertension 2010       PAST SURGICAL HISTORY    Past 
          [facility] Infectious Disease         Progress Note      Koko Chao  MEDICAL RECORD NUMBER:  [id number]  AGE: 65 y.o.   GENDER: male  : 1960  EPISODE DATE:  2025      Assessment:     Patient is a 65-year-old male who I am consulted for a left parietal lobe brain abscess.    Continues to do well on exam today, reviewed cultures which thus far continue to demonstrate no evidence of growth.  He has had overall improvement in speech.      ID will reevaluate on   Will likely plan for sign off at that time patient is otherwise doing well and has no other evidence of ongoing infection      Subjective:     Chief Complaint   Patient presents with    Memory Loss         Patient is a 65-year-old male who I am following for a left parietal lobe brain abscess.    No acute events overnight, no new complaints or concerns this morning.  Stable on medical floor.      Patient Active Problem List   Diagnosis Code    Elevated PSA R97.20    Hypertension I10    Heartburn R12    Hyperlipidemia E78.5    Angina effort I20.89    COVID U07.1    Left nephrolithiasis N20.0    Mass of brain G93.89    Brain abscess G06.0    Cerebritis G04.90    Vasogenic edema (HCC) G93.6    Pneumonia of left lower lobe due to infectious organism J18.9    Left parietal mass G93.89    Acute respiratory failure (HCC) J96.00             PAST MEDICAL HISTORY        Diagnosis Date    Cancer (HCC)     CRVO (central retinal vein occlusion) (HCC) 2016    Right eye    Heartburn     Hyperlipidemia 2010    Hypertension 2010       PAST SURGICAL HISTORY    Past Surgical History:   Procedure Laterality Date    COLONOSCOPY      WNL     COLONOSCOPY N/A 3/13/2020    COLONOSCOPY DIAGNOSTIC performed by Percy Cantu MD at [facility] Endoscopy    CRANIOTOMY Left 2025    LEFT PARIETAL CRANIOTOMY FOR EVACUATION OF CEREBRAL ABSCESS performed by Zander Singh MD at [facility] OR    CRANIOTOMY Left 2025    LEFT PARIETAL CRANIOTOMY HEMATOMA 
          [facility] Infectious Disease         Progress Note      Koko Chao  MEDICAL RECORD NUMBER:  [id number]  AGE: 65 y.o.   GENDER: male  : 1960  EPISODE DATE:  2025      Assessment:     Patient is a 65-year-old male who I am consulted for a left parietal lobe brain abscess.    Continues to do well on exam today, reviewed cultures which thus far continue to demonstrate no evidence of growth.  He has had overall improvement in speech.  Remains off of all antimicrobials, ID service will likely transition to peripheral evaluation at this point      Subjective:     Chief Complaint   Patient presents with    Memory Loss         Patient is a 65-year-old male who I am following for a left parietal lobe brain abscess.    No acute events overnight, no new complaints or concerns this morning.  Patient transferred out of ICU earlier today.  Speech continues to improve on exam.      Patient Active Problem List   Diagnosis Code    Elevated PSA R97.20    Hypertension I10    Heartburn R12    Hyperlipidemia E78.5    Angina effort I20.89    COVID U07.1    Left nephrolithiasis N20.0    Mass of brain G93.89    Brain abscess G06.0    Cerebritis G04.90    Vasogenic edema (HCC) G93.6    Pneumonia of left lower lobe due to infectious organism J18.9    Left parietal mass G93.89    Acute respiratory failure (HCC) J96.00             PAST MEDICAL HISTORY        Diagnosis Date    Cancer (HCC)     CRVO (central retinal vein occlusion) (HCC) 2016    Right eye    Heartburn     Hyperlipidemia 2010    Hypertension 2010       PAST SURGICAL HISTORY    Past Surgical History:   Procedure Laterality Date    COLONOSCOPY      WNL     COLONOSCOPY N/A 3/13/2020    COLONOSCOPY DIAGNOSTIC performed by Percy Cantu MD at [facility] Endoscopy    CRANIOTOMY Left 2025    LEFT PARIETAL CRANIOTOMY FOR EVACUATION OF CEREBRAL ABSCESS performed by Zander Singh MD at [facility] OR    CRANIOTOMY Left 2025    LEFT PARIETAL CRANIOTOMY 
          [facility] Infectious Disease         Progress Note      Koko Chao  MEDICAL RECORD NUMBER:  [id number]  AGE: 65 y.o.   GENDER: male  : 1960  EPISODE DATE:  2025      Assessment:     Patient is a 65-year-old male who I am consulted for a left parietal lobe brain abscess.    Reviewed operative note from  by neurosurgery.  There is no evidence of pus on entrance to the area in the parietal collection.  The area was completely excised with culture swabs and pathology sent for further samples.  These remain in process and thus far no evidence of growth from culture.    Currently on ceftriaxone and metronidazole  White blood cell count steadily improved from peak 16.2 to now 10.7  Fever curve overall stable  Reviewed vitals  Reviewed all cultures obtained from operative intervention which thus far demonstrate no evidence of growth      Subjective:     Chief Complaint   Patient presents with    Memory Loss         Patient is a 65-year-old male who I am following for a left parietal lobe brain abscess.    Extubated on .  No acute events overnight.  Patient evaluated while in ICU.      Patient Active Problem List   Diagnosis Code    Elevated PSA R97.20    Hypertension I10    Heartburn R12    Hyperlipidemia E78.5    Angina effort I20.89    COVID U07.1    Left nephrolithiasis N20.0    Mass of brain G93.89    Brain abscess G06.0    Cerebritis G04.90    Vasogenic edema (HCC) G93.6    Pneumonia of left lower lobe due to infectious organism J18.9    Left parietal mass G93.89             PAST MEDICAL HISTORY        Diagnosis Date    Cancer (HCC)     CRVO (central retinal vein occlusion) (HCC) 2016    Right eye    Heartburn     Hyperlipidemia 2010    Hypertension 2010       PAST SURGICAL HISTORY    Past Surgical History:   Procedure Laterality Date    COLONOSCOPY      WNL     COLONOSCOPY N/A 3/13/2020    COLONOSCOPY DIAGNOSTIC performed by Percy Cantu MD at [facility] Endoscopy    
          [facility] Infectious Disease         Progress Note      Koko Chao  MEDICAL RECORD NUMBER:  [id number]  AGE: 65 y.o.   GENDER: male  : 1960  EPISODE DATE:  2025      Assessment:     Patient is a 65-year-old male who I am consulted for a left parietal lobe brain abscess.    Exam stable, evidence of mild leukocytosis, currently 18  ID service will sign off at this time  Please reach out with any additional questions or concerns      Subjective:     Chief Complaint   Patient presents with    Memory Loss         Patient is a 65-year-old male who I am following for a left parietal lobe brain abscess.    No acute events overnight, no new complaints or concerns this morning.        Patient Active Problem List   Diagnosis Code    Elevated PSA R97.20    Hypertension I10    Heartburn R12    Hyperlipidemia E78.5    Angina effort I20.89    COVID U07.1    Left nephrolithiasis N20.0    Mass of brain G93.89    Brain abscess G06.0    Cerebritis G04.90    Vasogenic edema (HCC) G93.6    Pneumonia of left lower lobe due to infectious organism J18.9    Left parietal mass G93.89    Acute respiratory failure (HCC) J96.00             PAST MEDICAL HISTORY        Diagnosis Date    Cancer (HCC)     CRVO (central retinal vein occlusion) (HCC) 2016    Right eye    Heartburn     Hyperlipidemia 2010    Hypertension        PAST SURGICAL HISTORY    Past Surgical History:   Procedure Laterality Date    COLONOSCOPY      WNL     COLONOSCOPY N/A 3/13/2020    COLONOSCOPY DIAGNOSTIC performed by Percy Cantu MD at [facility] Endoscopy    CRANIOTOMY Left 2025    LEFT PARIETAL CRANIOTOMY FOR EVACUATION OF CEREBRAL ABSCESS performed by Zander Singh MD at [facility] OR    CRANIOTOMY Left 2025    LEFT PARIETAL CRANIOTOMY HEMATOMA EVACUATION performed by Zander Singh MD at [facility] OR    CYSTOSCOPY Left 2023    CYSTOSCOPY LEFT URETERAL STENT INSERTION performed by Yo Denis MD at [facility] OR    LIVER BIOPSY 
    Hospitalist Progress Note  Internal Medicine Resident      Patient: Koko Chao 65 y.o. male      Unit/Bed: 4A-15/015-A    Admit Date: 4/11/2025      ASSESSMENT AND PLAN  Active Problems  Left parietal lobe abscess: Patient presents with aphasia ongoing for 3 weeks. CT head done in ED showed suspected intra-axial mass in the left parietal lobe. Edema is anteriorly of mass. No hydrocephalus, no midline shift.  Neurosurgery consulted.  Given dose of Keppra, Decadron in the ED. CT abdomen pelvis negative. CT chest showed groundglass opacities, no mass. MRI showed peripherally enhancing lesion of 2.4 cm within the posterior left parietal lobe with mild vasogenic edema.  ID consulted  Started on vancomycin, ceftriaxone, Flagyl  Continue Decadron 4 mg every 6 hours  Continue Keppra 500 mg twice daily  Complete echo ordered to rule out endocarditis  Hypertension: Patient on olmesartan 5 mg twice daily at home. Will substitute with Losartan 12.5mg daily  Hyperlipidemia: Last lipid panel 6/1/23 showed Cholesterol 193, HDL 66, , Tri 84. Continue home pravastatin 40mg   History of prostate cancer: Pt follows with Dr. Denis, urology  Hx of central retinal vein occulusion: Pt with blurriness in right eye at times.     LDA: []CVC / []PICC / []Midline / []Olvera / []Drains / []Mediport / [x]None  Antibiotics: Vancomycin, ceftriaxone, Flagyl  Steroids: Decadron  Labs (still needed?): [x]Yes / []No  IVF (still needed?): []Yes / [x]No    Level of care: [x]Step Down / []Med-Surg  Bed Status: [x]Inpatient / []Observation  Telemetry: [x]Yes / []No  PT/OT: []Yes / [x]No    DVT Prophylaxis: [] Lovenox / [] Heparin / [x] SCDs / [] Already on Systemic Anticoagulation / [] None     Expected discharge date: TBD  Disposition: TBD     Code status: Full Code     ===================================================================    Chief Complaint: Aphasia  Subjective (past 24 hours): Patient seen and examined at bedside.  MRI showed 
  CRITICAL CARE PROGRESS NOTE      Patient:  Koko Chao    Unit/Bed:4D-17/017-A  YOB: 1960  MRN: [id number]   PCP: Hannah Banks MD  Date of Admission: 4/11/2025  Chief Complaint:-Aphasia    Assessment and Plan:    Acute respiratory failure: Patient needed urgent hematoma evacuation as below, needed intubation in the ED.  Current settings 10/6 at 40%.  On sedation propofol, fentanyl.  Will wean ventilator as tolerated.  Acute left parietal parenchymal hemorrhage: Acute headache and hypertensive episode with SBPs 200-220s noted by overnight staff.  Patient also significantly more aphasic prompting neurosurgery evaluation and urgent head imaging. CT head showed a hemorrhage in the edematous area around the surgical resection, though there did not appear to be any bleed within the biopsy site. NSGYrecommended immediate surgical evacuation, which family agreed to. Patient was intubated and taken for left parietal craniotomy with hematoma evacuation.  He was sedated using propofol, return to the ICU intubated on propofol, fentanyl, Cardene drip with 3% hypertonic saline infusion.   Strict blood pressure control with -160. Patient on both propofol and Cardene drip. He is noted to be extremely anxious at baseline overnight. This can contribute to hypertension. We will wean sedation slowly.  We will titrate his nicardipine drip accordingly.  We will continue his home Cozaar as well.  Sodium goal remains 145-150.  Continue 3% hypertonic  Continue decadron 4mg q6h IV  Hemoglobin goal 9-10.  DVT prophylaxis SCDs.  Frequent neurochecks  Left parietal lobe abscess: Presenting symptoms of aphasia likely 2/2 abscess. There is a peripheral enhancing lesion measuring up to 2.4 cm within posterior left parietal lobe with mild vasogenic edema causing a mild mass effect upon the posterior horn of left lateral ventricle.  Infectious diseases following.  TTE ordered to r/o endocarditis, no valvular vegetations or 
  CRITICAL CARE PROGRESS NOTE      Patient:  Koko Retanaer    Unit/Bed:4D-17/017-A  YOB: 1960  MRN: [id number]   PCP: Hannah Banks MD  Date of Admission: 4/11/2025  Chief Complaint:-Aphasia    Assessment and Plan:    Left parietal lobe abscess: Presenting symptoms aphasia likely 2/2 abscess. There is a peripheral enhancing lesion measuring up to 2.4 cm within posterior left parietal lobe with mild vasogenic edema causing a mild mass effect upon the posterior horn of left lateral ventricle; imaging included below.  Infectious diseases following.  TTE ordered to r/o endocarditis, TV/PV/MV/AV without any vegetations or mass.  Currently on broad-spectrum antimicrobials Vancocin/Flagyl/Rocephin.  NSGY following, plan to biopsy 4/16 after 5-day ASA washout. TEG platelet mapping ordered 4/14, showing all values WNL (ADP % aggregation 95.6). Patient and family still wish to wait for 4/16 as daughter is flying in on 4/15 evening to be present for biopsy.   Tentative biopsy 4/16 per neurosurgery.    Continuous, titratable 3% hypertonic saline for vasogenic edema. Sodium goal 145-150.  Every 4 hours sodium; chronologic trend since arrival: [phone].  Continue Vancocin/Flagyl/Rocephin.  Blood cultures 1, 2 NGTD 48 hours.  Collected 4/11/25.  Pending Bartonella IgG, IgM by IFA.  Keppra 500 Mg IV every 12 hours for seizure prophylaxis, seizure precautions.  N.p.o. at midnight 4/15.  Aphasia: Suspected 2/2 left parietal lobe abscess as above.  NSGY following.  Denies difficulty with reading comprehension, no episodes of fluent aphasia, no prior episodes of aphasia.   SLP following.  Patient tolerating p.o. diet.  Prostate cancer history: Follows with urology Dr. Braxton.  Active surveillance with a history of 3 biopsies.  Surveillance with follow-ups PSAs/MRIs.  Pi-Rads 2.  BPH: No medication use.  History of Rapaflo, patient no longer on it.  History of kidney stone: History 
  CRITICAL CARE PROGRESS NOTE      Patient:  Koko Retanaer    Unit/Bed:4D-17/017-A  YOB: 1960  MRN: [id number]   PCP: Hannah Banks MD  Date of Admission: 4/11/2025  Chief Complaint:-Aphasia    Assessment and Plan:    Left parietal lobe abscess: Presenting symptoms aphasia likely 2/2 abscess. There is a peripheral enhancing lesion measuring up to 2.4 cm within posterior left parietal lobe with mild vasogenic edema causing a mild mass effect upon the posterior horn of left lateral ventricle; imaging included below.  Infectious diseases following.  TTE ordered to r/o endocarditis, no valvular vegetations or mass noted. Toxoplasma IgG/IgM negative. On broad-spectrum antimicrobials Vancocin/Flagyl/Rocephin.TEG mapping WNL. On 3% for sodium goal 145-50. Planned biopsy 4/16 per neurosurgery.  N.p.o. at midnight 4/15.  Continue 3% for goal sodium 145-150  Continue Vancocin/Flagyl/Rocephin.  Blood cultures 1, 2 collected 4/11/25.  NGTD 48 hours.   Pending Bartonella IgG, IgM by IFA.  Keppra 500 Mg IV every 12 hours for seizure prophylaxis, seizure precautions.  Aphasia: Suspected 2/2 left parietal lobe abscess as above.  NSGY following.  Denies difficulty with reading comprehension, no episodes of fluent aphasia, no prior episodes of aphasia. SLP on board.   Nocturnal Hypoxia: Patient with brief episode of overnight hypoxia 4/15-4/16 with saturation 86% requiring 2L NC. Wean oxygen as tolerated.   Prostate cancer history: Follows with urology Dr. Braxton.  Active surveillance with a history of 3 biopsies.  Surveillance with follow-ups PSAs/MRIs.  Pi-Rads 2.  BPH: No medication use.  History of Rapaflo, patient no longer on it.  History of kidney stone: History [facility], nursing to obtain outside medical records.  HTN: Losartan 12.5 Mg with holding parameters. Home medication olmesartan on hold.  HLD: 40 mg statin.  Lipid panel 6/1/2023 showed TC/HDL/LDL/triglyceride 193/66/110/84.  Retinal central vein occlusion: 
  CRITICAL CARE PROGRESS NOTE      Patient:  Koko Retanaer    Unit/Bed:4D-17/017-A  YOB: 1960  MRN: [id number]   PCP: Hannah Banks MD  Date of Admission: 4/11/2025  Chief Complaint:-Aphasia    Assessment and Plan:    Acute respiratory failure: Patient needed urgent hematoma evacuation as below, intubated in the OR, extubated yesterday on 4/19, tolerated very well.  Neurosurgery requiring strict blood pressure control as outlined for Monday 4/21.  Acute left parietal parenchymal hemorrhage:  The patient experienced an acute hypertensive episode overnight, -220.  Companied by sudden onset headache and worsening aphasia.  As noted by the overnight team, it due to these changes and neurosurgery was urgently consulted, CT head obtained.  And reveals hemorrhage in the edematous area surrounding the prior surgical resection, no bleeding was identified at the biopsy site. Neurosurgery recommended immediate surgical evacuation, which was discussed with and approved by the family.  The patient was subsequently intubated and taken to the OR for left parietal craniotomy with hematoma evacuation.  Postoperatively he returned to the ICU intubated, sedated on propofol infusion, received fentanyl for analgesia, maintained on Cardene drip for blood pressure control, and started on 30% hypertonic saline for cerebral edema management.  Systolic blood pressure control with -160 patient on Cardene drip, and propofol has stopped.  Titrate his nicardipine drip accordingly, continue home Cozaar as well.  Sodium goal remain 145-150, continue 3% hypertonic saline  Continue Decadron 4 mg every 6 hours IV  Hemoglobin goals 9-10,  Neurocheck every 4 hours.  Left parietal lobe abscess: Patient with aphasia likely to 2/2 abscess, peripheral enhancing lesion measuring up to 2.4 cm within posterior left parietal lobe, with mild vasogenic edema causing a mild mass affect abutting the posterior horn of the left lateral 
  CRITICAL CARE PROGRESS NOTE      Patient:  Koko Retanaer    Unit/Bed:4D-17/017-A  YOB: 1960  MRN: [id number]   PCP: Hannah Banks MD  Date of Admission: 4/11/2025  Chief Complaint:-Aphasia    Assessment and Plan:    Left parietal lobe abscess: Aphasia 2/2 abscess.  There is a concern for malignancy present.  Peripheral enhancing lesion measuring up to 2.4 cm within posterior left parietal lobe, with mild vasogenic edema causing a mild mass affect abutting the posterior horn of the left lateral ventricle.  ID following, TTE ordered to rule out endocarditis, no valvular vegetation or mass noted.  All infectious workup negative. TEG mapping within normal limits.  Initially on 3%  hypertonic, now on mannitol.  Sodium goals 145-150.  SBP goals 110-160.  Pending biopsy result. SBP goal 110-160 s/p biopsy.  Serum osm goal 300-310, sodium goal 145-150.   Mannitol for cerebral edema, 25 g IV given over 2 hours, every 6 hours.   0.5mg Bumex IV, hold parameters for BP, sOsm in place.    Recheck sodiums every 6 hours.   Continue Decadron 4 Mg IV every 6 hours.  WBC today 19.8, no fevers overnight  Every 4 hours neurochecks.  Keppra 500 mg IV every 12 hours for seizure prophylaxis, seizure precaution.  Left parietal parenchymal hemorrhage:  The patient experienced an acute hypertensive episode overnight, with -220.  Accompanied by sudden onset headache and worsening aphasia.  Neurosurgery obtained head CT showing hemorrhage in edematous area surrounding prior resection site, with a bleed-free biopsy site.  Taken in for emergency surgical evacuation, tolerated successfully.  Intubated in OR, remained intubated on return to ICU.  Patient sedated with propofol, fentanyl for analgesia.  Patient subsequently weaned from sedation, and extubated over the weekend 4/19-4/20.  Nicardipine drip used for backup blood pressure control, as patient transitioned to home p.o. Cozaar. Repeat head CT 4/20 showing reduction in 
  CRITICAL CARE PROGRESS NOTE      Patient:  Koko Retanaer    Unit/Bed:4D-17/017-A  YOB: 1960  MRN: [id number]   PCP: Hannah Banks MD  Date of Admission: 4/11/2025  Chief Complaint:- Aphasia    Assessment and Plan:    Left parietal lobe abscess: Aphasia 2/2 abscess.  There is a concern for malignancy present.  Peripheral enhancing lesion measuring up to 2.4 cm within posterior left parietal lobe, with mild vasogenic edema causing a mild mass affect abutting the posterior horn of the left lateral ventricle.  ID following, TTE ordered to rule out endocarditis, no valvular vegetation or mass noted.  All infectious workup negative. TEG mapping within normal limits.  Initially on 3%  hypertonic, now on mannitol.  Sodium goals 145-150.  SBP goals 110-160.  Pending biopsy result. SBP goal 110-160 s/p biopsy.  Serum osm goal 300-310, sodium goal 145-150.   Mannitol for cerebral edema, 25 g IV given over 2 hours, every 6 hours.   0.5mg Bumex IV, hold parameters for BP, sOsm in place.    Recheck sodiums every 6 hours.   Continue Decadron 4 Mg IV every 6 hours.  WBC today 19.8, no fevers overnight  Every 4 hours neurochecks.  Keppra 500 mg IV every 12 hours for seizure prophylaxis, seizure precaution.  Left parietal parenchymal hemorrhage:  The patient experienced an acute hypertensive episode overnight, with -220.  Accompanied by sudden onset headache and worsening aphasia.  Neurosurgery obtained head CT showing hemorrhage in edematous area surrounding prior resection site, with a bleed-free biopsy site.  Taken in for emergency surgical evacuation, tolerated successfully.  Intubated in OR, remained intubated on return to ICU.  Patient sedated with propofol, fentanyl for analgesia.  Patient subsequently weaned from sedation, and extubated over the weekend 4/19-4/20.  Nicardipine drip used for backup blood pressure control, as patient transitioned to home p.o. Cozaar. Repeat head CT 4/20 showing reduction 
  CRITICAL CARE PROGRESS NOTE      Patient:  Koko Retanaer    Unit/Bed:4D-17/017-A  YOB: 1960  MRN: [id number]   PCP: Hannah Banks MD  Date of Admission: 4/11/2025  Chief Complaint:-Aphasia    Assessment and Plan:    Left parietal lobe abscess: Presenting symptoms aphasia likely 2/2 abscess. There is a peripheral enhancing lesion measuring up to 2.4 cm within posterior left parietal lobe with mild vasogenic edema causing a mild mass effect upon the posterior horn of left lateral ventricle; imaging included below.  Infectious diseases following.  TTE ordered to r/o endocarditis, TV/PV/MV/AV without any vegetations or mass.  Currently on broad-spectrum antimicrobials Vancocin/Flagyl/Rocephin.  NSGY following, plan to biopsy  Tentative biopsy 4/16 per neurosurgery.  Patient request to wait for 5-day ASA washout, which will end of 4/16.  Continuous 3% hypertonic saline for vasogenic edema. Sodium goal 145-150.  Every 4 hours sodium; chronologic trend since arrival: [phone].  Continue Vancocin/Flagyl/Rocephin.  Blood cultures 1, 2 NGTD 48 hours.  Collected 4/11/25.  Pending Bartonella IgG, IgM by IFA.  Keppra 500 Mg IV every 12 hours, seizure prophylaxis.  Aphasia: Suspected 2/2 left parietal lobe abscess as above.  NSGY following.  Denies difficulty with reading comprehension, no episodes of fluent aphasia, no prior episodes of aphasia.  No family history of aphasia.  Prostate cancer history: Follows with urology Dr. Braxton.  Active surveillance with a history of 3 biopsies.  Surveillance with follow-ups PSAs/MRIs.  Pi-Rads 2.  BPH: No medication use.  History of Rapaflo, patient no longer on it.  History of kidney stone: History [facility], nursing to obtain outside medical records.  HTN: Losartan 12.5 Mg with holding parameters  HLD: 40 mg statin.  Lipid panel 6/1/2023 showed TC/HDL/LDL/triglyceride 193/66/110/84.  Retinal central vein occlusion: Historically has intermittent 
  CRITICAL CARE PROGRESS NOTE      Patient:  Koko Retanaer    Unit/Bed:4D-17/017-A  YOB: 1960  MRN: [id number]   PCP: Hannah Banks MD  Date of Admission: 4/11/2025  Chief Complaint:-Aphasia    Assessment and Plan:    Left parietal lobe abscess: Aphasia 2/2 abscess 3 weeks PTA; peripheral enhancing lesion measuring up to 2.4 cm within posterior left parietal lobe.  There is significant concern for malignancy. Neurosurgery performed biopsy, send out to [facility]. ID following, TTE ordered to rule out endocarditis, no valvular vegetation or mass noted. WBC count steadily increasing since steroids, patient largely afebrile. All infectious workup negative. Off Abx. TEG mapping within normal limits. Sodium goals 145-150 during ICU stay. Initially on 3%  hypertonic, transitioned to mannitol with IV Bumex to help augment sodium/serum sOSM goal. Also on decadron 4mg IV q6h. SBP goals 110-160. On home meds Cozaar. Added Norvasc, clonidine patch. Seizure prophylaxis.  Follow biopsy results.  Recommending palliative consult.   Continue Decadron 4 Mg IV every 6 hours.  Every 4 hours neurochecks.  Keppra 500 mg IV every 12 hours for seizure prophylaxis, seizure precaution.    Left parietal parenchymal hemorrhage:  Overnight 4/16-4/17, patient experienced an acute hypertensive episode with -220. Accompanied by sudden onset headache and worsening aphasia.  Neurosurgery obtained head CT showing hemorrhage in edematous area surrounding prior resection site, with a bleed-free biopsy site.  Taken in for emergency surgical evacuation, tolerated successfully.  Intubated in OR, remained intubated on return to ICU.  Patient sedated with propofol, fentanyl for analgesia.  Patient subsequently weaned from sedation, and extubated over the weekend 4/19-4/20. SBP strictly monitored for goal 110-160. Nicardipine drip used for backup blood pressure control, as patient weaned from sedation while transitioning to home 
 [facility]  INPATIENT PHYSICAL THERAPY  DAILY NOTE  STR NEUROSCIENCES 4A - 4A-04/004-A      Discharge Recommendations: Inpatient Rehabilitation  Equipment Recommendations:    monitor for needs            Timed Code Treatment Minutes: 38 Minutes  PT Concurrent Minutes  Time In: 1009  Time Out: 1047  Minutes: 38       Date: 2025  Patient Name: Koko Chao,  Gender:  male        MRN: [id number]  : 1960  (65 y.o.)     Referring Practitioner: Davidson Payne PA-C  Diagnosis: Mass of brain  Additional Pertinent Hx: Per EMR: \"Koko Chao is a 65 y.o. male with PMHx of hypertension, hyperlipidemia, central retinal vein occlusion, prostate cancer who presents to [facility] with chief complaint of aphasia.  Patient reports had 10-minute episode on 4/10 where he was not able to express his language. Patient reports this has been ongoing for 3 weeks. CT head done in ED showed suspected intra-axial mass in the left parietal lobe. Edema is anteriorly of mass.\"  Pt is s/p LEFT PARIETAL CRANIOTOMY FOR EVACUATION OF CEREBRAL ABSCESS on 25 by Dr Singh.  Pt also s/p LEFT PARIETAL CRANIOTOMY HEMATOMA EVACUATION on 25 by Dr Singh.     Prior Level of Function:  Lives With: Spouse  Type of Home: House  Home Layout: One level  Home Access: Level entry  Home Equipment: None   Bathroom Shower/Tub: Walk-in shower  Bathroom Toilet: Standard  Bathroom Equipment: None    Prior Level of Assist for ADLs: Independent  Prior Level of Assist for Homemaking: Independent  Prior Level of Assist for Transfers: Independent  Active : Yes  Prior Level of Assist for Ambulation: Independent household ambulator, with or without device  Has the patient had two or more falls in the past year or any fall with injury in the past year?: No    Restrictions/Precautions:  Restrictions/Precautions: Fall Risk, Seizure, General Precautions  Position Activity Restriction  Other Position/Activity 
 [facility]  INPATIENT PHYSICAL THERAPY  ReAssessment/DAILY NOTE  [facility] ICU 4D - 4D-17/017-A      Discharge Recommendations: Inpatient Rehabilitation  Equipment Recommendations:    monitor for needs          Time In: 1421  Time Out: 1444  Timed Code Treatment Minutes: 23 Minutes  Minutes: 23          Date: 2025  Patient Name: Koko Chao,  Gender:  male        MRN: [id number]  : 1960  (65 y.o.)     Referring Practitioner: Davidson Payne PA-C  Diagnosis: Mass of brain  Additional Pertinent Hx: Per EMR: \"Koko Chao is a 65 y.o. male with PMHx of hypertension, hyperlipidemia, central retinal vein occlusion, prostate cancer who presents to [facility] with chief complaint of aphasia.  Patient reports had 10-minute episode on 4/10 where he was not able to express his language. Patient reports this has been ongoing for 3 weeks. CT head done in ED showed suspected intra-axial mass in the left parietal lobe. Edema is anteriorly of mass.\"  Pt is s/p LEFT PARIETAL CRANIOTOMY FOR EVACUATION OF CEREBRAL ABSCESS on 25 by Dr Singh.  Pt also s/p LEFT PARIETAL CRANIOTOMY HEMATOMA EVACUATION on 25 by Dr Singh.     Prior Level of Function:  Lives With: Spouse  Type of Home: House  Home Layout: One level  Home Access: Level entry  Home Equipment: None   Bathroom Shower/Tub: Walk-in shower  Bathroom Toilet: Standard  Bathroom Equipment: None    Prior Level of Assist for ADLs: Independent  Prior Level of Assist for Homemaking: Independent  Prior Level of Assist for Transfers: Independent  Active : Yes  Prior Level of Assist for Ambulation: Independent household ambulator, with or without device  Has the patient had two or more falls in the past year or any fall with injury in the past year?: No    Restrictions/Precautions:  Restrictions/Precautions: Fall Risk, Seizure, General Precautions  Position Activity Restriction  Other Position/Activity Restrictions: SBP 
 [facility]  INPATIENT PHYSICAL THERAPY  DAILY NOTE  [facility] ICU 4D - 4D-17/017-A      Discharge Recommendations:  Pt would benefit from an IPR stay   Equipment Recommendations:    monitor for needs              Time In: 1442  Time Out: 1509  Timed Code Treatment Minutes: 27 Minutes  Minutes: 27          Date: 2025  Patient Name: Koko Chao,  Gender:  male        MRN: [id number]  : 1960  (65 y.o.)     Referring Practitioner: Davidson Payne PA-C  Diagnosis: Mass of brain  Additional Pertinent Hx: Per EMR: \"Koko Chao is a 65 y.o. male with PMHx of hypertension, hyperlipidemia, central retinal vein occlusion, prostate cancer who presents to [facility] with chief complaint of aphasia.  Patient reports had 10-minute episode on 4/10 where he was not able to express his language. Patient reports this has been ongoing for 3 weeks. CT head done in ED showed suspected intra-axial mass in the left parietal lobe. Edema is anteriorly of mass.\"  Pt is s/p LEFT PARIETAL CRANIOTOMY FOR EVACUATION OF CEREBRAL ABSCESS on 25 by Dr Singh.  Pt also s/p LEFT PARIETAL CRANIOTOMY HEMATOMA EVACUATION on 25 by Dr Singh.     Prior Level of Function:  Lives With: Spouse  Type of Home: House  Home Layout: One level  Home Access: Level entry  Home Equipment: None   Bathroom Shower/Tub: Walk-in shower  Bathroom Toilet: Standard  Bathroom Equipment: None    Prior Level of Assist for ADLs: Independent  Prior Level of Assist for Homemaking: Independent  Prior Level of Assist for Transfers: Independent  Active : Yes  Prior Level of Assist for Ambulation: Independent household ambulator, with or without device  Has the patient had two or more falls in the past year or any fall with injury in the past year?: No    Restrictions/Precautions:  Restrictions/Precautions: Fall Risk, Seizure, General Precautions  Position Activity Restriction  Other Position/Activity Restrictions: SBP 
 [facility]  INPATIENT PHYSICAL THERAPY  DAILY NOTE  [facility] NEUROSCIENCES 4A - 4A-04/004-A      Discharge Recommendations: Inpatient Rehabilitation  Equipment Recommendations:    monitor for needs            Time In: 0856  Time Out: 0935  Timed Code Treatment Minutes: 39 Minutes  Minutes: 39          Date: 2025  Patient Name: Koko Chao,  Gender:  male        MRN: [id number]  : 1960  (65 y.o.)     Referring Practitioner: Davidson Payne PA-C  Diagnosis: Mass of brain  Additional Pertinent Hx: Per EMR: \"Koko Chao is a 65 y.o. male with PMHx of hypertension, hyperlipidemia, central retinal vein occlusion, prostate cancer who presents to [facility] with chief complaint of aphasia.  Patient reports had 10-minute episode on 4/10 where he was not able to express his language. Patient reports this has been ongoing for 3 weeks. CT head done in ED showed suspected intra-axial mass in the left parietal lobe. Edema is anteriorly of mass.\"  Pt is s/p LEFT PARIETAL CRANIOTOMY FOR EVACUATION OF CEREBRAL ABSCESS on 25 by Dr Singh.  Pt also s/p LEFT PARIETAL CRANIOTOMY HEMATOMA EVACUATION on 25 by Dr Singh.     Prior Level of Function:  Lives With: Spouse  Type of Home: House  Home Layout: One level  Home Access: Level entry  Home Equipment: None   Bathroom Shower/Tub: Walk-in shower  Bathroom Toilet: Standard  Bathroom Equipment: None    Prior Level of Assist for ADLs: Independent  Prior Level of Assist for Homemaking: Independent  Prior Level of Assist for Transfers: Independent  Active : Yes  Prior Level of Assist for Ambulation: Independent household ambulator, with or without device  Has the patient had two or more falls in the past year or any fall with injury in the past year?: No    Restrictions/Precautions:  Restrictions/Precautions: Fall Risk, Seizure, General Precautions  Position Activity Restriction  Other Position/Activity Restrictions: SBP 
 [facility]  INPATIENT PHYSICAL THERAPY  DAILY NOTE  [facility] ICU 4D - 4D-17/017-A      Discharge Recommendations: Patient would benefit from continued PT at discharge and PT needs to reassess since additional sx on   Equipment Recommendations:    monitor for needs          Time In: 1350  Time Out: 1403  Timed Code Treatment Minutes: 13 Minutes  Minutes: 13          Date: 2025  Patient Name: Koko Chao,  Gender:  male        MRN: [id number]  : 1960  (65 y.o.)     Referring Practitioner: Davidson Payne PA-C  Diagnosis: Mass of brain  Additional Pertinent Hx: Per EMR: \"Koko Chao is a 65 y.o. male with PMHx of hypertension, hyperlipidemia, central retinal vein occlusion, prostate cancer who presents to [facility] with chief complaint of aphasia.  Patient reports had 10-minute episode on 4/10 where he was not able to express his language. Patient reports this has been ongoing for 3 weeks. CT head done in ED showed suspected intra-axial mass in the left parietal lobe. Edema is anteriorly of mass.\"  Pt is s/p LEFT PARIETAL CRANIOTOMY FOR EVACUATION OF CEREBRAL ABSCESS on 25 by Dr Singh.  Pt also s/p LEFT PARIETAL CRANIOTOMY HEMATOMA EVACUATION on 25 by Dr Singh.     Prior Level of Function:  Lives With: Spouse  Type of Home: House  Home Layout: One level  Home Access: Level entry  Home Equipment: None   Bathroom Shower/Tub: Walk-in shower  Bathroom Toilet: Standard  Bathroom Equipment: None    Prior Level of Assist for ADLs: Independent  Prior Level of Assist for Homemaking: Independent  Prior Level of Assist for Transfers: Independent  Active : Yes  Prior Level of Assist for Ambulation: Independent household ambulator, with or without device  Has the patient had two or more falls in the past year or any fall with injury in the past year?: No    Restrictions/Precautions:  Restrictions/Precautions: Fall Risk, Seizure, General 
 [facility]  INPATIENT PHYSICAL THERAPY  DAILY NOTE  [facility] ICU 4D - 4D-17/017-A      Discharge Recommendations: Inpatient Rehabilitation  Equipment Recommendations:    monitor for needs            Time In: 925  Time Out: 937  Timed Code Treatment Minutes: 12 Minutes  Minutes: 12          Date: 2025  Patient Name: Koko Chao,  Gender:  male        MRN: [id number]  : 1960  (65 y.o.)     Referring Practitioner: Davidson Payne PA-C  Diagnosis: Mass of brain  Additional Pertinent Hx: Per EMR: \"Koko Chao is a 65 y.o. male with PMHx of hypertension, hyperlipidemia, central retinal vein occlusion, prostate cancer who presents to [facility] with chief complaint of aphasia.  Patient reports had 10-minute episode on 4/10 where he was not able to express his language. Patient reports this has been ongoing for 3 weeks. CT head done in ED showed suspected intra-axial mass in the left parietal lobe. Edema is anteriorly of mass.\"  Pt is s/p LEFT PARIETAL CRANIOTOMY FOR EVACUATION OF CEREBRAL ABSCESS on 25 by Dr Singh.  Pt also s/p LEFT PARIETAL CRANIOTOMY HEMATOMA EVACUATION on 25 by Dr Singh.     Prior Level of Function:  Lives With: Spouse  Type of Home: House  Home Layout: One level  Home Access: Level entry  Home Equipment: None   Bathroom Shower/Tub: Walk-in shower  Bathroom Toilet: Standard  Bathroom Equipment: None    Prior Level of Assist for ADLs: Independent  Prior Level of Assist for Homemaking: Independent  Prior Level of Assist for Transfers: Independent  Active : Yes  Prior Level of Assist for Ambulation: Independent household ambulator, with or without device  Has the patient had two or more falls in the past year or any fall with injury in the past year?: No    Restrictions/Precautions:  Restrictions/Precautions: Fall Risk, Seizure, General Precautions  Position Activity Restriction  Other Position/Activity Restrictions: SBP <160, low 
1238- pt to pacu, resp easy and unlabored, VSS, pt appears in no acute distress, pt states \"hard to breath\", pt coughing up clear mucous, this RN suctioning it out, pt placed on 4L NC, pt appears in no acute distress, vitals stabilized.   1245- duo neb given  1250- pt resting in bed with eyes  closed, resp easy and unlabored, VSS, pt states he feels nauseous, pt placed on 2L NC, pt appears in no acute distress  1254- droperidol given  1305- hydralazine given  1310- pt resting in bed with eyes closed, resp easy and unlabored, VSS, pt appears in no acute distress, pt denies pain and states nausea improved  1315- pt to CT scan in stable condition, RN at bedside  1322- pt to ICU in stable condition, report to Damaso FERREIRA on 4D  1333- attempted to call pt's wife, no answer  
1935 - Pt notified this RN and dayschance RN that he was bleeding from his craniotomy dressing site and having new 6/10 headache.    1943 - Dressing over surgical site changed with assistance of HANNA Blevins. Dr. Cooper notified of bleeding.    1945 - Pt medicated for pain per MAR. Neuro status assessed. Pt unable to tell me his name, location, birthday, or the current month.    1954 - Dressing over surgical site saturated again. Reinforced again.     1958 - Message placed to Dr. Singh per [employer]. Notified him of neuro status and bleeding. STAT head CT ordered. José Manuel RN contacting pt's wife.     2022 - CT completed.     2028 - This RN received call from Dr. Singh informing me of plan to return to OR.     2030 - /78    2034 - 5mg hydralazine given    2040 - Family at bedside, Dr. Singh arrives at bedside.     2119 - 5mg of hydralazine given    2120 - OR staff arrive to bring pt to OR, reported off to OR RN.   
[facility]  SPEECH THERAPY MISSED TREATMENT NOTE  [facility] OR      Date: 2025  Patient Name: Koko Chao        MRN: [id number]    : 1960  (65 y.o.)    REASON FOR MISSED TREATMENT:  Patient MANUEL for neurosurgical interventions. Will complete repeat evaluations as medical status indicates.     Lucy Castellanos M.A., CCC-SLP [id number]            
[facility]  Neurosurgery - Dr. Zander Singh MD  Daily Progress Note  Pt Name: Koko Chao  Medical Record Number: [id number]  Date of Birth 1960   Today's Date: 4/23/2025    SUBJECTIVE  Pt doing well. Adequate analgesia. Pt is tolerating a  (DIET). Pt tolerating (ACTIVITY) Pt is passing flatus and has had a bowel movement. Pt denies any nausea of vomiting.    OBJECTIVE  CURRENT VITALS BP (!) 150/83   Pulse 70   Temp 97.5 °F (36.4 °C) (Oral)   Resp 14   Ht 1.727 m (5' 8\")   Wt 97.9 kg (215 lb 13.3 oz)   SpO2 95%   BMI 32.82 kg/m²   GENERAL: alert  LUNGS:   HEART:   ABDOMEN:   INCISION: healing well  EXTERMITY:     Yehuda is awake alert and following commands today.  His neuro exam has improved.  He still has mild word finding difficulties but this is actually improved as well.  His incision is healing well with no signs of infection.    24 HR INTAKE/OUTPUT :   Intake/Output Summary (Last 24 hours) at 4/23/2025 0851  Last data filed at 4/23/2025 0600  Gross per 24 hour   Intake 681.02 ml   Output 3100 ml   Net -2418.98 ml     DRAIN/TUBE OUTPUT :      LABS  CBC :   Lab Results   Component Value Date/Time    WBC 23.3 04/23/2025 04:04 AM    HGB 13.2 04/23/2025 04:04 AM    HCT 39.6 04/23/2025 04:04 AM     04/23/2025 04:04 AM     BMP:   Lab Results   Component Value Date/Time     04/23/2025 04:04 AM    K 4.1 04/23/2025 04:04 AM    K 4.1 04/19/2025 03:45 AM     04/23/2025 04:04 AM    CO2 23 04/23/2025 04:04 AM    BUN 26 04/23/2025 04:04 AM    CREATININE 0.6 04/23/2025 04:04 AM    MG 2.1 04/14/2025 05:55 AM       ASSESSMENT  1Lizbeth Blackman is now postop day 7 status post 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  Postop imaging shows resection of the left parietal mass.  The patient thereafter h a hypertensive episode a day and a half after surgery which necessitated evacuation of hematoma.  I suspect that the patient experienced an infarct in the left 
[facility]  Neurosurgery - Dr. Zander Singh MD  Daily Progress Note  Pt Name: Koko Chao  Medical Record Number: [id number]  Date of Birth 1960   Today's Date: 4/17/2025    SUBJECTIVE  Pt doing well. Adequate analgesia. Pt is tolerating a  (DIET). Pt tolerating (ACTIVITY) Pt is passing flatus and has had a bowel movement. Pt denies any nausea of vomiting.    OBJECTIVE  CURRENT VITALS BP (!) 185/74   Pulse 85   Temp 98.4 °F (36.9 °C) (Oral)   Resp 21   Ht 1.727 m (5' 8\")   Wt 87.5 kg (192 lb 14.4 oz)   SpO2 93%   BMI 29.33 kg/m²   GENERAL:   LUNGS:   HEART:   ABDOMEN:   INCISION: no significant drainage  EXTERMITY:     Koko is postoperative day 1 status post left posterior parietal resection of left parietal mass.  Koko is alert and following commands.  He is moving all 4 extremities equally.  He has a delay on identifying objects.  He was able to identify my watch.  He does have word finding difficulty.  This was present preoperatively.  Otherwise Koko's incision is healing well and he has no complaints at this time.      24 HR INTAKE/OUTPUT :   Intake/Output Summary (Last 24 hours) at 4/17/2025 0909  Last data filed at 4/17/2025 0800  Gross per 24 hour   Intake 3834.92 ml   Output 2150 ml   Net 1684.92 ml     DRAIN/TUBE OUTPUT :      LABS  CBC :   Lab Results   Component Value Date/Time    WBC 13.6 04/17/2025 03:52 AM    HGB 12.5 04/17/2025 03:52 AM    HCT 37.8 04/17/2025 03:52 AM     04/17/2025 03:52 AM     BMP:   Lab Results   Component Value Date/Time     04/17/2025 03:51 AM     04/17/2025 03:51 AM    K 4.3 04/17/2025 03:51 AM     04/17/2025 03:51 AM    CO2 19 04/17/2025 03:51 AM    BUN 13 04/17/2025 03:51 AM    CREATININE 0.7 04/17/2025 03:51 AM    MG 2.1 04/14/2025 05:55 AM       ASSESSMENT  1. Koko is postoperative day 1 status post left parietal craniotomy for resection of left parietal mass.  Koko was found to have some word finding difficulties 
[facility] [place]  Neurosurgery - Dr. Zander Singh MD  Daily Progress Note  Pt Name: Koko Chao  Medical Record Number: [id number]  Date of Birth 1960   Today's Date: 4/14/2025    SUBJECTIVE  Pt doing well. Adequate analgesia. Pt is tolerating a  (DIET). Pt tolerating (ACTIVITY) Pt is passing flatus and has had a bowel movement. Pt denies any nausea of vomiting.    OBJECTIVE  CURRENT VITALS BP (!) 138/59   Pulse 73   Temp 97.7 °F (36.5 °C) (Oral)   Resp 11   Ht 1.727 m (5' 8\")   Wt 84.6 kg (186 lb 8.2 oz)   SpO2 95%   BMI 28.36 kg/m²   GENERAL: alert  LUNGS:   HEART:   ABDOMEN:   INCISION:   EXTERMITY:   Koko is awake alert and following commands.  His expressive aphasia and word finding difficulties seem to meet of improved slightly he has no pronator drift.  Overall his exam is stable.      24 HR INTAKE/OUTPUT :   Intake/Output Summary (Last 24 hours) at 4/14/2025 0957  Last data filed at 4/14/2025 0909  Gross per 24 hour   Intake 1487.65 ml   Output 200 ml   Net 1287.65 ml     DRAIN/TUBE OUTPUT :    LABS  CBC :   Lab Results   Component Value Date/Time    WBC 12.6 04/14/2025 05:55 AM    HGB 13.1 04/14/2025 05:55 AM    HCT 40.6 04/14/2025 05:55 AM     04/14/2025 05:55 AM     BMP:   Lab Results   Component Value Date/Time     04/14/2025 05:55 AM    K 3.8 04/14/2025 05:55 AM    K 4.1 11/08/2018 11:23 AM     04/14/2025 05:55 AM    CO2 22 04/14/2025 05:55 AM    BUN 19 04/14/2025 05:55 AM    CREATININE 0.7 04/14/2025 05:55 AM    MG 2.1 04/14/2025 05:55 AM       ASSESSMENT  1. 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  He is currently on empiric antibiotic coverage.  Koko had taken aspirin approximately 3 days ago.  Neurologically he is doing well.  He is currently being worked up for a source of possible hematogenous spread to his brain.    PLAN  1.  Follows a 65-year-old man with a left parietal likely cerebral abscess.  He had taken 
[facility] [place]  SPEECH THERAPY MISSED TREATMENT NOTE  [facility] ICU 4D      Date: 2025  Patient Name: Koko Chao        MRN: [id number]    : 1960  (65 y.o.)    REASON FOR MISSED TREATMENT:    HANNA Valdez reporting new brain bleed. Patient intubated, sedated d/t agitation + BP spike, not following commands. Patient not appropriate for multi-modal communication therapy at this time. Plan to re-attempt when appropriate.     Anju LIZARRAGA. Student SLP Intern        
Await precert submitted for IPR 4/24.   
Called [facility] requested information on consults for oncology.    
Called insurance company to check on the precert was informed that the case has been \"pended\".    
[place]  [facility] NEUROSCIENCES 4A  Occupational Therapy  Daily Note    Discharge Recommendations: Inpatient Rehabilitation  Equipment Recommendations:   will continue to assess pending progress, discharge location      Time In: 1127  Time Out: 1153  Timed Code Treatment Minutes: 26 Minutes  Minutes: 26          Date: 2025  Patient Name: Koko Chao,   Gender: male      Room: [address] [place] [address]  MRN: [id number]  : 1960  (65 y.o.)  Referring Practitioner: Davidson Payne PA-C  Diagnosis: Mass of brain  Additional Pertinent Hx: Per EMR: Koko Chao is a 65 y.o. male with PMHx of hypertension, hyperlipidemia, central retinal vein occlusion, prostate cancer who presents to [facility] with chief complaint of aphasia.  Patient reports had 10-minute episode on 4/10 where he was not able to express his language. Patient reports this has been ongoing for 3 weeks. CT head done in ED showed suspected intra-axial mass in the left parietal lobe. Edema is anteriorly of mass. Pt s/p LEFT PARIETAL CRANIOTOMY FOR EVACUATION OF CEREBRAL ABSCESS on 25. Repeat CT due to increased aphasia, H/A was done right away and showed a hemorrhage really not in the surgical cavity but rather in the edematous area surrounding the surgical cavity. Pt s/p LEFT PARIETAL CRANIOTOMY HEMATOMA EVACUATION on 25. Pt extubated  AM.    Restrictions/Precautions:  Restrictions/Precautions: Fall Risk, Seizure, General Precautions  Position Activity Restriction  Other Position/Activity Restrictions: SBP <160, low stim, + for DVT in right calf (per MD ok for therapy)     Social/Functional History:  Lives With: Spouse  Type of Home: House  Home Layout: One level  Home Access: Level entry  Home Equipment: None   Bathroom Shower/Tub: Walk-in shower  Bathroom Toilet: Standard  Bathroom Equipment: None       Prior Level of Assist for ADLs: Independent  Prior Level of Assist for Homemaking: Independent  Prior Level 
[facility]  INPATIENT OCCUPATIONAL THERAPY  [facility] ICU 4D  EVALUATION      Discharge Recommendations: (IPR vs home with / and  OT)  Equipment Recommendations:   will continue to assess pending progress, discharge location    Time In: 1052  Time Out: 1120  Timed Code Treatment Minutes: 16 Minutes  Minutes: 28          Date: 2025  Patient Name: Koko Chao,   Gender: male      MRN: [id number]  : 1960  (65 y.o.)  Referring Practitioner: Davidson Payne PA-C  Diagnosis: Mass of brain  Additional Pertinent Hx: Per EMR: Koko Chao is a 65 y.o. male with PMHx of hypertension, hyperlipidemia, central retinal vein occlusion, prostate cancer who presents to [facility] with chief complaint of aphasia.  Patient reports had 10-minute episode on 4/10 where he was not able to express his language. Patient reports this has been ongoing for 3 weeks. CT head done in ED showed suspected intra-axial mass in the left parietal lobe. Edema is anteriorly of mass. Pt s/p LEFT PARIETAL CRANIOTOMY FOR EVACUATION OF CEREBRAL ABSCESS on 25    Restrictions/Precautions:  Restrictions/Precautions: Fall Risk, Seizure  Position Activity Restriction  Other Position/Activity Restrictions: SBP <160, low stim    Subjective  Chart Reviewed: Yes, Orders, Progress Notes, History and Physical, Imaging, Operative Notes  Patient assessed for rehabilitation services?: Yes  Family / Caregiver Present: No    Subjective: Pt seated in bed upon arrival. RN reporting pt had been wanting to get up to use bathroom. Pt agreeable to OT session.  **RN reinforcing low stim guidelines, decreasing visitation with family on arrival.    Pain: 0/10:     Vitals: Blood Pressure: 160/72  Oxygen: 92% on RA  Heart Rate: 89 bpm  All vitals remained stable throughout    Social/Functional History:  Lives With: Spouse  Type of Home: House  Home Layout: One level  Home Access: Level entry  Home Equipment: None   Bathroom 
[facility]  OCCUPATIONAL THERAPY MISSED TREATMENT NOTE  [facility] ICU 4D  4D-17/017-A      Date: 2025  Patient Name: Koko Chao        CSN: [id number]   : 1960  (65 y.o.)  Gender: male   Referring Practitioner: Davidson Payne PA-C            REASON FOR MISSED TREATMENT: Patient Unavailable. Pt participating with ancillary department for PT session. Will check back as able.               
Comprehensive Nutrition Assessment    Type and Reason for Visit:  Reassess    Nutrition Recommendations/Plan:   ONS initiation: Osmel TID -prefers chocolate   Diet as per Speech Therapy/ Physician   Consider MVI      Malnutrition Assessment:  Malnutrition Status:  At risk for malnutrition (04/18/25 1027)    Context:  Acute Illness     Findings of the 6 clinical characteristics of malnutrition:  Energy Intake:  50% or less of estimated energy requirements for 5 or more days (5 days per wife ( only since pt has been here))  Weight Loss:   (no significant wt loss)     Body Fat Loss:  No body fat loss     Muscle Mass Loss:  No muscle mass loss    Fluid Accumulation:  No fluid accumulation     Strength:  Not Performed    Nutrition Assessment:     Pt. Improving from nutritional standpoint AEB tolerating po diet however intake has been variable related to decreased appetite and altered taste.  At risk for further nutrition compromise r/t admit with left parietal lobe mass, increased needs for wound healing, 4/16 left partial craniotomy for evacuation of cerebral abscess, 4/17 left partial craniotomy hematoma evacuation, intubation 4/17-4/19 and underlying medical condition (Hx prostate cancer, HLD, HTN).     Nutrition Related Findings:    Pt. Report/Treatments/Miscellaneous: Patient seen with wife and reports good appetite/ intake PTA, reports decreased appetite and altered taste during LOS, breakfast intake: 100% cheerios and few bites of oatmeal, agrees to trial 4 ounce ONS  GI Status: BM today per patient   Pertinent Labs: sodium 142, potassium 4.1, BUN 28, Creatinine 0.6, glucose 145  Pertinent Meds: bumex, decadron, keppra, mannitol      Wound Type: Surgical Incision ((4/16) left sided parietal craniotoym for evacuation of cerebral abscess)       Current Nutrition Intake & Therapies:    Average Meal Intake: 26-50%, 51-75%  Average Supplements Intake:  (to begin Mightyshakes)  ADULT DIET; Regular  ADULT ORAL 
Echo completed bedside, Dr Henry to read.  
[facility]   Pharmacy Pharmacokinetic Monitoring Service - Vancomycin     Koko Chao is a 65 y.o. male starting on vancomycin therapy for CNS infection. Pharmacy consulted by Dr. Cui for monitoring and adjustment.    Target Concentration: Goal AUC/MARCIN 400-600 mg*hr/L    Additional Antimicrobials: Rocephin, Flagyl    Pertinent Laboratory Values:   Wt Readings from Last 1 Encounters:   04/11/25 86.5 kg (190 lb 11.2 oz)     Temp Readings from Last 1 Encounters:   04/12/25 98.1 °F (36.7 °C) (Oral)     Estimated Creatinine Clearance: 89 mL/min (based on SCr of 0.9 mg/dL).  Recent Labs     04/11/25  1245 04/12/25  0346   CREATININE 0.8 0.9   BUN 12 11   WBC 8.3 8.8     Pertinent Cultures:  Date Source Results   4/11/25 Blood Sent   MRSA Nasal Swab: N/A. Non-respiratory infection.    Plan:  Dosing recommendations based on Bayesian software  Start Vancomycin 2000 mg IVPB x 1 loading dose, followed by Vancomycin 1000 mg IVPB Q12H for anticipated AUC of 475 and trough concentration of 13.5 mcg/mL at steady state  Renal labs as indicated   Vancomycin concentration to be ordered within 48 hours  Pharmacy will monitor renal function and adjust therapy as indicated    Thank you for the consult,    Lars Dixon, PharmD, BCPS  4/12/2025  2:49 PM      
[facility]   Pharmacy Pharmacokinetic Monitoring Service - Vancomycin    Indication: CNS Infection   Target Concentration: Goal AUC/MARCIN 400-600 mg*hr/L  Day of Therapy: 3  Additional Antimicrobials: ceftriaxone, metronidazole    Pertinent Laboratory Values:   Wt Readings from Last 1 Encounters:   04/13/25 84.6 kg (186 lb 8.2 oz)     Temp Readings from Last 1 Encounters:   04/14/25 97.3 °F (36.3 °C)     Estimated Creatinine Clearance: 113 mL/min (based on SCr of 0.7 mg/dL).  Recent Labs     04/13/25  0428 04/14/25  0555   CREATININE 0.8 0.7   BUN 16 19   WBC 19.2* 12.6*     Pertinent Cultures:  Date Source Results   4/11/25 BC x2 NGTD   MRSA Nasal Swab: N/A. Non-respiratory infection.    Recent vancomycin administrations                     vancomycin (VANCOCIN) 1,000 mg in sodium chloride 0.9 % 250 mL IVPB ([id number]) (mg) 1,000 mg New Bag 04/14/25 0301     1,000 mg New Bag 04/13/25 1554     1,000 mg New Bag  0323    vancomycin (VANCOCIN) 2000 mg in 0.9% sodium chloride 500 mL IVPB ()  Restarted 04/12/25 1702     2,000 mg New Bag  1657                  Assessment:  Date/Time Current Dose Concentration Timing of Concentration AUC C trough,ss   4/14/25 1200 1000 mg Q12H 6.2 ~ 9 hr 294 6.4   Note: Serum concentrations collected for AUC dosing may appear elevated if collected in close proximity to the dose administered, this is not necessarily an indication of toxicity    Plan:  Current dosing regimen is sub-therapeutic  Increase dose to 1750 mg Q12H for estimated AUC of 502 & trough of 11.2  Repeat vancomycin concentration  ordered for 4/16 @ 1500  Pharmacy will monitor renal function and adjust therapy as indicated.    Thank you for the consult,  Asiya Rojas, PharmD  PGY1 Pharmacy Resident  4/14/2025 2:21 PM     
[facility]   Pharmacy Pharmacokinetic Monitoring Service - Vancomycin    Indication: CNS infection  Target Concentration: Goal AUC/MARCIN 400-600 mg*hr/L  Day of Therapy: 6  Additional Antimicrobials: ceftriaxone, metronidazole     Pertinent Laboratory Values:   Wt Readings from Last 1 Encounters:   04/17/25 87.5 kg (192 lb 14.4 oz)     Temp Readings from Last 1 Encounters:   04/17/25 98.3 °F (36.8 °C) (Oral)     Estimated Creatinine Clearance: 115 mL/min (based on SCr of 0.7 mg/dL).  Recent Labs     04/16/25  0328 04/17/25  0351 04/17/25  0352   CREATININE 0.7 0.7  --    BUN 11 13  --    WBC 8.7  --  13.6*     Pertinent Cultures:  Date Source Results   4/11/25 BC x2  NGTD   4/16/25 Head swab  sent   4/16/25 Abscess swab  No yeast or hyphae    4/16/25 Abscess swab  Rare neutrophils observed. Rare epithelial cells. No bacteria seen    MRSA Nasal Swab: N/A. Non-respiratory infection.    Recent vancomycin administrations                     vancomycin (VANCOCIN) 1250 mg in sodium chloride 0.9% 250 mL IVPB (mg) 1,250 mg New Bag 04/17/25 0634     1,250 mg New Bag 04/16/25 2249    vancomycin (VANCOCIN) 1750 mg in sodium chloride 0.9 % 500 mL IVPB (mg) 1,750 mg New Bag 04/16/25 1500     1,750 mg New Bag  0329     1,750 mg New Bag 04/15/25 1508     1,750 mg New Bag  0402     1,750 mg New Bag 04/14/25 1712                  Assessment:  Date/Time Current Dose Concentration Timing of Concentration AUC C trough,ss   04/17/25 1408 1250 Q8H 8.9 ~ 7.5 hr 449 11.6   Note: Serum concentrations collected for AUC dosing may appear elevated if collected in close proximity to the dose administered, this is not necessarily an indication of toxicity    Plan:  Current dosing regimen is therapeutic  Continue current dose of 1250 mg Q8H  Repeat vancomycin concentration ordered for tomorrow 0600  Pharmacy will monitor renal function and adjust therapy as indicated.    Thank you for the consult,  Asiya Rojas, PharmD  PGY1 
[facility]   Pharmacy Pharmacokinetic Monitoring Service - Vancomycin    Indication: CNS infection  Target Concentration: Goal AUC/MARCIN 400-600 mg*hr/L  Day of Therapy: 5  Additional Antimicrobials: ceftriaxone, metronidazole     Pertinent Laboratory Values:   Wt Readings from Last 1 Encounters:   04/13/25 84.6 kg (186 lb 8.2 oz)     Temp Readings from Last 1 Encounters:   04/16/25 97.8 °F (36.6 °C) (Oral)     Estimated Creatinine Clearance: 113 mL/min (based on SCr of 0.7 mg/dL).  Recent Labs     04/15/25  0352 04/16/25  0328   CREATININE 0.7 0.7   BUN 13 11   WBC 8.8 8.7     Pertinent Cultures:  Date Source Results   4/11/25 BC x2  NGTD   4/16/25 Head swab  sent   4/16/25 Abscess swab  No yeast or hyphae    4/16/25 Abscess swab  Rare neutrophils observed. Rare epithelial cells. No bacteria seen    MRSA Nasal Swab: N/A. Non-respiratory infection.    Recent vancomycin administrations                     vancomycin (VANCOCIN) 1750 mg in sodium chloride 0.9 % 500 mL IVPB (mg) 1,750 mg New Bag 04/16/25 1500     1,750 mg New Bag  0329     1,750 mg New Bag 04/15/25 1508     1,750 mg New Bag  0402     1,750 mg New Bag 04/14/25 1712    vancomycin (VANCOCIN) 1,000 mg in sodium chloride 0.9 % 250 mL IVPB ([id number]) (mg) 1,000 mg New Bag 04/14/25 0301     1,000 mg New Bag 04/13/25 1554                  Assessment:  Date/Time Current Dose Concentration Timing of Concentration AUC C trough,ss   4/16/25 1429 1750 mg Q12H 8.1 ~ 11 hr 467 9.9   Note: Serum concentrations collected for AUC dosing may appear elevated if collected in close proximity to the dose administered, this is not necessarily an indication of toxicity    Plan:  Current dosing regimen is sub-therapeutic  Increase dose to 1250 mg Q8H for estimated AUC of 498 & trough of 13.3  Repeat vancomycin concentration ordered for tomorrow at 1400  Pharmacy will monitor renal function and adjust therapy as indicated.    Thank you for the consult,  Asiya CARDONA 
[facility]  Acute Inpatient Rehab Preadmission Assessment    Patient Name: Koko Chao        Ethnicity:Not of , /a, or French origin  Race:White  MRN: [id number]    : 1960  (65 y.o.)  Gender: male     Admitted from:[facility]  Initial Assessment    Date of admission to the hospital: 2025 12:34 PM  Date patient eligible for admission:2025    Primary Diagnosis: Left parietal cerebral abscess      Did patient have surgery?  yes -   Procedure(s):  LEFT PARIETAL CRANIOTOMY HEMATOMA EVACUATION 2025     Procedure(s):  LEFT PARIETAL CRANIOTOMY FOR EVACUATION OF CEREBRAL ABSCESS     Surgeon(s):  Zander Singh MD 2025      Surgeon(s):  Zander Singh MD  Physicians: Sanchez Rogers MD, Dr. Singh, Dr. Bang, Dr. Robison     Risk for clinical complications/co-morbidities:   Past Medical History:   Diagnosis Date    Cancer (HCC)     CRVO (central retinal vein occlusion) (HCC) 2016    Right eye    Heartburn     Hyperlipidemia 2010    Hypertension        Financial Information  Primary insurance:  FSI     Secondary Insurance:   None    Has the patient had two or more falls in the past year or any fall with injury in the past year?   no    Did the patient have major surgery during the 100 days prior to admission?  yes    Precautions:     Restrictions/Precautions: Fall Risk, Seizure, General Precautions  Other Position/Activity Restrictions: SBP <160, low stim, + for DVT in right calf (per MD ok for therapy)      Isolation Precautions: None       Physiatrist: Dr. Robison    Patients Occupation: Employed full time  Reviewed Lab and Diagnostic reports from Current Admission: Yes    Patients Prior Functional  Level: Prior Function  Prior Level of Assist for ADLs: Independent  Prior Level of Assist for Homemaking: Independent  Prior Level of Assist for Transfers: Independent    Current functional status for upper extremity ADL’s: 
[facility]  SPEECH THERAPY  [facility] ICU 4D  Speech - Language - Cognitive Evaluation    Discharge Recommendations:  Continue to assess pending progress    SLP Individual Minutes  Time In: 1318  Time Out: 1349  Minutes: 31  Timed Code Treatment Minutes: 0 Minutes       Date: 2025  Patient Name: Koko Chao      CSN: [id number]   : 1960  (65 y.o.)  Gender: male   Referring Physician: CORBY Russo CNP  Diagnosis: Mass of brain  Precautions: fall risk , aspiration risk   History of Present Illness/Injury: 65M PMHx GERD, hypertension, HLD, central retinal vein occlusion, prostate cancer presenting with chief complaint aphasia.  Patient noted a 10-minute episode on 4/10/2025 where he was unable to express any language.  Patient notes this issue has been ongoing for 3 weeks and has been progressing recently.  ED course showed CT scan with intra-axial mass in left parietal lobe, post MRI revealed abscess measuring 2.4 cm with edema in the anterior fields.  NSGY consulted, recommended Decadron.  Given this is a mass infectious disease, antibiotics vancomycin, ceftriaxone, Flagyl initiated and Decadron held to allow for neutrophil migration.  3% NS instead started to help with cerebral edema.  Patient was promptly transferred to ICU from hospital service for further management.     : Doing well overnight.  Family is aware of patient's hospitalization.  A/OX4 this morning, is aware of ongoing medical care.  Still having word finding difficulty.  Notes that has not improved since admission.  Has denied tolerating p.o. diet.  Urinating, defecating without issue.  Past Medical History:   Diagnosis Date    Cancer (HCC)     CRVO (central retinal vein occlusion) (HCC) 2016    Right eye    Heartburn     Hyperlipidemia     Hypertension        Pain: No pain reported.    Subjective:  Patient seen with HANNA Boudreaux's approval. Patient upright in bed upon ST arrival working remotely on laptop 
I was notified around 7:00 or so that Mr. Devlin suddenly had a headache and started to have an increased aphasic response.  Koko speech was less fluent and he became increasingly confused.  Koko and had a head CT both postoperatively and early this morning which showed a nice resection cavity with some expected edema surrounding the resection cavity.  Because of his neurological changes I requested that he have a stat head CT.  The head CT was done right away and showed a hemorrhage really not in the surgical cavity but rather in the edematous area surrounding the surgical cavity.  This I believe that this was the cause of Koko's mental decline and I spoke with the patient and his family regarding taking him back to the operating room immediately to evacuate this hematoma.  I recommended the family that the patient will likely be intubated overnight and sedated.  I would also request that the patient's systolic blood pressure remain in the range of 110-150 if possible.  In addition, I will get a head CT in the morning.  I will touch base with the intensive care unit doctors regarding possibly restarting 3% normal saline given the fact that this is a highly edematous lesion and clearly there was postoperative edema likely due to the vascularity of this likely tumor.  Thank you  
ICU CHARGE NURSE NOTIFIED OF NEED FOR VENTILATOR UPON PATIENT RETURN.  
[facility]  OCCUPATIONAL THERAPY MISSED TREATMENT NOTE  [facility] ICU 4D  4D-17/017-A      Date: 2025  Patient Name: Koko Chao        CSN: [id number]   : 1960  (65 y.o.)  Gender: male   Referring Practitioner: Davidson Payne PA-C            REASON FOR MISSED TREATMENT: Pt is not appropriate for therapy at this time. Pt remains intubated and sedated. Per DO, \" Pt will remain on ventilation and sedation propofol, fentanyl through  to reduce the risk of re-bleed.\"  OT will check back on 25 as time allows.             
Koko is resting comfortably in his chair today.  His incision is healing and shows no sign of infection.  I believe Koko has been cleared for inpatient rehab.  I do not yet have the surgical pathology.  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  There were areas of hypervascularity as well as necrosis.  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  The plan for Koko is as follows:    1-mannitol will be discontinued today.  2-please maintain Decadron to 2 mg p.o. every 6 hours.  3-please follow-up in the neurosurgery office in 2 weeks for suture removal.  At that time I will discuss further imaging with the family.  4-please maintain seizure prophylaxis until follow-up.  5-Koko has a DVT and can be started on prophylactic dose of Lovenox.  I would hold off on full anticoagulation for 1 more week.  This would put him 2 weeks after his last craniotomy.    Neurosurgery will sign off at this point and please contact us with any questions or concerns.  
[facility]  OCCUPATIONAL THERAPY MISSED TREATMENT NOTE  STRZ ICU 4D  4D-17/017-A      Date: 2025  Patient Name: Koko Chao        CSN: [id number]   : 1960  (65 y.o.)  Gender: male   Referring Practitioner: Davidson Payne PA-C            REASON FOR MISSED TREATMENT: Hold Treatment per Nursing as patient returned to emergent OR overnight for additional craniotomy hematoma evacuation due to increased bleeding and headache. Patient found to have additional hemorrhage  adjacent to the surgical bed. Patient intubated and sedated, had have not begun waking patient. Not appropriate for early mobility at this time      Nazia Gunter, OTR/L [id number]                
[facility]  PHYSICAL THERAPY MISSED TREATMENT NOTE  STRZ ICU 4D    Date: 2025  Patient Name: Koko Chao        MRN: [id number]   : 1960  (65 y.o.)  Gender: male                REASON FOR MISSED TREATMENT:  Patient unable to participate.      Patient planned for crani this date, will attempt after sx and as further activity orders received.           
[facility]  STRZ NEUROSCIENCES 4A  Occupational Therapy  Daily Note    Discharge Recommendations: Inpatient Rehabilitation  Patient is exhibiting above listed deficits and requiring continued therapy. Patient would benefit from continued therapy on an inpatient rehab unit. Patient is able to tolerate 3 hours of intensive therapy 5-6 days/week. Without continued therapies pt at risk for functional decline, increased falls, and readmission to hospital.     Equipment Recommendations:   will continue to assess pending progress, discharge location    Time In: 1101  Time Out: 1149  Timed Code Treatment Minutes: 48 Minutes  Minutes: 48          Date: 2025  Patient Name: Koko Chao,   Gender: male      Room: [facility]004-A  MRN: [id number]  : 1960  (65 y.o.)  Referring Practitioner: Davidson Payne PA-C  Diagnosis: Mass of brain  Additional Pertinent Hx: Per EMR: Koko Chao is a 65 y.o. male with PMHx of hypertension, hyperlipidemia, central retinal vein occlusion, prostate cancer who presents to [facility] with chief complaint of aphasia.  Patient reports had 10-minute episode on 4/10 where he was not able to express his language. Patient reports this has been ongoing for 3 weeks. CT head done in ED showed suspected intra-axial mass in the left parietal lobe. Edema is anteriorly of mass. Pt s/p LEFT PARIETAL CRANIOTOMY FOR EVACUATION OF CEREBRAL ABSCESS on 25. Repeat CT due to increased aphasia, H/A was done right away and showed a hemorrhage really not in the surgical cavity but rather in the edematous area surrounding the surgical cavity. Pt s/p LEFT PARIETAL CRANIOTOMY HEMATOMA EVACUATION on 25. Pt extubated  AM.    Restrictions/Precautions:  Restrictions/Precautions: Fall Risk, Seizure, General Precautions  Position Activity Restriction  Other Position/Activity Restrictions: SBP <160, low stim, + for DVT in right calf (per MD grace for therapy) 
Met with patient who states he would like to pursue admission to [facility].      Precert initiated.    [facility]  INPATIENT REHABILITATION UNIT  PRECERTIFICATION TEMPLATE    Date of Admission: 2025 12:34 PM    Patient Name: Koko Chao      MRN: [id number]    : 1960  (65 y.o.)  Gender: male     [place] / Product Type: *No Product type* /   Secondary Payor Source:      Isolation Status: No active isolations    Precert initiated for Inpatient Rehab Admission at [facility].    Reference Number: [id number]  Route of Precertification Transaction: Fax and Phone Call    
[facility] [place] 2[place]  SPEECH THERAPY  STRZ ICU 4D  Speech - Language - Cognitive Evaluation + Clinical Swallow Evaluation +tx    Discharge Recommendations: Inpatient Rehabilitation  DIET ORDER RECOMMENDATIONS AFTER EVALUATION: Regular diet and thin liquids  STRATEGIES: Full Upright Position, Small Bite/Sip, Limit Distractions, and Monitor for Fatigue     SLP Individual Minutes  Time In: 1007  Time Out: 1052  Minutes: 45  Timed Code Treatment Minutes: 8 Minutes     Speech, Language, Cognitive Evaluation: 29  Clinical Swallow Evaluation: 8  Interventions: 8    Date: 2025  Patient Name: Koko Chao      CSN: [id number]   : 1960  (65 y.o.)  Gender: male   Referring Physician:  Cordell Dennis MD  Diagnosis: Mass of brain  Precautions: fall risk, aspiration risk   History of Present Illness/Injury: Patient is a 65-year-old male past medical history of GERD, hypertension, hyperlipidemia, central retinal vein occlusion, prostate cancer who presented with aphasia. His last known well was 4/10/2025 where he was unable to express any language. On admission this was noted to be ongoing for 3 weeks and progressively worsening. In the ED a CT scan was done which showed intra-axial mass in the left parietal lobe, post MRI revealed possible abscess measuring 2.4 cm with edema in the anterior talus. Neurosurgery was consulted who recommended Decadron. Infectious disease was also consulted who were concerned for abscess who initially started the patient on vancomycin, ceftriaxone and Flagyl. He was given hypertonic saline while in the ICU to help with cerebral edema. On 2025 found to have a intracranial bleed surrounding area of biopsy. Left craniotomy with hematoma evacuation was completed. 2025 hypertonic saline was discontinued. He was started on steroid therapy. Transferred to stepdown for further management and care. Neurosurgery continues to follow. Woke with neurosurgery who state to continue 
Neurosurgery Progress Note    Patient:  Koko Chao      Unit/Bed:4D-17/017-A    YOB: 1960    MRN: [id number]     Acct: [id number]     Admit date: 4/11/2025    Chief Complaint   Patient presents with    Memory Loss       Patient Seen, Chart, Physician notes, Labs, Radiology studies review  Subjective: Patient is seen and evaluated on 4-day with valuation exam findings reviewed and discussed with Dr. Singh and with nursing and with family.  He is resting comfortably postoperative day 4 from craniotomy and day 3 from reexploration and revision to address intracranial hemorrhage.  Pain is well-controlled        Past, Family, Social History unchanged from admission.    Diet:  ADULT DIET; Full Liquid    Medications:  Scheduled Meds:   clotrimazole  10 mg Oral 5x Daily    bumetanide  0.5 mg IntraVENous Daily    tiotropium-olodaterol  2 puff Inhalation Daily    losartan  25 mg Oral BID    dexAMETHasone  4 mg IntraVENous Q6H    pantoprazole  40 mg Oral QAM AC    cefTRIAXone (ROCEPHIN) IV  2,000 mg IntraVENous Q12H    metroNIDAZOLE  500 mg IntraVENous Q8H    sodium chloride flush  5-40 mL IntraVENous 2 times per day    pravastatin  40 mg Oral Nightly    levETIRAcetam  500 mg IntraVENous Q12H     Continuous Infusions:   3% sodium chloride      dexmedeTOMIDine Stopped (04/20/25 0530)    niCARdipine Stopped (04/19/25 2232)    propofol Stopped (04/19/25 1334)    fentaNYL Stopped (04/19/25 1448)    sodium chloride Stopped (04/19/25 1437)     PRN Meds:morphine **OR** morphine, labetalol, albuterol, calcium carbonate, melatonin, hydrALAZINE, sodium chloride flush, sodium chloride, potassium chloride **OR** potassium alternative oral replacement **OR** potassium chloride, magnesium sulfate, ondansetron **OR** ondansetron, polyethylene glycol    Objective: Patient was observed lying in bed with head of bed appropriately elevated and pain well to moderately controlled.  Dressings were changed over flat incision 
[facility]  INPATIENT REHABILITATION  ADMISSIONS COORDINATOR CONSULT    Referral Type: internal    Patient Name: Koko Chao      MRN: [id number]    : 1960  (65 y.o.)  Gender: male   Race:White (non-)     [place] / Product Type: *No Product type* /   Secondary Payor Source:      Isolation Status: No active isolations    Lives With: Spouse  Type of Home: House  Home Layout: One level  Home Access: Level entry     Occupation: Full time employment  Type of Occupation:      Disciplines Required upon Admission to Inpatient Rehabilitation: Physical Therapy, Occupational Therapy, and Speech Therapy  Post operative: Yes  Fall: No  Dialysis: No  Diet: ADULT DIET; Regular  ADULT ORAL NUTRITION SUPPLEMENT; Breakfast, Lunch, Dinner; Standard 4 oz Oral Supplement  Discussed patient with  and PM&R provider: Await medical work up completion and completed PM&R consult for further determination of patient needs.   Patient requires precert for post acute care.       
[facility]  PHYSICAL THERAPY MISSED TREATMENT NOTE  STRZ ICU 4D    Date: 2025  Patient Name: Koko Chao        MRN: [id number]   : 1960  (65 y.o.)  Gender: male   Referring Practitioner: Davidson Payne PA-C            REASON FOR MISSED TREATMENT:  Hold treatment per nursing request.  as patient returned to emergent OR overnight for additional craniotomy hematoma evacuation due to increased bleeding and headache. Patient found to have additional hemorrhage adjacent to the surgical bed. Patient intubated and sedated, had have not begun waking patient. Not appropriate for early mobility at this time     Lita Swift, MPT [id number]         
[facility]  Neurosurgery - Dr. Zander Singh MD  Daily Progress Note  Pt Name: Koko Chao  Medical Record Number: [id number]  Date of Birth 1960   Today's Date: 4/22/2025    SUBJECTIVE  Pt doing well. Adequate analgesia. Pt is tolerating a  (DIET). Pt tolerating (ACTIVITY) Pt is passing flatus and has had a bowel movement. Pt denies any nausea of vomiting.    OBJECTIVE  CURRENT VITALS BP (!) 159/71   Pulse 63   Temp 97.8 °F (36.6 °C) (Oral)   Resp 21   Ht 1.727 m (5' 8\")   Wt 97.9 kg (215 lb 13.3 oz)   SpO2 94%   BMI 32.82 kg/m²   GENERAL: alert  LUNGS:   HEART:   ABDOMEN:   INCISION: healing well  EXTERMITY:     Koko is awake and following commands.  He has a anomic aphasia that is actually slightly improved today he is able to move all 4 extremities equally.  He is able to identify objects and recognizes his wife and has fluent interaction with her and me  Overall his exam has improved since yesterday.    24 HR INTAKE/OUTPUT :   Intake/Output Summary (Last 24 hours) at 4/22/2025 0854  Last data filed at 4/22/2025 0842  Gross per 24 hour   Intake 2202.66 ml   Output 3050 ml   Net -847.34 ml     DRAIN/TUBE OUTPUT :     LABS  CBC :   Lab Results   Component Value Date/Time    WBC 19.8 04/22/2025 04:02 AM    HGB 12.8 04/22/2025 04:02 AM    HCT 39.9 04/22/2025 04:02 AM     04/22/2025 04:02 AM     BMP:   Lab Results   Component Value Date/Time     04/22/2025 04:02 AM    K 4.1 04/22/2025 04:02 AM    K 4.1 04/19/2025 03:45 AM     04/22/2025 04:02 AM    CO2 23 04/22/2025 04:02 AM    BUN 28 04/22/2025 04:02 AM    CREATININE 0.6 04/22/2025 04:02 AM    MG 2.1 04/14/2025 05:55 AM       ASSESSMENT  1. Koko is postoperative day #6 status post left parietal craniotomy for resection of tumor and postoperative day 5 status post craniotomy for removal of intracranial hemorrhage.  He is currently on 50 g of mannitol IV every 6 hours.  His neurologic exam has improved and he does 
Patient admitted to  Room 15 from ED and via cart/stretcher  Complaint upon arrival to the room None  Vital signs obtained. Assessment and data collection initiated. Oriented to room. Policies and procedures for  explained All questions answered with no further questions at this time. Fall prevention and safety brochure discussed with patient. 2 person skin check completed.    Patient requests PCP notification  Patient declines family notification.      Was swallow screen completed on admission? [x] YES or [] NO  If patient failed swallow test, obtain order for speech therapy consult and keep NPO.    
Patient arrived to unit from OR via bed with OR RN and anesthesia. Patient transferred to ICU bed and placed on continuous ICU bedside monitor. Patient admitted for Mass of brain [G93.89]  Left parietal mass [G93.89]. Vitals obtained. See flowsheets. Patient's IV access includes 20g IV L hand, R rad art line, 20g IV R hand, 22g IV L FA, 22g IV R wrist. Current infusions and rates of infusion include none. Assessment completed by Martine RN. Two nurse skin assessment completed by Martine RN and Time RN. See flowsheets for assessment details. Policies and procedures of ICU unable to be explained to patient at this time. Family member(s)/representative(s) present at time of admission include none. Patient rights explained to family member(s)/representatives and patient, as able. Patient/patient's family member(s)/representative(s) N/A to have physician notified of their admission. All questions posed by patient's family member(s)/representative(s) and patient answered at this time.     
Patient declined continuous pulse ox monitoring due to working on computer pt is aware that in the ICU it is preferable to have continuous pulse ox monitoring to quickly respond to changes. Patient verbalizes understanding, agreeable to intermittent checks. Pt on room air no distress noted  
Patient has been successfully weaned from Mechanical Ventilation.  RSBI before extubation was 45 with EtCO2 of 42 and SpO2 of 94 on 40% FiO2.  Patient extubated and placed on 36% O2 via nasal cannula.  Post extubation SpO2 is 92% with HR  84 bpm and RR 16 breaths/min.  Patient had mild cough that was productive of clear  sputum.  Extubation Well tolerated by patient..  
Patient in preop holding, family at bedside, consents signed, IV fluids running, no personal belongings. All question and concerns have been addressed  
[facility]  OCCUPATIONAL THERAPY MISSED TREATMENT NOTE  STRZ ICU 4D  4D-17/017-A      Date: 4/15/2025  Patient Name: Koko Chao        CSN: [id number]   : 1960  (65 y.o.)  Gender: male                REASON FOR MISSED TREATMENT:  OT eval/tx orders received. RN reports pt independent with ADLs/mobility currently without deficits noted. Crani scheduled for . Will follow up after surgery.                 
[facility]  PHYSICAL THERAPY MISSED TREATMENT NOTE  STRZ ICU 4D    Date: 2025  Patient Name: Koko Chao        MRN: [id number]   : 1960  (65 y.o.)  Gender: male   Referring Practitioner: Davidson Payne PA-C            REASON FOR MISSED TREATMENT:  Hold treatment per Resident due to DVT and probable need for IVC filter placement.      Lita Swift, MPT 2029         
[facility]  Neurosurgery - Dr. Zander Singh MD  Daily Progress Note  Pt Name: Koko Chao  Medical Record Number: [id number]  Date of Birth 1960   Today's Date: 4/15/2025    SUBJECTIVE  Pt doing well. Adequate analgesia. Pt is tolerating a  (DIET). Pt tolerating (ACTIVITY) Pt is passing flatus and has had a bowel movement. Pt denies any nausea of vomiting.    OBJECTIVE  CURRENT VITALS BP (!) 147/68   Pulse 67   Temp 97.6 °F (36.4 °C) (Oral)   Resp 18   Ht 1.727 m (5' 8\")   Wt 84.6 kg (186 lb 8.2 oz)   SpO2 93%   BMI 28.36 kg/m²   GENERAL:   LUNGS:   HEART:   ABDOMEN:   INCISION:   EXTERMITY:     Koko is resting comfortably in bed and is awake alert and oriented.  He is following commands without pronator drift.  Speech is fluent.  No change in exam.    24 HR INTAKE/OUTPUT :   Intake/Output Summary (Last 24 hours) at 4/15/2025 0854  Last data filed at 4/15/2025 0826  Gross per 24 hour   Intake 3607.63 ml   Output 1650 ml   Net 1957.63 ml     DRAIN/TUBE OUTPUT :     LABS  CBC :   Lab Results   Component Value Date/Time    WBC 8.8 04/15/2025 03:52 AM    HGB 12.8 04/15/2025 03:52 AM    HCT 40.0 04/15/2025 03:52 AM     04/15/2025 03:52 AM     BMP:   Lab Results   Component Value Date/Time     04/15/2025 06:56 AM    K 3.9 04/15/2025 03:52 AM    K 4.1 11/08/2018 11:23 AM     04/15/2025 03:52 AM    CO2 21 04/15/2025 03:52 AM    BUN 13 04/15/2025 03:52 AM    CREATININE 0.7 04/15/2025 03:52 AM    MG 2.1 04/14/2025 05:55 AM       ASSESSMENT  1. Koko is a 65-year-old man with a likely left parietal abscess which will be resected tomorrow.  I have again gone over the risks and benefits of surgery with Koko.    PLAN  1.  Plan is for a left parietal craniotomy for resection of left parietal abscess.    Zander Singh MD, MD Electronically signed 4/15/2025 at 8:54 AM   
[facility]  INPATIENT SPEECH THERAPY  STRZ ICU 4D  DAILY NOTE    Discharge Recommendations: Inpatient Rehabilitation  DIET ORDER RECOMMENDATIONS AFTER EVALUATION: Regular diet and thin liquids  STRATEGIES: Full Upright Position, Small Bite/Sip, Limit Distractions, and Monitor for Fatigue      SLP Individual Minutes  Time In: 1401  Time Out: 1425  Minutes: 24  Timed Code Treatment Minutes: 0 Minutes       Date: 2025  Patient Name: Koko Chao      CSN: [id number]   : 1960  (65 y.o.)  Gender: male   Referring Physician:  Cordell Dennis MD   Diagnosis: Mass of brain  Precautions: fall risk, aspiration risk   Current Diet: Regular diet and thin liquids  Respiratory Status: Room Air  Date of Last MBS/FEES: Not Applicable    Pain:  No pain reported.    Subjective:  Patient seen with RN approval.Patient resting in bed at time of ST arrival. Absence for family at bedside. Patient pleasant and cooperative. Frustrations noticed with word finding difficulties; however, motivated to continue session.     Short-Term Goals:  SHORT TERM GOAL #1:  Goal 1: Patient will consume regular diet and thin liquids with implementation of safe swallow strategies with no overt s/s of aspiration or pulmonary decline to maintain adequate nutrition adn hydration measures  INTERVENTIONS: Goal not addressed d/t focus on language goals    SHORT TERM GOAL #2:  Goal 2: Patient, staff and family will adhere to low stimulation guidelines with completion of ACE (<12, 3 consecutive days in a row) to aide in promotion of neurological healing s/p intracrainial interventions  INTERVENTIONS: Goal not addressed d/t focus on language goals    SHORT TERM GOAL #3:  Goal 3: Patient will complete verbal expression tasks (including BASIC naming, automatic speech tasks, biographics, picture description, verbal fluency, word/phrase/sentence level repetition) with 70% accuracy, mod cues to improve functional expressive 
[facility]  Neurosurgery - Dr. Zander Singh MD  Daily Progress Note  Pt Name: Koko Chao  Medical Record Number: [id number]  Date of Birth 1960   Today's Date: 4/21/2025    SUBJECTIVE  Pt doing well. Adequate analgesia. Pt is tolerating a  (DIET). Pt tolerating (ACTIVITY) Pt is passing flatus and has had a bowel movement. Pt denies any nausea of vomiting.    OBJECTIVE  CURRENT VITALS /72   Pulse 63   Temp 100 °F (37.8 °C)   Resp 16   Ht 1.727 m (5' 8\")   Wt 97.6 kg (215 lb 2.7 oz)   SpO2 92%   BMI 32.72 kg/m²   GENERAL:   LUNGS:   HEART:   ABDOMEN:   INCISION: healing well  EXTERMITY:   Koko is awake and following commands.  He does have word finding difficulties as well as an expressive aphasia.  This was present before his surgery.  This is likely due to the edema following his surgery.  He is moving all extremities to command.  His exam is stable.    24 HR INTAKE/OUTPUT :   Intake/Output Summary (Last 24 hours) at 4/21/2025 0923  Last data filed at 4/21/2025 0623  Gross per 24 hour   Intake 2103.86 ml   Output 3275 ml   Net -1171.14 ml     DRAIN/TUBE OUTPUT :      LABS  CBC :   Lab Results   Component Value Date/Time    WBC 12.6 04/21/2025 02:30 AM    HGB 10.8 04/21/2025 02:30 AM    HCT 33.4 04/21/2025 02:30 AM     04/21/2025 02:30 AM     BMP:   Lab Results   Component Value Date/Time     04/21/2025 02:30 AM    K 3.9 04/21/2025 02:30 AM    K 4.1 04/19/2025 03:45 AM     04/21/2025 02:30 AM    CO2 23 04/21/2025 02:30 AM    BUN 27 04/21/2025 02:30 AM    CREATININE 0.6 04/21/2025 02:30 AM    MG 2.1 04/14/2025 05:55 AM       ASSESSMENT  1. Koko is postoperative day #3 status post evacuation of hematoma in the left parietal lobe.  His exam is stable.    PLAN  1.  I did discussion with the ICU resident and I asked him to DC the 3%.  I believe that Koko is stable to be transferred to the stepdown unit.  We will touch base with infectious disease as to whether 
[facility]  OCCUPATIONAL THERAPY MISSED TREATMENT NOTE  STRZ ICU 4D  4D-17/017-A      Date: 2025  Patient Name: Koko Chao        CSN: [id number]   : 1960  (65 y.o.)  Gender: male                REASON FOR MISSED TREATMENT:  Patient planned for crani this date, will attempt after sx and as further activity orders received.     Nazia Gunter, OTR/L [id number]                 
Spiritual Health History and Assessment/Progress Note  [facility]    (P) Spiritual/Emotional Needs,  ,  ,      Name: Koko Chao MRN: [id number]    Age: 65 y.o.     Sex: male   Language: English   Hindu: Alevism   Mass of brain     Date: 4/15/2025            Total Time Calculated: (P) 10 min              Spiritual Assessment continued in STRZ ICU 4D        Referral/Consult From: (P) Rounding   Encounter Overview/Reason: (P) Spiritual/Emotional Needs  Service Provided For: (P) Patient    Kimberly, Belief, Meaning:   Patient is connected with a kimberly tradition or spiritual practice and has beliefs or practices that help with coping during difficult times  Family/Friends No family/friends present      Importance and Influence:  Patient has spiritual/personal beliefs that influence decisions regarding their health  Family/Friends No family/friends present    Community:  Patient feels well-supported. Support system includes: Spouse/Partner, [place] Community, and Extended family  Family/Friends No family/friends present    Assessment and Plan of Care:     Patient Interventions include: Facilitated expression of thoughts and feelings, Explored spiritual coping/struggle/distress, Affirmed coping skills/support systems, and Other:  prayed for patient bedside.  Family/Friends Interventions include: No family/friends present    Patient Plan of Care: Spiritual Care available upon further referral  Family/Friends Plan of Care: No family/friends present    Electronically signed by Chaplain Rosoevelt on 4/15/2025 at 10:55 AM   
Spiritual Health History and Assessment/Progress Note  [facility]    (P) Spiritual/Emotional Needs,  ,  ,      Name: Koko Chao MRN: [id number]    Age: 65 y.o.     Sex: male   Language: English   Judaism: Mu-ism   Mass of brain     Date: 4/18/2025            Total Time Calculated: (P) 19 min              Spiritual Assessment continued in STRZ ICU 4D        Referral/Consult From: (P) Rounding   Encounter Overview/Reason: (P) Spiritual/Emotional Needs  Service Provided For: (P) Patient and family together    Kimberly, Belief, Meaning:   Patient unable to assess at this time  Family/Friends are connected with a kimberly tradition or spiritual practice and have beliefs or practices that help with coping during difficult times      Importance and Influence:  Patient unable to assess at this time  Family/Friends have spiritual/personal beliefs that influence decisions regarding the patient's health    Community:  Patient feels well-supported. Support system includes: Spouse/Partner, [place] Community, and Extended family  Family/Friends feel well-supported. Support system includes: Spouse/Partner, [place] Community, and Extended family    Assessment and Plan of Care:     Patient Interventions include: Other:  prayed for patient with spouse bedside.  Family/Friends Interventions include: Facilitated expression of thoughts and feelings, Explored spiritual coping/struggle/distress, Affirmed coping skills/support systems, and Other:  prayed for patient bedside, with spouse present.    Patient Plan of Care: Spiritual Care available upon further referral  Family/Friends Plan of Care: Spiritual Care available upon further referral    Electronically signed by Chaplain Roosevelt on 4/18/2025 at 2:49 PM   
Spiritual Health History and Assessment/Progress Note  [facility]    (P) Spiritual/Emotional Needs, Rituals, Rites and Sacraments,  ,  ,      Name: Koko Chao MRN: [id number]    Age: 65 y.o.     Sex: male   Language: English   Synagogue: Rastafari   Mass of brain     Date: 4/19/2025            Total Time Calculated: (P) 15 min              Spiritual Assessment continued in [facility] ICU 4D        Referral/Consult From: (P) Clergy/   Encounter Overview/Reason: (P) Spiritual/Emotional Needs, Rituals, Rites and Sacraments  Service Provided For: (P) Patient, Family, Patient and family together ()    Kim[place], Belief, Meaning:   Patient identifies as spiritual, is connected with a kimberly tradition or spiritual practice, and has beliefs or practices that help with coping during difficult times  Family/Friends identify as spiritual, are connected with a kimberly tradition or spiritual practice, and have beliefs or practices that help with coping during difficult times      Importance and Influence:  Patient has spiritual/personal beliefs that influence decisions regarding their health  Family/Friends have spiritual/personal beliefs that influence decisions regarding the patient's health    Community:  Patient is connected with a spiritual community and feels well-supported. Support system includes: [place] Community and Friends  Family/Friends are connected with a spiritual community:    Assessment and Plan of Care:     Patient Interventions include: Provided sacramental/Catholic ritual and Other: Provided anointing oil for home  \"Mor\"   Family/Friends Interventions include: Provided sacramental/Catholic ritual    Patient Plan of Care: Spiritual Care available upon further referral  Family/Friends Plan of Care: Spiritual Care available upon further referral    Electronically signed by Chaplain Heide on 4/19/2025 at 12:48 PM    
Spiritual Health History and Assessment/Progress Note  [facility]    (P) Advance Care Planning,  ,  ,      Name: Koko Chao MRN: [id number]    Age: 65 y.o.     Sex: male   Language: English   Sikhism: Taoist   Left parietal mass     Date: 4/12/2025            Total Time Calculated: (P) 10 min              Spiritual Assessment began in [facility] NEUROSCIENCES 4A        Referral/Consult From: (P) Nurse   Encounter Overview/Reason: (P) Advance Care Planning  Service Provided For: (P) Patient, Family, Patient and family together, Significant other, Friend    Kimberly, Belief, Meaning:   Patient identifies as spiritual, is connected with a kimberly tradition or spiritual practice, and has beliefs or practices that help with coping during difficult times  Family/Friends identify as spiritual, are connected with a kimberyl tradition or spiritual practice, and have beliefs or practices that help with coping during difficult times      Importance and Influence:  Patient has spiritual/personal beliefs that influence decisions regarding their health  Family/Friends have spiritual/personal beliefs that influence decisions regarding the patient's health    Community:  Patient is connected with a spiritual community and feels well-supported. Support system includes: Spouse/Partner, Parent/s, [place] Community, and Friends  Family/Friends are connected with a spiritual community:    Assessment and Plan of Care:     Patient Interventions include: Provided sacramental/Mandaen ritual and Assisted in Advance Care Planning conversation  Family/Friends Interventions include: Provided sacramental/Mandaen ritual and Assisted in Advance Care Planning conversation    Patient Plan of Care: Spiritual Care available upon further referral  Family/Friends Plan of Care: Spiritual Care available upon further referral    Electronically signed by Chaplain Heide on 4/12/2025 at 9:14 AM    
Spiritual Health History and Assessment/Progress Note  [facility]    (P) Spiritual/Emotional Needs,  ,  ,      Name: Koko Chao MRN: [id number]    Age: 65 y.o.     Sex: male   Language: English   Zoroastrianism: Gnosticism   Mass of brain     Date: 4/22/2025            Total Time Calculated: (P) 10 min              Spiritual Assessment continued in [facility] ICU 4D        Referral/Consult From: (P) Rounding   Encounter Overview/Reason: (P) Spiritual/Emotional Needs  Service Provided For: (P) Patient and family together    Kimberly, Belief, Meaning:   Patient has beliefs or practices that help with coping during difficult times  Family/Friends have beliefs or practices that help with coping during difficult times      Importance and Influence:  Patient has spiritual/personal beliefs that influence decisions regarding their health  Family/Friends have spiritual/personal beliefs that influence decisions regarding the patient's health    Community:  Patient feels well-supported. Support system includes: Spouse/Partner, [place] Community, and Friends  Family/Friends feel well-supported. Support system includes: Spouse/Partner, [place] Community, and Friends    Assessment and Plan of Care:     Patient Interventions include: Facilitated expression of thoughts and feelings, Affirmed coping skills/support systems, and Other:  prayed for patient bedside, with pt spouse and two family friends present.  Family/Friends Interventions include: Facilitated expression of thoughts and feelings, Explored spiritual coping/struggle/distress, Affirmed coping skills/support systems, and Other:  prayed for patient bedside, with pt spouse and two friends present.    Patient Plan of Care: Spiritual Care available upon further referral  Family/Friends Plan of Care: Spiritual Care available upon further referral    Electronically signed by Chaplain Roosevelt on 4/22/2025 at 3:04 PM   
[facility]  PHYSICAL THERAPY MISSED TREATMENT NOTE  [facility] ICU 4D    Date: 4/15/2025  Patient Name: Koko Chao        MRN: [id number]   : 1960  (65 y.o.)  Gender: male                REASON FOR MISSED TREATMENT:  PT orders received, pt is ind with mobility at this time. Plans for surgery tomorrow. Will follow up post-op.  .             
[facility]  INPATIENT PHYSICAL THERAPY  EVALUATION  [facility] ICU 4D - 4D-17/017-A    Discharge Recommendations: IP Rehab, Therapy recommended at discharge, Patient able to tolerate 3 hours of therapy per day  Equipment Recommendations:    monitor for needs          Time In: 1350  Time Out: 1417  Timed Code Treatment Minutes: 12 Minutes  Minutes: 27          Date: 2025  Patient Name: Koko Chao,  Gender:  male        MRN: [id number]  : 1960  (65 y.o.)      Referring Practitioner: Davidson Payne PA-C  Diagnosis: Mass of brain  Additional Pertinent Hx: Per EMR: \"Koko Chao is a 65 y.o. male with PMHx of hypertension, hyperlipidemia, central retinal vein occlusion, prostate cancer who presents to [facility] with chief complaint of aphasia.  Patient reports had 10-minute episode on 4/10 where he was not able to express his language. Patient reports this has been ongoing for 3 weeks. CT head done in ED showed suspected intra-axial mass in the left parietal lobe. Edema is anteriorly of mass.\"  Pt is s/p LEFT PARIETAL CRANIOTOMY FOR EVACUATION OF CEREBRAL ABSCESS on 25 by Dr Singh     Restrictions/Precautions:  Restrictions/Precautions: Fall Risk, Seizure, General Precautions       Other Position/Activity Restrictions: SBP <160, low stim    Required Braces or Orthoses?: No      Subjective:  Chart Reviewed: Yes  Patient assessed for rehabilitation services?: Yes  Subjective: Pt up in recliner and is ready to return to bed after ambulating some.    General:     Vision: Impaired  Vision Exceptions: Wears glasses at all times  Hearing: Within functional limits       Pain: denied      Vitals:  ICU monitoring - stable    Social/Functional History:    Lives With: Spouse  Type of Home: House  Home Layout: One level  Home Access: Level entry  Home Equipment: None     Bathroom Shower/Tub: Walk-in shower  Bathroom Toilet: Standard  Bathroom Equipment: None       Prior Level of 
[facility]  Neurosurgery - Dr. Zander Singh MD  Daily Progress Note  Pt Name: Koko Chao  Medical Record Number: [id number]  Date of Birth 1960   Today's Date: 4/13/2025    SUBJECTIVE  Pt doing well. Adequate analgesia. Pt is tolerating a  (DIET). Pt tolerating (ACTIVITY) Pt is passing flatus and has had a bowel movement. Pt denies any nausea of vomiting.    OBJECTIVE  CURRENT VITALS BP (!) 140/77   Pulse 68   Temp 97.6 °F (36.4 °C) (Oral)   Resp 16   Ht 1.727 m (5' 8\")   Wt 84.6 kg (186 lb 8.2 oz)   SpO2 94%   BMI 28.36 kg/m²   GENERAL:   LUNGS:   HEART:   ABDOMEN:   INCISION:   EXTERMITY:   24 HR INTAKE/OUTPUT :     Koko is awake alert and oriented and following commands.  He to my exam has more fluent speech today.  Exam is stable.    Intake/Output Summary (Last 24 hours) at 4/13/2025 1132  Last data filed at 4/13/2025 0945  Gross per 24 hour   Intake 1293.06 ml   Output --   Net 1293.06 ml     DRAIN/TUBE OUTPUT :      LABS  CBC :   Lab Results   Component Value Date/Time    WBC 19.2 04/13/2025 04:28 AM    HGB 14.8 04/13/2025 04:28 AM    HCT 44.2 04/13/2025 04:28 AM     04/13/2025 04:28 AM     BMP:   Lab Results   Component Value Date/Time     04/13/2025 04:28 AM    K 4.2 04/13/2025 04:28 AM    K 4.1 11/08/2018 11:23 AM     04/13/2025 04:28 AM    CO2 20 04/13/2025 04:28 AM    BUN 16 04/13/2025 04:28 AM    CREATININE 0.8 04/13/2025 04:28 AM    MG 2.0 04/13/2025 04:28 AM       ASSESSMENT  1. Koko is a 65-year-old man with a likely left parietal intracranial abscess.  Infectious diseases consulting on him as his cardiology.  Koko is been started on empiric antibiotics.  He is awake alert and oriented and doing well today.    PLAN  1.  Koko will have a left sided parietal craniotomy on Wednesday for a likely cerebral abscess.  This will senthil 5 days since he is stopped his aspirin.  In the meantime we will be continued on empiric antibiotics.  He will also have 
(likes and is drinking)  ADULT DIET; Regular  ADULT ORAL NUTRITION SUPPLEMENT; Breakfast, Lunch, Dinner; Standard 4 oz Oral Supplement    Anthropometric Measures:  Height: 172.7 cm (5' 8\")  Ideal Body Weight (IBW): 154 lbs (70 kg)    Admission Body Weight: 86.5 kg (190 lb 11.2 oz) ((4/11) bedscale no edema)  Current Body Weight: 96 kg (211 lb 10.3 oz) (4/24/25: +2 UE, LE edema),      Current BMI (kg/m2): 32.2  Usual Body Weight:  (per EMR: (2/7/24) 190# 13oz, (8/2/23)192# 13oz)                          BMI Categories: Obese Class 1 (BMI 30.0-34.9)    Estimated Daily Nutrient Needs:  Energy Requirements Based On: Kcal/kg  Weight Used for Energy Requirements:  ((4/11) 86.5kgm)  Energy (kcal/day): 9922-0304 (20-25)  Weight Used for Protein Requirements: Ideal (70kgm IBW)  Protein (g/day):  grams (1.2-2)     Fluid (ml/day): per provider    Nutrition Diagnosis:   Inadequate oral intake related to decreased appetite, altered taste perception as evidenced by variable po intake    Nutrition Interventions:   Food and/or Nutrient Delivery: Continue Current Diet, Continue Oral Nutrition Supplement  Nutrition Education/Counseling: Education/Counseling initiated  Coordination of Nutrition Care: Continue to monitor while inpatient, Interdisciplinary Rounds       Goals:  Goals: PO intake 75% or greater, by next RD assessment  Type of Goal: Continue current goal  Previous Goal Met: Progressing toward Goal(s)    Nutrition Monitoring and Evaluation:      Food/Nutrient Intake Outcomes: Food and Nutrient Intake, Supplement Intake  Physical Signs/Symptoms Outcomes: Biochemical Data, Nutrition Focused Physical Findings, Skin, Weight, GI Status, Fluid Status or Edema    Discharge Planning:    Too soon to determine     Allison C Schwab, RD, LD  Contact: [phone]     
Intern on 4/17/2025 at 5:13 PM    
the patient experienced a headache and started having a neurologic change and word finding difficulties.  A head CT was obtained which showed a hemorrhage into the areas adjacent to the surgical bed.  These were the areas that were very edematous due to the likely tumor.  The patient was taken down for a craniotomy and removal of hematoma.  Currently the patient is sedated but when the sedation is lifted he is following commands briskly and localizing with his upper extremities very briskly and moving his lower extremities very briskly by report of the nursing staff.  I would like to keep him on 3% hypertonic saline as long as the intensive care unit doctors are okay with this.  In addition I do believe that this is a tumor and I would like to keep him on Decadron 4 mg IV every 6.  The patient gets very agitated when he is not sedated and blood pressure control becomes an issue.  For that reason I am okay with sedation as long as the intensive care unit feels that it is appropriate in the setting of blood pressure control.    Zander Singh MD, MD Electronically signed 4/18/2025 at 7:08 AM   
to the OR for left parietal craniotomy hematoma evacuation 4/17/2025 with Dr Singh.  Patient required intubation for procedure, was able to be weaned and extubated 4/20.  He was continued on hypertonic 3% saline.  4/21 patient was found to have an acute DVT in the right peroneal vein.  Due to significant bleeding risk, duplex was recommended in 72 hours to assess for propagation and for IVC filter planning.  Patient with ongoing anxiety, had required Precedex drip, was able to be weaned off.  Patient started on hydroxyzine as needed and clonidine patch. Patient's blood pressure under strict control with -160. Clonidine and Norvasc were added to his home Cozaar to allow for tighter management.     4/23/25: Patient seen today in consult.  Wife and family at bedside.  Patient reports fatigue today, states is not sleeping well.  He continues on Decadron and Keppra IV, also with mannitol every 6 hours IV.  Leukocytosis noted, could be exacerbated by Decadron. Discussed with patient and wife about therapy needs, medical stability, IPR, and precert with insurance. They verbalize understanding. They denies any questions at this time.     4/24/25: patient seen today in follow up. Patient up in therapy gym. Mannitol continues until tomorrow AM. Will start precert for IPR today, hopeful for approval tomorrow after mannitol stopped. Patient hopeful for transfer to IPR soon. He denies any questions an concerns at this time.       Current Rehabilitation Assessments:  PT:    Bed Mobility:  Supine to Sit: Contact Guard Assistance, with head of bed raised, with rail, with verbal cues , with increased time for completion  Sit to Supine: Contact Guard Assistance   Transfers:  Sit to Stand: Contact Guard Assistance, with verbal cues  Stand to Sit:Contact Guard Assistance  Ambulation:  Minimal Assistance, as tim handholds on this PT's arms  Distance: 18 ft  Surface: Level Tile  Device:  holding therapist's arms  Gait 
ventricle.  Infectious diseases following.  TTE ordered to r/o endocarditis, no valvular vegetations or mass noted. Toxoplasma IgG/IgM negative. On broad-spectrum antimicrobials Vancocin/Flagyl/Rocephin.TEG mapping WNL. On 3% for sodium goal 145-50.   Pending biopsy results  Continue empiric Flagyl/Rocephin. Vancocin discontinued.   Blood cultures 1, 2 collected 4/11/25, NGTD 5 days. Anaerobic/aerobic culture swab from head with no bacteria.  AFB stain of swab from head negative.  Fungus smear from swab from head negative. Bartonella IgG/IgM negative..  BP goals as above.   Continue 3%, steroids for control of cerebral edema, which will peak at 72h post-op (now Sunday 4/20).  Patient did receive 50 mg of mannitol one-time dose yesterday evening as we discontinued 3% hypertonic given ongoing pleural effusions.  Serum osmolarity 303 this morning.  Goal serum osmolarity 300-310.  Keppra 500 Mg IV every 12 hours for seizure prophylaxis, seizure precautions.  Aphasia: Suspected 2/2 left parietal lobe abscess, and hematoma as above.  NSGY following.  SLP on board..   Bilateral pleural effusions: Noted on imaging 4/17 AM.  Patient now intubated s/p surgery. ABG 7.4 1/34/65/22. S/P Bumex 1 Mg.  Respiratory culture, pneumonia panel, COVID/flu, MRSA nares ordered.  Bumex 0.5mg daily while on 3%. This will maximize benefit of reducing vasogenic edema while diuresing lungs  Repeat chest x-ray 4/19 AM..  Wean ventilator as tolerated.  Continue Acapella/ICS  Acute leukocytosis: 2/2 steroid use. Monitor with daily labs.   Nocturnal Hypoxia: Wean ventilator as tolerated.  Prostate cancer history: Follows with urology Dr. Denis. Active surveillance with a history of 3 biopsies.  Surveillance with follow-ups PSAs/MRIs. Pi-Rads 2.  BPH: No medication use.  History of Rapaflo, patient no longer on it.  History of kidney stone: History [facility], nursing to obtain outside medical records.  HTN: Losartan 12.5 Mg with holding parameters. 
well and likely will be able to go to rehab in the morning.  I would like to keep him on his mannitol dose of 50 g IV every 6 for 1 more day.  I would like to maintain his Decadron.  I am okay with DVT prophylaxis but not full anticoagulation in the setting of a recent cerebral hemorrhage and likely infarct.  I would like him to follow-up with me in 3 weeks with a repeat head CT.  I will discuss ordering an MRI with him at the time of follow-up.  I would like him to stay on his seizure prophylaxis as well as Decadron (dose to be determined likely by oncology.  4 mg p.o. every 6 is fine with me.).    Zander Singh MD, MD Electronically signed 4/24/2025 at 9:58 AM   
    Goals:  Goals: Initiate nutrition support  Type of Goal: New goal       Nutrition Monitoring and Evaluation:      Food/Nutrient Intake Outcomes: Progression of Nutrition, Enteral Nutrition Intake/Tolerance, Vitamin/Mineral Intake, IVF Intake  Physical Signs/Symptoms Outcomes: Biochemical Data, GI Status, Fluid Status or Edema, Hemodynamic Status, Nutrition Focused Physical Findings, Skin, Weight    Discharge Planning:    Too soon to determine     Abida Wesley RD, LD  Contact: [phone]     
   Heart Attack Maternal Uncle     Heart Disease Maternal Uncle     High Blood Pressure Maternal Uncle     High Cholesterol Maternal Uncle     Prostate Cancer Neg Hx        SOCIAL HISTORY    Social History     Tobacco Use    Smoking status: Never    Smokeless tobacco: Never   Vaping Use    Vaping status: Never Used   Substance Use Topics    Alcohol use: Yes     Comment: occ beer    Drug use: No       ALLERGIES    Allergies   Allergen Reactions    Demerol Hives    Midazolam Hcl Hives    Other Other (See Comments)     -zines; muscle spasms    Versed [Midazolam]      Hives         MEDICATIONS    No current facility-administered medications on file prior to encounter.     Current Outpatient Medications on File Prior to Encounter   Medication Sig Dispense Refill    albuterol sulfate HFA (VENTOLIN HFA) 108 (90 Base) MCG/ACT inhaler Inhale 2 puffs into the lungs every 4 hours as needed for Wheezing or Shortness of Breath 18 g 0    olmesartan (BENICAR) 5 MG tablet Take 2 tablets by mouth once daily 180 tablet 3    pravastatin (PRAVACHOL) 40 MG tablet Take 1 tablet by mouth once daily 90 tablet 0    fluticasone (FLONASE) 50 MCG/ACT nasal spray 1 spray by Each Nostril route daily (Patient taking differently: 1 spray by Each Nostril route as needed) 3 each 0    aspirin 81 MG tablet Take 1 tablet by mouth daily      alfuzosin (UROXATRAL) 10 MG extended release tablet Take 1 tablet by mouth daily (Patient not taking: Reported on 4/11/2025) 30 tablet 3    PANTOPRAZOLE SODIUM PO Take by mouth daily      FAMOTIDINE PO Take by mouth daily      Cranberry 500 MG CAPS Take 1 capsule by mouth 2 times daily 180 capsule 3    D-Mannose 500 MG CAPS Take 500 mg by mouth daily 90 capsule 2       REVIEW OF SYSTEMS    10 point review of systems performed which is otherwise negative      Objective:      BP (!) 110/52   Pulse 54   Temp 97.6 °F (36.4 °C) (Oral)   Resp 14   Ht 1.727 m (5' 8\")   Wt 84.6 kg (186 lb 8.2 oz)   SpO2 95%   BMI 
, with increased time for completion ; minimal assist to bring BLEs up into bed    TRANSFERS:  Sit to Stand:  Minimal Assistance, with increased time for completion, cues for hand placement, with verbal cues.    Stand to Sit: Minimal Assistance, with increased time for completion, cues for hand placement, with verbal cues, cues for alignment to surface and for safety with assistive device.      FUNCTIONAL MOBILITY:  Assistive Device: arm in arm with OTR  Assist Level:  Minimal Assistance, with verbal cues , and with increased time for completion.   Distance:  EOB<>BSC  **Unsteady, quickly fatigues.      AM-PAC Inpatient Daily Activity Raw Score: 14     Modified [place]:  Current Functional Status:  +4 - Moderately severe disability; unable to walk and attend to bodily needs without assistance.   Patient could not live alone and could not walk from one room to another without physical help from another person, but they can sit up in bed without any help.  Education provided regarding stroke rehabilitation management.    Education:  Learners: Patient and Family  Plan of Care, Role of OT,ADL's, Importance of Increasing Activity, and transfer training    ASSESSMENT:     Activity Tolerance:  Patient tolerance of  treatment: Fair treatment tolerance, Limited by fatigue, Reduced activity pace, and Need for increased rest breaks . Goals. Revised this date.         Plan: Times Per Week: 6x C  Current Treatment Recommendations: Strengthening, Balance training, Functional mobility training, Endurance training, Patient/Caregiver education & training, Equipment evaluation, education, & procurement, Safety education & training, Self-Care / ADL, Home management training    Goals  Short Term Goals  Time Frame for Short Term Goals: by discharge  Short Term Goal 1: Pt will increase activity tolerance for functional mobility household distances with supervision in prep for ADL/IADL completion. Goal not met, revised.   Short Term Goal 
Contact Guard Assistance, X 1, with set-up, with verbal cues , and with increased time for completion.   Distance: Within room and to/from unit shower room  Did require 2 brief moments of min assistance due to slight LOB        Functional Outcome Measures:         Modified [place]:  Current Functional Status:  +3 - Moderate disability; requiring some help, but able to walk without physical assistance (SBA/CGA).   Patient could not live alone but could walk from one room to another without physical help from another person.  Education provided regarding stroke rehabilitation management.    Education:  Learners: Patient  Plan of Care, Role of OT,ADL's, Reviewed Prior Education, Home Safety, Importance of Increasing Activity, Fall Prevention, Assistive Device Safety, Education Related to Potential Risks and Complications Due to Impairment/Illness/Injury, and Education Related to Stroke Diagnosis    ASSESSMENT:     Activity Tolerance:  Patient tolerance of  treatment: Good treatment tolerance      Plan: Times Per Week: 6x C  Current Treatment Recommendations: Strengthening, Balance training, Functional mobility training, Endurance training, Patient/Caregiver education & training, Equipment evaluation, education, & procurement, Safety education & training, Self-Care / ADL, Home management training    Goals  Short Term Goals  Time Frame for Short Term Goals: by discharge  Short Term Goal 1: Pt will increase activity tolerance for functional mobility to/from bathroom with SBA in prep for ADL.  Short Term Goal 2: Pt will complete BADL tasks with minimal assistance while correctly naming all items needed for ADL,  to increase independence with self care tasks.  Short Term Goal 3: Pt will tolerate dynamic standing X 4 minutes with SBA in prep for sinkside grooming/showering tasks.  Short Term Goal 4: Pt will complete functional transfers with SBA in prep for toilet/BSC transfers.  Long Term Goals  Time Frame for Long Term 
Coronary Art Dis Maternal Aunt     Coronary Art Dis Maternal Aunt     Cancer Maternal Uncle         stomach     Heart Attack Maternal Uncle     Heart Disease Maternal Uncle     High Blood Pressure Maternal Uncle     High Cholesterol Maternal Uncle     Prostate Cancer Neg Hx        SOCIAL HISTORY    Social History     Tobacco Use    Smoking status: Never    Smokeless tobacco: Never   Vaping Use    Vaping status: Never Used   Substance Use Topics    Alcohol use: Yes     Comment: occ beer    Drug use: No       ALLERGIES    Allergies   Allergen Reactions    Demerol Hives    Midazolam Hcl Hives    Other Other (See Comments)     -zines; muscle spasms    Versed [Midazolam]      Hives         MEDICATIONS    No current facility-administered medications on file prior to encounter.     Current Outpatient Medications on File Prior to Encounter   Medication Sig Dispense Refill    albuterol sulfate HFA (VENTOLIN HFA) 108 (90 Base) MCG/ACT inhaler Inhale 2 puffs into the lungs every 4 hours as needed for Wheezing or Shortness of Breath 18 g 0    olmesartan (BENICAR) 5 MG tablet Take 2 tablets by mouth once daily 180 tablet 3    pravastatin (PRAVACHOL) 40 MG tablet Take 1 tablet by mouth once daily 90 tablet 0    fluticasone (FLONASE) 50 MCG/ACT nasal spray 1 spray by Each Nostril route daily (Patient taking differently: 1 spray by Each Nostril route as needed) 3 each 0    aspirin 81 MG tablet Take 1 tablet by mouth daily      alfuzosin (UROXATRAL) 10 MG extended release tablet Take 1 tablet by mouth daily (Patient not taking: Reported on 4/11/2025) 30 tablet 3    PANTOPRAZOLE SODIUM PO Take by mouth daily      FAMOTIDINE PO Take by mouth daily      Cranberry 500 MG CAPS Take 1 capsule by mouth 2 times daily 180 capsule 3    D-Mannose 500 MG CAPS Take 500 mg by mouth daily 90 capsule 2       REVIEW OF SYSTEMS    10 point review of systems performed      Objective:      BP (!) 146/79   Pulse 61   Temp 97.9 °F (36.6 °C) (Oral)   
Goals:  No LTGs established d/t short ELOS         Functional Oral Intake Scale: Total Oral Intake: 6.  Total oral intake with no special preparation, but must avoid specific foods or liquid items    EDUCATION:  Learner: Patient  Education:  Education Related to Stroke Diagnosis, Reviewed signs, symptoms and risks of aspiration, Reviewed ST goals and Plan of Care, Reviewed recommendations for follow-up, Education Related to Potential Risks and Complications Due to Impairment/Illness/Injury, and Education Related to Prevention of Recurrence of Impairment/Illness/Injury  Evaluation of Education: Verbalizes understanding    ASSESSMENT/PLAN:  Activity Tolerance:  Patient tolerance of  treatment: good. Active participant     Assessment/Plan: Patient progressing toward established goals.  Continues to require skilled care of licensed speech pathologist to progress toward achievement of established goals and plan of care..     Plan for Next Session: Language interventions, ACE    Kym Tidwell MA, CCC-SLP [id number]     
Stair Climbing Raw Score : 15  AM-PAC Inpatient without Stair Climbing T-Scale Score : 43.03     Modified [place]:  Current Functional Status:  +3 - Moderate disability; requiring some help, but able to walk without physical assistance (SBA/CGA).   Patient could not live alone but could walk from one room to another without physical help from another person.  Education provided regarding stroke rehabilitation management.    ASSESSMENT:  Assessment: Patient continues to demonstrate deficits in Balance, Coordination, and Safety awareness and would benefit from continued skilled PT to address these impairments and return to PLOF.   Activity Tolerance:  Patient tolerance of  treatment:Good.  Plan: Current Treatment Recommendations: Strengthening, Balance training, Gait training, Functional mobility training, Transfer training, Patient/Caregiver education & training, Safety education & training, Home exercise program, Therapeutic activities, Endurance training  General Plan:  (6xC)    Education:  Learners: Patient and Family  Patient Education: Plan of Care, Transfers, Reviewed Prior Education, Gait, Use of Gait Belt, Up in Chair for All Meals, Verbal Exercise Instruction    Goals:  Patient Goals : go home  Short Term Goals  Time Frame for Short Term Goals: at discharge  Short Term Goal 1: Pt to be Mod I for supine <> sit to get in/out of bed  Short Term Goal 2: Pt to be Mod I for sit <> stand to get up to ambulate  Short Term Goal 3: Pt to ambulate 80 ft with/without RW with Supervision for household distances  Long Term Goals  Time Frame for Long Term Goals : not set due to short ELOS    Following session, patient left in safe position with all fall risk precautions in place.       
  Administered Date(s) Administered    COVID-19, PFIZER PURPLE top, DILUTE for use, (age 12 y+), 30mcg/0.3mL 03/04/2021, 03/25/2021    Influenza, AFLURIA (age 3 y+), FLUZONE, (age 6 mo+), Quadv MDV, 0.5mL 08/30/2018    Influenza, FLUARIX, FLULAVAL, FLUZONE (age 6 mo+) and AFLURIA, (age 3 y+), Quadv PF, 0.5mL 10/06/2016, 10/19/2020    Pneumococcal, PCV-13, PREVNAR 13, (age 6w+), IM, 0.5mL 05/18/2017    Pneumococcal, PCV20, PREVNAR 20, (age 6w+), IM, 0.5mL 02/07/2024    TDaP, ADACEL (age 10y-64y), BOOSTRIX (age 10y+), IM, 0.5mL 04/19/2016    Zoster Recombinant (Shingrix) 08/28/2019       PHYSICAL EXAM    /61   Pulse 55   Temp 98.1 °F (36.7 °C)   Resp 19   Ht 1.727 m (5' 8\")   Wt 84.6 kg (186 lb 8.2 oz)   SpO2 93%   BMI 28.36 kg/m²   General:  Awake, alert, not in distress.  HEENT: pink conjunctiva, unicteric sclera, moist oral mucosa.  Chest: Clear to auscultation bilateral  Cardiovascular:  RRR ,S1S2, no murmur or gallop.  Abdomen:  Soft, non tender to palpation.  Extremities: Normal appearing  Skin:  Warm and dry.  CNS: Alert and oriented x 3, did have episode of aphasia overnight         LABS       CBC:   Lab Results   Component Value Date/Time    WBC 8.7 04/16/2025 03:28 AM    HGB 12.2 04/16/2025 03:28 AM    HCT 37.3 04/16/2025 03:28 AM    MCV 89.7 04/16/2025 03:28 AM     04/16/2025 03:28 AM     BMP:   Lab Results   Component Value Date/Time     04/16/2025 03:28 AM    K 3.9 04/16/2025 03:28 AM    K 4.1 11/08/2018 11:23 AM     04/16/2025 03:28 AM    CO2 19 04/16/2025 03:28 AM    BUN 11 04/16/2025 03:28 AM    CREATININE 0.7 04/16/2025 03:28 AM     PT/INR:   Lab Results   Component Value Date/Time    INR 0.97 11/08/2018 11:23 AM     Prealbumin: No results found for: \"PREALBUMIN\"  Albumin:No results found for: \"LABALBU\"  Sed Rate:  Lab Results   Component Value Date/Time    SEDRATE 3 01/07/2016 02:27 PM     CRP:   Lab Results   Component Value Date/Time    CRP 0.11 01/07/2016 02:27 PM 
98 P: 78. RR:18. B/P: 176/93. FiO2: Room air. O2 Sat: 93.  I/O: 3607.6/1250; +2357.6 (+4278.3 since admission)  Body mass index is 29.33 kg/m².   GCS:   15  General: Overweight  male  HEENT: Normocephalic and atraumatic. No scleral icterus. PERR  Neck: supple.  No Thyromegaly.  Lungs: clear to auscultation.  No retractions  Cardiac: RRR. No JVD.  Abdomen: soft. Nontender.  Extremities: No clubbing, cyanosis, or edema x 4.    Vasculature: capillary refill < 3 seconds. Palpable dorsalis pedis pulses.  Skin: warm and dry.  Psych: Alert and oriented x3.  Affect appropriate  Lymph: No supraclavicular adenopathy.  Neurologic: Patient with broken speech during my exam.  Is able to comprehend my sentences.  Can read, write without issue.  No seizures     Data: (All radiographs, tracings, PFTs, and imaging are personally viewed and interpreted unless otherwise noted).   Sodium 143, potassium 4.3, chloride 115, bicarb 19, BUN 13, creatinine 0.7, EGFR >90  CBC 13.6, hemoglobin 12.5, hematocrit 37.8, platelets 270.  Telemetry shows normal sinus rhythm.  MRI scan of the brain performed on: 4/11/25.  IMPRESSION:  1. Peripherally enhancing lesion measuring up to 2.4 cm within the   posterior left parietal lobe with mild vasogenic edema causes mild mass   effect upon the posterior horn of the left lateral ventricle. Appearance   raises suspicion for cerebral abscess although internal septations are   atypical and may possibly represent passing vessels. No definite enhancing   internal solid component identified. No additional lesion identified.  2. 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  4/17 head CT increased soft tissue swelling of the left parietal scalp.    Seen with multidisciplinary ICU team.  Meets Continued ICU Level Care Criteria:      Case and plan discussed with Dr. Dennis.    Electronically signed by 
example. With increased time patient able locate all 3's and draw a box around all the x's.     Functional Outcome Measures:   HAND ASSESSMENT    Hand Dominance: Right  Left Hand Strength -  (lbs)  Handle Setting 2: 64, 65, 63 (avg 64)  Right Hand Strength -  (lbs)  Handle Setting 2: 77, 82, 81 (avg 80)  Fine Motor Skills  Left 9 Hole Peg Test Time (secs): 42.69  Right 9 Hole Peg Test Time (secs): 38.93               Modified [place]:  Current Functional Status:  +4 - Moderately severe disability; unable to walk and attend to bodily needs without assistance.   Patient could not live alone and could not walk from one room to another without physical help from another person, but they can sit up in bed without any help.  Education provided regarding stroke rehabilitation management.    Education:  Learners: Patient  Plan of Care, Role of OT,ADL's, Orientation, Equipment Education, Reviewed Prior Education, Home Safety, Importance of Increasing Activity, Fall Prevention, Education Related to Potential Risks and Complications Due to Impairment/Illness/Injury, and Education Related to Stroke Diagnosis    ASSESSMENT:     Activity Tolerance:  Patient tolerance of  treatment: Good treatment tolerance and Limited by medical complications      Plan: Times Per Week: 6x C  Current Treatment Recommendations: Strengthening, Balance training, Functional mobility training, Endurance training, Patient/Caregiver education & training, Equipment evaluation, education, & procurement, Safety education & training, Self-Care / ADL, Home management training    Goals  Short Term Goals  Time Frame for Short Term Goals: by discharge  Short Term Goal 1: Pt will increase activity tolerance for functional mobility to/from bathroom with SBA in prep for ADL.  Short Term Goal 2: Pt will complete BADL tasks with minimal assistance while correctly naming all items needed for ADL,  to increase independence with self care tasks.  Short Term Goal 
surgery was performed with hematoma evacuation, which patient tolerated well.  On return to ICU, patient required strict blood pressure monitoring, with 3% hypertonic and 0.5 Mg Bumex daily to help with serum osmolality goal 300-310 to better help with vasogenic cerebral edema.  Patient continued with Decadron.  Patient's blood pressure under strict control with -160.  Clonidine and Norvasc were added to his home Cozaar to allow for tighter management.  At this time, patient is pending results of [facility] biopsy read.  There is a significant suspicion for this being a malignancy.  Patient now on mannitol for strict cerebral edema control, sodium goals a bit more lenient.  Patient also had an acute DVT noted on vascular imaging 4/21.  Given ongoing bleed, patient not a candidate for chemical anticoagulation.  Repeat DVT scan scheduled in 72 hours, if signs of propagation then patient is a candidate for an IVC filter placement..       Clinically patient is improving on a day-to-day basis.  Neurosurgery anticipating transition to stepdown unit on 4/24 as goals for edema management have been achieved with sodium, systolic blood pressure well-managed.  Given significant concern for malignancy, palliative care is likely indicated.    4/25/2025: Mannitol was discontinued today.  IV Decadron changed to 2 mg every 6 hours.  Neurosurgery plans to follow-up in 2 weeks for suture removal.  Started on DVT prophylaxis at this time with Lovenox per neurosurgery.  Holding full anticoagulation for 1 more week.  Awaiting IPR      4/26: Patient was resting comfortably in a chair when I evaluate him in the room.  Was playing conversant.  To be well oriented with no difficulties moving.  He does have mild expressive aphasia but is able to communicate effectively.  awaiting placement in IPR        ROS: reviewed complete ROS unchanged unless otherwise stated in hospital course/subjective portion.       Medications:  
    Prealbumin: No results found for: \"PREALBUMIN\"  Albumin:No results found for: \"LABALBU\"  Sed Rate:  Lab Results   Component Value Date/Time    SEDRATE 3 01/07/2016 02:27 PM     CRP:   Lab Results   Component Value Date/Time    CRP 0.11 01/07/2016 02:27 PM     Micro:   Lab Results   Component Value Date/Time    BC No growth 24 hours. 04/11/2025 08:51 PM      Hemoglobin A1C:   Lab Results   Component Value Date/Time    LABA1C 5.7 08/04/2023 06:04 AM           Electronically signed by Anselmo Cui MD TD@ at 7:56 AM    
  Lab Results   Component Value Date/Time    BC  04/11/2025 08:51 PM     No growth 24 hours. No growth 48 hours. No growth at 5 days      Hemoglobin A1C:   Lab Results   Component Value Date/Time    LABA1C 5.7 08/04/2023 06:04 AM           Electronically signed by Anselmo Cui MD TD@ at 7:00 AM    
01/07/2016 02:27 PM     CRP:   Lab Results   Component Value Date/Time    CRP 0.11 01/07/2016 02:27 PM     Micro:   Lab Results   Component Value Date/Time    BC  04/11/2025 08:51 PM     No growth 24 hours. No growth 48 hours. No growth at 5 days      Hemoglobin A1C:   Lab Results   Component Value Date/Time    LABA1C 5.7 08/04/2023 06:04 AM           Electronically signed by Anselmo Cui MD TD@ at 6:54 AM    
Daily    cloNIDine, 1 patch, TransDERmal, Weekly    clotrimazole, 10 mg, Oral, 5x Daily    tiotropium-olodaterol, 2 puff, Inhalation, Daily    pantoprazole, 40 mg, Oral, QAM AC    sodium chloride flush, 5-40 mL, IntraVENous, 2 times per day    pravastatin, 40 mg, Oral, Nightly    levETIRAcetam, 500 mg, IntraVENous, Q12H         Intake/Output Summary (Last 24 hours) at 4/27/2025 1158  Last data filed at 4/26/2025 1832  Gross per 24 hour   Intake 750 ml   Output --   Net 750 ml       Exam:  /60   Pulse 66   Temp 98.2 °F (36.8 °C) (Oral)   Resp 18   Ht 1.727 m (5' 8\")   Wt 96 kg (211 lb 10.3 oz)   SpO2 94%   BMI 32.18 kg/m²     General: No distress, appears stated age.  Eyes:  PERRL. Conjunctivae/corneas clear.  HENT: Head normal appearing. Nares normal. Oral mucosa moist.  Hearing intact.   Neck: Supple, with full range of motion. Trachea midline.  No gross JVD appreciated.  Respiratory:  Normal effort. Clear to auscultation, without rales or wheezes or rhonchi.  Cardiovascular: Normal rate, regular rhythm with normal S1/S2 without murmurs.    No lower extremity edema.   Abdomen: Soft, non-tender, non-distended with normal bowel sounds.  Musculoskeletal: No joint swelling or tenderness. Normal tone. No abnormal movements.   Skin: Warm and dry. No rashes or lesions.  Neurologic:  No focal sensory/motor deficits in the upper or lower extremities. Cranial nerves:  grossly non-focal 2-12.     Psychiatric: Alert and oriented, normal insight and thought content.   Capillary Refill: Brisk,< 3 seconds.  Peripheral Pulses: +2 palpable, equal bilaterally.       Labs:   Recent Labs     04/25/25 0415 04/26/25 0338 04/27/25  0336   WBC 19.3* 18.0* 14.1*   HGB 12.0* 11.8* 11.3*   HCT 36.1* 35.4* 34.4*    185 197     Recent Labs     04/25/25  0415 04/27/25  0336    135   K 4.6 4.9    101   CO2 21* 24   BUN 25* 23   CREATININE 0.6* 0.7   CALCIUM 8.5* 8.6*     No results for input(s): \"AST\", \"ALT\", 
Value Date/Time    CRP 0.11 01/07/2016 02:27 PM     Micro:   Lab Results   Component Value Date/Time    BC  04/11/2025 08:51 PM     No growth 24 hours. No growth 48 hours. No growth at 5 days      Hemoglobin A1C:   Lab Results   Component Value Date/Time    LABA1C 5.7 08/04/2023 06:04 AM           Electronically signed by Anselmo Cui MD TD@ at 6:41 AM    
intubated and sedated till Sunday 4/20 to reduce the risk of brain re-bleed. Family is on board with plan, resident available to answer all questions.      4/20: no acute issue overnight. Physical exam conducted by me, CT head ordered. Patient doing well so far, will continue same measure for now.    4/21: No acute events overnight. Low-grade fever 100.  Added Tylenol as needed today.  Anticipating possible move to stepdown unit if neurosurgery is okay with discontinuing 3% hypertonic.  Recommending patient transition to p.o. steroids of continue to require steroid therapy for cerebral edema control.  Glucose 140-180.  Patient producing urine, tolerating p.o. diet.  Continues to be aphasic, but following commands.    Past Medical History: HTN, HLD, central vein retinal occlusion, prostate cancer.    Family History:  Mother: Lung cancer, with mets..  Social History: Non-smoker.  Social drinker.  No other drug use.    ROS   Patient had word finding difficulty again this morning, but is able to follow commands.  Alert/oriented to name, date of birth, year.  Denies headache, CP, SOB, N/V/D, abdominal pain, constipation, incontinence/voiding difficulties, bleeding.    Scheduled Meds:   clotrimazole  10 mg Oral 5x Daily    bumetanide  0.5 mg IntraVENous Daily    tiotropium-olodaterol  2 puff Inhalation Daily    losartan  25 mg Oral BID    dexAMETHasone  4 mg IntraVENous Q6H    pantoprazole  40 mg Oral QAM AC    cefTRIAXone (ROCEPHIN) IV  2,000 mg IntraVENous Q12H    metroNIDAZOLE  500 mg IntraVENous Q8H    sodium chloride flush  5-40 mL IntraVENous 2 times per day    pravastatin  40 mg Oral Nightly    levETIRAcetam  500 mg IntraVENous Q12H     Continuous Infusions:   3% sodium chloride 40 mL/hr (04/20/25 2254)    dexmedeTOMIDine Stopped (04/20/25 0530)    niCARdipine 10 mg/hr (04/20/25 2119)    propofol Stopped (04/19/25 1334)    fentaNYL Stopped (04/19/25 1448)    sodium chloride Stopped (04/19/25 1437)       PHYSICAL 
grandmother, maternal uncle, and maternal uncle; Heart Attack (age of onset: 77) in his father; Heart Disease in his maternal aunt, maternal grandmother, maternal uncle, and maternal uncle; Heart Disease (age of onset: 62) in his mother; Heart Disease (age of onset: 77) in his father; High Blood Pressure in his father, maternal aunt, maternal grandmother, maternal uncle, and maternal uncle; High Blood Pressure (age of onset: 38) in his sister; High Cholesterol in his father, maternal aunt, maternal grandmother, maternal uncle, and maternal uncle; High Cholesterol (age of onset: 38) in his sister; Other in his paternal grandfather.     Medications Prior to Admissions   Prior to Admission medications    Medication Sig Start Date End Date Taking? Authorizing Provider   albuterol sulfate HFA (VENTOLIN HFA) 108 (90 Base) MCG/ACT inhaler Inhale 2 puffs into the lungs every 4 hours as needed for Wheezing or Shortness of Breath 3/22/25  Yes Justine Doe APRN - CNP   olmesartan (BENICAR) 5 MG tablet Take 2 tablets by mouth once daily 1/16/25  Yes Hannah Banks MD   pravastatin (PRAVACHOL) 40 MG tablet Take 1 tablet by mouth once daily 1/7/25  Yes Hannah Banks MD   fluticasone (FLONASE) 50 MCG/ACT nasal spray 1 spray by Each Nostril route daily  Patient taking differently: 1 spray by Each Nostril route as needed 6/9/22  Yes Rebecca Noe APRN - CNP   aspirin 81 MG tablet Take 1 tablet by mouth daily   Yes Delfino Calixto MD   alfuzosin (UROXATRAL) 10 MG extended release tablet Take 1 tablet by mouth daily  Patient not taking: Reported on 4/11/2025 4/17/24   Denise Aguilar APRN - CNP   PANTOPRAZOLE SODIUM PO Take by mouth daily    Delfino Calixto MD   FAMOTIDINE PO Take by mouth daily    Delfino Calixto MD   Cranberry 500 MG CAPS Take 1 capsule by mouth 2 times daily 2/1/24 5/1/24  Denise Aguilar APRN - CNP   D-Mannose 500 MG CAPS Take 500 mg by mouth daily 2/1/24 5/1/24  Denise Aguilar APRN - 
no exertional dyspnea/no orthopnea/no shortness of breath/no hemoptysis

## 2025-04-29 LAB
ALBUMIN SERPL BCG-MCNC: 3.5 G/DL (ref 3.4–4.9)
ALP SERPL-CCNC: 57 U/L (ref 40–129)
ALT SERPL W/O P-5'-P-CCNC: 50 U/L (ref 10–50)
ANION GAP SERPL CALC-SCNC: 10 MEQ/L (ref 8–16)
AST SERPL-CCNC: 20 U/L (ref 10–50)
BASOPHILS ABSOLUTE: 0 THOU/MM3 (ref 0–0.1)
BASOPHILS NFR BLD AUTO: 0.1 %
BILIRUB SERPL-MCNC: 0.6 MG/DL (ref 0.3–1.2)
BUN SERPL-MCNC: 18 MG/DL (ref 8–23)
CALCIUM SERPL-MCNC: 9.1 MG/DL (ref 8.8–10.2)
CHLORIDE SERPL-SCNC: 100 MEQ/L (ref 98–111)
CO2 SERPL-SCNC: 26 MEQ/L (ref 22–29)
CREAT SERPL-MCNC: 0.8 MG/DL (ref 0.7–1.2)
DEPRECATED RDW RBC AUTO: 43.8 FL (ref 35–45)
EOSINOPHIL NFR BLD AUTO: 0.1 %
EOSINOPHILS ABSOLUTE: 0 THOU/MM3 (ref 0–0.4)
ERYTHROCYTE [DISTWIDTH] IN BLOOD BY AUTOMATED COUNT: 13.7 % (ref 11.5–14.5)
GFR SERPL CREATININE-BSD FRML MDRD: > 90 ML/MIN/1.73M2
GLUCOSE SERPL-MCNC: 110 MG/DL (ref 74–109)
HCT VFR BLD AUTO: 37 % (ref 42–52)
HGB BLD-MCNC: 12.2 GM/DL (ref 14–18)
IMM GRANULOCYTES # BLD AUTO: 0.18 THOU/MM3 (ref 0–0.07)
IMM GRANULOCYTES NFR BLD AUTO: 1.5 %
LYMPHOCYTES ABSOLUTE: 1.2 THOU/MM3 (ref 1–4.8)
LYMPHOCYTES NFR BLD AUTO: 9.5 %
MCH RBC QN AUTO: 29.3 PG (ref 26–33)
MCHC RBC AUTO-ENTMCNC: 33 GM/DL (ref 32.2–35.5)
MCV RBC AUTO: 88.7 FL (ref 80–94)
MONOCYTES ABSOLUTE: 0.7 THOU/MM3 (ref 0.4–1.3)
MONOCYTES NFR BLD AUTO: 5.7 %
NEUTROPHILS ABSOLUTE: 10.1 THOU/MM3 (ref 1.8–7.7)
NEUTROPHILS NFR BLD AUTO: 83.1 %
NRBC BLD AUTO-RTO: 0 /100 WBC
PLATELET # BLD AUTO: 281 THOU/MM3 (ref 130–400)
PMV BLD AUTO: 10.9 FL (ref 9.4–12.4)
POTASSIUM SERPL-SCNC: 4.7 MEQ/L (ref 3.5–5.2)
PREALB SERPL-MCNC: 28.9 MG/DL (ref 20–40)
PROT SERPL-MCNC: 5.8 G/DL (ref 6.4–8.3)
RBC # BLD AUTO: 4.17 MILL/MM3 (ref 4.7–6.1)
SODIUM SERPL-SCNC: 136 MEQ/L (ref 135–145)
WBC # BLD AUTO: 12.2 THOU/MM3 (ref 4.8–10.8)

## 2025-04-29 PROCEDURE — 84134 ASSAY OF PREALBUMIN: CPT

## 2025-04-29 PROCEDURE — 97162 PT EVAL MOD COMPLEX 30 MIN: CPT

## 2025-04-29 PROCEDURE — 97166 OT EVAL MOD COMPLEX 45 MIN: CPT

## 2025-04-29 PROCEDURE — 6360000002 HC RX W HCPCS: Performed by: STUDENT IN AN ORGANIZED HEALTH CARE EDUCATION/TRAINING PROGRAM

## 2025-04-29 PROCEDURE — 99232 SBSQ HOSP IP/OBS MODERATE 35: CPT | Performed by: STUDENT IN AN ORGANIZED HEALTH CARE EDUCATION/TRAINING PROGRAM

## 2025-04-29 PROCEDURE — 97530 THERAPEUTIC ACTIVITIES: CPT

## 2025-04-29 PROCEDURE — 97129 THER IVNTJ 1ST 15 MIN: CPT

## 2025-04-29 PROCEDURE — 97116 GAIT TRAINING THERAPY: CPT

## 2025-04-29 PROCEDURE — 1180000000 HC REHAB R&B

## 2025-04-29 PROCEDURE — 2500000003 HC RX 250 WO HCPCS: Performed by: STUDENT IN AN ORGANIZED HEALTH CARE EDUCATION/TRAINING PROGRAM

## 2025-04-29 PROCEDURE — 92610 EVALUATE SWALLOWING FUNCTION: CPT

## 2025-04-29 PROCEDURE — 97110 THERAPEUTIC EXERCISES: CPT

## 2025-04-29 PROCEDURE — 6370000000 HC RX 637 (ALT 250 FOR IP): Performed by: STUDENT IN AN ORGANIZED HEALTH CARE EDUCATION/TRAINING PROGRAM

## 2025-04-29 PROCEDURE — 97535 SELF CARE MNGMENT TRAINING: CPT

## 2025-04-29 PROCEDURE — 97112 NEUROMUSCULAR REEDUCATION: CPT

## 2025-04-29 PROCEDURE — 80053 COMPREHEN METABOLIC PANEL: CPT

## 2025-04-29 PROCEDURE — 92523 SPEECH SOUND LANG COMPREHEN: CPT

## 2025-04-29 PROCEDURE — 85025 COMPLETE CBC W/AUTO DIFF WBC: CPT

## 2025-04-29 PROCEDURE — 36415 COLL VENOUS BLD VENIPUNCTURE: CPT

## 2025-04-29 RX ADMIN — AMLODIPINE BESYLATE 10 MG: 10 TABLET ORAL at 08:33

## 2025-04-29 RX ADMIN — PANTOPRAZOLE SODIUM 40 MG: 40 TABLET, DELAYED RELEASE ORAL at 06:15

## 2025-04-29 RX ADMIN — LEVETIRACETAM 500 MG: 500 TABLET, FILM COATED ORAL at 20:19

## 2025-04-29 RX ADMIN — MICONAZOLE NITRATE: 2 POWDER TOPICAL at 20:27

## 2025-04-29 RX ADMIN — DEXAMETHASONE 2 MG: 4 TABLET ORAL at 12:29

## 2025-04-29 RX ADMIN — PRAVASTATIN SODIUM 40 MG: 40 TABLET ORAL at 20:19

## 2025-04-29 RX ADMIN — ENOXAPARIN SODIUM 40 MG: 100 INJECTION SUBCUTANEOUS at 08:34

## 2025-04-29 RX ADMIN — SODIUM CHLORIDE, PRESERVATIVE FREE 10 ML: 5 INJECTION INTRAVENOUS at 08:35

## 2025-04-29 RX ADMIN — DEXAMETHASONE 2 MG: 4 TABLET ORAL at 17:13

## 2025-04-29 RX ADMIN — LEVETIRACETAM 500 MG: 500 TABLET, FILM COATED ORAL at 06:15

## 2025-04-29 RX ADMIN — LOSARTAN POTASSIUM 37.5 MG: 25 TABLET, FILM COATED ORAL at 20:19

## 2025-04-29 RX ADMIN — MICONAZOLE NITRATE: 2 POWDER TOPICAL at 12:29

## 2025-04-29 RX ADMIN — DEXAMETHASONE 2 MG: 4 TABLET ORAL at 06:15

## 2025-04-29 RX ADMIN — LOSARTAN POTASSIUM 37.5 MG: 25 TABLET, FILM COATED ORAL at 08:34

## 2025-04-29 RX ADMIN — Medication 6 MG: at 20:19

## 2025-04-29 RX ADMIN — BUMETANIDE 0.5 MG: 0.5 TABLET ORAL at 08:34

## 2025-04-29 RX ADMIN — DEXAMETHASONE 2 MG: 4 TABLET ORAL at 23:50

## 2025-04-29 ASSESSMENT — 9 HOLE PEG TEST
TESTTIME_SECONDS: 27.8
TEST_RESULT: FUNCTIONAL
TESTTIME_SECONDS: 27.3
TEST_RESULT: FUNCTIONAL

## 2025-04-29 NOTE — PLAN OF CARE
Problem: ABCDS Injury Assessment  Goal: Absence of physical injury  4/28/2025 2147 by Kimberli Blue, RN  Outcome: Progressing  Flowsheets (Taken 4/28/2025 2147)  Absence of Physical Injury: Implement safety measures based on patient assessment     Problem: Nutrition Deficit:  Goal: Optimize nutritional status  4/28/2025 2147 by Kimberli Blue, RN  Outcome: Progressing  Flowsheets (Taken 4/28/2025 2147)  Nutrient intake appropriate for improving, restoring, or maintaining nutritional needs:   Assess nutritional status and recommend course of action   Monitor oral intake, labs, and treatment plans   Recommend appropriate diets, oral nutritional supplements, and vitamin/mineral supplements     Problem: Safety - Adult  Goal: Free from fall injury  4/28/2025 2147 by Kimberli Blue RN  Outcome: Progressing  Flowsheets (Taken 4/28/2025 2147)  Free From Fall Injury: Instruct family/caregiver on patient safety     Problem: Discharge Planning  Goal: Discharge to home or other facility with appropriate resources  4/28/2025 2147 by Kimberli Blue, RN  Outcome: Progressing  Flowsheets (Taken 4/28/2025 1945)  Discharge to home or other facility with appropriate resources:   Identify barriers to discharge with patient and caregiver   Arrange for needed discharge resources and transportation as appropriate   Identify discharge learning needs (meds, wound care, etc)

## 2025-04-29 NOTE — CARE COORDINATION
Patient alert,awake in bed.Oriented patient to call light and discussed that he needed to call for assistance to go to the bathroom until evaluated and cleared by therapy. Patient states understanding.

## 2025-04-29 NOTE — PROGRESS NOTES
[facility]  SPEECH THERAPY  [id number] IP REHAB  Speech - Language - Cognitive Evaluation + Clinical Swallow Evaluation + Cognitive tx    Discharge Recommendations: Outpatient Speech Therapy  DIET ORDER RECOMMENDATIONS AFTER EVALUATION: Regular diet and thin liquids  STRATEGIES: Full Upright Position, Limit Distractions, and Monitor for Fatigue     SLP Individual Minutes  Time In: 1330  Time Out: 1415  Minutes: 45  Timed Code Treatment Minutes: 8 Minutes     Speech, Language, Cognitive Evaluation: 29 minutes  Clinical Swallow Evaluation: 8 minutes  Cognitive tx: 8 minutes    Date: 2025  Patient Name: Koko Chao      CSN: [id number]   : 1960  (65 y.o.)  Gender: male   Referring Physician:  Sonya Robison DO  Diagnosis: Brain mass  Precautions: fall precautions, seizure precautions  History of Present Illness/Injury: Patient admitted to [facility] with above diagnosis: see physician H&P for further information. Per chart review, \"Koko Chao  is a 65 y.o. male with PMH significant for right eyes central retinal vein occlusion, prostate cancer, heartburn, HLD, and HTN who is being admitted to the inpatient rehabilitation unit on 2025.  History obtained via: ED documentation, acute care documentation, and patient     Patient initially presented to [facility] ED on  for 415 for evaluation of memory issues and word finding difficulty.  Patient additionally reported intermittent blurred vision.  In the ED, CT head was performed which reportedly revealed an intra-axial mass in the left parietal lobe.  Neurosurgery was consulted.  Patient was started on Keppra and Decadron.  Additionally, MRI of the brain was ordered and CT abdomen/pelvis ordered.  Patient was admitted for further evaluation management.     On admission, MRI brain was completed patient was completed which revealed a peripherally enhancing lesion measuring up to 2.4 cm within the posterior left parietal lobe with mild

## 2025-04-29 NOTE — PROGRESS NOTES
[facility]  INPATIENT PHYSICAL THERAPY  EVALUATION  -STRZ 8K  REHAB - 8K-17/017-A    Discharge Recommendations: Continue to assess pending progress, Therapy recommended at discharge  Equipment Recommendations: No               Time In: 1100  Time Out: 1230  Timed Code Treatment Minutes: 78 Minutes  Minutes: 90          Date: 2025  Patient Name: Koko Chao,  Gender:  male        MRN: [id number]  : 1960  (65 y.o.)      Referring Practitioner: Sonya Robison DO  Diagnosis: Brain mass  Additional Pertinent Hx: Per H&P \"Koko Chao is a 65 y.o. male with PMHx of hypertension, hyperlipidemia, central retinal vein occlusion, prostate cancer who presents to [facility] with chief complaint of aphasia.  Patient reports had 10-minute episode on 4/10 where he was not able to express his language. Patient reports this has been ongoing for 3 weeks. CT head done in ED showed suspected intra-axial mass in the left parietal lobe. Edema is anteriorly of mass. Pt s/p LEFT PARIETAL CRANIOTOMY FOR EVACUATION OF CEREBRAL ABSCESS on 25. Repeat CT due to increased aphasia, H/A was done right away and showed a hemorrhage really not in the surgical cavity but rather in the edematous area surrounding the surgical cavity. Pt s/p LEFT PARIETAL CRANIOTOMY HEMATOMA EVACUATION on 25. Patient was transferred out of the ICU on 2025. On 2025, CT head was obtained showing that there was a new hyperdense area posterior and superficial to the location of the tumor resection. Per neurosurgery on 2025, follow-up with neurosurgery in 2 weeks for suture removal (). Pt admitted to inpatient rehab on  for intensive therapy to promote safety and independence with daily activities.\"     Restrictions/Precautions:  Restrictions/Precautions: Fall Risk, Seizure, General Precautions       Other Position/Activity Restrictions: Can shower, however, cannot get head sutures wet    Required

## 2025-04-29 NOTE — PLAN OF CARE
Problem: Discharge Planning  Goal: Discharge to home or other facility with appropriate resources  2025 1454 by Nurys Kaplan RN  Note:   [facility]  Physical Medicine Case Management Assessment    [] Inpatient Rehabilitation Unit    Patient Name: Koko Chao        MRN: [id number]    : 1960  (65 y.o.)  Gender: male   Date of Admission: 2025  2:40 PM    Family/Social/Home Environment: Prior to admission patient was independent and working full time. Patient lives with his wife Anita who is his main support. Patients PCP is Hannah Banks MD and pharmacy of choice he will have to ask his wife. Patient stated he did not utilize any assistive devices. Patient is motivated to work with therapy.     Patient informed of team conference on Thursday and how to contact case management if needed.         Social/Functional History  Lives With: Spouse  Type of Home: House  Home Layout: One level  Home Access: Stairs to enter with rails  Entrance Stairs - Number of Steps: 3  Entrance Stairs - Rails: Left  Bathroom Shower/Tub: Walk-in shower  Bathroom Toilet: Standard  Bathroom Equipment: None  Home Equipment: None  Has the patient had two or more falls in the past year or any fall with injury in the past year?: No  Receives Help From: Family  Prior Level of Assist for ADLs: Independent  Prior Level of Assist for Homemaking: Independent  Prior Level of Assist for Ambulation: Independent household ambulator, with or without device  Prior Level of Assist for Transfers: Independent  Active : Yes  Mode of Transportation: SUV  Occupation: Full time employment  Type of Occupation:   Leisure & Hobbies: Watch football, spend time with grandkids  Additional Comments: Patient reports he was highly independent prior to admission without use of AD and working full time job. Patient spouse does not work and able to assist with needs at home however reports she has bad fibromyalgia.

## 2025-04-29 NOTE — PROGRESS NOTES
[facility]  INPATIENT OCCUPATIONAL THERAPY  MH-STRZ 8K IP REHAB  EVALUATION      Discharge Recommendations: Continue to assess pending progress  Equipment Recommendations:   continue to assess pending progress; pt does not utilize/own any equipment at baseline    Time In: 0700  Time Out: 0830  Timed Code Treatment Minutes: 76 Minutes  Minutes: 90       Date: 2025  Patient Name: Koko Chao,   Gender: male      MRN: [id number]  : 1960  (65 y.o.)  Referring Practitioner: Sonya Robison DO  Diagnosis: Brain mass  Additional Pertinent Hx: Koko Chao is a 65 y.o. male with PMHx of hypertension, hyperlipidemia, central retinal vein occlusion, prostate cancer who presents to [facility] with chief complaint of aphasia.  Patient reports had 10-minute episode on 4/10 where he was not able to express his language. Patient reports this has been ongoing for 3 weeks. CT head done in ED showed suspected intra-axial mass in the left parietal lobe. Edema is anteriorly of mass. Pt s/p LEFT PARIETAL CRANIOTOMY FOR EVACUATION OF CEREBRAL ABSCESS on 25. Repeat CT due to increased aphasia, H/A was done right away and showed a hemorrhage really not in the surgical cavity but rather in the edematous area surrounding the surgical cavity. Pt s/p LEFT PARIETAL CRANIOTOMY HEMATOMA EVACUATION on 25. Patient was transferred out of the ICU on 2025. On 2025, CT head was obtained showing that there was a new hyperdense area posterior and superficial to the location of the tumor resection. Per neurosurgery on 2025, follow-up with neurosurgery in 2 weeks for suture removal (). Pt admitted to inpatient rehab on  for intensive therapy to promote safety and independence with daily activities.    Restrictions/Precautions:  Restrictions/Precautions: Fall Risk, Seizure, General Precautions  Position Activity Restriction  Other Position/Activity Restrictions: Can shower, however,  occupational performance as evidenced by a score of 85/100 on the Modified Barthel Index.  Long Term Goal 2: Pt will complete a basic IADL task with supervision and 0-1 VC for sequencing and problem solving to promote safety and independence with homemaking tasks.  Long Term Goal 3: Pt will sequence ADL routine with supervision and 0-1 VC for initiation to increase safety and return to PLOF.         Following session, patient left in safe position with all fall risk precautions in place.

## 2025-04-29 NOTE — PROGRESS NOTES
[facility] Contin[facility] Nurse  Progress Note       Koko Chao  AGE: 65 y.o.   GENDER: male  : 1960  UNIT: 8K-17/017-A  TODAY'S DATE:  2025  ADMISSION DATE: 2025  2:40 PM    Subjective:       Koko Chao is a 65 y.o. male referred by:   [] Physician/ Resident/ PA/ APRN-CNP  [] Nursing  [] Other:     Summary:      Wound ostomy consult received for     buttocks; pictures obtained in avatar   . Documentation reviewed. Staff documenting \"redness, excoriation\". Photo reviewed. 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 Recommend staff to utilize Michael and Wound Care order sets. See below. If wound evolves during hospital admission, please call.       MASD gluteal cleft    -recommend miconazole powder followed by triad. No depends while in bed or chair only when working with therapy.    Treatment Recommendations:  -Turn every 2 hours and PRN  -Offload with pillows or wedges  -Ensure patient is on low air loss support surface (Ashley, SPR+, Kula, Bariatric bed)  -Utilize moisture wicking underpads  -Limit use of depends, except when working with therapy  -If applicable, use waffle cushion when in chair    How to: Michael and Wound Care Orders         Type WOUND in order set search bar.  Right click Wound Care Orders, select add to favorites.  Right click GEN Michael Scale Focused, select add to favorites.        Check GEN Michael Scale Focus and Wound Care Orders.  Select appropriate orders.  Sign orders as Per Protocol: No Cosign Required.   These are independent nursing orders.

## 2025-04-29 NOTE — PROGRESS NOTES
Physical Medicine & Rehabilitation   Progress Note    Chief Complaint:  Brain Mass     Subjective: Patient seen and examined.  No acute events overnight.  Patient reports that he is feeling well.  He tolerated his first sessions of therapy yesterday. He denies any significant pain. No reports of chest pain or shortness of breath.  No reports of any significant changes to bowel or bladder.  His last documented BM was on 4/30/2025.    Rehabilitation:  PT 4/29/2025:  Balance:  Static Sitting Balance:  Independent  Dynamic Sitting Balance: Independent  Static Standing Balance: Supervision  Dynamic Standing Balance: Stand By Assistance, Contact Guard Assistance  Pt. Able to  an object from floor using No Device with Contact Guard Assistance   *Patient instructed in Functional Gait Assessment (see score below)  *Patient instructed in 5x sit<>stand test (see score below)     Bed Mobility:  Rolling to Left: Independent   Rolling to Right: Independent   Supine to Sit: Independent  Sit to Supine: Independent   *Patient performed with HOB flat and no railings      Transfers:  Sit to Stand: Stand By Assistance  Stand to Sit:Stand By Assistance  *Patient instructed in sit<>stand transfers from various surfaces including: recliner, edge of bed and wheelchair without AD.   To/From Bed and Chair: Stand By Assistance  Car:Stand By Assistance  *Patient required cueing for technique      Ambulation:  Stand By Assistance  Distance: 10 feet, 70 feet and 200 feet   Surface: Level Tile  Device: No Device  Gait Deviations: Forward Flexed Posture, Slow Alexia, Decreased Step Length Bilaterally, Decreased Gait Speed, Decreased Heel Strike Bilaterally, Decreased Foot Clearance Right, and Decreased Foot Clearance Left      Stairs:  Stairs: 6\" steps X 4 using Bilateral Handrails and Stand By Assistance, Contact Guard Assistance.  *Patient utilized B handrails      Neuro Re-ed  Pt. Completed standing, Stepping, and dynamic gait  conversation.  Language: Expressive aphasia     Cranial Nerves:  cranial nerves II-XII are grossly intact  ROM:  normal  Motor Exam: Moving all extremities easily against gravity extremities.  No focal deficits noted  Tone:  normal  Muscle bulk: within normal limits  Sensory:  Sensory intact to light touch  Gait: Not assessed     Heart: RRR, no M/R/G noted  Lungs: CTAB, breathing comfortably on room air without increased work of breathing  Abdomen: non-distended  Skin: warm and dry, no rash or erythema on exposed skin.  Incision C/D/I with sutures      Diagnostics:   No results found for this or any previous visit (from the past 24 hours).      Impression:  Left parietal  brain mass   S/p left parietal craniotomy for resection 4/16/25 with Dr Singh  Left parietal parenchymal hemorrhage  S/p left parietal craniotomy hematoma evacuation 4/17/2025 with Dr Singh  Expressive Aphasia  Gait disturbance  Acute DVT RLE  Anxiety  Leukocytosis  Bilateral pleural effusion  Hypertension  Prostate cancer, surveillance  BPH  History of kidney stone  Nocturnal hypoxia  Hyperlipidemia  Retinal central vein occlusion     Plan:  Continue inpatient rehabilitation program involving at least 3 hours per day, 5 days per week of intensive rehabilitation.  Rehabilitation services will include PT, OT, and SLP/RT in order to improve functional status prior to discharge.  Family education and training will be completed.  Equipment evaluations and recommendations will be completed as appropriate.       Rehabilitation nursing will be involved for bowel, bladder, skin, and pain management.  Nursing will also provide education and training to patient and family.    Dr. Bang consulted for medical management   Brain Mass: s/p resection on 4/16/2025.  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  Continue dexamethasone 2 mg every 6 hours and Keppra 500 mg every 12 hours.  Per

## 2025-04-29 NOTE — PROGRESS NOTES
[facility]  Recreational Therapy  Inpatient Rehabilitation Evaluation        Time Spent with Patient: 15 minutes    Date:  4/29/2025       Patient Name: Koko Chao      MRN: [id number]       YOB: 1960 (65 y.o.)       Gender: male     Referring Practitioner: Sonya Robison DO    RESTRICTIONS/PRECAUTIONS:  Restrictions/Precautions: Fall Risk, Seizure, General Precautions          PAIN: 0    SUBJECTIVE:  pt lives with his wife in [place]-they have 2 children and 4 grandchildren     VISION:  Glasses    HEARING: Within Normal Limit    LEISURE INTERESTS:   Pt had a difficult time stating some of his hobbies-some word find difficulty noted-wife present and supportive- she states pt likes to take his grandchildren fishing, they go out to eat and pt states \"too much\" -RT showed wife where the game cupboard is and encouraged her to allow him to wheel his w/c around the unit when he is feeling anxious and needing to get out of the room-pt wants to be able to get up on his own but knows staff needs to evaluate further at this time-gave pt a few word search puzzles to try and gave him a deck of cards that he would like to use-very pleasant-    BARRIERS TO LEISURE INTERESTS:    Communication deficit and Decreased endurance           Patient Education  New Education Provided: Importance of Leisure, RT Plan of Care    Plan:  Continue to follow patient through this admission  Include patient in appropriate groups  See patient individually    Electronically signed by: Janeth Prajapati, CTRS  Date: 4/29/2025

## 2025-04-29 NOTE — PLAN OF CARE
Individualized Plan of Care  [facility] Inpatient Rehabilitation Unit    Rehabilitation physician: Dr. Robison    Admit Date: 4/28/2025     Rehabilitation Diagnosis: Brain mass [G93.89]      Rehabilitation impairments: self care, mobility, motor dysfunction, bowel/bladder management, pain management, safety, cognitive function, and communication    Factors facilitating achievement of predicted outcomes: Family support, Motivated, Cooperative, and Pleasant  Barriers to the achievement of predicted outcomes: Communication deficit and Decreased endurance    Patient Goals: Improve independence with mobility, Improvement of mobility at a wheelchair level, Increase overall strength and endurance, Increase balance, Increase endurance, Increase independence with activities of daily living, Improve cognition, Increase self-awareness, Increase safety awareness, Increase community integration, Increase socialization, Functional communication with caregivers, Integrate appropriate pain management plan, Assure adequate nutritional option for discharge, Continence of bowel and bladder, and Provide appropriate patient and family education      NURSING:  Nursing goals for Koko Chao while on the rehabilitation unit will include:  Continence of bowel and bladder, Adequate number of bowel movements, Urinate with no urinary retention >300ml in bladder, Maintain O2 SATs at an acceptable level during stay, Effective pain management while on the rehabilitation unit, Establish adequate pain control plan for discharge, Absence of skin breakdown while on the rehabilitation unit, Improved skin integrity via assessments including wound measurements, Avoidance of any hospital acquired infections, No signs/symptoms of infection at the wound site, Freedom from injury during hospitalization, and Complete education with patient/family with understanding demonstrated regarding disease process and resultant impairment     In order

## 2025-04-29 NOTE — PLAN OF CARE
Problem: ABCDS Injury Assessment  Goal: Absence of physical injury  Outcome: Progressing  Flowsheets (Taken 4/29/2025 1210)  Absence of Physical Injury: Implement safety measures based on patient assessment     Problem: Nutrition Deficit:  Goal: Optimize nutritional status  Outcome: Progressing  Flowsheets (Taken 4/29/2025 1210)  Nutrient intake appropriate for improving, restoring, or maintaining nutritional needs: Assess nutritional status and recommend course of action     Problem: Safety - Adult  Goal: Free from fall injury  Outcome: Progressing  Flowsheets (Taken 4/29/2025 1210)  Free From Fall Injury: Instruct family/caregiver on patient safety     Problem: Discharge Planning  Goal: Discharge to home or other facility with appropriate resources  Outcome: Progressing  Flowsheets (Taken 4/29/2025 1210)  Discharge to home or other facility with appropriate resources: Identify barriers to discharge with patient and caregiver     Problem: Neurosensory - Adult  Goal: Absence of seizures  Outcome: Progressing  Flowsheets (Taken 4/29/2025 1210)  Absence of seizures:   Monitor for seizure activity.  If seizure occurs, document type and location of movements and any associated apnea   Administer anticonvulsants as ordered     Problem: Respiratory - Adult  Goal: Achieves optimal ventilation and oxygenation  Outcome: Progressing  Flowsheets (Taken 4/29/2025 1210)  Achieves optimal ventilation and oxygenation:   Assess for changes in respiratory status   Assess for changes in mentation and behavior     Problem: Cardiovascular - Adult  Goal: Maintains optimal cardiac output and hemodynamic stability  Outcome: Progressing  Flowsheets (Taken 4/29/2025 1210)  Maintains optimal cardiac output and hemodynamic stability:   Monitor blood pressure and heart rate   Assess for signs of decreased cardiac output

## 2025-04-30 PROCEDURE — 99232 SBSQ HOSP IP/OBS MODERATE 35: CPT | Performed by: STUDENT IN AN ORGANIZED HEALTH CARE EDUCATION/TRAINING PROGRAM

## 2025-04-30 PROCEDURE — 92507 TX SP LANG VOICE COMM INDIV: CPT

## 2025-04-30 PROCEDURE — 97129 THER IVNTJ 1ST 15 MIN: CPT

## 2025-04-30 PROCEDURE — 1180000000 HC REHAB R&B

## 2025-04-30 PROCEDURE — 6360000002 HC RX W HCPCS: Performed by: STUDENT IN AN ORGANIZED HEALTH CARE EDUCATION/TRAINING PROGRAM

## 2025-04-30 PROCEDURE — 97116 GAIT TRAINING THERAPY: CPT

## 2025-04-30 PROCEDURE — 97530 THERAPEUTIC ACTIVITIES: CPT

## 2025-04-30 PROCEDURE — 97112 NEUROMUSCULAR REEDUCATION: CPT

## 2025-04-30 PROCEDURE — 97535 SELF CARE MNGMENT TRAINING: CPT

## 2025-04-30 PROCEDURE — 6370000000 HC RX 637 (ALT 250 FOR IP): Performed by: STUDENT IN AN ORGANIZED HEALTH CARE EDUCATION/TRAINING PROGRAM

## 2025-04-30 PROCEDURE — 94640 AIRWAY INHALATION TREATMENT: CPT

## 2025-04-30 RX ORDER — LEVETIRACETAM 100 MG/ML
500 SOLUTION ORAL EVERY 12 HOURS
Status: DISCONTINUED | OUTPATIENT
Start: 2025-05-01 | End: 2025-05-09 | Stop reason: HOSPADM

## 2025-04-30 RX ADMIN — LEVETIRACETAM 500 MG: 500 TABLET, FILM COATED ORAL at 18:03

## 2025-04-30 RX ADMIN — ENOXAPARIN SODIUM 40 MG: 100 INJECTION SUBCUTANEOUS at 07:57

## 2025-04-30 RX ADMIN — Medication 6 MG: at 21:18

## 2025-04-30 RX ADMIN — PRAVASTATIN SODIUM 40 MG: 40 TABLET ORAL at 21:17

## 2025-04-30 RX ADMIN — AMLODIPINE BESYLATE 10 MG: 10 TABLET ORAL at 09:43

## 2025-04-30 RX ADMIN — DEXAMETHASONE 2 MG: 4 TABLET ORAL at 06:03

## 2025-04-30 RX ADMIN — DEXAMETHASONE 2 MG: 4 TABLET ORAL at 18:03

## 2025-04-30 RX ADMIN — MICONAZOLE NITRATE: 2 POWDER TOPICAL at 07:58

## 2025-04-30 RX ADMIN — DEXAMETHASONE 2 MG: 4 TABLET ORAL at 23:59

## 2025-04-30 RX ADMIN — BUMETANIDE 0.5 MG: 0.5 TABLET ORAL at 09:43

## 2025-04-30 RX ADMIN — PANTOPRAZOLE SODIUM 40 MG: 40 TABLET, DELAYED RELEASE ORAL at 06:03

## 2025-04-30 RX ADMIN — LEVETIRACETAM 500 MG: 500 TABLET, FILM COATED ORAL at 06:03

## 2025-04-30 RX ADMIN — DEXAMETHASONE 2 MG: 4 TABLET ORAL at 12:50

## 2025-04-30 RX ADMIN — TIOTROPIUM BROMIDE AND OLODATEROL 2 PUFF: 3.124; 2.736 SPRAY, METERED RESPIRATORY (INHALATION) at 06:29

## 2025-04-30 RX ADMIN — MICONAZOLE NITRATE: 2 POWDER TOPICAL at 21:18

## 2025-04-30 RX ADMIN — LOSARTAN POTASSIUM 37.5 MG: 25 TABLET, FILM COATED ORAL at 09:43

## 2025-04-30 ASSESSMENT — PAIN SCALES - GENERAL
PAINLEVEL_OUTOF10: 0
PAINLEVEL_OUTOF10: 0

## 2025-04-30 NOTE — CONSULTS
Department of Family Practice  Consult Note        Reason for Consult:  Medical management while on the Inpatient Rehab unit.    Requesting Physician:  Dr Robison.    CHIEF COMPLAINT:   The need to continue the intensive time with therapies following the acute hospital stay.    History Obtained From:  patient, wife and EMR    HISTORY OF PRESENT ILLNESS:              The patient is a 65 y.o. male with significant past medical history of       Diagnosis Date    Cancer (HCC)     CRVO (central retinal vein occlusion) (HCC) 01/2016    Right eye    Heartburn     Hyperlipidemia 2010    Hypertension 2010      who presents with having had some memory changes, word finding trouble and occasional  blurred vision. He was found in the ED to have a brain mass possible abscess. He went to the OR and bx was taken. The final dx on the path is pending from the [facility]. He had a post op bleeding that required surgical drainage. With being more medically stable he has come to the Inpatient Rehab Unit to continue the time with therapies prior to a discharge disposition being made.    Past Medical History:        Diagnosis Date    Cancer (HCC)     CRVO (central retinal vein occlusion) (HCC) 01/2016    Right eye    Heartburn     Hyperlipidemia 2010    Hypertension 2010     Past Surgical History:        Procedure Laterality Date    COLONOSCOPY  2009    WNL     COLONOSCOPY N/A 3/13/2020    COLONOSCOPY DIAGNOSTIC performed by Percy Cantu MD at [facility]    CRANIOTOMY Left 4/16/2025    LEFT PARIETAL CRANIOTOMY FOR EVACUATION OF CEREBRAL ABSCESS performed by Zander Singh MD at [facility] OR    CRANIOTOMY Left 4/17/2025    LEFT PARIETAL CRANIOTOMY HEMATOMA EVACUATION performed by Zander Singh MD at [facility] OR    CYSTOSCOPY Left 4/6/2023    CYSTOSCOPY LEFT URETERAL STENT INSERTION performed by Yo Denis MD at [facility] OR    LIVER BIOPSY  1995    PROSTATE BIOPSY  2012    benign     TESTICLE REMOVAL  1994    Lt    ULTRASOUND

## 2025-04-30 NOTE — PROGRESS NOTES
[facility]  MH-STRZ 8K IP REHAB  Occupational Therapy  Daily Note    Discharge Recommendations: Home with Outpatient OT  Equipment Recommendations:   continue to assess pending progress; pt does not utilize/own any equipment at baseline       Time In: 1130  Time Out: 1227  Timed Code Treatment Minutes: 57 Minutes  Minutes: 57          Date: 2025  Patient Name: Koko Chao,   Gender: male      Room: [facility]17/017-A  MRN: [id number]  : 1960  (65 y.o.)  Referring Practitioner: Sonya Robison DO  Diagnosis: Brain mass  Additional Pertinent Hx: Koko Chao is a 65 y.o. male with PMHx of hypertension, hyperlipidemia, central retinal vein occlusion, prostate cancer who presents to [facility] with chief complaint of aphasia.  Patient reports had 10-minute episode on 4/10 where he was not able to express his language. Patient reports this has been ongoing for 3 weeks. CT head done in ED showed suspected intra-axial mass in the left parietal lobe. Edema is anteriorly of mass. Pt s/p LEFT PARIETAL CRANIOTOMY FOR EVACUATION OF CEREBRAL ABSCESS on 25. Repeat CT due to increased aphasia, H/A was done right away and showed a hemorrhage really not in the surgical cavity but rather in the edematous area surrounding the surgical cavity. Pt s/p LEFT PARIETAL CRANIOTOMY HEMATOMA EVACUATION on 25. Patient was transferred out of the ICU on 2025. On 2025, CT head was obtained showing that there was a new hyperdense area posterior and superficial to the location of the tumor resection. Per neurosurgery on 2025, follow-up with neurosurgery in 2 weeks for suture removal (). Pt admitted to inpatient rehab on  for intensive therapy to promote safety and independence with daily activities.    Restrictions/Precautions:  Restrictions/Precautions: Fall Risk, Seizure, General Precautions  Position Activity Restriction  Other Position/Activity Restrictions: Can shower, however,  Stand By Assistance.   Pt doffed shorts while seated, attempted to don in standing with 1 vc to sit down to don due to safety     Footwear Management: Modified Independent.   To doff and don socks and shoes while seated     Shower Transfer: Stand By Assistance.     Pt appropriately gathered all needed items for BADL tasks.       IADL:   Laundry:  Patient was engaged into dynamic standing therapeutic task(s) that was graded to facilitate:crossing midline , bilateral integration, and dynamic functional reach into graded planes & heights  . Patient required SBA, and demonstrated an endurance of 4 minutes. Demonstrated good tolerance throughout activity. Standing task completed to challenge endurance and balance required for ADL and IADL skills.       TRANSFERS:  Sit to Stand:  Independent.    Stand to Sit: Independent.      FUNCTIONAL MOBILITY:  Assistive Device: None  Assist Level:  Stand By Assistance.   Distance: To and from bathroom, Within room, and To and from therapy apartment  Pt ascending and descending half flight of stairs using bilateral handrails with SBA         ADDITIONAL ACTIVITIES:  Family Education: Pt's wife present and participated in family education and was checked off to mobilize in the room with patient       Modified Penelope:  Current Functional Status:  +3 - Moderate disability; requiring some help, but able to walk without physical assistance (SBA/CGA).   Patient could not live alone but could walk from one room to another without physical help from another person.  Education provided regarding stroke rehabilitation management.    Education:  Learners: Patient and Significant Other  Plan of Care, Role of OT,ADL's, IADL's, and Family Education    ASSESSMENT:  Activity Tolerance:  Patient tolerance of  treatment: Good treatment tolerance       Plan: Times Per Week: 5x/wk for 90 min  Current Treatment Recommendations: Strengthening, Balance training, Functional mobility training, Endurance

## 2025-04-30 NOTE — PROGRESS NOTES
Patient: Koko Chao  Unit/Bed: 8K-17/017-A  YOB: 1960  MRN: [id number] Acct: [id number]   Admitting Diagnosis: Brain mass [G93.89]  Admit Date:  4/28/2025  Hospital Day: 2    Assessment:     Principal Problem:    Brain mass  Resolved Problems:    * No resolved hospital problems. *      Plan:     Continue to follow        Subjective:     Patient has no complaint of CP or SOB..   Medication side effects: none    Scheduled Meds:   miconazole   Topical BID    pravastatin  40 mg Oral Nightly    pantoprazole  40 mg Oral QAM AC    tiotropium-olodaterol  2 puff Inhalation Daily    amLODIPine  10 mg Oral Daily    [START ON 5/5/2025] cloNIDine  1 patch TransDERmal Weekly    losartan  37.5 mg Oral BID    melatonin  6 mg Oral Nightly    bumetanide  0.5 mg Oral Daily    dexAMETHasone  2 mg Oral 4 times per day    enoxaparin  40 mg SubCUTAneous Daily    levETIRAcetam  500 mg Oral Q12H     Continuous Infusions:   sodium chloride      dextrose       PRN Meds:sodium chloride flush, sodium chloride, potassium chloride **OR** potassium alternative oral replacement **OR** potassium chloride, magnesium sulfate, polyethylene glycol, calcium carbonate, albuterol, acetaminophen, polyvinyl alcohol, traZODone, glucose, dextrose bolus **OR** dextrose bolus, glucagon (rDNA), dextrose, ondansetron    Review of Systems  Pertinent items are noted in HPI.    Objective:     Patient Vitals for the past 8 hrs:   BP Temp Temp src Pulse Resp SpO2 Weight   04/30/25 0930 127/67 -- -- 61 -- -- --   04/30/25 0754 110/66 97.7 °F (36.5 °C) Oral 63 18 98 % --   04/30/25 0600 -- -- -- -- -- -- 80.3 kg (177 lb)     I/O last 3 completed shifts:  In: 640 [P.O.:640]  Out: -   I/O this shift:  In: 200 [P.O.:200]  Out: -     /67   Pulse 61   Temp 97.7 °F (36.5 °C) (Oral)   Resp 18   Ht 1.727 m (5' 8\")   Wt 80.3 kg (177 lb)   SpO2 98%   BMI 26.91 kg/m²     General appearance: alert, appears stated age, and cooperative  Head:

## 2025-04-30 NOTE — PROGRESS NOTES
[facility]  INPATIENT PHYSICAL THERAPY  DAILY NOTE  [facility] 8K IP REHAB - 8K-17/017-A      Discharge Recommendations: Home with Outpatient PT  Equipment Recommendations: No               Time In: 06  Time Out: 730  Timed Code Treatment Minutes: 60 Minutes  Minutes: 60          Date: 2025  Patient Name: Koko Chao,  Gender:  male        MRN: [id number]  : 1960  (65 y.o.)     Referring Practitioner: Sonya Robison DO  Diagnosis: Brain mass  Additional Pertinent Hx: Per H&P \"Koko Chao is a 65 y.o. male with PMHx of hypertension, hyperlipidemia, central retinal vein occlusion, prostate cancer who presents to [facility] with chief complaint of aphasia.  Patient reports had 10-minute episode on 4/10 where he was not able to express his language. Patient reports this has been ongoing for 3 weeks. CT head done in ED showed suspected intra-axial mass in the left parietal lobe. Edema is anteriorly of mass. Pt s/p LEFT PARIETAL CRANIOTOMY FOR EVACUATION OF CEREBRAL ABSCESS on 25. Repeat CT due to increased aphasia, H/A was done right away and showed a hemorrhage really not in the surgical cavity but rather in the edematous area surrounding the surgical cavity. Pt s/p LEFT PARIETAL CRANIOTOMY HEMATOMA EVACUATION on 25. Patient was transferred out of the ICU on 2025. On 2025, CT head was obtained showing that there was a new hyperdense area posterior and superficial to the location of the tumor resection. Per neurosurgery on 2025, follow-up with neurosurgery in 2 weeks for suture removal (). Pt admitted to inpatient rehab on  for intensive therapy to promote safety and independence with daily activities.\"     Prior Level of Function:  Lives With: Spouse  Type of Home: House  Home Layout: One level  Home Access: Stairs to enter with rails  Entrance Stairs - Number of Steps: 3  Entrance Stairs - Rails: Left  Home Equipment: None   Bathroom

## 2025-04-30 NOTE — PROGRESS NOTES
[facility]  INPATIENT SPEECH THERAPY  [facility] 8K IP REHAB  DAILY NOTE    Discharge Recommendations: Outpatient Speech Therapy  DIET ORDER RECOMMENDATIONS: Regular diet and thin liquids  STRATEGIES: Full Upright Position, Limit Distractions, and Monitor for Fatigue     SLP Individual Minutes  Time In: 0800  Time Out: 830  Minutes: 30  Timed Code Treatment Minutes: 0 Minutes       Date: 2025  Patient Name: Koko Chao      CSN: [id number]   : 1960  (65 y.o.)  Gender: male   Referring Physician:  Sonya Robison DO  Diagnosis: Brain mass  Precautions: fall precautions, seizure precautions,  Current Diet: Regular diet and thin liquids  Respiratory Status: Room Air  Date of Last MBS/FEES: Not Applicable    Pain:  No pain reported.    Subjective:  Patient sitting in recliner upon ST arrival. Agreeable to skilled ST services. No family present. Pleasant and cooperative throughout.     Short-Term Goals:  SHORT TERM GOAL #1:  Goal 1: Patient will complete verbal expression tasks (including BASIC naming, automatic speech tasks, biographics, picture description, verbal fluency, word/phrase/sentence level repetition) with 70% accuracy, mod cues to improve functional expressive communication.  INTERVENTIONS:  Confrontational Naming  Independent:   First letter:    Semantic:   Sentence completion: 3/30  Phonemic:   Semantic and phonemic: 3/30  Repetition:     SHORT TERM GOAL #2:  Goal 2: Patient will complete auditory/reading comprehension (including direction following of 1-2 step commands, basic and mildly complex yes/questions, functional reading comprehension, Object ID from FO3) with 70% accuracy, mod cues to improve functional recepetive communication.  INTERVENTIONS: Did not address d/t focus on other goals      SHORT TERM GOAL #3:  Goal 3: Patient will complete written expression tasks (including biographics, words/phrases) with 70% accuracy, mod cues to improve functional

## 2025-04-30 NOTE — CARE COORDINATION
Patient requested to walk and ambulated 100 ft throughout the halls.He spoke clearly and had good concentration.He understands turning and repositioning,use iof the incentive spirometer every 4 hours while awake.Eager to get better,go back to work ,and be with family.

## 2025-04-30 NOTE — PROGRESS NOTES
[facility]  INPATIENT SPEECH THERAPY  [facility]Z 8K IP REHAB  DAILY NOTE    Discharge Recommendations: Outpatient Speech Therapy  DIET ORDER RECOMMENDATIONS: Regular diet and thin liquids  STRATEGIES: Full Upright Position, Limit Distractions, and Monitor for Fatigue     SLP Individual Minutes  Time In: 1100  Time Out: 1130  Minutes: 30  Timed Code Treatment Minutes: 0 Minutes       Date: 2025  Patient Name: Koko Chao      CSN: [id number]   : 1960  (65 y.o.)  Gender: male   Referring Physician:  Sonya Robison DO  Diagnosis: Brain mass  Precautions: fall precautions, seizure precautions,  Current Diet: Regular diet and thin liquids  Respiratory Status: Room Air  Date of Last MBS/FEES: Not Applicable    Pain:  No pain reported.    Subjective:  Patient sitting in recliner upon ST arrival. Agreeable to skilled ST services. No family present. Pleasant and cooperative throughout.     Short-Term Goals:  SHORT TERM GOAL #1:  Goal 1: Patient will complete verbal expression tasks (including BASIC naming, automatic speech tasks, biographics, picture description, verbal fluency, word/phrase/sentence level repetition) with 70% accuracy, mod cues to improve functional expressive communication.  INTERVENTIONS:   ID Family Members and Names via Pictures  Wife's name: 5/5 independent  Childrens names: 6/10 independent; 4/10 mod cues  DIL/RADHA names: 2/10 independent; 2/10 min cues;  2/10 mod cues; 4/10 max cues  Grandchildren names: 5/20 independent; 6/20 min cues; 5/20 mod cues; 4/10 max cues    *increased success with repetition of names    SHORT TERM GOAL #2:  Goal 2: Patient will complete auditory/reading comprehension (including direction following of 1-2 step commands, basic and mildly complex yes/questions, functional reading comprehension, Object ID from FO3) with 70% accuracy, mod cues to improve functional recepetive communication.  INTERVENTIONS: Did not address d/t focus on other

## 2025-04-30 NOTE — PROGRESS NOTES
Pt has worked well with therapy today.  He had no complaints of pain or discomfort.  He does have some difficultly with word finding.  His wife has been cleared to help him in his room and does not have to call staff for help.  He did not want the cream to his lower back today as he says it is not bothering home.  Pt did have some anxiety about not being able to reach his wife.  She was doing some errands today.  He was very worried that it was taking longer then he felt that it should and thought that something bad happened.  She did come in late morning and he was able to calm down.

## 2025-04-30 NOTE — CARE COORDINATION
Pt changed his clothes for the day.He confused a jacket for his socks to put on.He stated \"\"I need a new jacket to put on these(pointing at feet) before I can put my shoes on.He was trying to think and said sorry .Overall he was thinking clearly this morning.Call light in reach,will monitor.

## 2025-04-30 NOTE — PROGRESS NOTES
[facility]  INPATIENT REHABILITATION  TEAM CONFERENCE NOTE    Conference Date: 2025  Admit Date:  2025  2:40 PM  Patient Name: Koko Caho    MRN: [id number]    : 1960  (65 y.o.)  Rehabilitation Admitting Diagnosis:  Brain mass [G93.89]  Referring Practitioner: Sonya Robison DO      CASE MANAGEMENT  Current issues/needs regarding patient and family discharge status: Prior to admission patient was independent and working full time. Patient lives with his wife Anita who is his main support. Patients PCP is Hannah Banks MD and pharmacy of choice he will have to ask his wife. Patient stated he did not utilize any assistive devices. Patient is motivated to work with therapy.     PHYSICAL THERAPY  Patient is a 65 y.o male who presents s/p left parietal craniotomoy resulting in a decline in functional mobility from baseline. Patient is independent for bed mobility, SBA for sit<>stand transfers and SBA to CGA for ambulation without AD with decreased toe clearance. Patient is able to ascend/descend 4 steps with B handrails and SBA to CGA. Patient score a 17/30 on the Functional gait assessment and perfomred the 5x sit<>stand test in 14.62 seconds indicating he is a moderate risk for falls. Patient overall can continue to benefit from skilled PT treatment in order to assist with BLE strengthening, gait training, transfer training, dynamic balance and stair training for increased functional mobility.  Equipment Needed: No    SPEECH THERAPY  Clinical Swallow Evaluation: Patient presents with WFL oral phase with inability to fully discern potential presence of pharyngeal phase deficits without formal instrumentation. Patient with good bolus control/manipulation and timely rotary mastication with effective textural breakdown. No oral residue noted. No overt signs/symptoms of aspiration across PO trials. Of course, pharyngeal dysfunction and totality of airway invasion events cannot be discerned at  bedside alone, however, instrumental evaluation not indicated at this time. Recommend patient continue regular diet and thin liquids. No further skilled ST services warranted for dysphagia management. Speech, Language, Cognitive Evaluation: Patient presents with moderate receptive/expressive language impairment with an aphasia classification of conduction. Deficits in auditory comprehension, following commands, confrontational naming, phrase/sentence repetition, verbal fluency, reading, and writing. Phonemic and semantic paraphasias noted throughout evaluation, as well as, neologisms and anomia. Patient presents with a severe cognitive impairment characterized by a score of 8/30 on the MOCA with deficits in the cognitive domains outlined above. Suspect severity of language impairments negatively impacting success on MOCA. No dysarthria, dysphonia, or aphonia. Patient would benefit from continued skilled ST services to address aforementioned language and cognitive impairments    OCCUPATIONAL THERAPY  Pt presented to the inpatient rehabilitation unit following a L parietal craniotomy for intensive therapy. Pt demonstrates impairments including decreased balance, decreased activity tolerance, impaired cognition, impaired speech, decreased strength, and decreased independence in ADL and IADL tasks. Pt is completing functional transfers with independence to supervision and functional mobility with SBA for standing balance. Pt currently requires SBA for basic ADLs including UB and LB dressing, bathing, and footwear with good demonstration of safety. Pt performs rote ADL routine with good sequencing and problem solving, but requires mod to max VC for problem solving, sequencing, initiation, and attention during IADL tasks. Pt is unable to sequence >2 step commands at this time and needs full supervision throughout ADL and IADL tasks for cognitive processing. Pt demonstrates decreased occupational performance as evidenced

## 2025-04-30 NOTE — PROGRESS NOTES
Comprehensive Nutrition Assessment    Type and Reason for Visit:  Initial, Positive nutrition screen, Consult (Oral Nutrition Supplements; unintentional weight loss)    Nutrition Recommendations/Plan:   Recommend diet as per SLP.  Continue Mightyshakes TID - will increase to 2 per meal per pt. Request.  Encouraged po, good nutrition at best efforts.  Discussed food from home/outside as desired.  Recommend MVI.     Malnutrition Assessment:  Malnutrition Status:  At risk for malnutrition (04/30/25 1504)    Context:  Acute Illness     Findings of the 6 clinical characteristics of malnutrition:  Energy Intake:  Mild decrease in energy intake (initially poor intake on acute; has improved)  Weight Loss:   (-7.3% in 2 weeks)     Body Fat Loss:  No body fat loss     Muscle Mass Loss:  No muscle mass loss    Fluid Accumulation:  Unable to assess     Strength:  Not Performed    Nutrition Assessment:     Pt. nutritionally compromised AEB wounds.  At risk for further nutrition compromise r/t increased nutrient needs for wound healing (s/p craniotomy -brain mass, hematoma evacuation) and underlying medical condition (hx HLD, HTN).       Nutrition Related Findings:    Pt. Report/Treatments/Miscellaneous:   4/30 - pt. Seen - reports appetite is \"real good\" and has been overall better today; denies any trouble tolerating diet; states good acceptance of Mightyshakes - asking for 2 per tray; states bowels have been moving daily; SLP following  Note - decreased po intake on acute; states weight down from UBW; reported good appetite pta  GI Status: BM 4/30  Pertinent Labs: 4/29: Glucose 110, BUN 18, Cr 0.8  Pertinent Meds: Zofran, Bumex, Glycolax, PPI      Wound Type: Moisture Associate Skin Damage (sacrum, surgical incision - 4/16 and 4/17/25: craniotomy)       Current Nutrition Intake & Therapies:    Average Meal Intake: 51-75%, %  Average Supplements Intake:  (states acceptance)  ADULT DIET; Regular  ADULT ORAL NUTRITION  SUPPLEMENT; Breakfast, Lunch, Dinner; Standard 4 oz Oral Supplement    Anthropometric Measures:  Height: 172.7 cm (5' 8\")  Ideal Body Weight (IBW): 154 lbs (70 kg)    Admission Body Weight: 79.1 kg (174 lb 4.8 oz) (4/28 no edema)  Current Body Weight: 80.3 kg (177 lb) (4/30 bedscale, no edema),       Current BMI (kg/m2): 26.9  Usual Body Weight:  (per pt. 191#; per EMR:8/2/23: 192# 13 oz, 2/7/24: 190# 13 oz, 4/11/25: 190# 11.2 oz bedscale, no edema)                          BMI Categories: Overweight (BMI 25.0-29.9)    Estimated Daily Nutrient Needs:  Energy Requirements Based On: Kcal/kg  Weight Used for Energy Requirements: Other (80)  Energy (kcal/day): 4160-5748 kcals (20-25)  Weight Used for Protein Requirements: Ideal (70)  Protein (g/day):  grams (1.2-2)       Nutrition Diagnosis:   Increased nutrient needs related to increase demand for energy/nutrients as evidenced by wounds    Nutrition Interventions:   Food and/or Nutrient Delivery: Continue Current Diet, Modify Oral Nutrition Supplement  Nutrition Education/Counseling: Education/Counseling initiated  Coordination of Nutrition Care: Continue to monitor while inpatient       Goals:  Goals: PO intake 75% or greater, by next RD assessment  Type of Goal: New goal       Nutrition Monitoring and Evaluation:      Food/Nutrient Intake Outcomes: Diet Advancement/Tolerance, Food and Nutrient Intake, Supplement Intake  Physical Signs/Symptoms Outcomes: Biochemical Data, Chewing or Swallowing, GI Status, Fluid Status or Edema, Nutrition Focused Physical Findings, Skin, Weight    Discharge Planning:    Too soon to determine     Allie West, RD, LD  Contact: [phone]

## 2025-04-30 NOTE — PLAN OF CARE
Problem: Safety - Adult  Goal: Free from fall injury  Outcome: Progressing    Problem: Discharge Planning  Goal: Discharge to home or other facility with appropriate resources  4/30/2025 0302 by Isatu Tavarez LPN  Outcome: Progressing  Flowsheets (Taken 4/30/2025 0302)  Discharge to home or other facility with appropriate resources:   Identify barriers to discharge with patient and caregiver   Arrange for needed discharge resources and transportation as appropriate   Identify discharge learning needs (meds, wound care, etc)     Problem: Neurosensory - Adult  Goal: Absence of seizures  Outcome: Progressing  Flowsheets (Taken 4/30/2025 0302)  Absence of seizures:   Monitor for seizure activity.  If seizure occurs, document type and location of movements and any associated apnea   If seizure occurs, turn head to side and suction secretions as needed   Administer anticonvulsants as ordered     Problem: Metabolic/Fluid and Electrolytes - Adult  Goal: Electrolytes maintained within normal limits  Outcome: Progressing  Flowsheets (Taken 4/30/2025 0302)  Electrolytes maintained within normal limits:   Monitor labs and assess patient for signs and symptoms of electrolyte imbalances   Administer electrolyte replacement as ordered   Free From Fall Injury: Instruct family/caregiver on patient safety

## 2025-04-30 NOTE — PLAN OF CARE
Problem: ABCDS Injury Assessment  Goal: Absence of physical injury  Outcome: Progressing  Flowsheets (Taken 4/30/2025 1013)  Absence of Physical Injury: Implement safety measures based on patient assessment     Problem: Nutrition Deficit:  Goal: Optimize nutritional status  Outcome: Progressing  Flowsheets (Taken 4/30/2025 1019)  Nutrient intake appropriate for improving, restoring, or maintaining nutritional needs:   Assess nutritional status and recommend course of action   Monitor oral intake, labs, and treatment plans     Problem: Safety - Adult  Goal: Free from fall injury  4/30/2025 1019 by Senthil Spain RN  Outcome: Progressing  Flowsheets (Taken 4/30/2025 1013)  Free From Fall Injury: Instruct family/caregiver on patient safety  4/30/2025 0302 by Isatu Tavarez LPN  Outcome: Progressing  Flowsheets (Taken 4/29/2025 1210 by Rebecca Cronin RN)  Free From Fall Injury: Instruct family/caregiver on patient safety     Problem: Discharge Planning  Goal: Discharge to home or other facility with appropriate resources  4/30/2025 1019 by Senthil Spain RN  Outcome: Progressing  Flowsheets (Taken 4/30/2025 0754)  Discharge to home or other facility with appropriate resources: Identify barriers to discharge with patient and caregiver  4/30/2025 0302 by Isatu Tavarez LPN  Outcome: Progressing  Flowsheets (Taken 4/30/2025 0302)  Discharge to home or other facility with appropriate resources:   Identify barriers to discharge with patient and caregiver   Arrange for needed discharge resources and transportation as appropriate   Identify discharge learning needs (meds, wound care, etc)     Problem: Neurosensory - Adult  Goal: Absence of seizures  4/30/2025 1019 by Senthil Spain RN  Outcome: Progressing  Flowsheets (Taken 4/30/2025 1019)  Absence of seizures: Monitor for seizure activity.  If seizure occurs, document type and location of movements and any associated apnea  4/30/2025 0302 by Isatu Tavarez

## 2025-04-30 NOTE — PROGRESS NOTES
[facility]  INPATIENT SPEECH THERAPY  MH-STRZ 8K IP REHAB  DAILY NOTE    Discharge Recommendations: Outpatient Speech Therapy  DIET ORDER RECOMMENDATIONS: Regular diet and thin liquids  STRATEGIES: Full Upright Position, Limit Distractions, and Monitor for Fatigue     SLP Individual Minutes  Time In: 1000  Time Out: 1030  Minutes: 30  Timed Code Treatment Minutes: 0 Minutes       Date: 2025  Patient Name: Koko Chao      CSN: [id number]   : 1960  (65 y.o.)  Gender: male   Referring Physician:  Sonya Robison DO  Diagnosis: Brain mass  Precautions: fall precautions, seizure precautions,  Current Diet: Regular diet and thin liquids  Respiratory Status: Room Air  Date of Last MBS/FEES: Not Applicable    Pain:  No pain reported.    Subjective:  Patient sitting in recliner upon ST arrival. Agreeable to skilled ST services. No family present. Pleasant and cooperative throughout.     Short-Term Goals:  SHORT TERM GOAL #1:  Goal 1: Patient will complete verbal expression tasks (including BASIC naming, automatic speech tasks, biographics, picture description, verbal fluency, word/phrase/sentence level repetition) with 70% accuracy, mod cues to improve functional expressive communication.  INTERVENTIONS:   Confrontational Naming  Following object ID task, ST tasked patient with naming each food item  3/8 independent  2/8 phonemic cue  3/8 semantic and phonemic cue      SHORT TERM GOAL #2:  Goal 2: Patient will complete auditory/reading comprehension (including direction following of 1-2 step commands, basic and mildly complex yes/questions, functional reading comprehension, Object ID from FO3) with 70% accuracy, mod cues to improve functional recepetive communication.  INTERVENTIONS:  Flowers Program  1A: ID 2 objects with repetition  3/5 independent; 1/5 additional repetition; 1/5 unable  1C: ID 2 objects by function with repetition   4/5 independent; 1/5 mod cue  1E: 2 object manipulation with  restrictions    EDUCATION:  Learner: Patient  Education:  Reviewed ST goals and Plan of Care, Reviewed recommendations for follow-up, and Education Related to Potential Risks and Complications Due to Impairment/Illness/Injury  Evaluation of Education: Demonstrates with assistance    ASSESSMENT/PLAN:  Activity Tolerance:  Patient tolerance of  treatment: good.      Assessment/Plan: Patient progressing toward established goals.  Continues to require skilled care of licensed speech pathologist to progress toward achievement of established goals and plan of care..     Plan for Next Session: expressive/receptive language      Palmira Villegas MS, CCC-SLP [id number]

## 2025-04-30 NOTE — PROGRESS NOTES
[facility]  [facility]-STR[facility] 8K IP REHAB  Occupational Therapy  Daily Note    Discharge Recommendations: Home with Outpatient OT  Equipment Recommendations:   Pt has a shower chair at home.       Time In: 1330  Time Out: 1403  Timed Code Treatment Minutes: 33 Minutes  Minutes: 33          Date: 2025  Patient Name: Koko Chao,   Gender: male      Room: [facility]017  MRN: [id number]  : 1960  (65 y.o.)  Referring Practitioner: Sonya Robison DO  Diagnosis: Brain mass  Additional Pertinent Hx: Koko Chao is a 65 y.o. male with PMHx of hypertension, hyperlipidemia, central retinal vein occlusion, prostate cancer who presents to [facility] with chief complaint of aphasia.  Patient reports had 10-minute episode on 4/10 where he was not able to express his language. Patient reports this has been ongoing for 3 weeks. CT head done in ED showed suspected intra-axial mass in the left parietal lobe. Edema is anteriorly of mass. Pt s/p LEFT PARIETAL CRANIOTOMY FOR EVACUATION OF CEREBRAL ABSCESS on 25. Repeat CT due to increased aphasia, H/A was done right away and showed a hemorrhage really not in the surgical cavity but rather in the edematous area surrounding the surgical cavity. Pt s/p LEFT PARIETAL CRANIOTOMY HEMATOMA EVACUATION on 25. Patient was transferred out of the ICU on 2025. On 2025, CT head was obtained showing that there was a new hyperdense area posterior and superficial to the location of the tumor resection. Per neurosurgery on 2025, follow-up with neurosurgery in 2 weeks for suture removal (). Pt admitted to inpatient rehab on  for intensive therapy to promote safety and independence with daily activities.    Restrictions/Precautions:  Restrictions/Precautions: Fall Risk, Seizure, General Precautions  Position Activity Restriction  Other Position/Activity Restrictions: Can shower, however, cannot get head sutures wet      Social/Functional

## 2025-05-01 LAB
BASOPHILS ABSOLUTE: 0 THOU/MM3 (ref 0–0.1)
BASOPHILS NFR BLD AUTO: 0.2 %
DEPRECATED RDW RBC AUTO: 44.2 FL (ref 35–45)
EOSINOPHIL NFR BLD AUTO: 0.1 %
EOSINOPHILS ABSOLUTE: 0 THOU/MM3 (ref 0–0.4)
ERYTHROCYTE [DISTWIDTH] IN BLOOD BY AUTOMATED COUNT: 13.8 % (ref 11.5–14.5)
HCT VFR BLD AUTO: 37.1 % (ref 42–52)
HGB BLD-MCNC: 12 GM/DL (ref 14–18)
IMM GRANULOCYTES # BLD AUTO: 0.16 THOU/MM3 (ref 0–0.07)
IMM GRANULOCYTES NFR BLD AUTO: 1.4 %
LYMPHOCYTES ABSOLUTE: 0.6 THOU/MM3 (ref 1–4.8)
LYMPHOCYTES NFR BLD AUTO: 5.4 %
MCH RBC QN AUTO: 28.8 PG (ref 26–33)
MCHC RBC AUTO-ENTMCNC: 32.3 GM/DL (ref 32.2–35.5)
MCV RBC AUTO: 89 FL (ref 80–94)
MONOCYTES ABSOLUTE: 0.5 THOU/MM3 (ref 0.4–1.3)
MONOCYTES NFR BLD AUTO: 4.5 %
NEUTROPHILS ABSOLUTE: 10.3 THOU/MM3 (ref 1.8–7.7)
NEUTROPHILS NFR BLD AUTO: 88.4 %
NRBC BLD AUTO-RTO: 0 /100 WBC
PLATELET # BLD AUTO: 347 THOU/MM3 (ref 130–400)
PMV BLD AUTO: 10.2 FL (ref 9.4–12.4)
RBC # BLD AUTO: 4.17 MILL/MM3 (ref 4.7–6.1)
WBC # BLD AUTO: 11.6 THOU/MM3 (ref 4.8–10.8)

## 2025-05-01 PROCEDURE — 36415 COLL VENOUS BLD VENIPUNCTURE: CPT

## 2025-05-01 PROCEDURE — 1180000000 HC REHAB R&B

## 2025-05-01 PROCEDURE — 99232 SBSQ HOSP IP/OBS MODERATE 35: CPT | Performed by: STUDENT IN AN ORGANIZED HEALTH CARE EDUCATION/TRAINING PROGRAM

## 2025-05-01 PROCEDURE — 85025 COMPLETE CBC W/AUTO DIFF WBC: CPT

## 2025-05-01 PROCEDURE — 94761 N-INVAS EAR/PLS OXIMETRY MLT: CPT

## 2025-05-01 PROCEDURE — 92507 TX SP LANG VOICE COMM INDIV: CPT

## 2025-05-01 PROCEDURE — 6370000000 HC RX 637 (ALT 250 FOR IP): Performed by: STUDENT IN AN ORGANIZED HEALTH CARE EDUCATION/TRAINING PROGRAM

## 2025-05-01 PROCEDURE — 97116 GAIT TRAINING THERAPY: CPT

## 2025-05-01 PROCEDURE — 97112 NEUROMUSCULAR REEDUCATION: CPT

## 2025-05-01 PROCEDURE — 6360000002 HC RX W HCPCS: Performed by: STUDENT IN AN ORGANIZED HEALTH CARE EDUCATION/TRAINING PROGRAM

## 2025-05-01 PROCEDURE — 94640 AIRWAY INHALATION TREATMENT: CPT

## 2025-05-01 PROCEDURE — 97129 THER IVNTJ 1ST 15 MIN: CPT

## 2025-05-01 PROCEDURE — 97535 SELF CARE MNGMENT TRAINING: CPT

## 2025-05-01 PROCEDURE — 97130 THER IVNTJ EA ADDL 15 MIN: CPT

## 2025-05-01 RX ADMIN — LEVETIRACETAM 500 MG: 500 SOLUTION ORAL at 17:35

## 2025-05-01 RX ADMIN — Medication 6 MG: at 19:35

## 2025-05-01 RX ADMIN — LOSARTAN POTASSIUM 37.5 MG: 25 TABLET, FILM COATED ORAL at 19:35

## 2025-05-01 RX ADMIN — MICONAZOLE NITRATE: 2 POWDER TOPICAL at 19:35

## 2025-05-01 RX ADMIN — MICONAZOLE NITRATE: 2 POWDER TOPICAL at 12:17

## 2025-05-01 RX ADMIN — AMLODIPINE BESYLATE 10 MG: 10 TABLET ORAL at 09:11

## 2025-05-01 RX ADMIN — LOSARTAN POTASSIUM 37.5 MG: 25 TABLET, FILM COATED ORAL at 09:11

## 2025-05-01 RX ADMIN — PANTOPRAZOLE SODIUM 40 MG: 40 TABLET, DELAYED RELEASE ORAL at 05:47

## 2025-05-01 RX ADMIN — TIOTROPIUM BROMIDE AND OLODATEROL 2 PUFF: 3.124; 2.736 SPRAY, METERED RESPIRATORY (INHALATION) at 10:21

## 2025-05-01 RX ADMIN — LEVETIRACETAM 500 MG: 500 SOLUTION ORAL at 05:46

## 2025-05-01 RX ADMIN — BUMETANIDE 0.5 MG: 0.5 TABLET ORAL at 09:11

## 2025-05-01 RX ADMIN — PRAVASTATIN SODIUM 40 MG: 40 TABLET ORAL at 19:35

## 2025-05-01 RX ADMIN — DEXAMETHASONE 2 MG: 4 TABLET ORAL at 12:16

## 2025-05-01 RX ADMIN — ENOXAPARIN SODIUM 40 MG: 100 INJECTION SUBCUTANEOUS at 09:10

## 2025-05-01 RX ADMIN — DEXAMETHASONE 2 MG: 4 TABLET ORAL at 17:34

## 2025-05-01 RX ADMIN — DEXAMETHASONE 2 MG: 4 TABLET ORAL at 05:47

## 2025-05-01 NOTE — PROGRESS NOTES
[facility]  INPATIENT PHYSICAL THERAPY  DAILY NOTE  [facility]Z 8K IP REHAB - 8K-17/017-A      Discharge Recommendations: Home with Outpatient PT  Equipment Recommendations: No             Time In: 1100  Time Out: 1200  Timed Code Treatment Minutes: 60 Minutes  Minutes: 60          Date: 2025  Patient Name: Koko Chao,  Gender:  male        MRN: [id number]  : 1960  (65 y.o.)     Referring Practitioner: Sonya Robison DO  Diagnosis: Brain mass  Additional Pertinent Hx: Per H&P \"Koko Chao is a 65 y.o. male with PMHx of hypertension, hyperlipidemia, central retinal vein occlusion, prostate cancer who presents to [facility] with chief complaint of aphasia.  Patient reports had 10-minute episode on 4/10 where he was not able to express his language. Patient reports this has been ongoing for 3 weeks. CT head done in ED showed suspected intra-axial mass in the left parietal lobe. Edema is anteriorly of mass. Pt s/p LEFT PARIETAL CRANIOTOMY FOR EVACUATION OF CEREBRAL ABSCESS on 25. Repeat CT due to increased aphasia, H/A was done right away and showed a hemorrhage really not in the surgical cavity but rather in the edematous area surrounding the surgical cavity. Pt s/p LEFT PARIETAL CRANIOTOMY HEMATOMA EVACUATION on 25. Patient was transferred out of the ICU on 2025. On 2025, CT head was obtained showing that there was a new hyperdense area posterior and superficial to the location of the tumor resection. Per neurosurgery on 2025, follow-up with neurosurgery in 2 weeks for suture removal (). Pt admitted to inpatient rehab on  for intensive therapy to promote safety and independence with daily activities.\"     Prior Level of Function:  Lives With: Spouse  Type of Home: House  Home Layout: One level  Home Access: Stairs to enter with rails  Entrance Stairs - Number of Steps: 3  Entrance Stairs - Rails: Left  Home Equipment: None   Bathroom Shower/Tub:  Treatment Recommendations: Strengthening, Balance training, Gait training, Functional mobility training, Transfer training, Patient/Caregiver education & training, Safety education & training, Home exercise program, Therapeutic activities, Endurance training, Stair training, Neuromuscular re-education  General Plan:  (5x/wk 60 min)    Education:  Learners: Patient and Family  Patient Education: Plan of Care, Education Related to Diagnosis, Transfers, Reviewed Prior Education, Gait, Stairs, Use of Gait Belt, Up in Chair for All Meals, Energy Conservation    Goals:  Patient Goals : \"Get the best I can get and walk the best I can and find words\"  Short Term Goals  Time Frame for Short Term Goals: 1 week  Short Term Goal 1: Patient to perform sit<>stand transfers Independent in order to assist with safety with transfers in home.  Short Term Goal 2: Patient to ascend/descend 3 steps with L handrail and Supervision in order to assist with home entry.  Short Term Goal 3: Patient to perform 5x sit<>stand test in </=10 seconds in order to assist with functional dynamic balance.  Short Term Goal 4: Patient to ambulate >/=200 feet without AD over level surfaces Independent in order to assist with safety with home and community mobility.  Short Term Goal 5: Patient to perform car transfer with Mod I in order to assist with safety with getting to/from appointments.  Long Term Goals  Time Frame for Long Term Goals : 2 weeks from PT evaluation  Long Term Goal 1: Patient to ascend/descend 3 steps with L handrail and Mod I in order to assist with home entry.  Long Term Goal 2: Patient to ambulate over uneven surfaces with Independent in order to assist with community mobility.  Long Term Goal 3: Patient to score >/=22/30 on the Functional Gait Assessment in order to assist with functional dynamic balance.    Following session, patient left in safe position with all fall risk precautions in place.

## 2025-05-01 NOTE — CARE COORDINATION
Pt alert and oriented.He took meds well today and ate well.Incentive was used well and understands the order.Call light in reach,will monitor.

## 2025-05-01 NOTE — PROGRESS NOTES
Physical Medicine & Rehabilitation   Progress Note    Chief Complaint:  Brain Mass     Subjective: Patient seen and examined.  No acute events overnight.  He reports he is feeling well.  We discussed obtaining a follow-up Doppler of his right lower extremity to ensure no DVT as he is now 2 weeks out from his surgery and this is the timeframe where neurosurgery stated he could be cleared for anticoagulation if it is needed.  No reports of any chest pain or shortness of breath.  No reports of any significant changes to bowel or bladder.  His last documented BM was on 5/2/2025.      Rehabilitation:  PT 5/1/2025:  Transfers:  Sit to Stand: Stand By Assistance, pt demo's occasional instances of LOB during transfers  Stand to Sit:Stand By Assistance, pt sebastian's occasional instances of LOB during transfers     Ambulation:  Stand By Assistance  Distance: room <> gym, and intermittent gym distances  Surface: Level Tile  Device: No Device  Gait Deviations: Forward Flexed Posture, Decreased Arm Swing, Mild Path Deviations, and pt. presenting with occasional unsteadiness      Stairs:  Stairs: 4\" steps X 6 using Bilateral Handrails and Stand By Assistance. 6\" steps X 13 using Bilateral Handrails and Stand By Assistance.     Neuro Re-Ed/Balance:  Static Standing Balance: Stand By Assistance  Dynamic Standing Balance: Contact Guard Assistance, with occasional Minimal Assistance required  *Pt instructed in dynamic standing balance with performance of:               - obstacle course forward, instructed in stepping over hurdles, lateral stepping to number pods, standing on gold air ex disc while counting aloud to 5, and long-stepping over mini anna               - obstacle course to L, instructed in side-stepping over hurdles, forward and retro to number pods, standing on gold air ex disc while counting aloud to 5, and long side-stepping over mini anna while carrying long 10 lb tidal tank              - obstacle course to R,

## 2025-05-01 NOTE — PROGRESS NOTES
[facility]  Diagnosis List for Inpatient Rehab facility (IRF) - Patient Assessment Instrument (YUMIKO)    Patient Name: Koko Chao        MRN: [id number]    : 1960  (65 y.o.)  Gender: male     Primary impairment requiring rehabilitation: 2.1 Non- traumatic brain dysfunction     Etiologic Diagnosis that led to the condition: Left parietal  brain mass, Left parietal parenchymal hemorrhage    Comorbid conditions affecting rehabilitation:  Left parietal  brain mass   S/p left parietal craniotomy for resection 25 with Dr Singh  Left parietal parenchymal hemorrhage  S/p left parietal craniotomy hematoma evacuation 2025 with Dr Singh  Expressive Aphasia  Gait disturbance  Acute DVT RLE  Anxiety  Leukocytosis  Bilateral pleural effusion  Hypertension  Prostate cancer, surveillance  BPH  History of kidney stone  Nocturnal hypoxia  Hyperlipidemia  Retinal central vein occlusion    Electronically signed by Sonya Robison DO on 2025 at 10:30 AM

## 2025-05-01 NOTE — PLAN OF CARE
Problem: Discharge Planning  Goal: Discharge to home or other facility with appropriate resources  2025 1451 by Nurys Kaplan RN  Note:   [facility]  Physical Medicine and Rehabilitation  Post Team Conference Note    Date: 2025  Patient Name: Koko Chao  MRN: [id number]  : 1960 (65 y.o.)    IPR Team Conference was held on 25. Recommendations of the team were explained to the patient and wife by RN and DR. Robison. Team is recommending that patient continue on acute inpatient rehab for PT/OT/ST, with an expected discharge date of  with insurance approval. Following discharge, team is recommending outpatient. Patient and wofe would like to pursue outpatient at discharge. Care plan reviewed with patient and wife. Patient and wife verbalized understanding or the plan of care and contributed to goal setting. SW to follow and maintain involvement in discharge planning.    Plan  Referrals Needed: outpatient services  Family Training: not scheduled at this time  Driving Recommendations: driving eval   2025 0138 by Kate Butler, RN  Outcome: Progressing

## 2025-05-01 NOTE — PROGRESS NOTES
[facility]  INPATIENT SPEECH THERAPY  MH-STRZ 8K IP REHAB  DAILY NOTE    Discharge Recommendations: Outpatient Speech Therapy  DIET ORDER RECOMMENDATIONS: Regular diet and thin liquids  STRATEGIES: Full Upright Position, Limit Distractions, and Monitor for Fatigue     SLP Individual Minutes  Time In: 0930  Time Out: 1000  Minutes: 30  Timed Code Treatment Minutes: 0 Minutes       Date: 2025  Patient Name: Koko Chao      CSN: [id number]   : 1960  (65 y.o.)  Gender: male   Referring Physician:  Sonya Robison DO  Diagnosis: Brain mass  Precautions: fall precautions, seizure precautions,  Current Diet: Regular diet and thin liquids  Respiratory Status: Room Air  Date of Last MBS/FEES: Not Applicable    Pain:  No pain reported.    Subjective:  Patient sitting in recliner upon ST arrival. Agreeable to skilled ST services. Wife present. Pleasant and cooperative throughout.     Short-Term Goals:  SHORT TERM GOAL #1:  Goal 1: Patient will complete verbal expression tasks (including BASIC naming, automatic speech tasks, biographics, picture description, verbal fluency, word/phrase/sentence level repetition) with 70% accuracy, mod cues to improve functional expressive communication.  INTERVENTIONS:    Object Naming   independent   with description   given 1st letter  3/50 reading written word   phrase completion   first sound   repetition   unable to elicit    *patient does well with circumlocution to assist with word finding difficulties    SHORT TERM GOAL #2:  Goal 2: Patient will complete auditory/reading comprehension (including direction following of 1-2 step commands, basic and mildly complex yes/questions, functional reading comprehension, Object ID from FO3) with 70% accuracy, mod cues to improve functional recepetive communication.  INTERVENTIONS: Did not address d/t focus on other goals      SHORT TERM GOAL #3:  Goal 3: Patient will complete written

## 2025-05-01 NOTE — PROGRESS NOTES
[facility]  INPATIENT SPEECH THERAPY  MH-STRZ 8K IP REHAB  DAILY NOTE    Discharge Recommendations: Outpatient Speech Therapy  DIET ORDER RECOMMENDATIONS: Regular diet and thin liquids  STRATEGIES: Full Upright Position, Limit Distractions, and Monitor for Fatigue     SLP Individual Minutes  Time In: 1300  Time Out: 1330  Minutes: 30  Timed Code Treatment Minutes: 30 Minutes       Date: 2025  Patient Name: Koko Chao      CSN: [id number]   : 1960  (65 y.o.)  Gender: male   Referring Physician:  Sonya Robison DO  Diagnosis: Brain mass  Precautions: fall precautions, seizure precautions,  Current Diet: Regular diet and thin liquids  Respiratory Status: Room Air  Date of Last MBS/FEES: Not Applicable    Pain:  No pain reported.    Subjective:  Patient sitting in recliner upon ST arrival. Agreeable to skilled ST services. Wife present. Pleasant and cooperative throughout.     Short-Term Goals:  SHORT TERM GOAL #1:  Goal 1: Patient will complete verbal expression tasks (including BASIC naming, automatic speech tasks, biographics, picture description, verbal fluency, word/phrase/sentence level repetition) with 70% accuracy, mod cues to improve functional expressive communication.  INTERVENTIONS: Not directly addressed d/t focus on other goals, however, patient with difficulty reading during cognitive tasks that negatively impacted success.  PREVIOUS SESSION  Object Naming   independent   with description   given 1st letter  3/50 reading written word   phrase completion   first sound   repetition   unable to elicit    *patient does well with circumlocution to assist with word finding difficulties    ID Family Members and Names via Pictures  Wife's name: 3/3 independent  Childrens names: 3/4 independent; 1/4 min cue  DIL/RADHA names: 2/4 independent; 2/4 mod cues  Grandchildren names: 16 independent; /16 min cues; 16 mod cues;  max cues     Family Tree  Min  cues for thought organization to build family tree; ID names reflected above     Phrase Repetition  6/10 independent  1/10 self correction  1/10 phonemic cue for one word in phrase  2/10 in unison with ST    SHORT TERM GOAL #2:  Goal 2: Patient will complete auditory/reading comprehension (including direction following of 1-2 step commands, basic and mildly complex yes/questions, functional reading comprehension, Object ID from FO3) with 70% accuracy, mod cues to improve functional recepetive communication.  INTERVENTIONS: Not directly addressed, however, patient with poor reading comprehension within cognitive tasks. Improved success when ST verbally presented prompt.   PREVIOUS SESSION  Basic Comprehension of Repeated Phrases  8/10 independent     *following phrase repetition ST asked question about phrase (I.e. \"She was a doctor.\" \"What was she?\")      SHORT TERM GOAL #3:  Goal 3: Patient will complete written expression tasks (including biographics, words/phrases) with 70% accuracy, mod cues to improve functional expressive communication.  INTERVENTIONS: Did not address d/t focus on other goals  PREVIOUS SESSION  Biographics  Name: independent  Address: independent     Common Phrases  How are you: independent  I love you: independent  What would you like: independent     Grocery List  Ice cream: initially written as \"aise scream\"; good independent verbal correction of spelling; min cues to correct written spelling    SHORT TERM GOAL #4:  Goal 4: Patient will complete functional carryover tasks (o-log, biographics, call light, 1-3 units) with 60% with mod cues to improve awareness and participation in current and future environments  INTERVENTIONS:  O-Lo/30   Independent: type of place, name of place, year, date, DION, clock time  Logical cue: month, etiology, symptoms  FO2: city      SHORT TERM GOAL #5:  Goal 5: Patient will complete functional problem solving, verbal/visual reasoning, thought organization,

## 2025-05-01 NOTE — PLAN OF CARE
Problem: Safety - Adult  Goal: Free from fall injury  Outcome: Progressing    Problem: Respiratory - Adult  Goal: Achieves optimal ventilation and oxygenation  Outcome: Progressing  Flowsheets  Taken 5/1/2025 1706 by Isatu Tavarez LPN  Achieves optimal ventilation and oxygenation:   Assess for changes in respiratory status   Assess for changes in mentation and behavior   Position to facilitate oxygenation and minimize respiratory effort    Problem: Infection - Adult  Goal: Absence of infection at discharge  Outcome: Progressing  Flowsheets (Taken 5/1/2025 1706)  Absence of infection at discharge:   Assess and monitor for signs and symptoms of infection   Monitor lab/diagnostic results     Problem: Metabolic/Fluid and Electrolytes - Adult  Goal: Electrolytes maintained within normal limits  Outcome: Progressing  Flowsheets (Taken 5/1/2025 1706)  Electrolytes maintained within normal limits: Monitor labs and assess patient for signs and symptoms of electrolyte imbalances   Achieves optimal ventilation and oxygenation: Assess for changes in respiratory status     Free From Fall Injury: Instruct family/caregiver on patient safety

## 2025-05-01 NOTE — PROGRESS NOTES
[place]  MH-STRZ 8K IP REHAB  Occupational Therapy  Daily Note    Discharge Recommendations: Home with Outpatient OT  Equipment Recommendations:   Pt's wife purchasing shower chair for home.       Time In: 1330  Time Out: 1500  Timed Code Treatment Minutes: 90 Minutes  Minutes: 90          Date: 2025  Patient Name: Kkoo Chao,   Gender: male      Room: [address] [place] [address]  MRN: [id number]  : 1960  (65 y.o.)  Referring Practitioner: Sonya Robison DO  Diagnosis: Brain mass  Additional Pertinent Hx: Koko Chao is a 65 y.o. male with PMHx of hypertension, hyperlipidemia, central retinal vein occlusion, prostate cancer who presents to [facility] with chief complaint of aphasia.  Patient reports had 10-minute episode on 4/10 where he was not able to express his language. Patient reports this has been ongoing for 3 weeks. CT head done in ED showed suspected intra-axial mass in the left parietal lobe. Edema is anteriorly of mass. Pt s/p LEFT PARIETAL CRANIOTOMY FOR EVACUATION OF CEREBRAL ABSCESS on 25. Repeat CT due to increased aphasia, H/A was done right away and showed a hemorrhage really not in the surgical cavity but rather in the edematous area surrounding the surgical cavity. Pt s/p LEFT PARIETAL CRANIOTOMY HEMATOMA EVACUATION on 25. Patient was transferred out of the ICU on 2025. On 2025, CT head was obtained showing that there was a new hyperdense area posterior and superficial to the location of the tumor resection. Per neurosurgery on 2025, follow-up with neurosurgery in 2 weeks for suture removal (). Pt admitted to inpatient rehab on  for intensive therapy to promote safety and independence with daily activities.    Restrictions/Precautions:  Restrictions/Precautions: Fall Risk, Seizure, General Precautions  Position Activity Restriction  Other Position/Activity Restrictions: Can shower, however, cannot get head sutures wet

## 2025-05-01 NOTE — PROGRESS NOTES
Physical Medicine & Rehabilitation   Progress Note    Chief Complaint:  Brain Mass     Subjective: Patient seen independently this morning and will be seen again on rounds with HANS Sheridan, following patient's inpatient Rehab Team Conference. Patient seen with wife at bedside. She reports some increased anxiety and decreased mental flexibility. OT scheduled reportedly changed this morning which was distressing to him. He acknowledges this. Wife states that this is not normal for him. Additionally, anxious yesterday when wife was away for appointment. Wife states that he was fixated on her getting her oil changed and did not let it go until she called and made the appointment in front of him. We discussed possibility of psychology evaluation as he discussing knowing he needs to just take a few deep breaths when he gets anxious. Declines psychology at this time but seems open if this continues to be problematic. No reports of any CP or SOB. No reports of any significant changes to bowel or bladder.       Rehabilitation:PT, OT, and SLP updates reviewed during team rounds. See separate note.         Review of Systems:  CONSTITUTIONAL:  negative for  fevers and chills  EYES:  positive for  glasses; negative for diplopia or blurred vision  HEENT:  negative for  hearing loss  RESPIRATORY:  negative for  dyspnea and wheezing  CARDIOVASCULAR:  negative for  chest pain, palpitations  GASTROINTESTINAL:  negative for nausea, vomiting, and diarrhea  GENITOURINARY:  negative for dysuria  SKIN:  negative for rash  NEUROLOGICAL:  positive for speech problems; negative for paresthesias   10 point system review otherwise negative      Objective:  /70   Pulse 71   Temp 97.9 °F (36.6 °C)   Resp 16   Ht 1.727 m (5' 8\")   Wt 77.8 kg (171 lb 8 oz)   SpO2 97%   BMI 26.08 kg/m²   Patient is awake and alert, sitting up in bedside chair. Wife at bedside  Orientation: Not formally assessed.  However, seems oriented  parietal parenchymal hemorrhage  S/p left parietal craniotomy hematoma evacuation 4/17/2025 with Dr Singh  Expressive Aphasia  Gait disturbance  Acute DVT RLE  Anxiety  Leukocytosis  Bilateral pleural effusion  Hypertension  Prostate cancer, surveillance  BPH  History of kidney stone  Nocturnal hypoxia  Hyperlipidemia  Retinal central vein occlusion     Plan:  Continue inpatient rehabilitation program involving at least 3 hours per day, 5 days per week of intensive rehabilitation.  Rehabilitation services will include PT, OT, and SLP/RT in order to improve functional status prior to discharge.  Family education and training will be completed.  Equipment evaluations and recommendations will be completed as appropriate.       Rehabilitation nursing will be involved for bowel, bladder, skin, and pain management.  Nursing will also provide education and training to patient and family.    Dr. Bang consulted for medical management   Brain Mass: s/p resection on 4/16/2025.  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  Continue dexamethasone 2 mg every 6 hours and Keppra 500 mg every 12 hours.  Per neurosurgery on [id number] with neurosurgery as an outpatient in 2 weeks for staple removal.  Awaiting official surgical pathology  Left parietal parenchymal hemorrhage: S/p hematoma evacuation on 4/17/2025.  Neurosurgery gave okay for Lovenox for DVT prophylaxis  Acute DVT RLE: Peroneal vein on 4/21/2025.  They were considering IVC filter in acute care and therefore, repeat was obtained on 4/24/2025 which was negative for DVT.  Patient currently on Lovenox for DVT prophylaxis.    Per neurosurgery's note, they recommended holding off on full anticoagulation until 2 weeks from most recent surgery-however, now that he has had a negative Doppler I am uncertain that he would even require anticoagulation.  Will plan to repeat Doppler at the 2-week senthil

## 2025-05-01 NOTE — PROGRESS NOTES
[facility]  INPATIENT SPEECH THERAPY  MH-STRZ 8K IP REHAB  DAILY NOTE    Discharge Recommendations: Outpatient Speech Therapy  DIET ORDER RECOMMENDATIONS: Regular diet and thin liquids  STRATEGIES: Full Upright Position, Limit Distractions, and Monitor for Fatigue     SLP Individual Minutes  Time In: 0800  Time Out: 0830  Minutes: 30  Timed Code Treatment Minutes: 0 Minutes       Date: 2025  Patient Name: Koko Chao      CSN: [id number]   : 1960  (65 y.o.)  Gender: male   Referring Physician:  Sonya Robison DO  Diagnosis: Brain mass  Precautions: fall precautions, seizure precautions,  Current Diet: Regular diet and thin liquids  Respiratory Status: Room Air  Date of Last MBS/FEES: Not Applicable    Pain:  No pain reported.    Subjective:  Patient sitting in recliner upon ST arrival. Agreeable to skilled ST services. Wife present. Pleasant and cooperative throughout. Briefly reviewed the following education at beginning of session; verbal receptiveness noted.     Word Finding Recommendations   Picture it?   Describe it   Talk “around” the word?   Draw it out?   Act it out   Use as many gestures as you can??   Point to it   If a similar object is nearby?   Write it out   Try to spell it out??   Say the first sound or say the first letter?   Use a synonym?       Communication (Expressive and Receptive Language)   Make sure you have his/her attention before you start.   Minimize or eliminate background noise (TV, radio, other people).   Keep your own voice at a normal level unless he/she has indicated otherwise.   Keep communication simple, but adult. Simplify your own sentence structure and reduce your rate of speech (e.g., slow down). Emphasize key words. Don’t “talk down” to him/her.   Give him/her time to speak. Resist the urge to finish sentences or offer words.   Communicate with drawings, gestures, writing, and facial expressions in addition to speech.   Confirm that you are

## 2025-05-02 ENCOUNTER — APPOINTMENT (OUTPATIENT)
Dept: INTERVENTIONAL RADIOLOGY/VASCULAR | Age: 65
End: 2025-05-02
Attending: STUDENT IN AN ORGANIZED HEALTH CARE EDUCATION/TRAINING PROGRAM
Payer: COMMERCIAL

## 2025-05-02 LAB — ECHO BSA: 1.95 M2

## 2025-05-02 PROCEDURE — 97116 GAIT TRAINING THERAPY: CPT

## 2025-05-02 PROCEDURE — 99232 SBSQ HOSP IP/OBS MODERATE 35: CPT | Performed by: STUDENT IN AN ORGANIZED HEALTH CARE EDUCATION/TRAINING PROGRAM

## 2025-05-02 PROCEDURE — 93971 EXTREMITY STUDY: CPT

## 2025-05-02 PROCEDURE — 97530 THERAPEUTIC ACTIVITIES: CPT

## 2025-05-02 PROCEDURE — 97110 THERAPEUTIC EXERCISES: CPT

## 2025-05-02 PROCEDURE — 1180000000 HC REHAB R&B

## 2025-05-02 PROCEDURE — 6370000000 HC RX 637 (ALT 250 FOR IP): Performed by: STUDENT IN AN ORGANIZED HEALTH CARE EDUCATION/TRAINING PROGRAM

## 2025-05-02 PROCEDURE — 6360000002 HC RX W HCPCS: Performed by: STUDENT IN AN ORGANIZED HEALTH CARE EDUCATION/TRAINING PROGRAM

## 2025-05-02 PROCEDURE — 97112 NEUROMUSCULAR REEDUCATION: CPT

## 2025-05-02 PROCEDURE — 97535 SELF CARE MNGMENT TRAINING: CPT

## 2025-05-02 PROCEDURE — 94640 AIRWAY INHALATION TREATMENT: CPT

## 2025-05-02 PROCEDURE — 92507 TX SP LANG VOICE COMM INDIV: CPT

## 2025-05-02 RX ORDER — BUSPIRONE HYDROCHLORIDE 5 MG/1
5 TABLET ORAL 2 TIMES DAILY
Status: DISCONTINUED | OUTPATIENT
Start: 2025-05-02 | End: 2025-05-09 | Stop reason: HOSPADM

## 2025-05-02 RX ADMIN — DEXAMETHASONE 2 MG: 4 TABLET ORAL at 12:02

## 2025-05-02 RX ADMIN — ENOXAPARIN SODIUM 40 MG: 100 INJECTION SUBCUTANEOUS at 08:25

## 2025-05-02 RX ADMIN — LEVETIRACETAM 500 MG: 500 SOLUTION ORAL at 05:59

## 2025-05-02 RX ADMIN — LEVETIRACETAM 500 MG: 500 SOLUTION ORAL at 18:18

## 2025-05-02 RX ADMIN — DEXAMETHASONE 2 MG: 4 TABLET ORAL at 23:48

## 2025-05-02 RX ADMIN — DEXAMETHASONE 2 MG: 4 TABLET ORAL at 18:20

## 2025-05-02 RX ADMIN — TIOTROPIUM BROMIDE AND OLODATEROL 2 PUFF: 3.124; 2.736 SPRAY, METERED RESPIRATORY (INHALATION) at 08:25

## 2025-05-02 RX ADMIN — MICONAZOLE NITRATE: 2 POWDER TOPICAL at 20:05

## 2025-05-02 RX ADMIN — Medication 6 MG: at 19:58

## 2025-05-02 RX ADMIN — DEXAMETHASONE 2 MG: 4 TABLET ORAL at 05:59

## 2025-05-02 RX ADMIN — PANTOPRAZOLE SODIUM 40 MG: 40 TABLET, DELAYED RELEASE ORAL at 05:59

## 2025-05-02 RX ADMIN — MICONAZOLE NITRATE: 2 POWDER TOPICAL at 08:23

## 2025-05-02 RX ADMIN — BUMETANIDE 0.5 MG: 0.5 TABLET ORAL at 08:26

## 2025-05-02 RX ADMIN — BUSPIRONE HYDROCHLORIDE 5 MG: 5 TABLET ORAL at 14:33

## 2025-05-02 RX ADMIN — BUSPIRONE HYDROCHLORIDE 5 MG: 5 TABLET ORAL at 19:57

## 2025-05-02 RX ADMIN — AMLODIPINE BESYLATE 10 MG: 10 TABLET ORAL at 08:25

## 2025-05-02 RX ADMIN — DEXAMETHASONE 2 MG: 4 TABLET ORAL at 00:11

## 2025-05-02 RX ADMIN — PRAVASTATIN SODIUM 40 MG: 40 TABLET ORAL at 19:57

## 2025-05-02 RX ADMIN — LOSARTAN POTASSIUM 37.5 MG: 25 TABLET, FILM COATED ORAL at 19:58

## 2025-05-02 RX ADMIN — APIXABAN 10 MG: 5 TABLET, FILM COATED ORAL at 19:56

## 2025-05-02 RX ADMIN — LOSARTAN POTASSIUM 37.5 MG: 25 TABLET, FILM COATED ORAL at 08:25

## 2025-05-02 RX ADMIN — TRAZODONE HYDROCHLORIDE 50 MG: 50 TABLET ORAL at 23:48

## 2025-05-02 NOTE — PROGRESS NOTES
RECREATIONAL THERAPY  -STRZ 8K IP REHAB      Date:  5/2/2025            Patient Name: Koko Chao           MRN: [id number]  Acct: [id number]          YOB: 1960 (65 y.o.)       Gender: male      Physician: Referring Practitioner: Sonya Robison DO    REASON FOR MISSED TREATMENT:  Pt eating cereal upon arrival-has a deck of cards in room to use in free time-states he use to play euchre but not sure he would be able to play with peers today since his surgery    Janeth Prajapati, CTRS    5/2/2025

## 2025-05-02 NOTE — PROGRESS NOTES
[facility]  INPATIENT SPEECH THERAPY  MH-STRZ 8K IP REHAB  DAILY NOTE    Discharge Recommendations: Outpatient Speech Therapy  DIET ORDER RECOMMENDATIONS: Regular diet and thin liquids  STRATEGIES: Full Upright Position, Limit Distractions, and Monitor for Fatigue     SLP Individual Minutes  Time In: 0730  Time Out: 0800  Minutes: 30  Timed Code Treatment Minutes: 0 Minutes       Date: 2025  Patient Name: Koko Chao      CSN: [id number]   : 1960  (65 y.o.)  Gender: male   Referring Physician:  Sonya Robison DO  Diagnosis: Brain mass  Precautions: fall precautions, seizure precautions,  Current Diet: Regular diet and thin liquids  Respiratory Status: Room Air  Date of Last MBS/FEES: Not Applicable    Pain:  No pain reported.    Subjective:  Patient sitting in recliner upon ST arrival. Agreeable to skilled ST services. No family present. Pleasant and cooperative throughout.     Short-Term Goals:  SHORT TERM GOAL #1:  Goal 1: Patient will complete verbal expression tasks (including BASIC naming, automatic speech tasks, biographics, picture description, verbal fluency, word/phrase/sentence level repetition) with 70% accuracy, mod cues to improve functional expressive communication.  INTERVENTIONS:   Object Naming  10/25 independent   with description   with first letter   written word   phrase completion   first sound      SHORT TERM GOAL #2:  Goal 2: Patient will complete auditory/reading comprehension (including direction following of 1-2 step commands, basic and mildly complex yes/questions, functional reading comprehension, Object ID from FO3) with 70% accuracy, mod cues to improve functional recepetive communication.  INTERVENTIONS:   Phrase Completion FO3/FO4  6/10 independent (3/3 FO3; 3/7 FO4)    *patient tasked with reading phrase and ID word to complete phrase    SHORT TERM GOAL #3:  Goal 3: Patient will complete written expression tasks (including biographics,

## 2025-05-02 NOTE — PROGRESS NOTES
[facility]  MH-STRZ 8K IP REHAB  Occupational Therapy  Daily Note    Discharge Recommendations: Home with Outpatient OT  Equipment Recommendations:   Pt's wife purchasing shower chair for home.       Time In: 1331  Time Out: 1404  Timed Code Treatment Minutes: 33 Minutes  Minutes: 33          Date: 2025  Patient Name: Koko Chao,   Gender: male      Room: [address] [place] [address]  MRN: [id number]  : 1960  (65 y.o.)  Referring Practitioner: Sonya Robison DO  Diagnosis: Brain mass  Additional Pertinent Hx: Koko Chao is a 65 y.o. male with PMHx of hypertension, hyperlipidemia, central retinal vein occlusion, prostate cancer who presents to [facility] with chief complaint of aphasia.  Patient reports had 10-minute episode on 4/10 where he was not able to express his language. Patient reports this has been ongoing for 3 weeks. CT head done in ED showed suspected intra-axial mass in the left parietal lobe. Edema is anteriorly of mass. Pt s/p LEFT PARIETAL CRANIOTOMY FOR EVACUATION OF CEREBRAL ABSCESS on 25. Repeat CT due to increased aphasia, H/A was done right away and showed a hemorrhage really not in the surgical cavity but rather in the edematous area surrounding the surgical cavity. Pt s/p LEFT PARIETAL CRANIOTOMY HEMATOMA EVACUATION on 25. Patient was transferred out of the ICU on 2025. On 2025, CT head was obtained showing that there was a new hyperdense area posterior and superficial to the location of the tumor resection. Per neurosurgery on 2025, follow-up with neurosurgery in 2 weeks for suture removal (). Pt admitted to inpatient rehab on  for intensive therapy to promote safety and independence with daily activities.    Restrictions/Precautions:  Restrictions/Precautions: Fall Risk, Seizure, General Precautions  Position Activity Restriction  Other Position/Activity Restrictions: Can shower, however, cannot get head sutures wet

## 2025-05-02 NOTE — PLAN OF CARE
Problem: Discharge Planning  Goal: Discharge to home or other facility with appropriate resources  5/1/2025 2254 by Víctor Bonner, RN  Outcome: Progressing  Flowsheets (Taken 5/1/2025 1930)  Discharge to home or other facility with appropriate resources: Identify barriers to discharge with patient and caregiver     Problem: ABCDS Injury Assessment  Goal: Absence of physical injury  Outcome: Progressing     Problem: Safety - Adult  Goal: Free from fall injury  5/1/2025 2254 by Víctor Bonner, RN  Outcome: Progressing     Problem: Neurosensory - Adult  Goal: Absence of seizures  Outcome: Progressing     Problem: Cardiovascular - Adult  Goal: Maintains optimal cardiac output and hemodynamic stability  Outcome: Progressing  Flowsheets (Taken 5/1/2025 1930)  Maintains optimal cardiac output and hemodynamic stability: Monitor blood pressure and heart rate     Problem: Respiratory - Adult  Goal: Achieves optimal ventilation and oxygenation  5/1/2025 2254 by Víctor Bonner, RN  Outcome: Progressing  Flowsheets (Taken 5/1/2025 1930)  Achieves optimal ventilation and oxygenation: Assess for changes in respiratory status     Problem: Skin/Tissue Integrity - Adult  Goal: Incisions, wounds, or drain sites healing without S/S of infection  Outcome: Progressing  Flowsheets (Taken 5/1/2025 1930)  Incisions, Wounds, or Drain Sites Healing Without Sign and Symptoms of Infection: TWICE DAILY: Assess and document skin integrity     Problem: Infection - Adult  Goal: Absence of infection at discharge  5/1/2025 2254 by Víctor Bonner, RN  Outcome: Progressing  Flowsheets (Taken 5/1/2025 1930)  Absence of infection at discharge: Assess and monitor for signs and symptoms of infection

## 2025-05-02 NOTE — PROGRESS NOTES
[facility]  INPATIENT SPEECH THERAPY  MH-STRZ 8K IP REHAB  DAILY NOTE    Discharge Recommendations: Outpatient Speech Therapy  DIET ORDER RECOMMENDATIONS: Regular diet and thin liquids  STRATEGIES: Full Upright Position, Limit Distractions, and Monitor for Fatigue     SLP Individual Minutes  Time In: 0730  Time Out: 0800  Minutes: 30  Timed Code Treatment Minutes: 0 Minutes       Date: 2025  Patient Name: Koko Chao      CSN: [id number]   : 1960  (65 y.o.)  Gender: male   Referring Physician:  Sonya Robison DO  Diagnosis: Brain mass  Precautions: fall precautions, seizure precautions,  Current Diet: Regular diet and thin liquids  Respiratory Status: Room Air  Date of Last MBS/FEES: Not Applicable    Pain:  No pain reported.    Subjective:  Patient sitting in recliner upon ST arrival. Agreeable to skilled ST services. No family present. Pleasant and cooperative throughout.     Short-Term Goals:  SHORT TERM GOAL #1:  Goal 1: Patient will complete verbal expression tasks (including BASIC naming, automatic speech tasks, biographics, picture description, verbal fluency, word/phrase/sentence level repetition) with 70% accuracy, mod cues to improve functional expressive communication.  INTERVENTIONS: Did not address d/t focus on other goals      SHORT TERM GOAL #2:  Goal 2: Patient will complete auditory/reading comprehension (including direction following of 1-2 step commands, basic and mildly complex yes/questions, functional reading comprehension, Object ID from FO3) with 70% accuracy, mod cues to improve functional recepetive communication.  INTERVENTIONS:   Word ID FO4  9/10 independent    *patient presented word verbally and tasked with ID word from FO4    Word ID FO6   independent    *patient presented word verbally and tasked with ID word from FO6    Object ID FO4   independent    *patient tasked with reading word and ID picture that reflects word from FO4; improved success

## 2025-05-02 NOTE — PLAN OF CARE
Problem: ABCDS Injury Assessment  Goal: Absence of physical injury  Outcome: Progressing    Problem: Nutrition Deficit:  Goal: Optimize nutritional status  Outcome: Progressing      Problem: Safety - Adult  Goal: Free from fall injury  Outcome: Progressing      Problem: Discharge Planning  Goal: Discharge to home or other facility with appropriate resources  Outcome: Progressing  Flowsheets (Taken 5/2/2025 0831)  Discharge to home or other facility with appropriate resources: Identify barriers to discharge with patient and caregiver     Problem: Neurosensory - Adult  Goal: Absence of seizures  Outcome: Progressing  Flowsheets (Taken 5/2/2025 0831)  Absence of seizures: Monitor for seizure activity.  If seizure occurs, document type and location of movements and any associated apnea     Problem: Skin/Tissue Integrity - Adult  Goal: Incisions, wounds, or drain sites healing without S/S of infection  Outcome: Progressing  Flowsheets (Taken 5/2/2025 0831)  Incisions, Wounds, or Drain Sites Healing Without Sign and Symptoms of Infection: TWICE DAILY: Assess and document dressing/incision, wound bed, drain sites and surrounding tissue     Problem: Musculoskeletal - Adult  Goal: Return mobility to safest level of function  Outcome: Progressing    Problem: Gastrointestinal - Adult  Goal: Maintains or returns to baseline bowel function  Outcome: Progressing  Flowsheets (Taken 5/2/2025 0831)  Maintains or returns to baseline bowel function: Assess bowel function     Problem: Infection - Adult  Goal: Absence of infection at discharge  Outcome: Progressing  Flowsheets (Taken 5/2/2025 0831)  Absence of infection at discharge: Assess and monitor for signs and symptoms of infection

## 2025-05-02 NOTE — PROGRESS NOTES
Patient: Koko Chao  Unit/Bed: 8K-17/017-A  YOB: 1960  MRN: [id number] Acct: [id number]   Admitting Diagnosis: Brain mass [G93.89]  Admit Date:  4/28/2025  Hospital Day: 3    Assessment:     Principal Problem:    Brain mass  Resolved Problems:    * No resolved hospital problems. *      Plan:     Final path from [facility] is still pending        Subjective:     Patient has no complaint of chest pain or SOB..   Medication side effects: none    Scheduled Meds:   levETIRAcetam  500 mg Oral Q12H    miconazole   Topical BID    pravastatin  40 mg Oral Nightly    pantoprazole  40 mg Oral QAM AC    tiotropium-olodaterol  2 puff Inhalation Daily    amLODIPine  10 mg Oral Daily    [START ON 5/5/2025] cloNIDine  1 patch TransDERmal Weekly    losartan  37.5 mg Oral BID    melatonin  6 mg Oral Nightly    bumetanide  0.5 mg Oral Daily    dexAMETHasone  2 mg Oral 4 times per day    enoxaparin  40 mg SubCUTAneous Daily     Continuous Infusions:   sodium chloride      dextrose       PRN Meds:sodium chloride flush, sodium chloride, potassium chloride **OR** potassium alternative oral replacement **OR** potassium chloride, magnesium sulfate, polyethylene glycol, calcium carbonate, albuterol, acetaminophen, polyvinyl alcohol, traZODone, glucose, dextrose bolus **OR** dextrose bolus, glucagon (rDNA), dextrose, ondansetron    Review of Systems  Pertinent items are noted in HPI.    Objective:     Patient Vitals for the past 8 hrs:   BP Temp Temp src Pulse Resp SpO2 Weight   05/01/25 1930 127/69 97.5 °F (36.4 °C) Oral 70 18 97 % --   05/01/25 1502 -- -- -- -- -- -- 78.8 kg (173 lb 12.8 oz)     I/O last 3 completed shifts:  In: 860 [P.O.:860]  Out: -   No intake/output data recorded.    /69   Pulse 70   Temp 97.5 °F (36.4 °C) (Oral)   Resp 18   Ht 1.727 m (5' 8\")   Wt 78.8 kg (173 lb 12.8 oz)   SpO2 97%   BMI 26.43 kg/m²     General appearance: alert, appears stated age, and cooperative  Head: Normocephalic, without

## 2025-05-02 NOTE — PROGRESS NOTES
[facility]  MH-STRZ 8K IP REHAB  Occupational Therapy  Daily Note    Discharge Recommendations: 24 hour assistance or supervision and Not safe to return home at this time  Equipment Recommendations:   Pt's wife purchasing shower chair for home.      Time In: 1030  Time Out: 1130  Timed Code Treatment Minutes: 60 Minutes  Minutes: 60          Date: 2025  Patient Name: Koko Chao,   Gender: male      Room: [facility]17/017-A  MRN: [id number]  : 1960  (65 y.o.)  Referring Practitioner: Sonya Robison DO  Diagnosis: Brain mass  Additional Pertinent Hx: Koko Chao is a 65 y.o. male with PMHx of hypertension, hyperlipidemia, central retinal vein occlusion, prostate cancer who presents to [facility] with chief complaint of aphasia.  Patient reports had 10-minute episode on 4/10 where he was not able to express his language. Patient reports this has been ongoing for 3 weeks. CT head done in ED showed suspected intra-axial mass in the left parietal lobe. Edema is anteriorly of mass. Pt s/p LEFT PARIETAL CRANIOTOMY FOR EVACUATION OF CEREBRAL ABSCESS on 25. Repeat CT due to increased aphasia, H/A was done right away and showed a hemorrhage really not in the surgical cavity but rather in the edematous area surrounding the surgical cavity. Pt s/p LEFT PARIETAL CRANIOTOMY HEMATOMA EVACUATION on 25. Patient was transferred out of the ICU on 2025. On 2025, CT head was obtained showing that there was a new hyperdense area posterior and superficial to the location of the tumor resection. Per neurosurgery on 2025, follow-up with neurosurgery in 2 weeks for suture removal (). Pt admitted to inpatient rehab on  for intensive therapy to promote safety and independence with daily activities.    Restrictions/Precautions:  Restrictions/Precautions: Fall Risk, Seizure, General Precautions  Position Activity Restriction  Other Position/Activity Restrictions: Can shower,  skills to re-create a 3D image from 2D design with PVC pipes. Pt completed a simple 10-piece design in 3 minutes, and a more difficult 17-piece design in 3.5 minutes. Pt demonstrated good attention to task and good tolerance throughout.        Modified [place]:  Current Functional Status:  +3 - Moderate disability; requiring some help, but able to walk without physical assistance (SBA/CGA).   Patient could not live alone but could walk from one room to another without physical help from another person.  Education provided regarding stroke rehabilitation management.    Education:  Learners: Patient  Plan of Care, Role of OT,IADL's, Home Exercise Program, and Home Safety    ASSESSMENT:     Activity Tolerance:  Patient tolerance of  treatment: Good treatment tolerance      Plan: Times Per Week: 5x/wk for 90 min  Current Treatment Recommendations: Strengthening, Balance training, Functional mobility training, Endurance training, Patient/Caregiver education & training, Equipment evaluation, education, & procurement, Safety education & training, Self-Care / ADL, Home management training, Cognitive reorientation, Cognitive/Perceptual training    Goals  Short Term Goals  Time Frame for Short Term Goals: 1 week  Short Term Goal 1: Pt will follow 3-step directions with mod VC during a basic ADL task to promote indep with ADL and IADL tasks.  Short Term Goal 2: Pt will select, retrieve, and don appropriate clothing with SBA and 1 VC or less for sequencing and initiation to increase indep with dressing.  Short Term Goal 3: Pt will tolerate tolerate 10 minutes standing with SBA in preparation for standing showering task.  Short Term Goal 4: Pt will write a 3-item grocery list with proper letter formation with mod cues to promote ability to express personal needs.  Long Term Goals  Time Frame for Long Term Goals : 2 weeks  Long Term Goal 1: Pt will demonstrate improved occupational performance as evidenced by a score of 85/100 on

## 2025-05-02 NOTE — PROGRESS NOTES
[facility]  INPATIENT SPEECH THERAPY  MH-STRZ 8K IP REHAB  DAILY NOTE    Discharge Recommendations: Outpatient Speech Therapy  DIET ORDER RECOMMENDATIONS: Regular diet and thin liquids  STRATEGIES: Full Upright Position, Limit Distractions, and Monitor for Fatigue     SLP Individual Minutes  Time In: 1404  Time Out: 1434  Minutes: 30  Timed Code Treatment Minutes: 0 Minutes       Date: 2025  Patient Name: Koko Chao      CSN: [id number]   : 1960  (65 y.o.)  Gender: male   Referring Physician:  Sonya Robison DO  Diagnosis: Brain mass  Precautions: fall precautions, seizure precautions,  Current Diet: Regular diet and thin liquids  Respiratory Status: Room Air  Date of Last MBS/FEES: Not Applicable    Pain:  No pain reported.    Subjective:  Patient sitting in recliner upon ST arrival. Agreeable to skilled ST services. No family present. Pleasant and cooperative throughout.     Short-Term Goals:  SHORT TERM GOAL #1:  Goal 1: Patient will complete verbal expression tasks (including BASIC naming, automatic speech tasks, biographics, picture description, verbal fluency, word/phrase/sentence level repetition) with 70% accuracy, mod cues to improve functional expressive communication.  INTERVENTIONS:   Naming Family Members via Pictures  Wife: 2/3 independent  Son and daughter: 3/5 independent  DIL and RADHA: 4/4 independent  Grandchildren: 15 independent    *benefit from first letter and first sound cues  *increased frustration with difficulties in tasks    SHORT TERM GOAL #2:  Goal 2: Patient will complete auditory/reading comprehension (including direction following of 1-2 step commands, basic and mildly complex yes/questions, functional reading comprehension, Object ID from FO3) with 70% accuracy, mod cues to improve functional recepetive communication.  INTERVENTIONS:   Retrieving Items on Grocery List  Patient provided 7 item grocery list and tasked with gathering all items on grocery

## 2025-05-02 NOTE — PROGRESS NOTES
[facility]  INPATIENT PHYSICAL THERAPY  DAILY NOTE  MH-STRZ 8K IP REHAB - 8K-17/017-A      Discharge Recommendations: Home with Outpatient PT  Equipment Recommendations: No               Time In: 0630  Time Out: 730  Timed Code Treatment Minutes: 60 Minutes  Minutes: 60          Date: 2025  Patient Name: Koko Chao,  Gender:  male        MRN: [id number]  : 1960  (65 y.o.)     Referring Practitioner: Sonya Robison DO  Diagnosis: Brain mass  Additional Pertinent Hx: Per H&P \"Koko Chao is a 65 y.o. male with PMHx of hypertension, hyperlipidemia, central retinal vein occlusion, prostate cancer who presents to [facility] with chief complaint of aphasia.  Patient reports had 10-minute episode on 4/10 where he was not able to express his language. Patient reports this has been ongoing for 3 weeks. CT head done in ED showed suspected intra-axial mass in the left parietal lobe. Edema is anteriorly of mass. Pt s/p LEFT PARIETAL CRANIOTOMY FOR EVACUATION OF CEREBRAL ABSCESS on 25. Repeat CT due to increased aphasia, H/A was done right away and showed a hemorrhage really not in the surgical cavity but rather in the edematous area surrounding the surgical cavity. Pt s/p LEFT PARIETAL CRANIOTOMY HEMATOMA EVACUATION on 25. Patient was transferred out of the ICU on 2025. On 2025, CT head was obtained showing that there was a new hyperdense area posterior and superficial to the location of the tumor resection. Per neurosurgery on 2025, follow-up with neurosurgery in 2 weeks for suture removal (). Pt admitted to inpatient rehab on  for intensive therapy to promote safety and independence with daily activities.\"     Prior Level of Function:  Lives With: Spouse  Type of Home: House  Home Layout: One level  Home Access: Stairs to enter with rails  Entrance Stairs - Number of Steps: 3  Entrance Stairs - Rails: Left  Home Equipment: None   Bathroom Shower/Tub:  throughout therapy gym performing various gait tasks including retro walking, grape vine walking, change in gait speed, change in direction, and head turns laterally and vertically for increased stabilization during gait.     Stairs:  Stairs: 6\" steps X 7 using Bilateral Handrails and Supervision.  Performs in reciprocal pattern     Balance and Neuromuscular re-education:  Static Standing Balance: Contact Guard Assistance, Minimal Assistance  Dynamic Standing Balance: Contact Guard Assistance, Minimal Assistance  Pt performs static standing on square balance board both laterally and forward/retro with occasional single UE support. Performed 3 x 1 min in each direction    Pt performs ring tosses to visual target while standing on bottom of BOSU. Includes reaching outside MARY and increased B WB. Pt initially requires time to gain balance before initiating task. Mild frustrations that are eased with encouragement. Minimal UE support     Pt performs toe taps to colored cones based upon PT instruction. Performed to // bars. Observation of improved performance this date in comparison to completing task with pt previously. Pt was move successful in placing proper foot to proper color. Immediate feedback given.     Pt. Completed 4 minutes on Nu-Step machine on L2 utilizing Bilateral Upper Extremities and Bilateral Lower Extremities to increase bilateral proprioceptive input for improved gross motor control.     Exercise:  None    Functional Outcome Measures:  Not completed  Modified [place]:  Current Functional Status:  +1 - No significant disability despite symptoms; able to carry out all usual duties and activities.   Patient can do everything they could do right before their stroke, but is slower and can't do as much.  Education provided regarding stroke rehabilitation management.    ASSESSMENT:  Assessment: Patient progressing toward established goals.  Activity Tolerance:  Patient tolerance of  treatment:Good.  Plan:

## 2025-05-03 PROCEDURE — 97110 THERAPEUTIC EXERCISES: CPT

## 2025-05-03 PROCEDURE — 97535 SELF CARE MNGMENT TRAINING: CPT

## 2025-05-03 PROCEDURE — 1180000000 HC REHAB R&B

## 2025-05-03 PROCEDURE — 6370000000 HC RX 637 (ALT 250 FOR IP): Performed by: STUDENT IN AN ORGANIZED HEALTH CARE EDUCATION/TRAINING PROGRAM

## 2025-05-03 PROCEDURE — 97116 GAIT TRAINING THERAPY: CPT

## 2025-05-03 PROCEDURE — 97112 NEUROMUSCULAR REEDUCATION: CPT

## 2025-05-03 PROCEDURE — 97530 THERAPEUTIC ACTIVITIES: CPT

## 2025-05-03 PROCEDURE — 6360000002 HC RX W HCPCS: Performed by: STUDENT IN AN ORGANIZED HEALTH CARE EDUCATION/TRAINING PROGRAM

## 2025-05-03 PROCEDURE — 92507 TX SP LANG VOICE COMM INDIV: CPT

## 2025-05-03 RX ADMIN — BUMETANIDE 0.5 MG: 0.5 TABLET ORAL at 09:00

## 2025-05-03 RX ADMIN — TRAZODONE HYDROCHLORIDE 50 MG: 50 TABLET ORAL at 19:36

## 2025-05-03 RX ADMIN — LOSARTAN POTASSIUM 37.5 MG: 25 TABLET, FILM COATED ORAL at 08:59

## 2025-05-03 RX ADMIN — LOSARTAN POTASSIUM 37.5 MG: 25 TABLET, FILM COATED ORAL at 19:36

## 2025-05-03 RX ADMIN — Medication 6 MG: at 19:36

## 2025-05-03 RX ADMIN — DEXAMETHASONE 2 MG: 4 TABLET ORAL at 12:06

## 2025-05-03 RX ADMIN — BUSPIRONE HYDROCHLORIDE 5 MG: 5 TABLET ORAL at 09:00

## 2025-05-03 RX ADMIN — PANTOPRAZOLE SODIUM 40 MG: 40 TABLET, DELAYED RELEASE ORAL at 05:23

## 2025-05-03 RX ADMIN — LEVETIRACETAM 500 MG: 500 SOLUTION ORAL at 17:25

## 2025-05-03 RX ADMIN — LEVETIRACETAM 500 MG: 500 SOLUTION ORAL at 05:23

## 2025-05-03 RX ADMIN — MICONAZOLE NITRATE: 2 POWDER TOPICAL at 09:01

## 2025-05-03 RX ADMIN — BUSPIRONE HYDROCHLORIDE 5 MG: 5 TABLET ORAL at 19:36

## 2025-05-03 RX ADMIN — APIXABAN 10 MG: 5 TABLET, FILM COATED ORAL at 19:36

## 2025-05-03 RX ADMIN — DEXAMETHASONE 2 MG: 4 TABLET ORAL at 17:25

## 2025-05-03 RX ADMIN — DEXAMETHASONE 2 MG: 4 TABLET ORAL at 05:24

## 2025-05-03 RX ADMIN — MICONAZOLE NITRATE: 2 POWDER TOPICAL at 22:16

## 2025-05-03 RX ADMIN — PRAVASTATIN SODIUM 40 MG: 40 TABLET ORAL at 19:36

## 2025-05-03 RX ADMIN — APIXABAN 10 MG: 5 TABLET, FILM COATED ORAL at 09:00

## 2025-05-03 RX ADMIN — AMLODIPINE BESYLATE 10 MG: 10 TABLET ORAL at 09:00

## 2025-05-03 NOTE — CARE COORDINATION
Patient slept well this shift. No concerns voiced.      Patient educated on how to use incentive spirometer. Patient verbalized understanding and demonstrated proper use. Emphasized importance and usage of device, with coughing and deep breathing every 4 hours while awake.

## 2025-05-03 NOTE — PLAN OF CARE
Problem: ABCDS Injury Assessment  Goal: Absence of physical injury  5/2/2025 2316 by Greg Vergara RN  Outcome: Progressing  Flowsheets (Taken 5/2/2025 2316)  Absence of Physical Injury: Implement safety measures based on patient assessment     Problem: Nutrition Deficit:  Goal: Optimize nutritional status  5/2/2025 2316 by Greg Vergara RN  Outcome: Progressing  Flowsheets (Taken 5/2/2025 2316)  Nutrient intake appropriate for improving, restoring, or maintaining nutritional needs:   Assess nutritional status and recommend course of action   Monitor oral intake, labs, and treatment plans     Problem: Safety - Adult  Goal: Free from fall injury  5/2/2025 2316 by Greg Vergara RN  Outcome: Progressing  Flowsheets (Taken 5/2/2025 2316)  Free From Fall Injury: Instruct family/caregiver on patient safety     Problem: Discharge Planning  Goal: Discharge to home or other facility with appropriate resources  5/2/2025 2316 by Greg Vergara RN  Outcome: Progressing  Flowsheets (Taken 5/2/2025 2316)  Discharge to home or other facility with appropriate resources: Identify barriers to discharge with patient and caregiver     Problem: Skin/Tissue Integrity - Adult  Goal: Incisions, wounds, or drain sites healing without S/S of infection  5/2/2025 2316 by Greg Vergara RN  Outcome: Progressing  Flowsheets  Taken 5/2/2025 2314  Incisions, Wounds, or Drain Sites Healing Without Sign and Symptoms of Infection: TWICE DAILY: Assess and document skin integrity  Taken 5/2/2025 2004  Incisions, Wounds, or Drain Sites Healing Without Sign and Symptoms of Infection:   TWICE DAILY: Assess and document skin integrity   TWICE DAILY: Assess and document dressing/incision, wound bed, drain sites and surrounding tissue     Problem: Genitourinary - Adult  Goal: Absence of urinary retention  5/2/2025 2316 by Greg Vergara RN  Outcome: Progressing  Flowsheets (Taken 5/2/2025 2316)  Absence of urinary retention:

## 2025-05-03 NOTE — PROGRESS NOTES
[facility]  INPATIENT PHYSICAL THERAPY  DAILY NOTE  [facility] 8K IP REHAB - 8K-17/017-A      Discharge Recommendations: Home with Outpatient PT  Equipment Recommendations: No               Time In: 626  Time Out: 730  Timed Code Treatment Minutes: 64 Minutes  Minutes: 64          Date: 5/3/2025  Patient Name: Koko Chao,  Gender:  male        MRN: [id number]  : 1960  (65 y.o.)     Referring Practitioner: Sonya Robison DO  Diagnosis: Brain mass  Additional Pertinent Hx: Per H&P \"Koko Chao is a 65 y.o. male with PMHx of hypertension, hyperlipidemia, central retinal vein occlusion, prostate cancer who presents to [facility] with chief complaint of aphasia.  Patient reports had 10-minute episode on 4/10 where he was not able to express his language. Patient reports this has been ongoing for 3 weeks. CT head done in ED showed suspected intra-axial mass in the left parietal lobe. Edema is anteriorly of mass. Pt s/p LEFT PARIETAL CRANIOTOMY FOR EVACUATION OF CEREBRAL ABSCESS on 25. Repeat CT due to increased aphasia, H/A was done right away and showed a hemorrhage really not in the surgical cavity but rather in the edematous area surrounding the surgical cavity. Pt s/p LEFT PARIETAL CRANIOTOMY HEMATOMA EVACUATION on 25. Patient was transferred out of the ICU on 2025. On 2025, CT head was obtained showing that there was a new hyperdense area posterior and superficial to the location of the tumor resection. Per neurosurgery on 2025, follow-up with neurosurgery in 2 weeks for suture removal (). Pt admitted to inpatient rehab on  for intensive therapy to promote safety and independence with daily activities.\"     Prior Level of Function:  Lives With: Spouse  Type of Home: House  Home Layout: One level  Home Access: Stairs to enter with rails  Entrance Stairs - Number of Steps: 3  Entrance Stairs - Rails: Left  Home Equipment: None   Bathroom

## 2025-05-03 NOTE — PROGRESS NOTES
[facility]  [facility] 8K IP REHAB  Occupational Therapy  Daily Note    Discharge Recommendations: Home with assist as needed and Home with Outpatient OT  Equipment Recommendations:   Pt's wife purchasing shower chair for home.      Time In: 0900  Time Out: 1030  Timed Code Treatment Minutes: 90 Minutes  Minutes: 90          Date: 5/3/2025  Patient Name: Koko Chao,   Gender: male      Room: [facility]17/017-A  MRN: [id number]  : 1960  (65 y.o.)  Referring Practitioner: Sonya Robison DO  Diagnosis: Brain mass  Additional Pertinent Hx: Koko Chao is a 65 y.o. male with PMHx of hypertension, hyperlipidemia, central retinal vein occlusion, prostate cancer who presents to [facility] with chief complaint of aphasia.  Patient reports had 10-minute episode on 4/10 where he was not able to express his language. Patient reports this has been ongoing for 3 weeks. CT head done in ED showed suspected intra-axial mass in the left parietal lobe. Edema is anteriorly of mass. Pt s/p LEFT PARIETAL CRANIOTOMY FOR EVACUATION OF CEREBRAL ABSCESS on 25. Repeat CT due to increased aphasia, H/A was done right away and showed a hemorrhage really not in the surgical cavity but rather in the edematous area surrounding the surgical cavity. Pt s/p LEFT PARIETAL CRANIOTOMY HEMATOMA EVACUATION on 25. Patient was transferred out of the ICU on 2025. On 2025, CT head was obtained showing that there was a new hyperdense area posterior and superficial to the location of the tumor resection. Per neurosurgery on 2025, follow-up with neurosurgery in 2 weeks for suture removal (). Pt admitted to inpatient rehab on  for intensive therapy to promote safety and independence with daily activities.    Restrictions/Precautions:  Restrictions/Precautions: Fall Risk, Seizure, General Precautions  Position Activity Restriction  Other Position/Activity Restrictions: Can shower, however, cannot get  independently.          Modified Zillah:  Current Functional Status:  2+    Education:  Learners: Patient  Plan of Care, Role of OT,ADL's, IADL's, Home Exercise Program, Importance of Increasing Activity, and Fall Prevention    ASSESSMENT:     Activity Tolerance:  Patient tolerance of  treatment: Good treatment tolerance      Plan: Times Per Week: 5x/wk for 90 min  Current Treatment Recommendations: Strengthening, Balance training, Functional mobility training, Endurance training, Patient/Caregiver education & training, Equipment evaluation, education, & procurement, Safety education & training, Self-Care / ADL, Home management training, Cognitive reorientation, Cognitive/Perceptual training    Goals  Short Term Goals  Time Frame for Short Term Goals: 1 week  Short Term Goal 1: Pt will follow 3-step directions with mod VC during a basic ADL task to promote indep with ADL and IADL tasks.  Short Term Goal 2: Pt will select, retrieve, and don appropriate clothing with SBA and 1 VC or less for sequencing and initiation to increase indep with dressing.  Short Term Goal 3: Pt will tolerate tolerate 10 minutes standing with SBA in preparation for standing showering task.  Short Term Goal 4: Pt will write a 3-item grocery list with proper letter formation with mod cues to promote ability to express personal needs.  Long Term Goals  Time Frame for Long Term Goals : 2 weeks  Long Term Goal 1: Pt will demonstrate improved occupational performance as evidenced by a score of 85/100 on the Modified Barthel Index.  Long Term Goal 2: Pt will complete a basic IADL task with supervision and 0-1 VC for sequencing and problem solving to promote safety and independence with homemaking tasks.  Long Term Goal 3: Pt will sequence ADL routine with supervision and 0-1 VC for initiation to increase safety and return to PLOF.    Following session, patient left in safe position with all fall risk precautions in place.

## 2025-05-03 NOTE — PLAN OF CARE
Problem: ABCDS Injury Assessment  Goal: Absence of physical injury  5/3/2025 1059 by Nicky Bonner RN  Outcome: Progressing  Flowsheets (Taken 5/3/2025 1059)  Absence of Physical Injury: Implement safety measures based on patient assessment     Problem: Nutrition Deficit:  Goal: Optimize nutritional status  5/3/2025 1059 by Nicky Bonner RN  Outcome: Progressing  Flowsheets (Taken 5/3/2025 1059)  Nutrient intake appropriate for improving, restoring, or maintaining nutritional needs:   Assess nutritional status and recommend course of action   Monitor oral intake, labs, and treatment plans     Problem: Safety - Adult  Goal: Free from fall injury  5/3/2025 1059 by Nicky Bonner RN  Outcome: Progressing  Flowsheets (Taken 5/3/2025 1059)  Free From Fall Injury: Instruct family/caregiver on patient safety     Problem: Discharge Planning  Goal: Discharge to home or other facility with appropriate resources  5/3/2025 1059 by Nicky Bonner RN  Outcome: Progressing  Flowsheets (Taken 5/3/2025 1059)  Discharge to home or other facility with appropriate resources:   Identify barriers to discharge with patient and caregiver   Arrange for needed discharge resources and transportation as appropriate       Problem: Respiratory - Adult  Goal: Achieves optimal ventilation and oxygenation  Outcome: Progressing  Flowsheets (Taken 5/3/2025 1059)  Achieves optimal ventilation and oxygenation:   Assess for changes in respiratory status   Assess for changes in mentation and behavior     Problem: Cardiovascular - Adult  Goal: Maintains optimal cardiac output and hemodynamic stability  Outcome: Progressing  Flowsheets (Taken 5/3/2025 1059)  Maintains optimal cardiac output and hemodynamic stability: Monitor blood pressure and heart rate     Problem: Skin/Tissue Integrity - Adult  Goal: Incisions, wounds, or drain sites healing without S/S of infection  5/3/2025 1059 by Nicky Bonner RN  Outcome: Progressing  Flowsheets  Taken

## 2025-05-03 NOTE — PROGRESS NOTES
[facility] [place]  INPATIENT SPEECH THERAPY  [facility] 8K IP REHAB  DAILY NOTE    Discharge Recommendations: Outpatient Speech Therapy  DIET ORDER RECOMMENDATIONS: Regular diet and thin liquids  STRATEGIES: Full Upright Position, Limit Distractions, and Monitor for Fatigue     SLP Individual Minutes  Time In: 1304  Time Out: 1334  Minutes: 30  Timed Code Treatment Minutes: 0 Minutes       Date: 5/3/2025  Patient Name: Koko Chao      CSN: [id number]   : 1960  (65 y.o.)  Gender: male   Referring Physician:  Sonya Robison DO  Diagnosis: Brain mass  Precautions: fall precautions, seizure precautions,  Current Diet: Regular diet and thin liquids  Respiratory Status: Room Air  Date of Last MBS/FEES: Not Applicable    Pain:  No pain reported.    Subjective:  Patient sitting in recliner upon ST arrival.  He was in good spirits and agreeable to  services today. Patient's wife present for duration of session today and was receptive to education.     Short-Term Goals:  SHORT TERM GOAL #1:  Goal 1: Patient will complete verbal expression tasks (including BASIC naming, automatic speech tasks, biographics, picture description, verbal fluency, word/phrase/sentence level repetition) with 70% accuracy, mod cues to improve functional expressive communication.  INTERVENTIONS:   Automatic Phrases  5/5 independent      Automatic phrases and convergent naming tasks left in room for home programming.     SHORT TERM GOAL #2:  Goal 2: Patient will complete auditory/reading comprehension (including direction following of 1-2 step commands, basic and mildly complex yes/questions, functional reading comprehension, Object ID from FO3) with 70% accuracy, mod cues to improve functional recepetive communication.  INTERVENTIONS:   Reading Comprehension - Y/N Questions  80% independent increasing to 100% with minimal cues.  Patient had observed difficulties with consonant blends and diagraphs this date.  Some perseveration on

## 2025-05-03 NOTE — PROGRESS NOTES
[facility]  INPATIENT SPEECH THERAPY  [facility] 8K IP REHAB  DAILY NOTE    Discharge Recommendations: Outpatient Speech Therapy  DIET ORDER RECOMMENDATIONS: Regular diet and thin liquids  STRATEGIES: Full Upright Position, Limit Distractions, and Monitor for Fatigue     SLP Individual Minutes  Time In: 1029  Time Out: 1100  Minutes: 31  Timed Code Treatment Minutes: 0 Minutes       Date: 5/3/2025  Patient Name: Koko Chao      CSN: [id number]   : 1960  (65 y.o.)  Gender: male   Referring Physician:  Sonya Robison DO  Diagnosis: Brain mass  Precautions: fall precautions, seizure precautions,  Current Diet: Regular diet and thin liquids  Respiratory Status: Room Air  Date of Last MBS/FEES: Not Applicable    Pain:  No pain reported.    Subjective:  Patient sitting in recliner upon ST arrival.  He was in good spirits and agreeable to  services today.     Short-Term Goals:  SHORT TERM GOAL #1:  Goal 1: Patient will complete verbal expression tasks (including BASIC naming, automatic speech tasks, biographics, picture description, verbal fluency, word/phrase/sentence level repetition) with 70% accuracy, mod cues to improve functional expressive communication.  INTERVENTIONS:   Confrontation Naming  40% independent increasing to 100% accuracy with phrase completion, written support or phonemic cues.    SHORT TERM GOAL #2:  Goal 2: Patient will complete auditory/reading comprehension (including direction following of 1-2 step commands, basic and mildly complex yes/questions, functional reading comprehension, Object ID from FO3) with 70% accuracy, mod cues to improve functional recepetive communication.  INTERVENTIONS:   2- step written directions  60% accuracy independently increasing to 100% with moderate cues. With each error he was able to complete 1/2 of the direction correctly but required cueing to complete the other half.     Matching Picture to sentence  70% accuracy independently increasing

## 2025-05-03 NOTE — PROGRESS NOTES
[facility] [place]  INPATIENT SPEECH THERAPY  [facility] 8K IP REHAB  DAILY NOTE    Discharge Recommendations: Outpatient Speech Therapy  DIET ORDER RECOMMENDATIONS: Regular diet and thin liquids  STRATEGIES: Full Upright Position, Limit Distractions, and Monitor for Fatigue     SLP Individual Minutes  Time In: 0729  Time Out: 0800  Minutes: 31  Timed Code Treatment Minutes: 0 Minutes       Date: 5/3/2025  Patient Name: Koko Chao      CSN: [id number]   : 1960  (65 y.o.)  Gender: male   Referring Physician:  Sonya Robison DO  Diagnosis: Brain mass  Precautions: fall precautions, seizure precautions,  Current Diet: Regular diet and thin liquids  Respiratory Status: Room Air  Date of Last MBS/FEES: Not Applicable    Pain:  No pain reported.    Subjective:  Patient sitting in recliner upon ST arrival.  He was in good spirits and agreeable to  services today.     Short-Term Goals:  SHORT TERM GOAL #1:  Goal 1: Patient will complete verbal expression tasks (including BASIC naming, automatic speech tasks, biographics, picture description, verbal fluency, word/phrase/sentence level repetition) with 70% accuracy, mod cues to improve functional expressive communication.  INTERVENTIONS:   Automatic Phrase Completion: written prompt  60% independently, 80% if the phrase was read aloud by the clinician.  Able to achieve 100% accuracy when provided with phonemic or verbal cues.     Convergent Categories (name category given 3 words)  71% independently increasing to 86% with verbal cues    Responsive Naming  57% independently increasing to 86% with verbal cues    SHORT TERM GOAL #2:  Goal 2: Patient will complete auditory/reading comprehension (including direction following of 1-2 step commands, basic and mildly complex yes/questions, functional reading comprehension, Object ID from FO3) with 70% accuracy, mod cues to improve functional recepetive communication.  INTERVENTIONS:       SHORT TERM GOAL #3:  Goal 3:

## 2025-05-04 PROCEDURE — 1180000000 HC REHAB R&B

## 2025-05-04 PROCEDURE — 6370000000 HC RX 637 (ALT 250 FOR IP): Performed by: STUDENT IN AN ORGANIZED HEALTH CARE EDUCATION/TRAINING PROGRAM

## 2025-05-04 PROCEDURE — 94761 N-INVAS EAR/PLS OXIMETRY MLT: CPT

## 2025-05-04 PROCEDURE — 94640 AIRWAY INHALATION TREATMENT: CPT

## 2025-05-04 PROCEDURE — 6360000002 HC RX W HCPCS: Performed by: STUDENT IN AN ORGANIZED HEALTH CARE EDUCATION/TRAINING PROGRAM

## 2025-05-04 RX ADMIN — BUSPIRONE HYDROCHLORIDE 5 MG: 5 TABLET ORAL at 10:09

## 2025-05-04 RX ADMIN — DEXAMETHASONE 2 MG: 4 TABLET ORAL at 12:04

## 2025-05-04 RX ADMIN — LEVETIRACETAM 500 MG: 500 SOLUTION ORAL at 18:41

## 2025-05-04 RX ADMIN — TRAZODONE HYDROCHLORIDE 50 MG: 50 TABLET ORAL at 20:55

## 2025-05-04 RX ADMIN — AMLODIPINE BESYLATE 10 MG: 10 TABLET ORAL at 10:08

## 2025-05-04 RX ADMIN — PANTOPRAZOLE SODIUM 40 MG: 40 TABLET, DELAYED RELEASE ORAL at 05:49

## 2025-05-04 RX ADMIN — MICONAZOLE NITRATE: 2 POWDER TOPICAL at 10:11

## 2025-05-04 RX ADMIN — BUMETANIDE 0.5 MG: 0.5 TABLET ORAL at 10:11

## 2025-05-04 RX ADMIN — DEXAMETHASONE 2 MG: 4 TABLET ORAL at 18:41

## 2025-05-04 RX ADMIN — LOSARTAN POTASSIUM 37.5 MG: 25 TABLET, FILM COATED ORAL at 20:55

## 2025-05-04 RX ADMIN — APIXABAN 10 MG: 5 TABLET, FILM COATED ORAL at 20:55

## 2025-05-04 RX ADMIN — TIOTROPIUM BROMIDE AND OLODATEROL 2 PUFF: 3.124; 2.736 SPRAY, METERED RESPIRATORY (INHALATION) at 09:10

## 2025-05-04 RX ADMIN — LEVETIRACETAM 500 MG: 500 SOLUTION ORAL at 05:49

## 2025-05-04 RX ADMIN — APIXABAN 10 MG: 5 TABLET, FILM COATED ORAL at 10:09

## 2025-05-04 RX ADMIN — Medication 6 MG: at 20:55

## 2025-05-04 RX ADMIN — BUSPIRONE HYDROCHLORIDE 5 MG: 5 TABLET ORAL at 20:55

## 2025-05-04 RX ADMIN — DEXAMETHASONE 2 MG: 4 TABLET ORAL at 05:49

## 2025-05-04 RX ADMIN — PRAVASTATIN SODIUM 40 MG: 40 TABLET ORAL at 20:55

## 2025-05-04 RX ADMIN — LOSARTAN POTASSIUM 37.5 MG: 25 TABLET, FILM COATED ORAL at 10:09

## 2025-05-04 RX ADMIN — DEXAMETHASONE 2 MG: 4 TABLET ORAL at 01:06

## 2025-05-04 RX ADMIN — MICONAZOLE NITRATE: 2 POWDER TOPICAL at 20:58

## 2025-05-04 NOTE — PLAN OF CARE
Problem: ABCDS Injury Assessment  Goal: Absence of physical injury  5/4/2025 1149 by Lucy Sykes RN  Outcome: Progressing  Flowsheets (Taken 5/4/2025 1136)  Absence of Physical Injury: Implement safety measures based on patient assessment  5/3/2025 2159 by Greg Vergara RN  Outcome: Progressing  Flowsheets (Taken 5/3/2025 2159)  Absence of Physical Injury: Implement safety measures based on patient assessment     Problem: Nutrition Deficit:  Goal: Optimize nutritional status  5/4/2025 1149 by Lucy Sykes RN  Outcome: Progressing  Flowsheets (Taken 5/4/2025 1136)  Nutrient intake appropriate for improving, restoring, or maintaining nutritional needs:   Assess nutritional status and recommend course of action   Monitor oral intake, labs, and treatment plans  5/3/2025 2159 by Greg Vergara RN  Outcome: Progressing  Flowsheets (Taken 5/3/2025 2159)  Nutrient intake appropriate for improving, restoring, or maintaining nutritional needs:   Assess nutritional status and recommend course of action   Monitor oral intake, labs, and treatment plans     Problem: Discharge Planning  Goal: Discharge to home or other facility with appropriate resources  5/4/2025 1149 by Lucy Sykes RN  Outcome: Progressing  Flowsheets (Taken 5/4/2025 1008)  Discharge to home or other facility with appropriate resources: Identify barriers to discharge with patient and caregiver  5/3/2025 2159 by Greg Vergara RN  Outcome: Progressing  Flowsheets (Taken 5/3/2025 2159)  Discharge to home or other facility with appropriate resources:   Identify barriers to discharge with patient and caregiver   Identify discharge learning needs (meds, wound care, etc)     Problem: Neurosensory - Adult  Goal: Absence of seizures  5/4/2025 1149 by Lucy Sykes RN  Outcome: Progressing  Flowsheets (Taken 5/4/2025 1008)  Absence of seizures:   Monitor for seizure activity.  If seizure occurs, document type and location of movements

## 2025-05-04 NOTE — CARE COORDINATION
Patient alert and oriented, confused on year of birthday, re-oriented to year.  Patient MAXIMO in room. Patient continent of bladder and bowel. Patient ambulated in hallway x 2 with gait belt on, once with staff and once with family.  Patient chews medications a few at time. Patient reports he has been chewing his medications since a child.

## 2025-05-04 NOTE — PLAN OF CARE
Problem: ABCDS Injury Assessment  Goal: Absence of physical injury  5/3/2025 2159 by Greg Vergara RN  Outcome: Progressing  Flowsheets (Taken 5/3/2025 2159)  Absence of Physical Injury: Implement safety measures based on patient assessment     Problem: Nutrition Deficit:  Goal: Optimize nutritional status  5/3/2025 2159 by Greg Vergara RN  Outcome: Progressing  Flowsheets (Taken 5/3/2025 2159)  Nutrient intake appropriate for improving, restoring, or maintaining nutritional needs:   Assess nutritional status and recommend course of action   Monitor oral intake, labs, and treatment plans     Problem: Safety - Adult  Goal: Free from fall injury  5/3/2025 2159 by Greg Vergara RN  Outcome: Progressing  Flowsheets (Taken 5/3/2025 2159)  Free From Fall Injury: Instruct family/caregiver on patient safety     Problem: Discharge Planning  Goal: Discharge to home or other facility with appropriate resources  5/3/2025 2159 by Greg Vergara RN  Outcome: Progressing  Flowsheets (Taken 5/3/2025 2159)  Discharge to home or other facility with appropriate resources:   Identify barriers to discharge with patient and caregiver   Identify discharge learning needs (meds, wound care, etc)     Problem: Neurosensory - Adult  Goal: Absence of seizures  5/3/2025 2159 by Greg Vergara RN  Outcome: Progressing  Flowsheets (Taken 5/3/2025 2159)  Absence of seizures: Monitor for seizure activity.  If seizure occurs, document type and location of movements and any associated apnea     Problem: Cardiovascular - Adult  Goal: Maintains optimal cardiac output and hemodynamic stability  5/3/2025 2159 by Greg Vergara RN  Outcome: Progressing  Flowsheets (Taken 5/3/2025 2159)  Maintains optimal cardiac output and hemodynamic stability: Monitor blood pressure and heart rate     Problem: Skin/Tissue Integrity - Adult  Goal: Incisions, wounds, or drain sites healing without S/S of infection  5/3/2025 2159 by Jai

## 2025-05-05 LAB
ANION GAP SERPL CALC-SCNC: 11 MEQ/L (ref 8–16)
BASOPHILS ABSOLUTE: 0 THOU/MM3 (ref 0–0.1)
BASOPHILS NFR BLD AUTO: 0.2 %
BUN SERPL-MCNC: 27 MG/DL (ref 8–23)
CALCIUM SERPL-MCNC: 9.7 MG/DL (ref 8.8–10.2)
CHLORIDE SERPL-SCNC: 100 MEQ/L (ref 98–111)
CO2 SERPL-SCNC: 23 MEQ/L (ref 22–29)
CREAT SERPL-MCNC: 0.8 MG/DL (ref 0.7–1.2)
DEPRECATED RDW RBC AUTO: 46.2 FL (ref 35–45)
EOSINOPHIL NFR BLD AUTO: 0 %
EOSINOPHILS ABSOLUTE: 0 THOU/MM3 (ref 0–0.4)
ERYTHROCYTE [DISTWIDTH] IN BLOOD BY AUTOMATED COUNT: 14.3 % (ref 11.5–14.5)
GFR SERPL CREATININE-BSD FRML MDRD: > 90 ML/MIN/1.73M2
GLUCOSE SERPL-MCNC: 119 MG/DL (ref 74–109)
HCT VFR BLD AUTO: 39.3 % (ref 42–52)
HGB BLD-MCNC: 12.8 GM/DL (ref 14–18)
IMM GRANULOCYTES # BLD AUTO: 0.25 THOU/MM3 (ref 0–0.07)
IMM GRANULOCYTES NFR BLD AUTO: 1.6 %
LYMPHOCYTES ABSOLUTE: 0.5 THOU/MM3 (ref 1–4.8)
LYMPHOCYTES NFR BLD AUTO: 3.5 %
MCH RBC QN AUTO: 29 PG (ref 26–33)
MCHC RBC AUTO-ENTMCNC: 32.6 GM/DL (ref 32.2–35.5)
MCV RBC AUTO: 88.9 FL (ref 80–94)
MONOCYTES ABSOLUTE: 0.6 THOU/MM3 (ref 0.4–1.3)
MONOCYTES NFR BLD AUTO: 3.7 %
NEUTROPHILS ABSOLUTE: 14 THOU/MM3 (ref 1.8–7.7)
NEUTROPHILS NFR BLD AUTO: 91 %
NRBC BLD AUTO-RTO: 0 /100 WBC
PLATELET # BLD AUTO: 372 THOU/MM3 (ref 130–400)
PMV BLD AUTO: 9.5 FL (ref 9.4–12.4)
POTASSIUM SERPL-SCNC: 4.5 MEQ/L (ref 3.5–5.2)
RBC # BLD AUTO: 4.42 MILL/MM3 (ref 4.7–6.1)
SODIUM SERPL-SCNC: 134 MEQ/L (ref 135–145)
WBC # BLD AUTO: 15.4 THOU/MM3 (ref 4.8–10.8)

## 2025-05-05 PROCEDURE — 97116 GAIT TRAINING THERAPY: CPT

## 2025-05-05 PROCEDURE — 97112 NEUROMUSCULAR REEDUCATION: CPT

## 2025-05-05 PROCEDURE — 80048 BASIC METABOLIC PNL TOTAL CA: CPT

## 2025-05-05 PROCEDURE — 36415 COLL VENOUS BLD VENIPUNCTURE: CPT

## 2025-05-05 PROCEDURE — 1180000000 HC REHAB R&B

## 2025-05-05 PROCEDURE — 6370000000 HC RX 637 (ALT 250 FOR IP): Performed by: STUDENT IN AN ORGANIZED HEALTH CARE EDUCATION/TRAINING PROGRAM

## 2025-05-05 PROCEDURE — 97129 THER IVNTJ 1ST 15 MIN: CPT

## 2025-05-05 PROCEDURE — 97530 THERAPEUTIC ACTIVITIES: CPT

## 2025-05-05 PROCEDURE — 94640 AIRWAY INHALATION TREATMENT: CPT

## 2025-05-05 PROCEDURE — 97535 SELF CARE MNGMENT TRAINING: CPT

## 2025-05-05 PROCEDURE — 6360000002 HC RX W HCPCS: Performed by: STUDENT IN AN ORGANIZED HEALTH CARE EDUCATION/TRAINING PROGRAM

## 2025-05-05 PROCEDURE — 94669 MECHANICAL CHEST WALL OSCILL: CPT

## 2025-05-05 PROCEDURE — 97110 THERAPEUTIC EXERCISES: CPT

## 2025-05-05 PROCEDURE — 92507 TX SP LANG VOICE COMM INDIV: CPT

## 2025-05-05 PROCEDURE — 99232 SBSQ HOSP IP/OBS MODERATE 35: CPT | Performed by: STUDENT IN AN ORGANIZED HEALTH CARE EDUCATION/TRAINING PROGRAM

## 2025-05-05 PROCEDURE — 85025 COMPLETE CBC W/AUTO DIFF WBC: CPT

## 2025-05-05 RX ADMIN — LOSARTAN POTASSIUM 37.5 MG: 25 TABLET, FILM COATED ORAL at 08:57

## 2025-05-05 RX ADMIN — PRAVASTATIN SODIUM 40 MG: 40 TABLET ORAL at 20:05

## 2025-05-05 RX ADMIN — MICONAZOLE NITRATE: 2 POWDER TOPICAL at 20:05

## 2025-05-05 RX ADMIN — BUSPIRONE HYDROCHLORIDE 5 MG: 5 TABLET ORAL at 08:57

## 2025-05-05 RX ADMIN — DEXAMETHASONE 2 MG: 4 TABLET ORAL at 00:28

## 2025-05-05 RX ADMIN — DEXAMETHASONE 2 MG: 4 TABLET ORAL at 06:04

## 2025-05-05 RX ADMIN — Medication 6 MG: at 20:05

## 2025-05-05 RX ADMIN — BUMETANIDE 0.5 MG: 0.5 TABLET ORAL at 08:57

## 2025-05-05 RX ADMIN — APIXABAN 10 MG: 5 TABLET, FILM COATED ORAL at 08:57

## 2025-05-05 RX ADMIN — DEXAMETHASONE 2 MG: 4 TABLET ORAL at 17:48

## 2025-05-05 RX ADMIN — APIXABAN 10 MG: 5 TABLET, FILM COATED ORAL at 20:05

## 2025-05-05 RX ADMIN — BUSPIRONE HYDROCHLORIDE 5 MG: 5 TABLET ORAL at 20:05

## 2025-05-05 RX ADMIN — PANTOPRAZOLE SODIUM 40 MG: 40 TABLET, DELAYED RELEASE ORAL at 06:04

## 2025-05-05 RX ADMIN — LOSARTAN POTASSIUM 37.5 MG: 25 TABLET, FILM COATED ORAL at 20:05

## 2025-05-05 RX ADMIN — LEVETIRACETAM 500 MG: 500 SOLUTION ORAL at 17:49

## 2025-05-05 RX ADMIN — LEVETIRACETAM 500 MG: 500 SOLUTION ORAL at 06:04

## 2025-05-05 RX ADMIN — AMLODIPINE BESYLATE 10 MG: 10 TABLET ORAL at 08:57

## 2025-05-05 RX ADMIN — DEXAMETHASONE 2 MG: 4 TABLET ORAL at 12:03

## 2025-05-05 RX ADMIN — MICONAZOLE NITRATE: 2 POWDER TOPICAL at 08:57

## 2025-05-05 RX ADMIN — DEXAMETHASONE 2 MG: 4 TABLET ORAL at 23:43

## 2025-05-05 RX ADMIN — TIOTROPIUM BROMIDE AND OLODATEROL 2 PUFF: 3.124; 2.736 SPRAY, METERED RESPIRATORY (INHALATION) at 09:21

## 2025-05-05 NOTE — PROGRESS NOTES
Patient: Koko Chao  Unit/Bed: 8K-17/017-A  YOB: 1960  MRN: [id number] Acct: [id number]   Admitting Diagnosis: Brain mass [G93.89]  Admit Date:  4/28/2025  Hospital Day: 7    Assessment:     Principal Problem:    Brain mass  Resolved Problems:    * No resolved hospital problems. *      Plan:     Awaiting brain Bx result from [facility]        Subjective:     Patient has no complaint of CP or SOB..   Medication side effects: none    Scheduled Meds:   apixaban  10 mg Oral BID    Followed by    [START ON 5/9/2025] apixaban  5 mg Oral BID    busPIRone  5 mg Oral BID    levETIRAcetam  500 mg Oral Q12H    miconazole   Topical BID    pravastatin  40 mg Oral Nightly    pantoprazole  40 mg Oral QAM AC    tiotropium-olodaterol  2 puff Inhalation Daily    amLODIPine  10 mg Oral Daily    losartan  37.5 mg Oral BID    melatonin  6 mg Oral Nightly    bumetanide  0.5 mg Oral Daily    dexAMETHasone  2 mg Oral 4 times per day     Continuous Infusions:   sodium chloride      dextrose       PRN Meds:sodium chloride flush, sodium chloride, potassium chloride **OR** potassium alternative oral replacement **OR** potassium chloride, magnesium sulfate, polyethylene glycol, calcium carbonate, albuterol, acetaminophen, polyvinyl alcohol, traZODone, glucose, dextrose bolus **OR** dextrose bolus, glucagon (rDNA), dextrose, ondansetron    Review of Systems  Pertinent items are noted in HPI.    Objective:     Patient Vitals for the past 8 hrs:   BP Pulse Resp   05/05/25 1352 123/66 78 18     I/O last 3 completed shifts:  In: 200 [P.O.:200]  Out: -   I/O this shift:  In: 960 [P.O.:960]  Out: -     /66   Pulse 78   Temp 97.3 °F (36.3 °C) (Oral)   Resp 18   Ht 1.727 m (5' 8\")   Wt 79.3 kg (174 lb 12.8 oz)   SpO2 97%   BMI 26.58 kg/m²     General appearance: alert, appears stated age, and cooperative  Head: Normocephalic, without obvious abnormality, atraumatic, incision line looks well on the left.  Lungs:

## 2025-05-05 NOTE — PLAN OF CARE
Problem: Respiratory - Adult  Goal: Achieves optimal ventilation and oxygenation  Outcome: Progressing  Flowsheets (Taken 5/4/2025 2050 by Lennie Cortez LPN)  Achieves optimal ventilation and oxygenation: Assess for changes in respiratory status

## 2025-05-05 NOTE — PLAN OF CARE
Problem: Safety - Adult  Goal: Free from fall injury  Outcome: Progressing  Flowsheets (Taken 5/4/2025 1136 by Lucy Sykes RN)  Free From Fall Injury:   Instruct family/caregiver on patient safety   Based on caregiver fall risk screen, instruct family/caregiver to ask for assistance with transferring infant if caregiver noted to have fall risk factors     Problem: Discharge Planning  Goal: Discharge to home or other facility with appropriate resources  Outcome: Progressing  Flowsheets (Taken 5/4/2025 2050)  Discharge to home or other facility with appropriate resources: Identify barriers to discharge with patient and caregiver     Problem: Skin/Tissue Integrity - Adult  Goal: Incisions, wounds, or drain sites healing without S/S of infection  Outcome: Progressing  Flowsheets (Taken 5/4/2025 2050)  Incisions, Wounds, or Drain Sites Healing Without Sign and Symptoms of Infection: TWICE DAILY: Assess and document skin integrity  Note: No new skin issues noted this shift.

## 2025-05-05 NOTE — CARE COORDINATION
Patient educated on how to use incentive spirometer. Patient verbalized understanding and demonstrated proper use. Emphasized importance and usage of device, with coughing and deep breathing every 4 hours while awake. Patient ambulated in urbina this shift. Continues Eliquis for DVT in RLE.

## 2025-05-05 NOTE — PROGRESS NOTES
[facility]  INPATIENT SPEECH THERAPY  MH-STRZ 8K IP REHAB  DAILY NOTE    Discharge Recommendations: Outpatient Speech Therapy  DIET ORDER RECOMMENDATIONS: Regular diet and thin liquids  STRATEGIES: Full Upright Position, Limit Distractions, and Monitor for Fatigue     SLP Individual Minutes  Time In: 0730  Time Out: 0800  Minutes: 30  Timed Code Treatment Minutes: 0 Minutes       Date: 2025  Patient Name: Koko Chao      CSN: [id number]   : 1960  (65 y.o.)  Gender: male   Referring Physician:  Sonya Robison DO  Diagnosis: Brain mass  Precautions: fall precautions, seizure precautions,  Current Diet: Regular diet and thin liquids  Respiratory Status: Room Air  Date of Last MBS/FEES: Not Applicable    Pain:  No pain reported.    Subjective:  Patient sitting in recliner upon ST arrival.  Agreeable to skilled ST services. No family present. Pleasant and cooperative throughout.     Short-Term Goals:  SHORT TERM GOAL #1:  Goal 1: Patient will complete verbal expression tasks (including BASIC naming, automatic speech tasks, biographics, picture description, verbal fluency, word/phrase/sentence level repetition) with 70% accuracy, mod cues to improve functional expressive communication.  INTERVENTIONS:   Naming Objects - iPad   independent   with description   with first letter  50 written word   phrase completion   first sound  50 spoken word    SHORT TERM GOAL #2:  Goal 2: Patient will complete auditory/reading comprehension (including direction following of 1-2 step commands, basic and mildly complex yes/questions, functional reading comprehension, Object ID from FO3) with 70% accuracy, mod cues to improve functional recepetive communication.  INTERVENTIONS: Did not address d/t focus on other goals      SHORT TERM GOAL #3:  Goal 3: Patient will complete written expression tasks (including biographics, words/phrases) with 70% accuracy, mod cues to improve functional

## 2025-05-05 NOTE — PROGRESS NOTES
[facility]  INPATIENT SPEECH THERAPY  MH-STRZ 8K IP REHAB  DAILY NOTE    Discharge Recommendations: Outpatient Speech Therapy  DIET ORDER RECOMMENDATIONS: Regular diet and thin liquids  STRATEGIES: Full Upright Position, Limit Distractions, and Monitor for Fatigue     SLP Individual Minutes  Time In: 0930  Time Out: 1000  Minutes: 30  Timed Code Treatment Minutes: 0 Minutes       Date: 2025  Patient Name: Koko Chao      CSN: [id number]   : 1960  (65 y.o.)  Gender: male   Referring Physician:  Sonya Robison DO  Diagnosis: Brain mass  Precautions: fall precautions, seizure precautions,  Current Diet: Regular diet and thin liquids  Respiratory Status: Room Air  Date of Last MBS/FEES: Not Applicable    Pain:  No pain reported.    Subjective:  Patient sitting in recliner upon ST arrival.  Agreeable to skilled ST services. No family present. Pleasant and cooperative throughout.     Short-Term Goals:  SHORT TERM GOAL #1:  Goal 1: Patient will complete verbal expression tasks (including BASIC naming, automatic speech tasks, biographics, picture description, verbal fluency, word/phrase/sentence level repetition) with 70% accuracy, mod cues to improve functional expressive communication.  INTERVENTIONS: Did not address d/t focus on other goals      SHORT TERM GOAL #2:  Goal 2: Patient will complete auditory/reading comprehension (including direction following of 1-2 step commands, basic and mildly complex yes/questions, functional reading comprehension, Object ID from FO3) with 70% accuracy, mod cues to improve functional recepetive communication.  INTERVENTIONS:   Categorizing Animals  13/15 independent    Reading Animals  /15 independent  /15 with phonemic cue  /15 repetition      SHORT TERM GOAL #3:  Goal 3: Patient will complete written expression tasks (including biographics, words/phrases) with 70% accuracy, mod cues to improve functional expressive communication.  INTERVENTIONS:

## 2025-05-05 NOTE — PROGRESS NOTES
Physical Medicine & Rehabilitation   Progress Note    Chief Complaint:  Brain Mass     Subjective: Patient seen and examined.  No acute events overnight. He state that he does feel that his anxiety is improving. He expresses some frustration with speed of progress. Patient with ongoing speech disturbances. No reports of any CP or SOB. No reports of any significant changes to bowel or bladder. He continues to tolerate and progress with therapies.       Rehabilitation:  PT 5/5/2025:  Transfers:  Sit to Stand: Modified Independent  Stand to Sit:Modified Independent     Ambulation:  Modified Independent  Distance: 200 feet and community distances throughout hospital  Surface: Level Tile, Carpet, and Ramp  Device: No Device  Gait Deviations: Slow Alexia and Decreased Gait Speed   *Patient ambulated from rehab gym to first floor and over to 830 building and back to 8K     Stairs:  Stairs: 6\" steps X 7  using No Device and Modified Independent.     Balance:  Dynamic Standing Balance: Stand By Assistance     Neuro Re-ed  Pt. Completed standing and Stepping activities using No UE support on No Device to improve coordination, gait mechanics, hip/quad stability, and lateral weight shifting for improved functional mobility.   Patient instructed in standing on airex balance pad while tossing ball and tapping blaze pods without UE support and progressed to standing on BOSU for 4 minutes x 2. Patient required cueing for posture with patient demonstrating slight unsteadiness. Patient instructed in standing blaze pod tapping while tossing ball in order to assist with dual task and weight shifting.  Patient instructed in standing lateral side stepping while bouncing ball to therapist. Patient required Supervision and cueing for reduced scissoring to maintain feet apart. Patient instructed in standing lateral side stepping in order to assist with weight shifting and dynamic balance.      Exercise:  *Patient instructed in 1 set of    Anion Gap    Collection Time: 05/05/25  8:53 AM   Result Value Ref Range    Anion Gap 11.0 8.0 - 16.0 meq/L   Glomerular Filtration Rate, Estimated    Collection Time: 05/05/25  8:53 AM   Result Value Ref Range    Est, Glom Filt Rate > 90 >60 ml/min/1.73m2           Impression:  Left parietal  brain mass   S/p left parietal craniotomy for resection 4/16/25 with Dr Singh  Left parietal parenchymal hemorrhage  S/p left parietal craniotomy hematoma evacuation 4/17/2025 with Dr Singh  Expressive Aphasia  Gait disturbance  Acute DVT RLE  Anxiety  Leukocytosis  Bilateral pleural effusion  Hypertension  Prostate cancer, surveillance  BPH  History of kidney stone  Nocturnal hypoxia  Hyperlipidemia  Retinal central vein occlusion     Plan:  Continue inpatient rehabilitation program involving at least 3 hours per day, 5 days per week of intensive rehabilitation.  Rehabilitation services will include PT, OT, and SLP/RT in order to improve functional status prior to discharge.  Family education and training will be completed.  Equipment evaluations and recommendations will be completed as appropriate.       Rehabilitation nursing will be involved for bowel, bladder, skin, and pain management.  Nursing will also provide education and training to patient and family.    Dr. Bang consulted for medical management   Brain Mass: s/p resection on 4/16/2025.  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  Continue dexamethasone 2 mg every 6 hours and Keppra 500 mg every 12 hours.  Per neurosurgery on [id number] with neurosurgery as an outpatient in 2 weeks for staple removal.  Awaiting official surgical pathology  Left parietal parenchymal hemorrhage: S/p hematoma evacuation on 4/17/2025.  Neurosurgery gave okay for Lovenox for DVT prophylaxis  Acute DVT RLE: Peroneal vein on 4/21/2025.  They were considering IVC filter in acute care and therefore, repeat

## 2025-05-05 NOTE — PROGRESS NOTES
[facility]  MH-STRZ 8K IP REHAB  Occupational Therapy  Daily Note    Discharge Recommendations: Home with Outpatient OT  Equipment Recommendations:   Pt's wife purchasing shower chair for home.       Time In: 1330  Time Out: 1500  Timed Code Treatment Minutes: 90 Minutes  Minutes: 90          Date: 2025  Patient Name: Koko Chao,   Gender: male      Room: [address] [place] [address]  MRN: [id number]  : 1960  (65 y.o.)  Referring Practitioner: Sonya Robison DO  Diagnosis: Brain mass  Additional Pertinent Hx: Koko Chao is a 65 y.o. male with PMHx of hypertension, hyperlipidemia, central retinal vein occlusion, prostate cancer who presents to [facility] with chief complaint of aphasia.  Patient reports had 10-minute episode on 4/10 where he was not able to express his language. Patient reports this has been ongoing for 3 weeks. CT head done in ED showed suspected intra-axial mass in the left parietal lobe. Edema is anteriorly of mass. Pt s/p LEFT PARIETAL CRANIOTOMY FOR EVACUATION OF CEREBRAL ABSCESS on 25. Repeat CT due to increased aphasia, H/A was done right away and showed a hemorrhage really not in the surgical cavity but rather in the edematous area surrounding the surgical cavity. Pt s/p LEFT PARIETAL CRANIOTOMY HEMATOMA EVACUATION on 25. Patient was transferred out of the ICU on 2025. On 2025, CT head was obtained showing that there was a new hyperdense area posterior and superficial to the location of the tumor resection. Per neurosurgery on 2025, follow-up with neurosurgery in 2 weeks for suture removal (). Pt admitted to inpatient rehab on  for intensive therapy to promote safety and independence with daily activities.    Restrictions/Precautions:  Restrictions/Precautions: Fall Risk, Seizure, General Precautions  Position Activity Restriction  Other Position/Activity Restrictions: Can shower, however, cannot get head sutures wet   Extremity Dressing: Supervsion  While donning and doffing shirt while standing     Lower Extremity Dressing: Supervision.  1 VC to recall safer technique to don clothing while seated.   Pt doffed shorts while seated, donned while seated,     Footwear Management: Modified Independent.   To doff and don socks and shoes while seated     Shower Transfer: Stand By Assistance.     Pt appropriately gathered all needed items for BADL tasks.       TRANSFERS:  Sit to Stand:  Independent.    Stand to Sit: Independent.      FUNCTIONAL MOBILITY:  Assistive Device: None  Assist Level:  Supervision  Distance: To and from bathroom, Within room, and To and from therapy apartment  Pt ascending and descending half flight of stairs using bilateral handrails with SBA   Pt navigating community distances while ascending and descending ramps and navigating over rugs and on/off elevators.   OT discussed recommendations for outpatient therapy at discharge. Pt and spouse state they would prefer [facility] outpatient therapy services. While in OP, brief tour provided of OP area.         IADL:   Patient was engaged into dynamic standing therapeutic task(s) that was graded to facilitate:bilateral integration . Patient required supervision while placing laundry in washing machine and again during 2nd trial while squatting to remove laundry from washer and place in dryer. Demonstrated good tolerance throughout activity. Standing task completed to challenge endurance and balance required for ADL and IADL skills.     Patient completed IADL community re-integration task of going to hospital gift shop that was facilitated to challenge: comprehension of multiple step task, navigating around crowds and dynamic balance. Patient was given a pre-fabricated list and was able to recall 3/3 items with phonemic cues. Patient completed task Supervision and min cues, demo'ing good tolerance and exhibiting no fatigue.       Modified [place]:  Current Functional

## 2025-05-05 NOTE — FLOWSHEET NOTE
05/05/25 1102   Treatment Team Notification   Reason for Communication Review case   Name of Team Member Notified Dr. Singh   Treatment Team Role Other (Comment)  (surgeon)   Method of Communication Secure Message   Response Other (Comment)   Notification Time 1105     Sutures on head can be remove on 5/8/25 per Dr. Singh

## 2025-05-05 NOTE — PLAN OF CARE
Problem: ABCDS Injury Assessment  Goal: Absence of physical injury  Outcome: Progressing  Flowsheets (Taken 5/5/2025 1109)  Absence of Physical Injury: Implement safety measures based on patient assessment     Problem: Nutrition Deficit:  Goal: Optimize nutritional status  Outcome: Progressing  Flowsheets (Taken 5/5/2025 1109)  Nutrient intake appropriate for improving, restoring, or maintaining nutritional needs:   Assess nutritional status and recommend course of action   Monitor oral intake, labs, and treatment plans     Problem: Safety - Adult  Goal: Free from fall injury  5/5/2025 1109 by Lucy Sykes RN  Outcome: Progressing  Flowsheets (Taken 5/5/2025 1109)  Free From Fall Injury:   Instruct family/caregiver on patient safety   Based on caregiver fall risk screen, instruct family/caregiver to ask for assistance with transferring infant if caregiver noted to have fall risk factors  5/5/2025 0228 by Lennie Cortez LPN  Outcome: Progressing  Flowsheets (Taken 5/4/2025 1136 by Lucy Sykes RN)  Free From Fall Injury:   Instruct family/caregiver on patient safety   Based on caregiver fall risk screen, instruct family/caregiver to ask for assistance with transferring infant if caregiver noted to have fall risk factors     Problem: Discharge Planning  Goal: Discharge to home or other facility with appropriate resources  5/5/2025 1109 by Lucy Sykes RN  Outcome: Progressing  Flowsheets (Taken 5/5/2025 0846)  Discharge to home or other facility with appropriate resources: Identify barriers to discharge with patient and caregiver  5/5/2025 0228 by Lennie Cortez LPN  Outcome: Progressing  Flowsheets (Taken 5/4/2025 2050)  Discharge to home or other facility with appropriate resources: Identify barriers to discharge with patient and caregiver     Problem: Neurosensory - Adult  Goal: Absence of seizures  Outcome: Progressing  Flowsheets (Taken 5/5/2025 0846)  Absence of seizures:   Monitor for

## 2025-05-05 NOTE — PROGRESS NOTES
[facility]  INPATIENT SPEECH THERAPY  MH-STRZ 8K IP REHAB  DAILY NOTE    Discharge Recommendations: Outpatient Speech Therapy  DIET ORDER RECOMMENDATIONS: Regular diet and thin liquids  STRATEGIES: Full Upright Position, Limit Distractions, and Monitor for Fatigue     SLP Individual Minutes  Time In: 1123  Time Out: 1153  Minutes: 30  Timed Code Treatment Minutes: 0 Minutes       Date: 2025  Patient Name: Koko Chao      CSN: [id number]   : 1960  (65 y.o.)  Gender: male   Referring Physician:  Sonya Robison DO  Diagnosis: Brain mass  Precautions: fall precautions, seizure precautions,  Current Diet: Regular diet and thin liquids  Respiratory Status: Room Air  Date of Last MBS/FEES: Not Applicable    Pain:  No pain reported.    Subjective:  Patient sitting in recliner upon ST arrival.  Agreeable to skilled ST services. Wife arrived at end of session. Pleasant and cooperative throughout.     Short-Term Goals:  SHORT TERM GOAL #1:  Goal 1: Patient will complete verbal expression tasks (including BASIC naming, automatic speech tasks, biographics, picture description, verbal fluency, word/phrase/sentence level repetition) with 70% accuracy, mod cues to improve functional expressive communication.  INTERVENTIONS:   Divergent Naming  States that start with M: 1/3 independent; 2/3 min cues  Emotions: 3/3 independent  Restaurants: 1/3 max cues; 2/3 total assist  Cereal: 1/3 independent; 2/3 total assist  Kitchen appliances: 1/3 mod cue; 1/3 max cue; 1/3 total assist      SHORT TERM GOAL #2:  Goal 2: Patient will complete auditory/reading comprehension (including direction following of 1-2 step commands, basic and mildly complex yes/questions, functional reading comprehension, Object ID from FO3) with 70% accuracy, mod cues to improve functional recepetive communication.  INTERVENTIONS:   Phrase Completion - Reading  1/5 independent; 4/5 with verbal presentation    Object ID from FO4 given Written

## 2025-05-05 NOTE — CARE COORDINATION
Patient MAXIMO in room. Ambulates in hallway with staff, patient tolerated well. Continent of bladder and bowel. Patient continues to struggle with birthday. [place] to month, day, and year of birthday.  Sutures in head can be remove Thursday 5/8/25 per Dr. Singh.  Patient ambulated in hallway with staff x 2, walked around south and north pod without problems.  Wife here visiting with patient. Patient able to say birthday with evening medications. Patient and wife reports he has been writing it down. Educate patient on current medications.

## 2025-05-05 NOTE — PROGRESS NOTES
[facility]  INPATIENT PHYSICAL THERAPY  DAILY NOTE  MH-STRZ 8K IP REHAB - 8K-17/017-A      Discharge Recommendations: Home with Outpatient PT  Equipment Recommendations: No               Time In: 0630  Time Out: 730  Timed Code Treatment Minutes: 60 Minutes  Minutes: 60          Date: 2025  Patient Name: Koko Chao,  Gender:  male        MRN: [id number]  : 1960  (65 y.o.)     Referring Practitioner: Sonya Robison DO  Diagnosis: Brain mass  Additional Pertinent Hx: Per H&P \"Koko Chao is a 65 y.o. male with PMHx of hypertension, hyperlipidemia, central retinal vein occlusion, prostate cancer who presents to [facility] with chief complaint of aphasia.  Patient reports had 10-minute episode on 4/10 where he was not able to express his language. Patient reports this has been ongoing for 3 weeks. CT head done in ED showed suspected intra-axial mass in the left parietal lobe. Edema is anteriorly of mass. Pt s/p LEFT PARIETAL CRANIOTOMY FOR EVACUATION OF CEREBRAL ABSCESS on 25. Repeat CT due to increased aphasia, H/A was done right away and showed a hemorrhage really not in the surgical cavity but rather in the edematous area surrounding the surgical cavity. Pt s/p LEFT PARIETAL CRANIOTOMY HEMATOMA EVACUATION on 25. Patient was transferred out of the ICU on 2025. On 2025, CT head was obtained showing that there was a new hyperdense area posterior and superficial to the location of the tumor resection. Per neurosurgery on 2025, follow-up with neurosurgery in 2 weeks for suture removal (). Pt admitted to inpatient rehab on  for intensive therapy to promote safety and independence with daily activities.\"     Prior Level of Function:  Lives With: Spouse  Type of Home: House  Home Layout: One level  Home Access: Stairs to enter with rails  Entrance Stairs - Number of Steps: 3  Entrance Stairs - Rails: Left  Home Equipment: None   Bathroom Shower/Tub:

## 2025-05-06 ENCOUNTER — APPOINTMENT (OUTPATIENT)
Dept: MRI IMAGING | Age: 65
End: 2025-05-06
Attending: STUDENT IN AN ORGANIZED HEALTH CARE EDUCATION/TRAINING PROGRAM
Payer: COMMERCIAL

## 2025-05-06 PROBLEM — C71.3: Status: ACTIVE | Noted: 2025-05-06

## 2025-05-06 LAB
BASOPHILS ABSOLUTE: 0 THOU/MM3 (ref 0–0.1)
BASOPHILS NFR BLD AUTO: 0.1 %
DEPRECATED RDW RBC AUTO: 45.5 FL (ref 35–45)
EOSINOPHIL NFR BLD AUTO: 0.1 %
EOSINOPHILS ABSOLUTE: 0 THOU/MM3 (ref 0–0.4)
ERYTHROCYTE [DISTWIDTH] IN BLOOD BY AUTOMATED COUNT: 14.1 % (ref 11.5–14.5)
HCT VFR BLD AUTO: 37.9 % (ref 42–52)
HGB BLD-MCNC: 12.2 GM/DL (ref 14–18)
IMM GRANULOCYTES # BLD AUTO: 0.26 THOU/MM3 (ref 0–0.07)
IMM GRANULOCYTES NFR BLD AUTO: 1.8 %
LYMPHOCYTES ABSOLUTE: 0.7 THOU/MM3 (ref 1–4.8)
LYMPHOCYTES NFR BLD AUTO: 4.8 %
MCH RBC QN AUTO: 28.8 PG (ref 26–33)
MCHC RBC AUTO-ENTMCNC: 32.2 GM/DL (ref 32.2–35.5)
MCV RBC AUTO: 89.4 FL (ref 80–94)
MONOCYTES ABSOLUTE: 0.4 THOU/MM3 (ref 0.4–1.3)
MONOCYTES NFR BLD AUTO: 3 %
NEUTROPHILS ABSOLUTE: 12.9 THOU/MM3 (ref 1.8–7.7)
NEUTROPHILS NFR BLD AUTO: 90.2 %
NRBC BLD AUTO-RTO: 0 /100 WBC
PLATELET # BLD AUTO: 346 THOU/MM3 (ref 130–400)
PMV BLD AUTO: 9.8 FL (ref 9.4–12.4)
RBC # BLD AUTO: 4.24 MILL/MM3 (ref 4.7–6.1)
WBC # BLD AUTO: 14.3 THOU/MM3 (ref 4.8–10.8)

## 2025-05-06 PROCEDURE — 97110 THERAPEUTIC EXERCISES: CPT

## 2025-05-06 PROCEDURE — 85025 COMPLETE CBC W/AUTO DIFF WBC: CPT

## 2025-05-06 PROCEDURE — 1180000000 HC REHAB R&B

## 2025-05-06 PROCEDURE — 94640 AIRWAY INHALATION TREATMENT: CPT

## 2025-05-06 PROCEDURE — 6360000004 HC RX CONTRAST MEDICATION: Performed by: PHYSICIAN ASSISTANT

## 2025-05-06 PROCEDURE — 97130 THER IVNTJ EA ADDL 15 MIN: CPT | Performed by: SPEECH-LANGUAGE PATHOLOGIST

## 2025-05-06 PROCEDURE — 97535 SELF CARE MNGMENT TRAINING: CPT

## 2025-05-06 PROCEDURE — 97530 THERAPEUTIC ACTIVITIES: CPT

## 2025-05-06 PROCEDURE — 92507 TX SP LANG VOICE COMM INDIV: CPT

## 2025-05-06 PROCEDURE — 6370000000 HC RX 637 (ALT 250 FOR IP): Performed by: STUDENT IN AN ORGANIZED HEALTH CARE EDUCATION/TRAINING PROGRAM

## 2025-05-06 PROCEDURE — A9579 GAD-BASE MR CONTRAST NOS,1ML: HCPCS | Performed by: PHYSICIAN ASSISTANT

## 2025-05-06 PROCEDURE — 97112 NEUROMUSCULAR REEDUCATION: CPT

## 2025-05-06 PROCEDURE — 6360000002 HC RX W HCPCS: Performed by: STUDENT IN AN ORGANIZED HEALTH CARE EDUCATION/TRAINING PROGRAM

## 2025-05-06 PROCEDURE — 99232 SBSQ HOSP IP/OBS MODERATE 35: CPT | Performed by: STUDENT IN AN ORGANIZED HEALTH CARE EDUCATION/TRAINING PROGRAM

## 2025-05-06 PROCEDURE — 99222 1ST HOSP IP/OBS MODERATE 55: CPT | Performed by: PHYSICIAN ASSISTANT

## 2025-05-06 PROCEDURE — 70553 MRI BRAIN STEM W/O & W/DYE: CPT

## 2025-05-06 PROCEDURE — 97129 THER IVNTJ 1ST 15 MIN: CPT | Performed by: SPEECH-LANGUAGE PATHOLOGIST

## 2025-05-06 PROCEDURE — 36415 COLL VENOUS BLD VENIPUNCTURE: CPT

## 2025-05-06 RX ADMIN — BUMETANIDE 0.5 MG: 0.5 TABLET ORAL at 08:30

## 2025-05-06 RX ADMIN — DEXAMETHASONE 2 MG: 4 TABLET ORAL at 06:00

## 2025-05-06 RX ADMIN — LOSARTAN POTASSIUM 37.5 MG: 25 TABLET, FILM COATED ORAL at 20:29

## 2025-05-06 RX ADMIN — LEVETIRACETAM 500 MG: 500 SOLUTION ORAL at 18:34

## 2025-05-06 RX ADMIN — DEXAMETHASONE 2 MG: 4 TABLET ORAL at 18:32

## 2025-05-06 RX ADMIN — TIOTROPIUM BROMIDE AND OLODATEROL 2 PUFF: 3.124; 2.736 SPRAY, METERED RESPIRATORY (INHALATION) at 09:46

## 2025-05-06 RX ADMIN — LEVETIRACETAM 500 MG: 500 SOLUTION ORAL at 06:00

## 2025-05-06 RX ADMIN — DEXAMETHASONE 2 MG: 4 TABLET ORAL at 11:59

## 2025-05-06 RX ADMIN — MICONAZOLE NITRATE: 2 POWDER TOPICAL at 08:30

## 2025-05-06 RX ADMIN — APIXABAN 10 MG: 5 TABLET, FILM COATED ORAL at 08:29

## 2025-05-06 RX ADMIN — MICONAZOLE NITRATE: 2 POWDER TOPICAL at 20:31

## 2025-05-06 RX ADMIN — LOSARTAN POTASSIUM 37.5 MG: 25 TABLET, FILM COATED ORAL at 08:30

## 2025-05-06 RX ADMIN — BUSPIRONE HYDROCHLORIDE 5 MG: 5 TABLET ORAL at 08:30

## 2025-05-06 RX ADMIN — PANTOPRAZOLE SODIUM 40 MG: 40 TABLET, DELAYED RELEASE ORAL at 06:00

## 2025-05-06 RX ADMIN — AMLODIPINE BESYLATE 10 MG: 10 TABLET ORAL at 08:29

## 2025-05-06 RX ADMIN — PRAVASTATIN SODIUM 40 MG: 40 TABLET ORAL at 20:30

## 2025-05-06 RX ADMIN — Medication 6 MG: at 20:29

## 2025-05-06 RX ADMIN — APIXABAN 10 MG: 5 TABLET, FILM COATED ORAL at 20:29

## 2025-05-06 RX ADMIN — GADOTERIDOL 15 ML: 279.3 INJECTION, SOLUTION INTRAVENOUS at 17:51

## 2025-05-06 RX ADMIN — BUSPIRONE HYDROCHLORIDE 5 MG: 5 TABLET ORAL at 20:30

## 2025-05-06 ASSESSMENT — ENCOUNTER SYMPTOMS
ABDOMINAL PAIN: 0
CHEST TIGHTNESS: 0
ABDOMINAL DISTENTION: 0

## 2025-05-06 NOTE — PROGRESS NOTES
[facility]  INPATIENT SPEECH THERAPY  MH-STRZ 8K IP REHAB  DAILY NOTE    Discharge Recommendations: Outpatient Speech Therapy  DIET ORDER RECOMMENDATIONS: Regular diet and thin liquids  STRATEGIES: Full Upright Position, Limit Distractions, and Monitor for Fatigue     SLP Individual Minutes  Time In: 1030  Time Out: 1100  Minutes: 30  Timed Code Treatment Minutes: 0 Minutes       Date: 2025  Patient Name: Koko Chao      CSN: [id number]   : 1960  (65 y.o.)  Gender: male   Referring Physician:  Sonya Robison DO  Diagnosis: Brain mass  Precautions: fall precautions, seizure precautions,  Current Diet: Regular diet and thin liquids  Respiratory Status: Room Air  Date of Last MBS/FEES: Not Applicable    Pain:  No pain reported.    Subjective:  Patient sitting in recliner upon ST arrival.  Agreeable to skilled ST services. Wife present initially, but left as session began. Pleasant and cooperative throughout.     Short-Term Goals:  SHORT TERM GOAL #1:  Goal 1: Patient will complete verbal expression tasks (including BASIC naming, automatic speech tasks, biographics, picture description, verbal fluency, word/phrase/sentence level repetition) with 70% accuracy, mod cues to improve functional expressive communication.  INTERVENTIONS: Did not address due to focus on other goals.     SHORT TERM GOAL #2:  Goal 2: Patient will complete auditory/reading comprehension (including direction following of 1-2 step commands, basic and mildly complex yes/questions, functional reading comprehension, Object ID from FO3) with 70% accuracy, mod cues to improve functional recepetive communication.  INTERVENTIONS: Did not address due to focus on other goals.     SHORT TERM GOAL #3:  Goal 3: Patient will complete written expression tasks (including biographics, words/phrases) with 70% accuracy, mod cues to improve functional expressive communication.  INTERVENTIONS: Did not address d/t focus on other  3 weeks    LONG TERM GOAL #1:  Goal 1: Patient will improve functional communication skills to a level of min assist to optimize independence with verbal expression and auditory comprehension to assist with transition to home environment and community re-integration    LONG TERM GOAL #2:  Goal 2: Patient will improve cognitive function to a level of min assist for potential return to prior level of independence      Functional Oral Intake Scale: Total Oral Intake: 7.  Total oral intake with no restrictions    EDUCATION:  Learner: Patient and Significant Other  Education:  Reviewed ST goals and Plan of Care, Reviewed recommendations for follow-up, and Education Related to Potential Risks and Complications Due to Impairment/Illness/Injury  Evaluation of Education: Demonstrates with assistance    ASSESSMENT/PLAN:  Activity Tolerance:  Patient tolerance of  treatment: good.   Assessment/Plan: Patient progressing toward established goals.  Continues to require skilled care of licensed speech pathologist to progress toward achievement of established goals and plan of care..     Plan for Next Session: receptive/expressive language      Nitza Vazquez MS, CCC-SLP [phone]'

## 2025-05-06 NOTE — CONSULTS
Radiation Oncology Consultation  Encounter Date: 2025     Mr. Koko Chao is a 65 y.o. male  : 1960  MRN: [id number]  [facility] Number: [id number]  Requesting Provider: Sonya Robison DO    Reason for request: Brain mass - high grade glioma      CONSULTANT: Jone Marc PA-C      CHIEF COMPLAINT:   DIAGNOSIS: C71.3 -- malignant neoplasm of left parietal lobe, Grade 4 glioblastoma (methylated)      ASSESSMENT:  Grade 4 glioblastoma  The diagnosis including AJCC staging was reviewed. Koko Chao received a copy of his AJCC staging information  Treatment options and recommendations including current NCCN guidelines were reviewed in detail. We reviewed recommendations for initial diagnostic work up, surgical recommendations, and adjuvant therapy recommendations.   We reviewed various aspects of radiation therapy for glioblastoma. They include discussion regarding the nature/mode of action of radiation beam therapy, multiple steps involved in set up/simulation, planning, initiation and performance of the treatment. We reviewed logistics including expected number of treatments and amount of time usually spent in the department for each treatment. Planning for standard fractionation, 30 total treatments over 6 weeks. We discussed the expected/unexpected short term and long term effects from radiation therapy.   Koko Chao, his wife (bedside) and his daughter (via telephone) had the opportunity to ask questions about treatment planning process, treatment regimen, expectations/side effects of treatment, and follow up/surveillance plan. They indicate their questions were satisfactorily addressed and indicate they understood the discussion. Written consent for treatment was obtained today.       PLAN:  Obtain MRI brain w wo contrast for radiation treatment planning. Order placed  Radiation simulation scheduled for 5/15/2025 at 0900. Treatment planning to follow and likely start radiation treatment 1-2 weeks

## 2025-05-06 NOTE — PROGRESS NOTES
[facility]  INPATIENT PHYSICAL THERAPY  DAILY NOTE  MH-STR[facility] 8K IP REHAB - 8K-17/017-A      Discharge Recommendations: Home with Outpatient PT  Equipment Recommendations: No               Time In: 0630  Time Out: 730  Timed Code Treatment Minutes: 60 Minutes  Minutes: 60          Date: 2025  Patient Name: Koko Chao,  Gender:  male        MRN: [id number]  : 1960  (65 y.o.)     Referring Practitioner: Sonya Robison DO  Diagnosis: Brain mass  Additional Pertinent Hx: Per H&P \"Koko Chao is a 65 y.o. male with PMHx of hypertension, hyperlipidemia, central retinal vein occlusion, prostate cancer who presents to [facility] with chief complaint of aphasia.  Patient reports had 10-minute episode on 4/10 where he was not able to express his language. Patient reports this has been ongoing for 3 weeks. CT head done in ED showed suspected intra-axial mass in the left parietal lobe. Edema is anteriorly of mass. Pt s/p LEFT PARIETAL CRANIOTOMY FOR EVACUATION OF CEREBRAL ABSCESS on 25. Repeat CT due to increased aphasia, H/A was done right away and showed a hemorrhage really not in the surgical cavity but rather in the edematous area surrounding the surgical cavity. Pt s/p LEFT PARIETAL CRANIOTOMY HEMATOMA EVACUATION on 25. Patient was transferred out of the ICU on 2025. On 2025, CT head was obtained showing that there was a new hyperdense area posterior and superficial to the location of the tumor resection. Per neurosurgery on 2025, follow-up with neurosurgery in 2 weeks for suture removal (). Pt admitted to inpatient rehab on  for intensive therapy to promote safety and independence with daily activities.\"     Prior Level of Function:  Lives With: Spouse  Type of Home: House  Home Layout: One level  Home Access: Stairs to enter with rails  Entrance Stairs - Number of Steps: 3  Entrance Stairs - Rails: Left  Home Equipment: None   Bathroom Shower/Tub:

## 2025-05-06 NOTE — CARE COORDINATION
Patient MAXIMO in room this shift. Patient resting with eyes closed most of this shift. Patient struggles to find words at time, but able to think of them eventually. No complaints from patient this shift.

## 2025-05-06 NOTE — PROGRESS NOTES
[facility]  [facility]-STR[facility] 8K IP REHAB  Occupational Therapy  Daily Note    Discharge Recommendations: Home with Outpatient OT  Equipment Recommendations:   Pt's wife purchasing shower chair for home.       Time In: 1100  Time Out: 1212  Timed Code Treatment Minutes: 72 Minutes  Minutes: 72          Date: 2025  Patient Name: Koko Chao,   Gender: male      Room: [address] [place] [address]  MRN: [id number]  : 1960  (65 y.o.)  Referring Practitioner: Sonya Robison DO  Diagnosis: Brain mass  Additional Pertinent Hx: Koko Chao is a 65 y.o. male with PMHx of hypertension, hyperlipidemia, central retinal vein occlusion, prostate cancer who presents to [facility] with chief complaint of aphasia.  Patient reports had 10-minute episode on 4/10 where he was not able to express his language. Patient reports this has been ongoing for 3 weeks. CT head done in ED showed suspected intra-axial mass in the left parietal lobe. Edema is anteriorly of mass. Pt s/p LEFT PARIETAL CRANIOTOMY FOR EVACUATION OF CEREBRAL ABSCESS on 25. Repeat CT due to increased aphasia, H/A was done right away and showed a hemorrhage really not in the surgical cavity but rather in the edematous area surrounding the surgical cavity. Pt s/p LEFT PARIETAL CRANIOTOMY HEMATOMA EVACUATION on 25. Patient was transferred out of the ICU on 2025. On 2025, CT head was obtained showing that there was a new hyperdense area posterior and superficial to the location of the tumor resection. Per neurosurgery on 2025, follow-up with neurosurgery in 2 weeks for suture removal (). Pt admitted to inpatient rehab on  for intensive therapy to promote safety and independence with daily activities.    Restrictions/Precautions:  Restrictions/Precautions: Fall Risk, Seizure, General Precautions  Position Activity Restriction  Other Position/Activity Restrictions: Can shower, however, cannot get head sutures wet   doffing shirt while standing     Lower Extremity Dressing: Supervision.  1 VC to recall safer technique to don clothing while seated.   Pt doffed shorts while seated, donned while seated,     Footwear Management: Modified Independent.   To doff and don socks and shoes while seated     Shower Transfer: Supervision    Pt appropriately gathered all needed items for BADL tasks.       TRANSFERS:  Sit to Stand:  Independent.    Stand to Sit: Independent.      FUNCTIONAL MOBILITY:  Assistive Device: None  Assist Level:  Supervision  Distance: To and from bathroom, Within room, and To and from therapy apartment  Pt ascending and descending half flight of stairs using bilateral handrails with supervision while carrying laundry basket,          IADL:   Patient was engaged into dynamic standing therapeutic task(s) that was graded to facilitate:bilateral integration . Patient required Mod I while  retrieving laundry and squatting to remove laundry from dryer. Pt stood to fold laundry with mod I and able to attend to task with distractions present in apartment. Demonstrated good tolerance throughout activity. Standing task completed to challenge endurance and balance required for ADL and IADL skills.       EXERCISES:   Patient completed BUE strengthening exercises with skilled review on HEP: completed x10  reps x3 set with a medium resistance band in all joints and all planes in order to improve UE strength and activity tolerance required for BADL routine and toilet / shower transfers. Patient tolerated well, requiring only occasional rest breaks. Patient also required step by step cues for technique with difficulty with body awareness/ proprioception to replicate movements on opposite. Education provided on use of QR code to use for carryover at home.       Modified Valier:  Current Functional Status:  +3 - Moderate disability; requiring some help, but able to walk without physical assistance (SBA/CGA).   Patient could not live

## 2025-05-06 NOTE — PROGRESS NOTES
Medical oncology:   Mr. Blank  is a 65-year-old white male who underwent left partial craniectomy and resection of left parietal lobe mass on 4/16/2025.  The patient was found to have glioblastoma, IDH-1/2 wild-type.,  WHO grade 4  The patient was found to be positive for MGMT promoter hyper methylation which is a favorable prognostic marker in patients with glioblastoma.    The standard of care in this condition is excision of the tumor followed by adjuvant chemoradiation therapy.  The chemotherapy will be temozolomide during the radiation therapy followed by 6 months treatment after surgery.   Lomustine has been given with patient who are positive for MGMT promoter hyper methylation with good results.    The standard recommendation for patients who are younger than age of 70 to  put  them on a study if it is available.  There are a lot of studies going on using immunotherapy and targeted therapy.    I discussed with the wife and the patient getting a second opinion from neuro oncologist at the [facility].  They are in favor to get a second opinion.  I am going to make the arrangement for the patient to be seen at the [facility].    Thank you for the consult.  A full consult will be dictated.  Bernardo Rosales MD, FACP

## 2025-05-06 NOTE — PLAN OF CARE
Problem: ABCDS Injury Assessment  Goal: Absence of physical injury  5/6/2025 1109 by Nicky Bonner RN  Outcome: Progressing  Flowsheets (Taken 5/6/2025 1109)  Absence of Physical Injury: Implement safety measures based on patient assessment     Problem: Nutrition Deficit:  Goal: Optimize nutritional status  5/6/2025 1109 by Nicky Bonner RN  Outcome: Progressing  Flowsheets (Taken 5/6/2025 1109)  Nutrient intake appropriate for improving, restoring, or maintaining nutritional needs: Assess nutritional status and recommend course of action     Problem: Safety - Adult  Goal: Free from fall injury  5/6/2025 1109 by Nicky Bonner RN  Outcome: Progressing  Flowsheets (Taken 5/6/2025 1109)  Free From Fall Injury: Instruct family/caregiver on patient safety     Problem: Discharge Planning  Goal: Discharge to home or other facility with appropriate resources  5/6/2025 1109 by Nicky Bonner RN  Outcome: Progressing  Flowsheets (Taken 5/6/2025 1109)  Discharge to home or other facility with appropriate resources:   Identify barriers to discharge with patient and caregiver   Arrange for needed discharge resources and transportation as appropriate     Problem: Neurosensory - Adult  Goal: Absence of seizures  5/6/2025 1109 by Nicky Bonner RN  Outcome: Progressing  Flowsheets (Taken 5/6/2025 1109)  Absence of seizures: Monitor for seizure activity.  If seizure occurs, document type and location of movements and any associated apnea     Problem: Respiratory - Adult  Goal: Achieves optimal ventilation and oxygenation  5/6/2025 1109 by Nicky Bonner RN  Outcome: Progressing  Flowsheets (Taken 5/6/2025 1109)  Achieves optimal ventilation and oxygenation:   Assess for changes in respiratory status   Assess for changes in mentation and behavior     Problem: Cardiovascular - Adult  Goal: Maintains optimal cardiac output and hemodynamic stability  5/6/2025 1109 by Nicky Bonner RN  Outcome: Progressing  Flowsheets (Taken

## 2025-05-06 NOTE — PROGRESS NOTES
[facility]  Recreational Therapy  Daily Note  MH[facility]STR[facility] 8K IP REHAB    Time Spent with Patient: 0 minutes    Date:  5/6/2025       Patient Name: Koko Chao      MRN: [id number]      YOB: 1960 (65 y.o.)       Gender: male     Referring Practitioner: Sonay Robison DO    RESTRICTIONS/PRECAUTIONS:  Restrictions/Precautions: Fall Risk, Seizure, General Precautions     Hearing: Within functional limits    PAIN: 0    SUBJECTIVE:  I like rock and roll     OBJECTIVE:  Pt was folding his laundry in the apartment with OT this am and enjoyed listening to our  play some songs for him-he sang a long to a few-affect brightened-appreciative          Patient Education  New Education Provided: Importance of Leisure,     Electronically signed by: Janeth Prajapati, CTRS  Date: 5/6/2025

## 2025-05-06 NOTE — PROGRESS NOTES
Physical Medicine & Rehabilitation   Progress Note    Chief Complaint:  Brain Mass     Subjective: Patient seen and examined.  Oncology and radiation oncology consulted today and are planning to see. Times arranged and relayed to wife to be present. No reports of any CP or SOB. No reports of any significant changes to bowel or bladder. He continues to tolerate and progress with therapies.       Rehabilitation:  PT 5/5/2025:  Transfers:  Sit to Stand: Modified Independent  Stand to Sit:Modified Independent     Ambulation:  Modified Independent  Distance: 200 feet and community distances throughout hospital  Surface: Level Tile, Carpet, and Ramp  Device: No Device  Gait Deviations: Slow Alexia and Decreased Gait Speed   *Patient ambulated from rehab gym to first floor and over to 830 building and back to 8K     Stairs:  Stairs: 6\" steps X 7  using No Device and Modified Independent.     Balance:  Dynamic Standing Balance: Stand By Assistance     Neuro Re-ed  Pt. Completed standing and Stepping activities using No UE support on No Device to improve coordination, gait mechanics, hip/quad stability, and lateral weight shifting for improved functional mobility.   Patient instructed in standing on airex balance pad while tossing ball and tapping blaze pods without UE support and progressed to standing on BOSU for 4 minutes x 2. Patient required cueing for posture with patient demonstrating slight unsteadiness. Patient instructed in standing blaze pod tapping while tossing ball in order to assist with dual task and weight shifting.  Patient instructed in standing lateral side stepping while bouncing ball to therapist. Patient required Supervision and cueing for reduced scissoring to maintain feet apart. Patient instructed in standing lateral side stepping in order to assist with weight shifting and dynamic balance.      Exercise:  *Patient instructed in 1 set of 20 reps of standing BLE strengthening including  -Standing

## 2025-05-06 NOTE — CARE COORDINATION
Patient is alert and oriented x 4. He is cooperative with all his therapies. He does not complain of any pain. He is now resting in his chair with wife at bedside and call light within reach.

## 2025-05-06 NOTE — PLAN OF CARE
Problem: ABCDS Injury Assessment  Goal: Absence of physical injury  5/5/2025 2214 by Gema Rock RN  Outcome: Progressing  Flowsheets (Taken 5/5/2025 2214)  Absence of Physical Injury: Implement safety measures based on patient assessment     Problem: Nutrition Deficit:  Goal: Optimize nutritional status  5/5/2025 2214 by Gema Rock RN  Outcome: Progressing  Flowsheets (Taken 5/5/2025 2214)  Nutrient intake appropriate for improving, restoring, or maintaining nutritional needs:   Assess nutritional status and recommend course of action   Monitor oral intake, labs, and treatment plans     Problem: Safety - Adult  Goal: Free from fall injury  5/5/2025 2214 by Gema Rock RN  Outcome: Progressing  Flowsheets (Taken 5/5/2025 2214)  Free From Fall Injury: Instruct family/caregiver on patient safety     Problem: Discharge Planning  Goal: Discharge to home or other facility with appropriate resources  5/5/2025 2214 by Gema Rock RN  Outcome: Progressing  Flowsheets (Taken 5/5/2025 2214)  Discharge to home or other facility with appropriate resources:   Identify discharge learning needs (meds, wound care, etc)   Identify barriers to discharge with patient and caregiver   Refer to discharge planning if patient needs post-hospital services based on physician order or complex needs related to functional status, cognitive ability or social support system   Arrange for needed discharge resources and transportation as appropriate     Problem: Neurosensory - Adult  Goal: Absence of seizures  5/5/2025 2214 by Gema Rock RN  Outcome: Progressing  Flowsheets (Taken 5/5/2025 2214)  Absence of seizures: Monitor for seizure activity.  If seizure occurs, document type and location of movements and any associated apnea     Problem: Respiratory - Adult  Goal: Achieves optimal ventilation and oxygenation  5/5/2025 2214 by Gema Rock RN  Outcome: Progressing  Flowsheets (Taken 5/5/2025 2214)  Achieves optimal ventilation and

## 2025-05-06 NOTE — PROGRESS NOTES
[facility]  INPATIENT SPEECH THERAPY  MH-STRZ 8K IP REHAB  DAILY NOTE    Discharge Recommendations: Outpatient Speech Therapy  DIET ORDER RECOMMENDATIONS: Regular diet and thin liquids  STRATEGIES: Full Upright Position, Limit Distractions, and Monitor for Fatigue     SLP Individual Minutes  Time In: 0800  Time Out: 0830  Minutes: 30  Timed Code Treatment Minutes: 0 Minutes       Date: 2025  Patient Name: Koko Chao      CSN: [id number]   : 1960  (65 y.o.)  Gender: male   Referring Physician:  Sonya Robison DO  Diagnosis: Brain mass  Precautions: fall precautions, seizure precautions,  Current Diet: Regular diet and thin liquids  Respiratory Status: Room Air  Date of Last MBS/FEES: Not Applicable    Pain:  No pain reported.    Subjective:  Patient sitting in recliner upon ST arrival.  Agreeable to skilled ST services. No family present. Pleasant and cooperative throughout.     Short-Term Goals:  SHORT TERM GOAL #1:  Goal 1: Patient will complete verbal expression tasks (including BASIC naming, automatic speech tasks, biographics, picture description, verbal fluency, word/phrase/sentence level repetition) with 70% accuracy, mod cues to improve functional expressive communication.  INTERVENTIONS:   Confrontational Naming  / independent  7/50 first letter  5/50 written word  8/50 phrase completion  6/50 first sound  2/50 spoken word      SHORT TERM GOAL #2:  Goal 2: Patient will complete auditory/reading comprehension (including direction following of 1-2 step commands, basic and mildly complex yes/questions, functional reading comprehension, Object ID from FO3) with 70% accuracy, mod cues to improve functional recepetive communication.  INTERVENTIONS: Did not address d/t focus on other goals      SHORT TERM GOAL #3:  Goal 3: Patient will complete written expression tasks (including biographics, words/phrases) with 70% accuracy, mod cues to improve functional expressive

## 2025-05-06 NOTE — PROGRESS NOTES
Patient: Koko Chao  Unit/Bed: 8K-17/017-A  YOB: 1960  MRN: [id number] Acct: [id number]   Admitting Diagnosis: Brain mass [G93.89]  Admit Date:  4/28/2025  Hospital Day: 8    Assessment:     Principal Problem:    Brain mass  Active Problems:    Malignant neoplasm of parietal lobe of brain (HCC)  Resolved Problems:    * No resolved hospital problems. *      Plan:     Continue to follow.        Subjective:     Patient has no complaint of CP or SOB..   Medication side effects: none    Scheduled Meds:   apixaban  10 mg Oral BID    Followed by    [START ON 5/9/2025] apixaban  5 mg Oral BID    busPIRone  5 mg Oral BID    levETIRAcetam  500 mg Oral Q12H    miconazole   Topical BID    pravastatin  40 mg Oral Nightly    pantoprazole  40 mg Oral QAM AC    tiotropium-olodaterol  2 puff Inhalation Daily    amLODIPine  10 mg Oral Daily    losartan  37.5 mg Oral BID    melatonin  6 mg Oral Nightly    bumetanide  0.5 mg Oral Daily    dexAMETHasone  2 mg Oral 4 times per day     Continuous Infusions:   sodium chloride      dextrose       PRN Meds:gadoteridol, sodium chloride flush, sodium chloride, potassium chloride **OR** potassium alternative oral replacement **OR** potassium chloride, magnesium sulfate, polyethylene glycol, calcium carbonate, albuterol, acetaminophen, polyvinyl alcohol, traZODone, glucose, dextrose bolus **OR** dextrose bolus, glucagon (rDNA), dextrose, ondansetron    Review of Systems  Pertinent items are noted in HPI.    Objective:     Patient Vitals for the past 8 hrs:   BP Temp Temp src Pulse Resp SpO2   05/06/25 1930 115/62 97.7 °F (36.5 °C) Oral 82 18 99 %     I/O last 3 completed shifts:  In: 1520 [P.O.:1520]  Out: -   No intake/output data recorded.    /62   Pulse 82   Temp 97.7 °F (36.5 °C) (Oral)   Resp 18   Ht 1.727 m (5' 8\")   Wt 78.6 kg (173 lb 3.2 oz)   SpO2 99%   BMI 26.33 kg/m²     General appearance: alert, appears stated age, and cooperative  Head:

## 2025-05-06 NOTE — PROGRESS NOTES
Physical Medicine & Rehabilitation   Progress Note    Chief Complaint:  Brain Mass     Subjective: Patient seen and examined.  No acute events overnight.  Radiation oncology ordered MRI for planning purposes.  Result with a possible acute/subacute infarct.  Neurology was consulted.  Spoke with neurology PA who felt that this was expected given his surgery.  Their consult note remains pending.  Will follow-up.  No reports of any chest pain or shortness of breath.  No reports of any significant changes to bowel or bladder.  He continues to tolerate progress with therapies.  Plan is for DC home on Friday, 5/9/2025.    Rehabilitation:  PT 5/6/2025:  Transfers:  Sit to Stand: Independent  Stand to Sit:Independent  Performed throughout session from various surfaces with appropriate timing.      Ambulation:  Independent  Distance: room <> gym and intermittent distances   Surface: Level Tile  Device: No Device  Gait Deviations: Mild Path Deviations      Stairs:  Stairs: 6\" steps X 7 using One Handrail and Independent.  Performed in reciprocal pattern and with appropriate timing.      Balance:  Dynamic Standing Balance: Contact Guard Assistance  Pt tosses rings to target while standing on hard surface of BOSU in // bars. Performed in SL, bilaterally and tandem stance x 2 (leading with RLE x 1 and LLE x 1)  Observation of excellent ankle and hip strategy. Occasional single UE support on // bar. Completion ~15min. Seated rest breaks between sets.      Exercise:  Standing BLE strengthening:  -step ups to 6in step with contralateral knee drive  -hip abd  -STSs  Performed 2 x 12 each with donned 2# ankle weights   Occasional cueing for proper form. Rest breaks given between sets.  Exercises were completed for increased independence with functional mobility.  Pt. Completed 12 minutes on Nu-Step machine on L4 utilizing Bilateral Upper Extremities and Bilateral Lower Extremities to improve strength and endurance for improved

## 2025-05-06 NOTE — PROGRESS NOTES
[facility]  MH-STRZ 8K IP REHAB  Occupational Therapy  Daily Note    Discharge Recommendations: Patient would benefit from continued OT at discharge  Equipment Recommendations:   Pt's wife purchasing shower chair for home.       Time In: 1440  Time Out: 1458  Timed Code Treatment Minutes: 18 Minutes  Minutes: 18          Date: 2025  Patient Name: Koko Chao,   Gender: male      Room: [facility]17/017-A  MRN: [id number]  : 1960  (65 y.o.)  Referring Practitioner: Sonya Robison DO  Diagnosis: Brain mass  Additional Pertinent Hx: Koko Chao is a 65 y.o. male with PMHx of hypertension, hyperlipidemia, central retinal vein occlusion, prostate cancer who presents to [facility] with chief complaint of aphasia.  Patient reports had 10-minute episode on 4/10 where he was not able to express his language. Patient reports this has been ongoing for 3 weeks. CT head done in ED showed suspected intra-axial mass in the left parietal lobe. Edema is anteriorly of mass. Pt s/p LEFT PARIETAL CRANIOTOMY FOR EVACUATION OF CEREBRAL ABSCESS on 25. Repeat CT due to increased aphasia, H/A was done right away and showed a hemorrhage really not in the surgical cavity but rather in the edematous area surrounding the surgical cavity. Pt s/p LEFT PARIETAL CRANIOTOMY HEMATOMA EVACUATION on 25. Patient was transferred out of the ICU on 2025. On 2025, CT head was obtained showing that there was a new hyperdense area posterior and superficial to the location of the tumor resection. Per neurosurgery on 2025, follow-up with neurosurgery in 2 weeks for suture removal (). Pt admitted to inpatient rehab on  for intensive therapy to promote safety and independence with daily activities.    Restrictions/Precautions:  Restrictions/Precautions: Fall Risk, Seizure, General Precautions  Position Activity Restriction  Other Position/Activity Restrictions: Can shower, however, cannot get head

## 2025-05-06 NOTE — PROGRESS NOTES
[facility]  INPATIENT SPEECH THERAPY  MH-STRZ 8K IP REHAB  DAILY NOTE    Discharge Recommendations: Outpatient Speech Therapy  DIET ORDER RECOMMENDATIONS: Regular diet and thin liquids  STRATEGIES: Full Upright Position, Limit Distractions, and Monitor for Fatigue     SLP Individual Minutes  Time In: 1400  Time Out: 1430  Minutes: 30  Timed Code Treatment Minutes: 0 Minutes       Date: 2025  Patient Name: Koko Chao      CSN: [id number]   : 1960  (65 y.o.)  Gender: male   Referring Physician:  Sonya Robison DO  Diagnosis: Brain mass  Precautions: fall precautions, seizure precautions,  Current Diet: Regular diet and thin liquids  Respiratory Status: Room Air  Date of Last MBS/FEES: Not Applicable    Pain:  No pain reported.    Subjective:  Patient sitting in recliner upon ST arrival.  Agreeable to skilled ST services. Wife and family friend present upon ST arrival, however, left at beginning of session. Pleasant and cooperative throughout.     Short-Term Goals:  SHORT TERM GOAL #1:  Goal 1: Patient will complete verbal expression tasks (including BASIC naming, automatic speech tasks, biographics, picture description, verbal fluency, word/phrase/sentence level repetition) with 70% accuracy, mod cues to improve functional expressive communication.  INTERVENTIONS: Did not address d/t focus on other goals      SHORT TERM GOAL #2:  Goal 2: Patient will complete auditory/reading comprehension (including direction following of 1-2 step commands, basic and mildly complex yes/questions, functional reading comprehension, Object ID from FO3) with 70% accuracy, mod cues to improve functional recepetive communication.  INTERVENTIONS: Did not address d/t focus on other goals      SHORT TERM GOAL #3:  Goal 3: Patient will complete written expression tasks (including biographics, words/phrases) with 70% accuracy, mod cues to improve functional expressive communication.  INTERVENTIONS:

## 2025-05-07 PROBLEM — I63.9 CEREBROVASCULAR ACCIDENT (HCC): Status: ACTIVE | Noted: 2025-05-07

## 2025-05-07 PROCEDURE — 99232 SBSQ HOSP IP/OBS MODERATE 35: CPT | Performed by: STUDENT IN AN ORGANIZED HEALTH CARE EDUCATION/TRAINING PROGRAM

## 2025-05-07 PROCEDURE — 6370000000 HC RX 637 (ALT 250 FOR IP): Performed by: STUDENT IN AN ORGANIZED HEALTH CARE EDUCATION/TRAINING PROGRAM

## 2025-05-07 PROCEDURE — 6360000002 HC RX W HCPCS: Performed by: STUDENT IN AN ORGANIZED HEALTH CARE EDUCATION/TRAINING PROGRAM

## 2025-05-07 PROCEDURE — 99223 1ST HOSP IP/OBS HIGH 75: CPT

## 2025-05-07 PROCEDURE — 97130 THER IVNTJ EA ADDL 15 MIN: CPT

## 2025-05-07 PROCEDURE — 1180000000 HC REHAB R&B

## 2025-05-07 PROCEDURE — 97112 NEUROMUSCULAR REEDUCATION: CPT

## 2025-05-07 PROCEDURE — 97530 THERAPEUTIC ACTIVITIES: CPT

## 2025-05-07 PROCEDURE — 92507 TX SP LANG VOICE COMM INDIV: CPT

## 2025-05-07 PROCEDURE — 97129 THER IVNTJ 1ST 15 MIN: CPT | Performed by: SPEECH-LANGUAGE PATHOLOGIST

## 2025-05-07 PROCEDURE — 97535 SELF CARE MNGMENT TRAINING: CPT

## 2025-05-07 PROCEDURE — 97116 GAIT TRAINING THERAPY: CPT

## 2025-05-07 PROCEDURE — 97130 THER IVNTJ EA ADDL 15 MIN: CPT | Performed by: SPEECH-LANGUAGE PATHOLOGIST

## 2025-05-07 PROCEDURE — 97129 THER IVNTJ 1ST 15 MIN: CPT

## 2025-05-07 PROCEDURE — 97110 THERAPEUTIC EXERCISES: CPT

## 2025-05-07 RX ADMIN — DEXAMETHASONE 2 MG: 4 TABLET ORAL at 18:13

## 2025-05-07 RX ADMIN — AMLODIPINE BESYLATE 10 MG: 10 TABLET ORAL at 09:08

## 2025-05-07 RX ADMIN — Medication 6 MG: at 20:26

## 2025-05-07 RX ADMIN — APIXABAN 10 MG: 5 TABLET, FILM COATED ORAL at 20:26

## 2025-05-07 RX ADMIN — ANTACID TABLETS 500 MG: 500 TABLET, CHEWABLE ORAL at 10:45

## 2025-05-07 RX ADMIN — LEVETIRACETAM 500 MG: 500 SOLUTION ORAL at 18:13

## 2025-05-07 RX ADMIN — MICONAZOLE NITRATE: 2 POWDER TOPICAL at 09:09

## 2025-05-07 RX ADMIN — DEXAMETHASONE 2 MG: 4 TABLET ORAL at 12:54

## 2025-05-07 RX ADMIN — TIOTROPIUM BROMIDE AND OLODATEROL 2 PUFF: 3.124; 2.736 SPRAY, METERED RESPIRATORY (INHALATION) at 06:28

## 2025-05-07 RX ADMIN — BUSPIRONE HYDROCHLORIDE 5 MG: 5 TABLET ORAL at 20:27

## 2025-05-07 RX ADMIN — TRAZODONE HYDROCHLORIDE 50 MG: 50 TABLET ORAL at 20:27

## 2025-05-07 RX ADMIN — PANTOPRAZOLE SODIUM 40 MG: 40 TABLET, DELAYED RELEASE ORAL at 06:28

## 2025-05-07 RX ADMIN — DEXAMETHASONE 2 MG: 4 TABLET ORAL at 06:28

## 2025-05-07 RX ADMIN — LOSARTAN POTASSIUM 37.5 MG: 25 TABLET, FILM COATED ORAL at 09:08

## 2025-05-07 RX ADMIN — PRAVASTATIN SODIUM 40 MG: 40 TABLET ORAL at 20:27

## 2025-05-07 RX ADMIN — DEXAMETHASONE 2 MG: 4 TABLET ORAL at 00:00

## 2025-05-07 RX ADMIN — BUSPIRONE HYDROCHLORIDE 5 MG: 5 TABLET ORAL at 09:08

## 2025-05-07 RX ADMIN — BUMETANIDE 0.5 MG: 0.5 TABLET ORAL at 09:08

## 2025-05-07 RX ADMIN — LEVETIRACETAM 500 MG: 500 SOLUTION ORAL at 06:28

## 2025-05-07 RX ADMIN — LOSARTAN POTASSIUM 37.5 MG: 25 TABLET, FILM COATED ORAL at 20:21

## 2025-05-07 RX ADMIN — ANTACID TABLETS 500 MG: 500 TABLET, CHEWABLE ORAL at 20:21

## 2025-05-07 RX ADMIN — APIXABAN 10 MG: 5 TABLET, FILM COATED ORAL at 09:09

## 2025-05-07 RX ADMIN — DEXAMETHASONE 2 MG: 4 TABLET ORAL at 23:54

## 2025-05-07 ASSESSMENT — ENCOUNTER SYMPTOMS
NAUSEA: 1
ABDOMINAL PAIN: 0
SHORTNESS OF BREATH: 0
TROUBLE SWALLOWING: 0
RHINORRHEA: 0
COUGH: 0
SORE THROAT: 0
PHOTOPHOBIA: 0
VOMITING: 0

## 2025-05-07 ASSESSMENT — VISUAL ACUITY: VA_NORMAL: 1

## 2025-05-07 NOTE — PROGRESS NOTES
[facility]  INPATIENT SPEECH THERAPY  MH-STRZ 8K IP REHAB  DAILY NOTE    Discharge Recommendations: Outpatient Speech Therapy  DIET ORDER RECOMMENDATIONS: Regular diet and thin liquids  STRATEGIES: Full Upright Position, Limit Distractions, and Monitor for Fatigue     SLP Individual Minutes  Time In: 1000  Time Out: 1030  Minutes: 30  Timed Code Treatment Minutes: 0 Minutes       Date: 2025  Patient Name: Koko Chao      CSN: [id number]   : 1960  (65 y.o.)  Gender: male   Referring Physician:  Sonya Robison DO  Diagnosis: Brain mass  Precautions: fall precautions, seizure precautions,  Current Diet: Regular diet and thin liquids  Respiratory Status: Room Air  Date of Last MBS/FEES: Not Applicable    Pain:  No pain reported.    Subjective:  Patient sitting in recliner upon ST arrival.  Agreeable to skilled ST services. No family present. Pleasant and cooperative throughout.    Short-Term Goals:  SHORT TERM GOAL #1:  Goal 1: Patient will complete verbal expression tasks (including BASIC naming, automatic speech tasks, biographics, picture description, verbal fluency, word/phrase/sentence level repetition) with 70% accuracy, mod cues to improve functional expressive communication.  INTERVENTIONS:   Naming - iPad   independent   given first letter  3/25 phrase completion   given first sound    Sentence Level Repetition   independent   in unison with ST   incorrect with phonemic/semantic paraphasias and neologisms    SHORT TERM GOAL #2:  Goal 2: Patient will complete auditory/reading comprehension (including direction following of 1-2 step commands, basic and mildly complex yes/questions, functional reading comprehension, Object ID from FO3) with 70% accuracy, mod cues to improve functional recepetive communication.  INTERVENTIONS:   ID Items in Category (Reading)  Reading words: 8/10 independent; 2/10 with model of action (\"throw\" + \"kick\")  ID words to fit in

## 2025-05-07 NOTE — PROGRESS NOTES
RECREATIONAL THERAPY  -STRZ 8K IP REHAB      Date:  5/7/2025            Patient Name: Koko Chao           MRN: [id number]  Acct: [id number]          YOB: 1960 (65 y.o.)       Gender: male      Physician: Referring Practitioner: Sonya Robison DO    REASON FOR MISSED TREATMENT:  Pt and family visiting in room with door shut this afternoon     Janeth Prajapati, CTRS    5/7/2025

## 2025-05-07 NOTE — PROGRESS NOTES
[facility]  INPATIENT REHABILITATION  TEAM CONFERENCE NOTE    Conference Date: 2025  Admit Date:  2025  2:40 PM  Patient Name: Koko Chao    MRN: [id number]    : 1960  (65 y.o.)  Rehabilitation Admitting Diagnosis:  Brain mass [G93.89]  Referring Practitioner: Sonya Robison DO      CASE MANAGEMENT  Current issues/needs regarding patient and family discharge status: Patient continues to be motivated and progressing with therapy. Patient plans to be discharged on Friday,  with outpatient PT, OT and ST services. SW will follow and maintain involvement in discharge planning.    PHYSICAL THERAPY  Patient overall is making progress with skilled PT treatment and has met 4/5 STG's and 3/3 LTG's. Patient has progressed to Independent for bed mobility, transfers and supervision for car transfers. Patient is Mod I for ambulation without AD over level and uneven surfaces. Patient scored a 25/30 on the Functional gait assessment and performed the 5x sit<>stand test in 9.8 seconds indicating he is a low risk for falls. Patient overall can continue to benefit from skilled PT treatment for dynamic balance training for increased functional mobility.  Equipment Needed: No    SPEECH THERAPY  Patient has met 6/6 STGs and 0/2 LTGs this reporting period. Patient is making excellent gains across cognitive domains and with language skills. Patient does well implementing circumlocution to assist with expressive language. Patient continues to require cuing for expressive and receptive language skills including naming, repetition, following commands, reading, and writing. Good attention organizing pillbox, requiring x1 cue to check for errors and good correction to follow. Patient did require min-mod cuing for comprehension of prescriptions and pillbox. Patient would benefit from continued skilled ST services to address aforementioned language and cognitive skills to promote re-integration into home and

## 2025-05-07 NOTE — PLAN OF CARE
Problem: Discharge Planning  Goal: Discharge to home or other facility with appropriate resources  2025 1307 by Vanessa Davila LISW-S  Note:   [facility]  Physical Medicine and Rehabilitation  Discharge Planning Progress Note    Date: 2025  Patient Name: Koko Chao  MRN: [id number]  : 1960 (65 y.o.)    SW met with patient on this date to discuss discharge planning. SW provided education on patient's options at discharge for therapy. Patient would like to continue with outpatient therapy, but would like to speak with his wife first. SW to follow up.    ANNITA was notified by HANNA Nuno that patient would like [facility] Outpatient Therapy. Referral was made on this date.    ANNITA will follow and maintain involvement in discharge planning.    Plan  Referrals Made: [facility] Outpatient Therapy  Referrals Needed: No additional referrals needed  Family Training: Provided throughout patient's stay

## 2025-05-07 NOTE — CONSULTS
[place] [id number]                              CONSULTATION      PATIENT NAME: SOBIA LOUIE                : 1960  MED REC NO: [id number]                       ROOM: [facility]  ACCOUNT NO: [id number]                       ADMIT DATE: 2025  PROVIDER: Bernardo Rosales MD    ONCOLOGY CONSULT    CONSULT DATE: 2025      HISTORY OF PRESENT ILLNESS:  The patient is a 65-year-old gentleman who underwent parietal lobe craniotomy and excision of a mass.  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  The patient underwent surgery on 2025.  The patient is currently in the rehab floor getting rehabilitation.  The patient is here with his wife to discuss plan for treatment.  The patient seems to be recovering.  The patient apparently presented to the emergency room on  with some mental changes.  An MRI did show a mass in the left parietal lobe.  The patient underwent partial resection and the pathology was sent to [facility] for a second opinion.  The patient has no complaint today.  ***    PAST MEDICAL HISTORY:    1. Prostate cancer.  2. Central retinal vein occlusion.  3. Hyperlipidemia.  4. Hypertension.    PAST SURGICAL HISTORY:    1. Colonoscopy.  2. Cystoscopy.  3. Liver biopsy.  4. Prostate biopsy.  5. Testicular removal.  6. EGD.    ALLERGIES:  DEMEROL, VERSED.      CURRENT MEDICATIONS:  Please see medication list.    SOCIAL HISTORY:  He is .  He is nonsmoker.  He does not drink alcohol.    FAMILY HISTORY:  Mother has cancer.  Mother has coronary artery disease.    REVIEW OF SYSTEMS:  Twelve systems were reviewed.  Pertinent positive findings were mentioned in history of the present illness.    PHYSICAL EXAMINATION:  GENERAL:  He is a pleasant gentleman, sitting on the chair in no respiratory distress.  His weight is 78.6 kg.   VITAL SIGNS:  Temp is 97.7, respirations 20, pulse 95,

## 2025-05-07 NOTE — PROGRESS NOTES
[facility]  INPATIENT SPEECH THERAPY  MH-STRZ 8K IP REHAB  PROGRESS NOTE    Discharge Recommendations: Outpatient Speech Therapy  DIET ORDER RECOMMENDATIONS: Regular diet and thin liquids  STRATEGIES: Full Upright Position, Limit Distractions, and Monitor for Fatigue     SLP Individual Minutes  Time In: 1335  Time Out: 1415  Minutes: 40  Timed Code Treatment Minutes: 40 Minutes       Date: 2025  Patient Name: Koko Chao      CSN: [id number]   : 1960  (65 y.o.)  Gender: male   Referring Physician:  Sonya Robison DO  Diagnosis: Brain mass  Precautions: fall precautions, seizure precautions,  Current Diet: Regular diet and thin liquids  Respiratory Status: Room Air  Date of Last MBS/FEES: Not Applicable    Pain:  No pain reported.    Subjective:  Patient sitting in recliner upon ST arrival. Agreeable to skilled ST services. Wife present. Pleasant and cooperative throughout.    Short-Term Goals:  SHORT TERM GOAL #1:  Goal 1: Patient will complete verbal expression tasks (including BASIC naming, automatic speech tasks, biographics, picture description, verbal fluency, word/phrase/sentence level repetition) with 70% accuracy, mod cues to improve functional expressive communication. GOAL MET; CONTINUE  INTERVENTIONS: Did not address d/t focus on other goals  PREVIOUS SESSION  Confrontational namin/5 indep, 2/5 with sentence completion, 2/5 with phonemic cues  *Patient independently using circumlocution strategies in an attempt to self cue for word retrieval, however still requiring phonemic cues for target productions.    Reading at sentence level:  indep,  with phonemic cues.    Naming - iPad   independent   given first letter  3/25 phrase completion   given first sound     Sentence Level Repetition   independent   in unison with ST   incorrect with phonemic/semantic paraphasias and neologisms    SHORT TERM GOAL #2:  Goal 2: Patient will complete  coins/change  *Benefited from counting change aloud to identify errors    Written ADL Sequencing:  -BBQ hamburgers: 2/4 indep, 2/4 with self correction  -Mailing a letter: 4/4 indep  -Ice cream sandwich: 2/4 indep, 2/4 with min cues  *Increased time needed to process information sequentially.   *Good comprehension of errors with ability to correct    Evaluating a pill box for errors (Moderately complex): 2/4 indep, 2/4 with min cues for more complex instructions.   *Cueing assist needed for comprehension/management of medications that were \"twice daily\"  *Good selective attention and reasoning for identifying errors.     SHORT TERM GOAL #6:  Goal 6: Patient, staff, and family will adhere to low stimulation guidelines (ACE completion) to promote neurological healing GOAL MET  INTERVENTIONS: Did not address d/t focus on other goals      Long-Term Goals:  Time Frame for Long Term Goals: 3 weeks    LONG TERM GOAL #1:  Goal 1: Patient will improve functional communication skills to a level of min assist to optimize independence with verbal expression and auditory comprehension to assist with transition to home environment and community re-integration GOAL NOT MET    LONG TERM GOAL #2:  Goal 2: Patient will improve cognitive function to a level of min assist for potential return to prior level of independence GOAL NOT MET      Functional Oral Intake Scale: Total Oral Intake: 7.  Total oral intake with no restrictions    EDUCATION:  Learner: Patient  Education:  Reviewed ST goals and Plan of Care, Reviewed recommendations for follow-up, and Education Related to Potential Risks and Complications Due to Impairment/Illness/Injury  Evaluation of Education: Demonstrates with assistance    ASSESSMENT/PLAN:  Summary: Patient has met 6/6 STGs and 0/2 LTGs this reporting period. Patient is making excellent gains across cognitive domains and with language skills. Patient does well implementing circumlocution to assist with

## 2025-05-07 NOTE — PROGRESS NOTES
[facility]  INPATIENT PHYSICAL THERAPY  PROGRESS NOTE  MH-STRZ 8K  REHAB - 8K-17/017-A      Discharge Recommendations: Home with Assist as Needed   Equipment Recommendations: No               Time In: 1030  Time Out: 1130  Timed Code Treatment Minutes: 60 Minutes  Minutes: 60          Date: 2025  Patient Name: Koko Chao,  Gender:  male        MRN: [id number]  : 1960  (65 y.o.)     Referring Practitioner: Sonya Robison DO  Diagnosis: Brain mass  Additional Pertinent Hx: Per H&P \"Koko Chao is a 65 y.o. male with PMHx of hypertension, hyperlipidemia, central retinal vein occlusion, prostate cancer who presents to [facility] with chief complaint of aphasia.  Patient reports had 10-minute episode on 4/10 where he was not able to express his language. Patient reports this has been ongoing for 3 weeks. CT head done in ED showed suspected intra-axial mass in the left parietal lobe. Edema is anteriorly of mass. Pt s/p LEFT PARIETAL CRANIOTOMY FOR EVACUATION OF CEREBRAL ABSCESS on 25. Repeat CT due to increased aphasia, H/A was done right away and showed a hemorrhage really not in the surgical cavity but rather in the edematous area surrounding the surgical cavity. Pt s/p LEFT PARIETAL CRANIOTOMY HEMATOMA EVACUATION on 25. Patient was transferred out of the ICU on 2025. On 2025, CT head was obtained showing that there was a new hyperdense area posterior and superficial to the location of the tumor resection. Per neurosurgery on 2025, follow-up with neurosurgery in 2 weeks for suture removal (). Pt admitted to inpatient rehab on  for intensive therapy to promote safety and independence with daily activities.\"     Prior Level of Function:  Lives With: Spouse  Type of Home: House  Home Layout: One level  Home Access: Stairs to enter with rails  Entrance Stairs - Number of Steps: 3  Entrance Stairs - Rails: Left  Home Equipment: None   Bathroom

## 2025-05-07 NOTE — CARE COORDINATION
Patient up in chair, talking to wife on phone. Denies pain or discomfort,no other needs at this time.

## 2025-05-07 NOTE — PLAN OF CARE
Problem: ABCDS Injury Assessment  Goal: Absence of physical injury  5/6/2025 2213 by Kimberli Blue RN  Outcome: Progressing  Flowsheets (Taken 5/6/2025 1109 by Nicky Bonner RN)  Absence of Physical Injury: Implement safety measures based on patient assessment     Problem: Nutrition Deficit:  Goal: Optimize nutritional status  5/6/2025 2213 by Kimberli Blue RN  Outcome: Progressing  Flowsheets (Taken 5/6/2025 1109 by Nicky Bonnre RN)  Nutrient intake appropriate for improving, restoring, or maintaining nutritional needs: Assess nutritional status and recommend course of action     Problem: Safety - Adult  Goal: Free from fall injury  5/6/2025 2213 by Kimberli Blue RN  Outcome: Progressing  Flowsheets (Taken 5/6/2025 1109 by Nicky Bonner RN)  Free From Fall Injury: Instruct family/caregiver on patient safety     Problem: Discharge Planning  Goal: Discharge to home or other facility with appropriate resources  5/6/2025 2213 by Kimberli Blue RN  Outcome: Progressing  Flowsheets (Taken 5/6/2025 1930)  Discharge to home or other facility with appropriate resources:   Arrange for needed discharge resources and transportation as appropriate   Identify barriers to discharge with patient and caregiver     Problem: Neurosensory - Adult  Goal: Absence of seizures  5/6/2025 2213 by Kimberli Blue RN  Outcome: Progressing  Flowsheets (Taken 5/6/2025 1930)  Absence of seizures:   Monitor for seizure activity.  If seizure occurs, document type and location of movements and any associated apnea   If seizure occurs, turn head to side and suction secretions as needed     Problem: Respiratory - Adult  Goal: Achieves optimal ventilation and oxygenation  5/6/2025 2213 by Kimberli Blue RN  Outcome: Progressing  Flowsheets (Taken 5/6/2025 1930)  Achieves optimal ventilation and oxygenation:   Assess for changes in respiratory status   Position to facilitate oxygenation and minimize respiratory effort   Assess for

## 2025-05-07 NOTE — DISCHARGE INSTRUCTIONS
NO DRIVING UNTIL OUTPATIENT DRIVING EVALUATION COMPLETED. OUTPATIENT DRIVING EVALUATION AVAILABLE THROUGH [facility] OUTPATIENT THERAP[facility] ([address] [place] [address], [address], [place] [address]. PH: [phone]).

## 2025-05-07 NOTE — PROGRESS NOTES
mod phonemic cues to begin to verbalize ingredients. Letter formation WFL however spelling is poor, but he demo'ed self initiation of phonemic segmenting during indep attempt (spelled sauce as margarita).    BED MOBILITY:  Not Tested    TRANSFERS:  Sit to Stand:  Independent. From all surfaces.  Stand to Sit: Independent.      FUNCTIONAL MOBILITY:  Assistive Device: None  Assist Level:  Independent.   Distance: To and from therapy gym and To and from therapy apartment    Pt. Able to ascend/descend 6 steps with indep and no cues.  Step management performed to transfer to/from therapy unit <-> room as well as to engage pt in environmental based task.        Functional Outcome Measures:    Modified Barthel Index administered this date with pt scoring 100/100 indicating [place] (100) with daily task completion.           Modified [place]:  Current Functional Status:  +2 - Slight disability; unable to carry out all previous activities, but able to look after own affairs without assistance.   Patient could live alone without any help from another person.  Education provided regarding stroke rehabilitation management.    Education:  Learners: Patient  ADL's and IADL's    ASSESSMENT:  Assessment: Pt, Koko, is making great progress on IPR. For rote ADLs, Koko can complete with full indep including correct item retrieval and selection and transportation. He can self sequence, initiate and problem solve through rote ADL tasks and scores a 100 on the modifed barthel index. However for novel IADL tasks, he requires min to mod cues for initiation and problem solving and cues for verbalizing needs throughout IADLs but has shown improvements in sequencing. Phyiscally, he tolerates tx well, requiring limited rest breaks and standing for > 20 minutes at a time. He woudl cont to benefit from OP OT to improve functional cognition for return to IADLs, community re-entry and RTW tasks following discharge from IPR.  Activity Tolerance:   Patient tolerance of  treatment: Good treatment tolerance       Plan: Times Per Week: 5x/wk for 90 min  Current Treatment Recommendations: Strengthening, Balance training, Functional mobility training, Endurance training, Patient/Caregiver education & training, Equipment evaluation, education, & procurement, Safety education & training, Self-Care / ADL, Home management training, Cognitive reorientation, Cognitive/Perceptual training    Goals  Short Term Goals  Time Frame for Short Term Goals: 1 week  Short Term Goal 1: Pt will follow 3-step directions with mod VC during a basic ADL task to promote indep with ADL and IADL tasks. GOAL MET   Short Term Goal 2: Pt will select, retrieve, and don appropriate clothing with SBA and 1 VC or less for sequencing and initiation to increase indep with dressing. GOAL MET   Short Term Goal 3: Pt will tolerate tolerate 10 minutes standing with SBA in preparation for standing showering task. GOAL MET   Short Term Goal 4: Pt will write a 3-item grocery list with proper letter formation with mod cues to promote ability to express personal needs. GOAL  MET   Long Term Goals  Time Frame for Long Term Goals : 2 weeks  Long Term Goal 1: Pt will demonstrate improved occupational performance as evidenced by a score of 85/100 on the Modified Barthel Index. GOAL MET   Long Term Goal 2: Pt will complete a basic IADL task with supervision and 0-1 VC for sequencing and problem solving to promote safety and independence with homemaking tasks. GOAL PART NOT MET   Long Term Goal 3: Pt will sequence ADL routine with supervision and 0-1 VC for initiation to increase safety and return to PLOF. GOAL MET       This note was completed in collaboration with an OTR/L.  Following session, patient left in safe position with all fall risk precautions in place.

## 2025-05-07 NOTE — PROGRESS NOTES
Comprehensive Nutrition Assessment    Type and Reason for Visit:  Initial, Positive nutrition screen, Consult (Oral Nutrition Supplements; unintentional weight loss)    Nutrition Recommendations/Plan:   Recommend diet as per SLP.  Continue Mightyshakes x 2 TID.  Encouraged po, good nutrition at best efforts - encouraged food from home/outside as desired.  Recommend MVI.     Malnutrition Assessment:  Malnutrition Status:  At risk for malnutrition (04/30/25 1504)    Context:  Acute Illness     Findings of the 6 clinical characteristics of malnutrition:  Energy Intake:  Mild decrease in energy intake (initially poor intake on acute; has improved)  Weight Loss:   (-7.3% in 2 weeks)     Body Fat Loss:  No body fat loss     Muscle Mass Loss:  No muscle mass loss    Fluid Accumulation:  Unable to assess     Strength:  Not Performed    Nutrition Assessment:     Pt. nutritionally compromised AEB wounds.  At risk for further nutrition compromise r/t increased nutrient needs for wound healing (s/p craniotomy -brain mass-Glioma, hematoma evacuation) and underlying medical condition (hx HLD, HTN).      Nutrition Related Findings:    Pt. Report/Treatments/Miscellaneous:   5/7- pt. Seen - reports appetite is \"pretty good\"; denies any trouble tolerating diet; states acceptance of Mightyshakes and wishes to continue to receive BID; SLP following; per Oncology c/s-strongly advise for the patient to be seen by tertiary care center, [facility], to be seen by a neurooncologist. The patient may be eligible for the studies   4/30 - pt. Seen - reports appetite is \"real good\" and has been overall better today; denies any trouble tolerating diet; states good acceptance of Mightyshakes - asking for 2 per tray; states bowels have been moving daily; SLP following  Note - decreased po intake on acute; states weight down from UBW; reported good appetite pta  GI Status: BM 5/5  Pertinent Labs: 5/5: Glucose 119, BUN 27, Cr

## 2025-05-07 NOTE — PROGRESS NOTES
[facility][note 3]  INPATIENT SPEECH THERAPY  [facility] 8K IP REHAB  DAILY NOTE    Discharge Recommendations: Outpatient Speech Therapy  DIET ORDER RECOMMENDATIONS: Regular diet and thin liquids  STRATEGIES: Full Upright Position, Limit Distractions, and Monitor for Fatigue     SLP Individual Minutes  Time In: 1200  Time Out: 1230  Minutes: 30  Timed Code Treatment Minutes: 30 Minutes       Date: 2025  Patient Name: Koko Chao      CSN: [id number]   : 1960  (65 y.o.)  Gender: male   Referring Physician:  Sonya Robison DO  Diagnosis: Brain mass  Precautions: fall precautions, seizure precautions,  Current Diet: Regular diet and thin liquids  Respiratory Status: Room Air  Date of Last MBS/FEES: Not Applicable    Pain:  No pain reported.    Subjective:  Upon arrival, patient positioned upright in recliner; pleasant and agreeable to ST interventions. Wife present intermittently throughout session.     Short-Term Goals:  SHORT TERM GOAL #1:  Goal 1: Patient will complete verbal expression tasks (including BASIC naming, automatic speech tasks, biographics, picture description, verbal fluency, word/phrase/sentence level repetition) with 70% accuracy, mod cues to improve functional expressive communication.  INTERVENTIONS:   Confrontational namin/5 indep, 2/5 with sentence completion, 2/5 with phonemic cues  *Patient independently using circumlocution strategies in an attempt to self cue for word retrieval, however still requiring phonemic cues for target productions.    Reading at sentence level:  indep,  with phonemic cues.      SHORT TERM GOAL #2:  Goal 2: Patient will complete auditory/reading comprehension (including direction following of 1-2 step commands, basic and mildly complex yes/questions, functional reading comprehension, Object ID from FO3) with 70% accuracy, mod cues to improve functional recepetive communication.  INTERVENTIONS: Did not address due to focus on other

## 2025-05-07 NOTE — PROGRESS NOTES
Notified by nursing of critical MRI results.  MRI was obtained by radiation oncology for planning.  Per radiology report there was fluid/blood products in operative bed.  Patient with known prior hemorrhage.  Additionally, noted to have signal that could represent acute/subacute infarct.  Patient clinically without any neurologic changes concerning for new event.  Patient has been progressing significantly with therapies and stable from a neurologic perspective.  Patient is currently on Eliquis given his acute DVT.     Will see patient in the morning and discuss results.

## 2025-05-07 NOTE — CARE COORDINATION
Patient has had an uneventful shift. Patient had denied pain and has denies any concerns. Patient participated in therapies as scheduled. Wife has been at bed side and is supportive.

## 2025-05-07 NOTE — CONSULTS
Neurology Consult Note    Date:5/7/2025       Room:[facility]17/017-A  Patient Name:Koko Chao     YOB: 1960     Age:65 y.o.    Requesting Physician: Sonya Robison DO     Reason for Consult:  Evaluate for Patient with GBM with ?infarct on MRI      Chief Complaint: Brain mass      Subjective     Koko Chao is a 65 y.o. male with a history of HTN, HLD, prostate cancer, left frontal arachnoid cyst, grade 4 glioblastoma s/p left parietal craniotomy 4/16/25 for evacuation of glioblastoma and subsequent left parietal hematoma evacuation 4/17/25 with Dr. Singh. Radiation oncology recommended MRI brain W/WO for radiation treatment planning, which revealed perioperative infarction, for which our service was consulted. Patient was admitted to [facility] 4/28/25 and has been doing relatively well, albeit some expressive aphasia. Radiation oncology planning for treatment to start 5/15/25 - 30 treatments over a 6 week period. He is currently on Eliquis for acute DVT in the right peroneal vein. He did undergo CTA head and neck 4/11/25 during his acute hospitalization, which were negative for any hemodynamically significant stenosis. ECHO 4/13/25 revealed normal LVSF, EF 60-65%. Repeat hemoglobin A1c and lipid panel ordered for tomorrow morning. He denies smoking. AS an outpatient, he reports taking ASA 81 mg daily and Pravastatin 40 mg nightly. Today, he denies headaches, vision changes, numbness/tingling, extremity weakness, chest pain, shortness of breath, or recent illness. He does admit to intermittent nausea, improved with tums, as well as some expressive aphasia and memory difficulties.     Review of Systems   Review of Systems   Constitutional:  Negative for chills and fever.   HENT:  Negative for rhinorrhea, sore throat and trouble swallowing.    Eyes:  Negative for photophobia and visual disturbance.   Respiratory:  Negative for cough and shortness of breath.    Cardiovascular:  Negative for chest pain and

## 2025-05-07 NOTE — PLAN OF CARE
Problem: ABCDS Injury Assessment  Goal: Absence of physical injury  5/7/2025 1032 by Nurys Kaplan RN  Outcome: Progressing  Flowsheets (Taken 5/7/2025 1032)  Absence of Physical Injury: Implement safety measures based on patient assessment  5/6/2025 2213 by Kimberli Blue RN  Outcome: Progressing  Flowsheets (Taken 5/6/2025 1109 by Nicky Bonner, RN)  Absence of Physical Injury: Implement safety measures based on patient assessment     Problem: Nutrition Deficit:  Goal: Optimize nutritional status  5/7/2025 1032 by Nurys Kaplan RN  Outcome: Progressing  Flowsheets (Taken 5/7/2025 1032)  Nutrient intake appropriate for improving, restoring, or maintaining nutritional needs:   Assess nutritional status and recommend course of action   Recommend appropriate diets, oral nutritional supplements, and vitamin/mineral supplements   Monitor oral intake, labs, and treatment plans   Provide specific nutrition education to patient or family as appropriate  5/7/2025 1031 by Allie West, RD, LD  Flowsheets (Taken 5/7/2025 1031)  Nutrient intake appropriate for improving, restoring, or maintaining nutritional needs:   Assess nutritional status and recommend course of action   Monitor oral intake, labs, and treatment plans   Recommend appropriate diets, oral nutritional supplements, and vitamin/mineral supplements  5/6/2025 2213 by Kmiberli Blue RN  Outcome: Progressing  Flowsheets (Taken 5/6/2025 1109 by Nicky Bonner, RN)  Nutrient intake appropriate for improving, restoring, or maintaining nutritional needs: Assess nutritional status and recommend course of action     Problem: Safety - Adult  Goal: Free from fall injury  5/7/2025 1032 by Nurys Kaplan RN  Outcome: Progressing  Flowsheets (Taken 5/7/2025 1032)  Free From Fall Injury:   Instruct family/caregiver on patient safety   Based on caregiver fall risk screen, instruct family/caregiver to ask for assistance with transferring infant if

## 2025-05-07 NOTE — PROGRESS NOTES
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 *      Plan:     Continue to follow        Subjective:     Patient has no complaint of CP or SOB..   Medication side effects: none    Scheduled Meds:   apixaban  10 mg Oral BID    Followed by    [START ON 5/9/2025] apixaban  5 mg Oral BID    busPIRone  5 mg Oral BID    levETIRAcetam  500 mg Oral Q12H    miconazole   Topical BID    pravastatin  40 mg Oral Nightly    pantoprazole  40 mg Oral QAM AC    tiotropium-olodaterol  2 puff Inhalation Daily    amLODIPine  10 mg Oral Daily    losartan  37.5 mg Oral BID    melatonin  6 mg Oral Nightly    bumetanide  0.5 mg Oral Daily    dexAMETHasone  2 mg Oral 4 times per day     Continuous Infusions:   sodium chloride      dextrose       PRN Meds:gadoteridol, sodium chloride flush, sodium chloride, potassium chloride **OR** potassium alternative oral replacement **OR** potassium chloride, magnesium sulfate, polyethylene glycol, calcium carbonate, albuterol, acetaminophen, polyvinyl alcohol, traZODone, glucose, dextrose bolus **OR** dextrose bolus, glucagon (rDNA), dextrose, ondansetron    Review of Systems  Pertinent items are noted in HPI.    Objective:     Patient Vitals for the past 8 hrs:   BP Temp Temp src Pulse Resp SpO2 Weight   05/07/25 0904 130/68 97.9 °F (36.6 °C) Oral 79 18 98 % --   05/07/25 0457 -- -- -- -- -- -- 78 kg (171 lb 14.4 oz)     I/O last 3 completed shifts:  In: 560 [P.O.:560]  Out: -   No intake/output data recorded.    /68   Pulse 79   Temp 97.9 °F (36.6 °C) (Oral)   Resp 18   Ht 1.727 m (5' 8\")   Wt 78 kg (171 lb 14.4 oz)   SpO2 98%   BMI 26.14 kg/m²     General appearance:

## 2025-05-08 LAB
ANION GAP SERPL CALC-SCNC: 11 MEQ/L (ref 8–16)
BASOPHILS ABSOLUTE: 0 THOU/MM3 (ref 0–0.1)
BASOPHILS NFR BLD AUTO: 0.1 %
BUN SERPL-MCNC: 27 MG/DL (ref 8–23)
CALCIUM SERPL-MCNC: 8.8 MG/DL (ref 8.8–10.2)
CHLORIDE SERPL-SCNC: 98 MEQ/L (ref 98–111)
CHOLEST SERPL-MCNC: 192 MG/DL (ref 100–199)
CO2 SERPL-SCNC: 24 MEQ/L (ref 22–29)
CREAT SERPL-MCNC: 0.7 MG/DL (ref 0.7–1.2)
DEPRECATED MEAN GLUCOSE BLD GHB EST-ACNC: 117 MG/DL (ref 70–126)
DEPRECATED RDW RBC AUTO: 45.4 FL (ref 35–45)
EOSINOPHIL NFR BLD AUTO: 0 %
EOSINOPHILS ABSOLUTE: 0 THOU/MM3 (ref 0–0.4)
ERYTHROCYTE [DISTWIDTH] IN BLOOD BY AUTOMATED COUNT: 14.2 % (ref 11.5–14.5)
GFR SERPL CREATININE-BSD FRML MDRD: > 90 ML/MIN/1.73M2
GLUCOSE SERPL-MCNC: 121 MG/DL (ref 74–109)
HBA1C MFR BLD HPLC: 5.9 % (ref 4–6)
HCT VFR BLD AUTO: 36.2 % (ref 42–52)
HDLC SERPL-MCNC: 101 MG/DL
HGB BLD-MCNC: 12.1 GM/DL (ref 14–18)
IMM GRANULOCYTES # BLD AUTO: 0.2 THOU/MM3 (ref 0–0.07)
IMM GRANULOCYTES NFR BLD AUTO: 1.7 %
LDLC SERPL CALC-MCNC: 79 MG/DL
LYMPHOCYTES ABSOLUTE: 0.5 THOU/MM3 (ref 1–4.8)
LYMPHOCYTES NFR BLD AUTO: 3.9 %
MCH RBC QN AUTO: 29.6 PG (ref 26–33)
MCHC RBC AUTO-ENTMCNC: 33.4 GM/DL (ref 32.2–35.5)
MCV RBC AUTO: 88.5 FL (ref 80–94)
MONOCYTES ABSOLUTE: 0.4 THOU/MM3 (ref 0.4–1.3)
MONOCYTES NFR BLD AUTO: 3.5 %
NEUTROPHILS ABSOLUTE: 10.7 THOU/MM3 (ref 1.8–7.7)
NEUTROPHILS NFR BLD AUTO: 90.8 %
NRBC BLD AUTO-RTO: 0 /100 WBC
PLATELET # BLD AUTO: 281 THOU/MM3 (ref 130–400)
PMV BLD AUTO: 9.4 FL (ref 9.4–12.4)
POTASSIUM SERPL-SCNC: 5 MEQ/L (ref 3.5–5.2)
RBC # BLD AUTO: 4.09 MILL/MM3 (ref 4.7–6.1)
SODIUM SERPL-SCNC: 133 MEQ/L (ref 135–145)
TRIGL SERPL-MCNC: 58 MG/DL (ref 0–199)
WBC # BLD AUTO: 11.8 THOU/MM3 (ref 4.8–10.8)

## 2025-05-08 PROCEDURE — 97130 THER IVNTJ EA ADDL 15 MIN: CPT

## 2025-05-08 PROCEDURE — 6360000002 HC RX W HCPCS: Performed by: STUDENT IN AN ORGANIZED HEALTH CARE EDUCATION/TRAINING PROGRAM

## 2025-05-08 PROCEDURE — 97530 THERAPEUTIC ACTIVITIES: CPT

## 2025-05-08 PROCEDURE — 1180000000 HC REHAB R&B

## 2025-05-08 PROCEDURE — 97116 GAIT TRAINING THERAPY: CPT

## 2025-05-08 PROCEDURE — 80048 BASIC METABOLIC PNL TOTAL CA: CPT

## 2025-05-08 PROCEDURE — 80061 LIPID PANEL: CPT

## 2025-05-08 PROCEDURE — 6370000000 HC RX 637 (ALT 250 FOR IP): Performed by: STUDENT IN AN ORGANIZED HEALTH CARE EDUCATION/TRAINING PROGRAM

## 2025-05-08 PROCEDURE — 92507 TX SP LANG VOICE COMM INDIV: CPT

## 2025-05-08 PROCEDURE — 97110 THERAPEUTIC EXERCISES: CPT

## 2025-05-08 PROCEDURE — 97129 THER IVNTJ 1ST 15 MIN: CPT

## 2025-05-08 PROCEDURE — 99232 SBSQ HOSP IP/OBS MODERATE 35: CPT | Performed by: NURSE PRACTITIONER

## 2025-05-08 PROCEDURE — 85025 COMPLETE CBC W/AUTO DIFF WBC: CPT

## 2025-05-08 PROCEDURE — 97535 SELF CARE MNGMENT TRAINING: CPT

## 2025-05-08 PROCEDURE — 6370000000 HC RX 637 (ALT 250 FOR IP): Performed by: NURSE PRACTITIONER

## 2025-05-08 PROCEDURE — 83036 HEMOGLOBIN GLYCOSYLATED A1C: CPT

## 2025-05-08 PROCEDURE — 36415 COLL VENOUS BLD VENIPUNCTURE: CPT

## 2025-05-08 RX ORDER — TRAZODONE HYDROCHLORIDE 100 MG/1
100 TABLET ORAL NIGHTLY
Status: DISCONTINUED | OUTPATIENT
Start: 2025-05-08 | End: 2025-05-09 | Stop reason: HOSPADM

## 2025-05-08 RX ADMIN — APIXABAN 10 MG: 5 TABLET, FILM COATED ORAL at 09:17

## 2025-05-08 RX ADMIN — DEXAMETHASONE 2 MG: 4 TABLET ORAL at 12:28

## 2025-05-08 RX ADMIN — PANTOPRAZOLE SODIUM 40 MG: 40 TABLET, DELAYED RELEASE ORAL at 05:30

## 2025-05-08 RX ADMIN — DEXAMETHASONE 2 MG: 4 TABLET ORAL at 05:30

## 2025-05-08 RX ADMIN — DEXAMETHASONE 2 MG: 4 TABLET ORAL at 17:44

## 2025-05-08 RX ADMIN — PRAVASTATIN SODIUM 40 MG: 40 TABLET ORAL at 20:46

## 2025-05-08 RX ADMIN — BUMETANIDE 0.5 MG: 0.5 TABLET ORAL at 09:18

## 2025-05-08 RX ADMIN — AMLODIPINE BESYLATE 10 MG: 10 TABLET ORAL at 09:18

## 2025-05-08 RX ADMIN — LEVETIRACETAM 500 MG: 500 SOLUTION ORAL at 05:30

## 2025-05-08 RX ADMIN — LEVETIRACETAM 500 MG: 500 SOLUTION ORAL at 17:45

## 2025-05-08 RX ADMIN — ANTACID TABLETS 500 MG: 500 TABLET, CHEWABLE ORAL at 19:29

## 2025-05-08 RX ADMIN — LOSARTAN POTASSIUM 37.5 MG: 25 TABLET, FILM COATED ORAL at 20:47

## 2025-05-08 RX ADMIN — BUSPIRONE HYDROCHLORIDE 5 MG: 5 TABLET ORAL at 09:18

## 2025-05-08 RX ADMIN — TRAZODONE HYDROCHLORIDE 100 MG: 100 TABLET ORAL at 20:49

## 2025-05-08 RX ADMIN — BUSPIRONE HYDROCHLORIDE 5 MG: 5 TABLET ORAL at 20:46

## 2025-05-08 RX ADMIN — APIXABAN 10 MG: 5 TABLET, FILM COATED ORAL at 20:46

## 2025-05-08 RX ADMIN — Medication 6 MG: at 20:49

## 2025-05-08 RX ADMIN — LOSARTAN POTASSIUM 37.5 MG: 25 TABLET, FILM COATED ORAL at 09:18

## 2025-05-08 NOTE — PROGRESS NOTES
[facility]  [facility]-STRZ 8K IP REHAB  Occupational Therapy  Daily Note    Discharge Recommendations: Home with Outpatient OT  Equipment Recommendations:   Pt's wife purchasing shower chair for home.      Time In: 0900  Time Out: 1000  Timed Code Treatment Minutes: 60 Minutes  Minutes: 60          Date: 2025  Patient Name: Koko Chao,   Gender: male      Room: [address] [place] [address]  MRN: [id number]  : 1960  (65 y.o.)  Referring Practitioner: Sonya Robison DO  Diagnosis: Brain mass  Additional Pertinent Hx: Koko Chao is a 65 y.o. male with PMHx of hypertension, hyperlipidemia, central retinal vein occlusion, prostate cancer who presents to [facility] with chief complaint of aphasia.  Patient reports had 10-minute episode on 4/10 where he was not able to express his language. Patient reports this has been ongoing for 3 weeks. CT head done in ED showed suspected intra-axial mass in the left parietal lobe. Edema is anteriorly of mass. Pt s/p LEFT PARIETAL CRANIOTOMY FOR EVACUATION OF CEREBRAL ABSCESS on 25. Repeat CT due to increased aphasia, H/A was done right away and showed a hemorrhage really not in the surgical cavity but rather in the edematous area surrounding the surgical cavity. Pt s/p LEFT PARIETAL CRANIOTOMY HEMATOMA EVACUATION on 25. Patient was transferred out of the ICU on 2025. On 2025, CT head was obtained showing that there was a new hyperdense area posterior and superficial to the location of the tumor resection. Per neurosurgery on 2025, follow-up with neurosurgery in 2 weeks for suture removal (). Pt admitted to inpatient rehab on  for intensive therapy to promote safety and independence with daily activities.    Restrictions/Precautions:  Restrictions/Precautions: Fall Risk, Seizure, General Precautions  Position Activity Restriction  Other Position/Activity Restrictions: Can shower, however, cannot get head sutures wet

## 2025-05-08 NOTE — PROGRESS NOTES
[facility]  INPATIENT SPEECH THERAPY  [facility] 8K IP REHAB  DAILY NOTE    Discharge Recommendations: Outpatient Speech Therapy  DIET ORDER RECOMMENDATIONS: Regular diet and thin liquids  STRATEGIES: Full Upright Position, Limit Distractions, and Monitor for Fatigue     SLP Individual Minutes  Time In: 1130  Time Out: 1200  Minutes: 30  Timed Code Treatment Minutes: 30 Minutes       Date: 2025  Patient Name: Koko Chao      CSN: [id number]   : 1960  (65 y.o.)  Gender: male   Referring Physician:  Sonya Robison DO  Diagnosis: Brain mass  Precautions: fall precautions, seizure precautions,  Current Diet: Regular diet and thin liquids  Respiratory Status: Room Air  Date of Last MBS/FEES: Not Applicable    Pain:  No pain reported.    Subjective:  Patient sitting in recliner upon ST arrival. Agreeable to skilled ST services and to travel MANUEL; notified HANNA Nuno of travel MANUEL. No family present. Pleasant and cooperative throughout.     Short-Term Goals:  SHORT TERM GOAL #1:  Goal 1: Patient will complete verbal expression tasks (including BASIC naming, automatic speech tasks, biographics, picture description, verbal fluency, word/phrase/sentence level repetition) with 70% accuracy, mod cues to improve functional expressive communication.   INTERVENTIONS: Able to ID dressing and pop used. Functional expressive language when telling staff what he would like on baked potato.      SHORT TERM GOAL #2:  Goal 2: Patient will complete auditory/reading comprehension (including direction following of 1-2 step commands, basic and mildly complex yes/questions, functional reading comprehension, Object ID from FO3) with 70% accuracy, mod cues to improve functional recepetive communication.   INTERVENTIONS: Did not address d/t focus on other goals      SHORT TERM GOAL #3:  Goal 3: Patient will complete written expression tasks (including biographics, words/phrases) with 70% accuracy, mod cues to improve

## 2025-05-08 NOTE — PROGRESS NOTES
[facility]  INPATIENT PHYSICAL THERAPY  DAILY NOTE  [facility] 8K IP REHAB - 8K-17/017-A      Discharge Recommendations: Home with Outpatient PT  Equipment Recommendations: No               Time In: 0730  Time Out: 830  Timed Code Treatment Minutes: 60 Minutes  Minutes: 60          Date: 2025  Patient Name: Koko Chao,  Gender:  male        MRN: [id number]  : 1960  (65 y.o.)     Referring Practitioner: Sonya Robison DO  Diagnosis: Brain mass  Additional Pertinent Hx: Per H&P \"Koko Chao is a 65 y.o. male with PMHx of hypertension, hyperlipidemia, central retinal vein occlusion, prostate cancer who presents to [facility] with chief complaint of aphasia.  Patient reports had 10-minute episode on 4/10 where he was not able to express his language. Patient reports this has been ongoing for 3 weeks. CT head done in ED showed suspected intra-axial mass in the left parietal lobe. Edema is anteriorly of mass. Pt s/p LEFT PARIETAL CRANIOTOMY FOR EVACUATION OF CEREBRAL ABSCESS on 25. Repeat CT due to increased aphasia, H/A was done right away and showed a hemorrhage really not in the surgical cavity but rather in the edematous area surrounding the surgical cavity. Pt s/p LEFT PARIETAL CRANIOTOMY HEMATOMA EVACUATION on 25. Patient was transferred out of the ICU on 2025. On 2025, CT head was obtained showing that there was a new hyperdense area posterior and superficial to the location of the tumor resection. Per neurosurgery on 2025, follow-up with neurosurgery in 2 weeks for suture removal (). Pt admitted to inpatient rehab on  for intensive therapy to promote safety and independence with daily activities.\"     Prior Level of Function:  Lives With: Spouse  Type of Home: House  Home Layout: One level  Home Access: Stairs to enter with rails  Entrance Stairs - Number of Steps: 3  Entrance Stairs - Rails: Left  Home Equipment: None   Bathroom Shower/Tub:

## 2025-05-08 NOTE — PLAN OF CARE
Problem: Nutrition Deficit:  Goal: Optimize nutritional status  5/7/2025 2325 by Greg Vergara RN  Flowsheets (Taken 5/7/2025 2325)  Nutrient intake appropriate for improving, restoring, or maintaining nutritional needs:   Assess nutritional status and recommend course of action   Monitor oral intake, labs, and treatment plans     Problem: Safety - Adult  Goal: Free from fall injury  5/7/2025 2325 by Greg Vergara RN  Outcome: Progressing  Flowsheets (Taken 5/7/2025 2325)  Free From Fall Injury: Instruct family/caregiver on patient safety     Problem: Discharge Planning  Goal: Discharge to home or other facility with appropriate resources  5/7/2025 2325 by Greg Vergara RN  Outcome: Progressing  Flowsheets (Taken 5/7/2025 2325)  Discharge to home or other facility with appropriate resources: Identify barriers to discharge with patient and caregiver     Problem: Neurosensory - Adult  Goal: Absence of seizures  5/7/2025 2325 by Greg Vergara RN  Outcome: Progressing  Flowsheets (Taken 5/7/2025 2325)  Absence of seizures:   Monitor for seizure activity.  If seizure occurs, document type and location of movements and any associated apnea   If seizure occurs, turn head to side and suction secretions as needed   Administer anticonvulsants as ordered     Problem: Cardiovascular - Adult  Goal: Maintains optimal cardiac output and hemodynamic stability  5/7/2025 2325 by Greg Vergara RN  Outcome: Progressing  Flowsheets (Taken 5/7/2025 2325)  Maintains optimal cardiac output and hemodynamic stability: Monitor blood pressure and heart rate     Problem: Skin/Tissue Integrity - Adult  Goal: Incisions, wounds, or drain sites healing without S/S of infection  5/7/2025 2325 by Greg Vergara RN  Outcome: Progressing  Flowsheets (Taken 5/7/2025 2028)  Incisions, Wounds, or Drain Sites Healing Without Sign and Symptoms of Infection:   TWICE DAILY: Assess and document skin integrity   TWICE DAILY:

## 2025-05-08 NOTE — PROGRESS NOTES
[facility]  INPATIENT SPEECH THERAPY  MH-STRZ 8K IP REHAB  DAILY NOTE    Discharge Recommendations: Outpatient Speech Therapy  DIET ORDER RECOMMENDATIONS: Regular diet and thin liquids  STRATEGIES: Full Upright Position, Limit Distractions, and Monitor for Fatigue     SLP Individual Minutes  Time In: 1330  Time Out: 1400  Minutes: 30  Timed Code Treatment Minutes: 0 Minutes       Date: 2025  Patient Name: Koko Chao      CSN: [id number]   : 1960  (65 y.o.)  Gender: male   Referring Physician:  Sonya Robison DO  Diagnosis: Brain mass  Precautions: fall precautions, seizure precautions,  Current Diet: Regular diet and thin liquids  Respiratory Status: Room Air  Date of Last MBS/FEES: Not Applicable    Pain:  No pain reported.    Subjective:  Patient sitting in recliner upon ST arrival. Agreeable to skilled ST services. Wife present. Pleasant and cooperative throughout.     Short-Term Goals:  SHORT TERM GOAL #1:  Goal 1: Patient will complete verbal expression tasks (including BASIC naming, automatic speech tasks, biographics, picture description, verbal fluency, word/phrase/sentence level repetition) with 70% accuracy, mod cues to improve functional expressive communication.   INTERVENTIONS:   Responsive Naming   independent  3/20 given first letter   phrase completion  3/20 given first sound   repetition      SHORT TERM GOAL #2:  Goal 2: Patient will complete auditory/reading comprehension (including direction following of 1-2 step commands, basic and mildly complex yes/questions, functional reading comprehension, Object ID from FO3) with 70% accuracy, mod cues to improve functional recepetive communication.   INTERVENTIONS:   Matching Picture to Written Word from FO6  50/50 independent    *patient requiring some assistance with correctly pronounicng words, however, patient able to independently ID correct picture    SHORT TERM GOAL #3:  Goal 3: Patient will complete written

## 2025-05-08 NOTE — CARE COORDINATION
Sutures removed as ordered. Cranial incision remains well approximated and without s/s of infection. Patient participated in therapy as scheduled and tolerated well. Patient is excited about being able to go hoe tomorrow.

## 2025-05-08 NOTE — PROGRESS NOTES
[facility] [place]  INPATIENT SPEECH THERAPY  MH-STRZ 8K IP REHAB  DAILY NOTE    Discharge Recommendations: Outpatient Speech Therapy  DIET ORDER RECOMMENDATIONS: Regular diet and thin liquids  STRATEGIES: Full Upright Position, Limit Distractions, and Monitor for Fatigue     SLP Individual Minutes  Time In: 1030  Time Out: 1100  Minutes: 30  Timed Code Treatment Minutes: 0 Minutes       Date: 2025  Patient Name: Koko Chao      CSN: [id number]   : 1960  (65 y.o.)  Gender: male   Referring Physician:  Sonya Robison DO  Diagnosis: Brain mass  Precautions: fall precautions, seizure precautions,  Current Diet: Regular diet and thin liquids  Respiratory Status: Room Air  Date of Last MBS/FEES: Not Applicable    Pain:  No pain reported.    Subjective:  Patient sitting in recliner upon ST arrival. Agreeable to skilled ST services. No family present. Pleasant and cooperative throughout.     Short-Term Goals:  SHORT TERM GOAL #1:  Goal 1: Patient will complete verbal expression tasks (including BASIC naming, automatic speech tasks, biographics, picture description, verbal fluency, word/phrase/sentence level repetition) with 70% accuracy, mod cues to improve functional expressive communication.   INTERVENTIONS: Did not address d/t focus on other goals      SHORT TERM GOAL #2:  Goal 2: Patient will complete auditory/reading comprehension (including direction following of 1-2 step commands, basic and mildly complex yes/questions, functional reading comprehension, Object ID from FO3) with 70% accuracy, mod cues to improve functional recepetive communication.   INTERVENTIONS:   ID Items in Category (Reading)  Reading words: 39/50 independent; 2/50 with re-attempt; 2/50 given semantic cue; 2/50 given phonemic cue; 3/50 given phrase completion; 2/50 in unison with ST  ID words to fit in category: 24 independent; 2/24 min cues; 3/4 mod cues    *ID window as room in house  *min cues to ID additional

## 2025-05-08 NOTE — PROGRESS NOTES
Physical Medicine & Rehabilitation   Progress Note    Chief Complaint:  Brain Mass     Subjective: Patient seen and examined.  Patient up in bedside chair.  No family present.  Patient reports good progress with therapy, bowel movement today, denies any pain.  Continues to report poor sleep.  He reports that his mind just continues to race, has trouble slowing it down.  Patient hopeful that he will sleep better while at home, did discuss adjustment of medications for tonight.  He is in agreement with this.  Plan is for DC home on Friday, 5/9/2025.  He denies any questions or concerns about discharge planning.    Rehabilitation:  PT 5/6/2025:  Transfers:  Sit to Stand: Independent  Stand to Sit:Independent  Performed throughout session from various surfaces with appropriate timing.   Ambulation:  Independent  Distance: room <> gym and intermittent distances   Surface: Level Tile  Device: No Device  Gait Deviations: Mild Path Deviations   Stairs:  Stairs: 6\" steps X 7 using One Handrail and Independent.  Performed in reciprocal pattern and with appropriate timing.   Balance:  Dynamic Standing Balance: Contact Guard Assistance  Pt tosses rings to target while standing on hard surface of BOSU in // bars. Performed in SL, bilaterally and tandem stance x 2 (leading with RLE x 1 and LLE x 1)  Observation of excellent ankle and hip strategy. Occasional single UE support on // bar. Completion ~15min. Seated rest breaks between sets.     OT 5/6/2025:  ADL:   Grooming: Independent.  Standing at sink Appropriate sequencing of task   Bathing: Supervision  While standing in shower with patient using labels to guide use of each bottle (shampoo, body wash, face wash).   Upper Extremity Dressing: Modified independent While donning and doffing shirt while standing   Lower Extremity Dressing: Supervision.  1 VC to recall safer technique to don clothing while seated.   Pt doffed shorts while seated, donned while seated,   Footwear  Management: Modified Independent.   To doff and don socks and shoes while seated   Shower Transfer: Supervision    Pt appropriately gathered all needed items for BADL tasks.   TRANSFERS:  Sit to Stand:  Independent.    Stand to Sit: Independent.    FUNCTIONAL MOBILITY:  Assistive Device: None  Assist Level:  Supervision  Distance: To and from bathroom, Within room, and To and from therapy apartment  Pt ascending and descending half flight of stairs using bilateral handrails with supervision while carrying laundry basket,    IADL:   Patient was engaged into dynamic standing therapeutic task(s) that was graded to facilitate:bilateral integration . Patient required Mod I while  retrieving laundry and squatting to remove laundry from dryer. Pt stood to fold laundry with mod I and able to attend to task with distractions present in apartment. Demonstrated good tolerance throughout activity. Standing task completed to challenge endurance and balance required for ADL and IADL skills.        ST 2025:  INTERVENTIONS:   Orientation Lo/30  -City: [facility]  -Location: [facility]  -Hospital: Correctly selected from a FO2 written options  -Month: Indep  -Date: Min cues, Stated \"the fifth\", needed cues that it was the date after  -Year: Correctly stated- utilized information on careboard  -Day of the week: Min cues for word retrieval  -TOD: Indep  -Etiology: Indep  -Deficits: Min cues     INTERVENTIONS:   [place] Calculations: 3/6 indep, 2 with min cues, / with mod cues  *Cueing assist needed for adding amounts together.   *Good success with coin/value identification    *Improved independence and confidence as task progressed  Problem solving given pictured scenes:  -Identification of safety situation: 7/10 indep, 3/10 with min cues  -Provision of a logical solution: 7/10 indep, 2/10 with min cues, 1/10 with mod cues  *Cues needed for attention to details and for word finding to verbalize responses.   *Good success with use of

## 2025-05-08 NOTE — PLAN OF CARE
Problem: Discharge Planning  Goal: Discharge to home or other facility with appropriate resources  2025 1213 by Vanessa Davila LISW-S  Note:   [facility]  Physical Medicine and Rehabilitation  Post Team Conference Note    Date: 2025  Patient Name: Koko Chao  MRN: [id number]  : 1960 (65 y.o.)    IPR Team Conference was held on Thursday, . Recommendations of the team were explained to the patient by SW. Team is recommending that patient continue on acute inpatient rehab for PT, OT and ST, with an expected discharge date of Friday, . Following discharge, team is recommending outpatient OT and ST. Patient would like to pursue outpatient OT and ST at [facility] at discharge. Care plan reviewed with patient. Patient verbalized understanding or the plan of care and contributed to goal setting. SW to follow and maintain involvement in discharge planning.    Plan  Referrals Needed: No additional referrals needed  Family Training: Provided throughout patient's stay  Driving Recommendations: Driving Evaluation

## 2025-05-08 NOTE — PLAN OF CARE
Problem: Discharge Planning  Goal: Discharge to home or other facility with appropriate resources  5/8/2025 1214 by Vanessa Davila LISW-S  Note: Transportation:   Has transportation kept you from medical appointments, meetings, work, or from getting things needed for daily living? (Check all that apply)  No.      Health Literacy:   How often do you need to have someone help you when you read instructions, pamphlets, or other written material from your doctor or pharmacy?  3. - Often    Social Isolation:  How often do you feel lonely or isolated from those around you?  0. Never      Patient Mood Interview (PHQ-2 to 9) (from [employer]©)   Say to Patient: \"Over the last 2 weeks, have you been bothered by any of the following problems?\"   If symptom is present, enter 1 (yes) in column 1 (Symptom Presence)  If yes in column 1, then ask the patient: “About how often have you been bothered by this?”  Read and show the patient a card with the symptom frequency choices.    Indicate response in column 2, Symptom Frequency.   Symptom Presence  No (enter 0 in column 2)   Yes (enter 0-3 in column 2)  9.    No response (leave column 2 blank) Symptom Frequency  Never or 1 day  2-6 days (several days)  7-11 days (half or more of the days)  12-14 days (nearly every day    Symptom Presence Symptom Frequency   Little interest or pleasure in doing things 1. Yes 0. Never or 1 day   Feeling down, depressed, or hopeless 0. No 0. Never or 1 day

## 2025-05-08 NOTE — PROGRESS NOTES
[facility]  Recreational Therapy  Discharge Note  Inpatient Rehabilitation Unit         Date:  5/8/2025       Patient Name: Koko Chao      MRN: [id number]       YOB: 1960 (65 y.o.)       Gender: male     Referring Practitioner: Sonya Robison DO    Patient discharged from Recreational Therapy at this time.  See recreational therapy notes for details.    Electronically signed by: MIRIAN Arguello  Date: 5/8/2025

## 2025-05-08 NOTE — PROGRESS NOTES
[facility]  [facility] 8K IP REHAB  Occupational Therapy  Daily Note    Discharge Recommendations: Home with Outpatient OT and Patient would benefit from continued OT at discharge or BUE HEP as pt progressing well  Equipment Recommendations:   Pt's wife purchasing shower chair for home.       Time In: 0630  Time Out: 0700  Timed Code Treatment Minutes: 30 Minutes  Minutes: 30          Date: 2025  Patient Name: Koko Chao,   Gender: male      [facility]  MRN: [id number]  : 1960  (65 y.o.)  Referring Practitioner: Sonya Robison DO  Diagnosis: Brain mass  Additional Pertinent Hx: Koko Chao is a 65 y.o. male with PMHx of hypertension, hyperlipidemia, central retinal vein occlusion, prostate cancer who presents to [facility] with chief complaint of aphasia.  Patient reports had 10-minute episode on 4/10 where he was not able to express his language. Patient reports this has been ongoing for 3 weeks. CT head done in ED showed suspected intra-axial mass in the left parietal lobe. Edema is anteriorly of mass. Pt s/p LEFT PARIETAL CRANIOTOMY FOR EVACUATION OF CEREBRAL ABSCESS on 25. Repeat CT due to increased aphasia, H/A was done right away and showed a hemorrhage really not in the surgical cavity but rather in the edematous area surrounding the surgical cavity. Pt s/p LEFT PARIETAL CRANIOTOMY HEMATOMA EVACUATION on 25. Patient was transferred out of the ICU on 2025. On 2025, CT head was obtained showing that there was a new hyperdense area posterior and superficial to the location of the tumor resection. Per neurosurgery on 2025, follow-up with neurosurgery in 2 weeks for suture removal (). Pt admitted to inpatient rehab on  for intensive therapy to promote safety and independence with daily activities.    Restrictions/Precautions:  Restrictions/Precautions: Fall Risk, Seizure, General Precautions  Position Activity Restriction  Other

## 2025-05-08 NOTE — PLAN OF CARE
appropriate   Identify discharge learning needs (meds, wound care, etc)   Refer to discharge planning if patient needs post-hospital services based on physician order or complex needs related to functional status, cognitive ability or social support system  Note: Patient being discharged to home tomorrow with wife and outpatient therapies  5/7/2025 2325 by Greg Vergara RN  Outcome: Progressing  Flowsheets (Taken 5/7/2025 2325)  Discharge to home or other facility with appropriate resources: Identify barriers to discharge with patient and caregiver     Problem: Neurosensory - Adult  Goal: Absence of seizures  5/8/2025 1055 by Nurys Kaplan RN  Outcome: Progressing  Flowsheets (Taken 5/8/2025 1055)  Absence of seizures:   Monitor for seizure activity.  If seizure occurs, document type and location of movements and any associated apnea   If seizure occurs, turn head to side and suction secretions as needed   Administer anticonvulsants as ordered   Support airway/breathing, administer oxygen as needed   Diagnostic studies as ordered  5/7/2025 2325 by Greg Vergara RN  Outcome: Progressing  Flowsheets (Taken 5/7/2025 2325)  Absence of seizures:   Monitor for seizure activity.  If seizure occurs, document type and location of movements and any associated apnea   If seizure occurs, turn head to side and suction secretions as needed   Administer anticonvulsants as ordered     Problem: Respiratory - Adult  Goal: Achieves optimal ventilation and oxygenation  Outcome: Progressing  Flowsheets (Taken 5/8/2025 1055)  Achieves optimal ventilation and oxygenation:   Assess for changes in respiratory status   Assess for changes in mentation and behavior     Problem: Cardiovascular - Adult  Goal: Maintains optimal cardiac output and hemodynamic stability  5/8/2025 1055 by Nurys Kaplan RN  Outcome: Progressing  Flowsheets (Taken 5/8/2025 1055)  Maintains optimal cardiac output and hemodynamic stability:    hygiene technique   Identify and instruct in appropriate isolation precautions for identified infection/condition  5/7/2025 2325 by Greg Vergara RN  Outcome: Progressing  Flowsheets (Taken 5/7/2025 2325)  Absence of infection at discharge:   Assess and monitor for signs and symptoms of infection   Monitor lab/diagnostic results     Problem: Metabolic/Fluid and Electrolytes - Adult  Goal: Electrolytes maintained within normal limits  5/8/2025 1055 by Nurys Kaplan RN  Outcome: Progressing  Flowsheets (Taken 5/8/2025 1055)  Electrolytes maintained within normal limits:   Monitor labs and assess patient for signs and symptoms of electrolyte imbalances   Administer electrolyte replacement as ordered   Monitor response to electrolyte replacements, including repeat lab results as appropriate   Fluid restriction as ordered   Instruct patient on fluid and nutrition restrictions as appropriate  5/7/2025 2325 by Greg Vergara RN  Outcome: Progressing  Flowsheets (Taken 5/7/2025 2325)  Electrolytes maintained within normal limits: Monitor labs and assess patient for signs and symptoms of electrolyte imbalances     Problem: Chronic Conditions and Co-morbidities  Goal: Patient's chronic conditions and co-morbidity symptoms are monitored and maintained or improved  5/8/2025 1055 by Nurys Kapaln RN  Outcome: Progressing  Flowsheets (Taken 5/8/2025 1055)  Care Plan - Patient's Chronic Conditions and Co-Morbidity Symptoms are Monitored and Maintained or Improved:   Monitor and assess patient's chronic conditions and comorbid symptoms for stability, deterioration, or improvement   Collaborate with multidisciplinary team to address chronic and comorbid conditions and prevent exacerbation or deterioration   Update acute care plan with appropriate goals if chronic or comorbid symptoms are exacerbated and prevent overall improvement and discharge  5/7/2025 2325 by Greg Vergara RN  Outcome:

## 2025-05-09 VITALS
WEIGHT: 171.12 LBS | HEART RATE: 80 BPM | DIASTOLIC BLOOD PRESSURE: 65 MMHG | RESPIRATION RATE: 16 BRPM | TEMPERATURE: 97.5 F | BODY MASS INDEX: 25.93 KG/M2 | HEIGHT: 68 IN | OXYGEN SATURATION: 96 % | SYSTOLIC BLOOD PRESSURE: 108 MMHG

## 2025-05-09 LAB
ANION GAP SERPL CALC-SCNC: 12 MEQ/L (ref 8–16)
BUN SERPL-MCNC: 25 MG/DL (ref 8–23)
CALCIUM SERPL-MCNC: 9.3 MG/DL (ref 8.8–10.2)
CHLORIDE SERPL-SCNC: 99 MEQ/L (ref 98–111)
CO2 SERPL-SCNC: 24 MEQ/L (ref 22–29)
CREAT SERPL-MCNC: 0.8 MG/DL (ref 0.7–1.2)
GFR SERPL CREATININE-BSD FRML MDRD: > 90 ML/MIN/1.73M2
GLUCOSE SERPL-MCNC: 134 MG/DL (ref 74–109)
POTASSIUM SERPL-SCNC: 5 MEQ/L (ref 3.5–5.2)
SODIUM SERPL-SCNC: 135 MEQ/L (ref 135–145)

## 2025-05-09 PROCEDURE — 94640 AIRWAY INHALATION TREATMENT: CPT

## 2025-05-09 PROCEDURE — 6360000002 HC RX W HCPCS: Performed by: STUDENT IN AN ORGANIZED HEALTH CARE EDUCATION/TRAINING PROGRAM

## 2025-05-09 PROCEDURE — 6370000000 HC RX 637 (ALT 250 FOR IP): Performed by: STUDENT IN AN ORGANIZED HEALTH CARE EDUCATION/TRAINING PROGRAM

## 2025-05-09 PROCEDURE — 97530 THERAPEUTIC ACTIVITIES: CPT

## 2025-05-09 PROCEDURE — 92507 TX SP LANG VOICE COMM INDIV: CPT

## 2025-05-09 PROCEDURE — 80048 BASIC METABOLIC PNL TOTAL CA: CPT

## 2025-05-09 PROCEDURE — 99239 HOSP IP/OBS DSCHRG MGMT >30: CPT | Performed by: STUDENT IN AN ORGANIZED HEALTH CARE EDUCATION/TRAINING PROGRAM

## 2025-05-09 PROCEDURE — 36415 COLL VENOUS BLD VENIPUNCTURE: CPT

## 2025-05-09 PROCEDURE — 97110 THERAPEUTIC EXERCISES: CPT

## 2025-05-09 RX ORDER — CALCIUM CARBONATE 500 MG/1
500 TABLET, CHEWABLE ORAL 3 TIMES DAILY PRN
COMMUNITY
Start: 2025-05-09 | End: 2025-06-08

## 2025-05-09 RX ORDER — BUMETANIDE 0.5 MG/1
0.5 TABLET ORAL DAILY
Qty: 15 TABLET | Refills: 1 | Status: SHIPPED | OUTPATIENT
Start: 2025-05-09 | End: 2025-05-17 | Stop reason: SDUPTHER

## 2025-05-09 RX ORDER — TRAZODONE HYDROCHLORIDE 100 MG/1
100 TABLET ORAL NIGHTLY PRN
Qty: 30 TABLET | Refills: 1 | Status: SHIPPED | OUTPATIENT
Start: 2025-05-09 | End: 2025-05-17 | Stop reason: SDUPTHER

## 2025-05-09 RX ORDER — PANTOPRAZOLE SODIUM 40 MG/1
40 TABLET, DELAYED RELEASE ORAL
Qty: 30 TABLET | Refills: 1 | Status: SHIPPED | OUTPATIENT
Start: 2025-05-10 | End: 2025-05-17 | Stop reason: SDUPTHER

## 2025-05-09 RX ORDER — LEVETIRACETAM 500 MG/1
500 TABLET ORAL 2 TIMES DAILY
Qty: 60 TABLET | Refills: 1 | Status: SHIPPED | OUTPATIENT
Start: 2025-05-09 | End: 2025-05-17 | Stop reason: SDUPTHER

## 2025-05-09 RX ORDER — POLYETHYLENE GLYCOL 3350 17 G/17G
17 POWDER, FOR SOLUTION ORAL DAILY PRN
COMMUNITY
Start: 2025-05-09 | End: 2025-06-08

## 2025-05-09 RX ORDER — DEXAMETHASONE 2 MG/1
2 TABLET ORAL EVERY 8 HOURS
Qty: 42 TABLET | Refills: 0 | Status: SHIPPED | OUTPATIENT
Start: 2025-05-09 | End: 2025-05-23

## 2025-05-09 RX ORDER — LOSARTAN POTASSIUM 25 MG/1
37.5 TABLET ORAL 2 TIMES DAILY
Qty: 45 TABLET | Refills: 1 | Status: SHIPPED | OUTPATIENT
Start: 2025-05-09 | End: 2025-05-17 | Stop reason: SDUPTHER

## 2025-05-09 RX ORDER — POLYVINYL ALCOHOL 14 MG/ML
1 SOLUTION/ DROPS OPHTHALMIC PRN
COMMUNITY
Start: 2025-05-09 | End: 2025-06-08

## 2025-05-09 RX ORDER — BUSPIRONE HYDROCHLORIDE 5 MG/1
5 TABLET ORAL 2 TIMES DAILY
Qty: 60 TABLET | Refills: 1 | Status: SHIPPED | OUTPATIENT
Start: 2025-05-09 | End: 2025-05-17 | Stop reason: SDUPTHER

## 2025-05-09 RX ORDER — AMLODIPINE BESYLATE 10 MG/1
10 TABLET ORAL DAILY
Qty: 30 TABLET | Refills: 1 | Status: SHIPPED | OUTPATIENT
Start: 2025-05-09 | End: 2025-05-17 | Stop reason: SDUPTHER

## 2025-05-09 RX ADMIN — APIXABAN 10 MG: 5 TABLET, FILM COATED ORAL at 09:01

## 2025-05-09 RX ADMIN — DEXAMETHASONE 2 MG: 4 TABLET ORAL at 06:02

## 2025-05-09 RX ADMIN — LEVETIRACETAM 500 MG: 500 SOLUTION ORAL at 06:02

## 2025-05-09 RX ADMIN — BUMETANIDE 0.5 MG: 0.5 TABLET ORAL at 09:01

## 2025-05-09 RX ADMIN — BUSPIRONE HYDROCHLORIDE 5 MG: 5 TABLET ORAL at 09:01

## 2025-05-09 RX ADMIN — DEXAMETHASONE 2 MG: 4 TABLET ORAL at 00:17

## 2025-05-09 RX ADMIN — LOSARTAN POTASSIUM 37.5 MG: 25 TABLET, FILM COATED ORAL at 09:01

## 2025-05-09 RX ADMIN — TIOTROPIUM BROMIDE AND OLODATEROL 2 PUFF: 3.124; 2.736 SPRAY, METERED RESPIRATORY (INHALATION) at 05:56

## 2025-05-09 RX ADMIN — PANTOPRAZOLE SODIUM 40 MG: 40 TABLET, DELAYED RELEASE ORAL at 06:02

## 2025-05-09 RX ADMIN — AMLODIPINE BESYLATE 10 MG: 10 TABLET ORAL at 09:01

## 2025-05-09 RX ADMIN — DEXAMETHASONE 2 MG: 4 TABLET ORAL at 12:06

## 2025-05-09 ASSESSMENT — PAIN SCALES - GENERAL: PAINLEVEL_OUTOF10: 0

## 2025-05-09 NOTE — PROGRESS NOTES
Patient discharged in stable condition as per order of attending physician to Home per staff at Time: 1230 to car.    AVS provided by RN at time of discharge, which includes all necessary medical information pertaining to the patients current course of illness, treatment, medications, post-discharge goals of care, and treatment preferences.     Patient/ family verbalize understanding of discharge plan and are in agreement with goal/plan/treatment preferences.     Belongings including Glasses sent with patient.      Home medications sent home with patient yes    Availability of \"My Chart\" offered to patient as a tool for updated health record.  Steps for activation discussed with patient as mentioned on AVS.

## 2025-05-09 NOTE — PROGRESS NOTES
[facility]  INPATIENT PHYSICAL THERAPY  DISCHARGE NOTE  MH-STRZ 8K  REHAB - 8K-17/017-A      Discharge Recommendations: Home with Outpatient PT  Equipment Recommendations: No               Time In: 0630  Time Out: 0653  Timed Code Treatment Minutes: 23 Minutes  Minutes: 23          Date: 2025  Patient Name: Koko Chao,  Gender:  male        MRN: [id number]  : 1960  (65 y.o.)     Referring Practitioner: Sonya Robison DO  Diagnosis: Brain mass  Additional Pertinent Hx: Per H&P \"Koko Chao is a 65 y.o. male with PMHx of hypertension, hyperlipidemia, central retinal vein occlusion, prostate cancer who presents to [facility] with chief complaint of aphasia.  Patient reports had 10-minute episode on 4/10 where he was not able to express his language. Patient reports this has been ongoing for 3 weeks. CT head done in ED showed suspected intra-axial mass in the left parietal lobe. Edema is anteriorly of mass. Pt s/p LEFT PARIETAL CRANIOTOMY FOR EVACUATION OF CEREBRAL ABSCESS on 25. Repeat CT due to increased aphasia, H/A was done right away and showed a hemorrhage really not in the surgical cavity but rather in the edematous area surrounding the surgical cavity. Pt s/p LEFT PARIETAL CRANIOTOMY HEMATOMA EVACUATION on 25. Patient was transferred out of the ICU on 2025. On 2025, CT head was obtained showing that there was a new hyperdense area posterior and superficial to the location of the tumor resection. Per neurosurgery on 2025, follow-up with neurosurgery in 2 weeks for suture removal (). Pt admitted to inpatient rehab on  for intensive therapy to promote safety and independence with daily activities.\"     Prior Level of Function:  Lives With: Spouse  Type of Home: House  Home Layout: One level  Home Access: Stairs to enter with rails  Entrance Stairs - Number of Steps: 3  Entrance Stairs - Rails: Left  Home Equipment: None   Bathroom

## 2025-05-09 NOTE — PLAN OF CARE
Problem: ABCDS Injury Assessment  Goal: Absence of physical injury  Outcome: Completed     Problem: Nutrition Deficit:  Goal: Optimize nutritional status  5/9/2025 1110 by Leslie Lloyd LPN  Outcome: Completed     Problem: Safety - Adult  Goal: Free from fall injury  5/9/2025 1110 by Leslie Lloyd LPN  Outcome: Completed     Problem: Discharge Planning  Goal: Discharge to home or other facility with appropriate resources  5/9/2025 1110 by Leslie Lloyd LPN  Outcome: Completed  Note: Patient is planning to be discharged today 5/9/25     Problem: Neurosensory - Adult  Goal: Absence of seizures  5/9/2025 1110 by Leslie lLoyd LPN  Outcome: Completed     Problem: Respiratory - Adult  Goal: Achieves optimal ventilation and oxygenation  Outcome: Completed     Problem: Cardiovascular - Adult  Goal: Maintains optimal cardiac output and hemodynamic stability  Outcome: Completed     Problem: Skin/Tissue Integrity - Adult  Goal: Incisions, wounds, or drain sites healing without S/S of infection  5/9/2025 1110 by Leslie Lloyd LPN  Outcome: Completed     Problem: Musculoskeletal - Adult  Goal: Return mobility to safest level of function  5/9/2025 1110 by Leslie Lloyd LPN  Outcome: Completed     Problem: Gastrointestinal - Adult  Goal: Maintains or returns to baseline bowel function  5/9/2025 1110 by Leslie Lloyd LPN  Outcome: Completed     Problem: Genitourinary - Adult  Goal: Absence of urinary retention  Outcome: Completed     Problem: Infection - Adult  Goal: Absence of infection at discharge  5/9/2025 1110 by Leslie Lloyd LPN  Outcome: Completed     Problem: Metabolic/Fluid and Electrolytes - Adult  Goal: Electrolytes maintained within normal limits  Outcome: Completed     Problem: Chronic Conditions and Co-morbidities  Goal: Patient's chronic conditions and co-morbidity symptoms are monitored and maintained or improved  Outcome: Completed

## 2025-05-09 NOTE — PROGRESS NOTES
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 *      Plan:     Medically stable for discharge tomorrow        Subjective:     Patient has no complaint of CP or SOB..   Medication side effects: none    Scheduled Meds:   traZODone  100 mg Oral Nightly    apixaban  10 mg Oral BID    Followed by    [START ON 5/9/2025] apixaban  5 mg Oral BID    busPIRone  5 mg Oral BID    levETIRAcetam  500 mg Oral Q12H    miconazole   Topical BID    pravastatin  40 mg Oral Nightly    pantoprazole  40 mg Oral QAM AC    tiotropium-olodaterol  2 puff Inhalation Daily    amLODIPine  10 mg Oral Daily    losartan  37.5 mg Oral BID    melatonin  6 mg Oral Nightly    bumetanide  0.5 mg Oral Daily    dexAMETHasone  2 mg Oral 4 times per day     Continuous Infusions:   sodium chloride      dextrose       PRN Meds:gadoteridol, sodium chloride flush, sodium chloride, potassium chloride **OR** potassium alternative oral replacement **OR** potassium chloride, magnesium sulfate, polyethylene glycol, calcium carbonate, albuterol, acetaminophen, polyvinyl alcohol, glucose, dextrose bolus **OR** dextrose bolus, glucagon (rDNA), dextrose, ondansetron    Review of Systems  Pertinent items are noted in HPI.    Objective:     Patient Vitals for the past 8 hrs:   BP Temp Temp src Pulse Resp SpO2   05/08/25 2045 134/73 97.7 °F (36.5 °C) Oral 73 16 96 %     I/O last 3 completed shifts:  In: 1586 [P.O.:1586]  Out: -   No intake/output data recorded.    /73   Pulse 73   Temp 97.7 °F (36.5 °C) (Oral)   Resp 16   Ht 1.727 m (5' 8\")   Wt 77.6 kg (171 lb 1.6 oz)   SpO2 96%   BMI 26.02 kg/m²

## 2025-05-09 NOTE — PLAN OF CARE
Problem: Discharge Planning  Goal: Discharge to home or other facility with appropriate resources  2025 0824 by Vanessa Davila LISW-S  Note:   [facility]  Physical Medicine and Rehabilitation  Case Management Discharge Note    Date: 2025  Patient Name: Koko Chao  MRN: [id number]  : 1960 (65 y.o.)    Patient to be discharged on Friday,  to home. Patient will be under the supervision of his wife, Anita. Family training was provided throughout patient's stay. IPR team recommended outpatient OT and ST at discharge. Patient will be receiving services through [facility]. SW initiated referral on  to [facility] via [employer]. No outstanding needs or concerns regarding discharge plan. SW provided contact information for future questions or concerns.

## 2025-05-09 NOTE — PLAN OF CARE
Problem: Nutrition Deficit:  Goal: Optimize nutritional status  5/8/2025 2339 by Greg Vergara RN  Outcome: Progressing  Flowsheets (Taken 5/8/2025 2339)  Nutrient intake appropriate for improving, restoring, or maintaining nutritional needs: Assess nutritional status and recommend course of action     Problem: Safety - Adult  Goal: Free from fall injury  5/8/2025 2339 by Greg Vergara RN  Outcome: Progressing  Flowsheets (Taken 5/8/2025 2339)  Free From Fall Injury: Instruct family/caregiver on patient safety     Problem: Discharge Planning  Goal: Discharge to home or other facility with appropriate resources  5/8/2025 2339 by Greg Vergara RN  Outcome: Progressing  Flowsheets (Taken 5/8/2025 2339)  Discharge to home or other facility with appropriate resources:   Identify barriers to discharge with patient and caregiver   Identify discharge learning needs (meds, wound care, etc)     Problem: Neurosensory - Adult  Goal: Absence of seizures  5/8/2025 2339 by Greg Vergara RN  Outcome: Progressing  Flowsheets (Taken 5/8/2025 2339)  Absence of seizures:   Monitor for seizure activity.  If seizure occurs, document type and location of movements and any associated apnea   Administer anticonvulsants as ordered     Problem: Skin/Tissue Integrity - Adult  Goal: Incisions, wounds, or drain sites healing without S/S of infection  5/8/2025 2339 by Greg Vergara RN  Outcome: Progressing  Flowsheets (Taken 5/8/2025 2056)  Incisions, Wounds, or Drain Sites Healing Without Sign and Symptoms of Infection: TWICE DAILY: Assess and document skin integrity     Problem: Musculoskeletal - Adult  Goal: Return mobility to safest level of function  5/8/2025 2339 by Greg Vergara RN  Outcome: Progressing  Flowsheets (Taken 5/8/2025 2339)  Return Mobility to Safest Level of Function:   Assess patient stability and activity tolerance for standing, transferring and ambulating with or without assistive devices    Assist with transfers and ambulation using safe patient handling equipment as needed     Problem: Gastrointestinal - Adult  Goal: Maintains or returns to baseline bowel function  5/8/2025 2339 by Greg Vergara, RN  Outcome: Progressing  Flowsheets (Taken 5/8/2025 2339)  Maintains or returns to baseline bowel function:   Assess bowel function   Encourage oral fluids to ensure adequate hydration   Administer ordered medications as needed     Problem: Infection - Adult  Goal: Absence of infection at discharge  5/8/2025 2339 by Greg Vergara, RN  Outcome: Progressing  Flowsheets (Taken 5/8/2025 2339)  Absence of infection at discharge: Assess and monitor for signs and symptoms of infection

## 2025-05-09 NOTE — PROGRESS NOTES
Pricing inquiry for Eliquis:    Eliquis 5mg #60: $144.61 (90 days is $250).    He is eligible to receive a 30 day free trial. He should also be eligible for the monthly copay assist card that would bring down the cost to $10/month or $30/3 months. We can provide that to him to take to any pharmacy he prefers, but he will have to activate it before use. This card he will only be able to use as long as he has a commercial prescription plan.    Thanks!  Sunitha Villegas CPhT  Patient  Ext [phone]  Outpatient Pharmacy  5/9/2025,8:11 AM

## 2025-05-09 NOTE — PROGRESS NOTES
[facility]  MH-STRZ 8K IP REHAB  Occupational Therapy  Discharge Note    Discharge Recommendations: Home with Outpatient OT  Equipment Recommendations:   Pt's wife purchasing shower chair for home.      Time In: 0800  Time Out: 0830  Timed Code Treatment Minutes: 30 Minutes  Minutes: 30          Date: 2025  Patient Name: Koko Chao,   Gender: male      Room: [address] [place] [address]  MRN: [id number]  : 1960  (65 y.o.)  Referring Practitioner: Soyna Robison DO  Diagnosis: Brain mass  Additional Pertinent Hx: Koko Chao is a 65 y.o. male with PMHx of hypertension, hyperlipidemia, central retinal vein occlusion, prostate cancer who presents to [facility] with chief complaint of aphasia.  Patient reports had 10-minute episode on 4/10 where he was not able to express his language. Patient reports this has been ongoing for 3 weeks. CT head done in ED showed suspected intra-axial mass in the left parietal lobe. Edema is anteriorly of mass. Pt s/p LEFT PARIETAL CRANIOTOMY FOR EVACUATION OF CEREBRAL ABSCESS on 25. Repeat CT due to increased aphasia, H/A was done right away and showed a hemorrhage really not in the surgical cavity but rather in the edematous area surrounding the surgical cavity. Pt s/p LEFT PARIETAL CRANIOTOMY HEMATOMA EVACUATION on 25. Patient was transferred out of the ICU on 2025. On 2025, CT head was obtained showing that there was a new hyperdense area posterior and superficial to the location of the tumor resection. Per neurosurgery on 2025, follow-up with neurosurgery in 2 weeks for suture removal (). Pt admitted to inpatient rehab on  for intensive therapy to promote safety and independence with daily activities.    Restrictions/Precautions:  Restrictions/Precautions: Fall Risk, Seizure, General Precautions  Position Activity Restriction  Other Position/Activity Restrictions: Can shower, however, cannot get head sutures

## 2025-05-09 NOTE — PROGRESS NOTES
[facility]  INPATIENT SPEECH THERAPY  MH-STRZ 8K IP REHAB  DISCHARGE NOTE    Discharge Recommendations: Outpatient Speech Therapy  DIET ORDER RECOMMENDATIONS: Regular diet and thin liquids  STRATEGIES: Full Upright Position, Limit Distractions, and Monitor for Fatigue     SLP Individual Minutes  Time In: 0730  Time Out: 0800  Minutes: 30  Timed Code Treatment Minutes: 0 Minutes       Date: 2025  Patient Name: Koko Chao      CSN: [id number]   : 1960  (65 y.o.)  Gender: male   Referring Physician:  Sonya Robison DO  Diagnosis: Brain mass  Precautions: fall precautions, seizure precautions,  Current Diet: Regular diet and thin liquids  Respiratory Status: Room Air  Date of Last MBS/FEES: Not Applicable    Pain:  No pain reported.    Subjective:  Patient sitting in recliner upon ST arrival. Agreeable to skilled ST services. No family present. Pleasant and cooperative throughout.     Short-Term Goals:  SHORT TERM GOAL #1:  Goal 1: Patient will complete verbal expression tasks (including BASIC naming, automatic speech tasks, biographics, picture description, verbal fluency, word/phrase/sentence level repetition) with 70% accuracy, mod cues to improve functional expressive communication. GOAL MET  INTERVENTIONS:   Confrontational Naming - iPad   independent   given first letter   phrase completion   given first sound   written word    *patient with good use of circumlocution to assist with word finding    PREVIOUS SESSION  Responsive Naming   independent  3/20 given first letter   phrase completion  3/20 given first sound   repetition    SHORT TERM GOAL #2:  Goal 2: Patient will complete auditory/reading comprehension (including direction following of 1-2 step commands, basic and mildly complex yes/questions, functional reading comprehension, Object ID from FO3) with 70% accuracy, mod cues to improve functional recepetive communication. GOAL

## 2025-05-09 NOTE — DISCHARGE SUMMARY
Physical Medicine & Rehabilitation   Discharge Summary     Patient Identification:  Koko Chao  : 1960  Admit date: 2025  Discharge date: 2025   Attending provider: Sonya Robison DO        Primary care provider: Hannah Banks MD     Discharge Diagnoses:   Left parietal  brain mass   S/p left parietal craniotomy for resection 25 with Dr Singh  Left parietal parenchymal hemorrhage  S/p left parietal craniotomy hematoma evacuation 2025 with Dr Singh  Expressive Aphasia  Gait disturbance  Acute DVT RLE  Anxiety  Leukocytosis  Bilateral pleural effusion  Hypertension  Prostate cancer, surveillance  BPH  History of kidney stone  Nocturnal hypoxia  Hyperlipidemia  Retinal central vein occlusion     Consults:   Family Medicine, oncology, radiation oncology      Inpatient Acute Hospital Course:   Koko Chao  is a 65 y.o. male with PMH significant for right eyes central retinal vein occlusion, prostate cancer, heartburn, HLD, and HTN who is being admitted to the inpatient rehabilitation unit on 2025.  History obtained via: ED documentation, acute care documentation, and patient     Patient initially presented to [facility] ED on  for 415 for evaluation of memory issues and word finding difficulty.  Patient additionally reported intermittent blurred vision.  In the ED, CT head was performed which reportedly revealed an intra-axial mass in the left parietal lobe.  Neurosurgery was consulted.  Patient was started on Keppra and Decadron.  Additionally, MRI of the brain was ordered and CT abdomen/pelvis ordered.  Patient was admitted for further evaluation management.     On admission, MRI brain was completed patient was completed which revealed a peripherally enhancing lesion measuring up to 2.4 cm within the posterior left parietal lobe with mild vasogenic edema.  Appearance reportedly suspicious for cerebral abscess.  CT of the chest, abdomen, and pelvis reportedly

## 2025-05-13 ENCOUNTER — APPOINTMENT (OUTPATIENT)
Dept: PHYSICAL THERAPY | Age: 65
End: 2025-05-13
Payer: COMMERCIAL

## 2025-05-14 ENCOUNTER — TELEPHONE (OUTPATIENT)
Dept: NEUROSURGERY | Age: 65
End: 2025-05-14

## 2025-05-14 NOTE — TELEPHONE ENCOUNTER
Patient's wife was called to reschedule the patient's appointment on 05/15 as Dr. Singh is going to be out of the office. VM was left stating for them to call the office back and get this appointment rescheduled.

## 2025-05-15 ENCOUNTER — HOSPITAL ENCOUNTER (OUTPATIENT)
Dept: CT IMAGING | Age: 65
Discharge: HOME | End: 2025-05-15

## 2025-05-15 ENCOUNTER — HOSPITAL ENCOUNTER (OUTPATIENT)
Dept: RADIATION ONCOLOGY | Age: 65
Discharge: HOME | End: 2025-05-15
Payer: COMMERCIAL

## 2025-05-15 ENCOUNTER — TELEPHONE (OUTPATIENT)
Dept: FAMILY MEDICINE CLINIC | Age: 65
End: 2025-05-15

## 2025-05-15 DIAGNOSIS — C71.3 MALIGNANT NEOPLASM OF PARIETAL LOBE (HCC): ICD-10-CM

## 2025-05-15 DIAGNOSIS — C71.9 GLIOBLASTOMA (HCC): Primary | ICD-10-CM

## 2025-05-15 PROCEDURE — 77334 RADIATION TREATMENT AID(S): CPT | Performed by: RADIOLOGY

## 2025-05-15 PROCEDURE — 77470 SPECIAL RADIATION TREATMENT: CPT | Performed by: RADIOLOGY

## 2025-05-15 PROCEDURE — 77332 RADIATION TREATMENT AID(S): CPT | Performed by: RADIOLOGY

## 2025-05-15 PROCEDURE — 3209999900 CT GUIDE RADIATION THERAPY NO CHARGE

## 2025-05-15 NOTE — TELEPHONE ENCOUNTER
Care Transitions Initial Follow Up Call    Outreach made within 2 business days of discharge: Yes    Patient: Koko Chao Patient : 1960   MRN: [id number]  Reason for Admission:   Discharge Date: 25       Spoke with: YOLI Full      Discharge department/facility: [facility]        Follow Up  Future Appointments   Date Time Provider Department Center   2025 11:00 AM Ginger Pickering SLP STRZ SPEECH [facility]   2025 12:00 PM Lili Lopez OT STRETTA OT [facility]   2025 11:00 AM Zander Singh MD N [facility]PXNEURSU [facility] [place]   2025  1:15 PM Yo Denis MD [place] [facility]       Cassie Van MA

## 2025-05-16 ENCOUNTER — HOSPITAL ENCOUNTER (OUTPATIENT)
Dept: SPEECH THERAPY | Age: 65
Setting detail: THERAPIES SERIES
Discharge: HOME | End: 2025-05-16
Payer: COMMERCIAL

## 2025-05-16 ENCOUNTER — HOSPITAL ENCOUNTER (OUTPATIENT)
Dept: OCCUPATIONAL THERAPY | Age: 65
Setting detail: THERAPIES SERIES
Discharge: HOME | End: 2025-05-16
Payer: COMMERCIAL

## 2025-05-16 PROCEDURE — 97166 OT EVAL MOD COMPLEX 45 MIN: CPT

## 2025-05-16 PROCEDURE — 92523 SPEECH SOUND LANG COMPREHEN: CPT | Performed by: SPEECH-LANGUAGE PATHOLOGIST

## 2025-05-16 NOTE — PROGRESS NOTES
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  he has a past surgical history that includes Testicle removal (); Colonoscopy (); Prostate biopsy (); liver biopsy (); Ultrasound Prostate/Transrectal (N/A, 2020); Colonoscopy (N/A, 3/13/2020); Upper gastrointestinal endoscopy (N/A, 3/13/2020); Cystoscopy (Left, 2023); Ureter surgery (N/A, 4/15/2023); craniotomy (Left, 2025); and craniotomy (Left, 2025).     Allergies Allergies   Allergen Reactions    Demerol Hives    Midazolam Hcl Hives    Other/Food Other (See Comments)     -zines; muscle spasms    Versed [Midazolam]      Hives        Medications   Current Outpatient Medications:     losartan (COZAAR) 25 MG tablet, Take 1.5 tablets by mouth 2 times daily, Disp: 45 tablet, Rfl: 1    pantoprazole (PROTONIX) 40 MG tablet, Take 1 tablet by mouth every morning (before breakfast), Disp: 30 tablet, Rfl: 1    amLODIPine (NORVASC) 10 MG tablet, Take 1 tablet by mouth daily, Disp: 30 tablet, Rfl: 1    bumetanide (BUMEX) 0.5 MG

## 2025-05-16 NOTE — TELEPHONE ENCOUNTER
Care Transitions Initial Follow Up Call    Outreach made within 2 business days of discharge: Yes    Patient: Koko Chao Patient : 1960   MRN: [id number]  Reason for Admission: Brain mass   Discharge Date: 25       Spoke with: Unable to leave voicemail due to mailbox is full at this time.    Discharge department/facility: [facility] Interactive Patient Contact:  Was patient able to fill all prescriptions:   Was patient instructed to bring all medications to the follow-up visit:   Is patient taking all medications as directed in the discharge summary?   Does patient understand their discharge instructions:   Does patient have questions or concerns that need addressed prior to 7-14 day follow up office visit:     Additional needs identified to be addressed with provider               Scheduled appointment with PCP within 7-14 days    Follow Up  Future Appointments   Date Time Provider Department Center   2025 11:00 AM Ginger Pickering SLP STR[facility] SPEECH [facility]   2025 12:00 PM Lili Lopez OT STRZ OT [facility]   2025  1:30 PM Zander Singh MD N [facility]NEURSU [facility]   2025  1:15 PM Yo Denis MD [place] [facility]       Razia Beckham LPN

## 2025-05-17 DIAGNOSIS — C71.3 MALIGNANT NEOPLASM OF PARIETAL LOBE OF BRAIN (HCC): Primary | ICD-10-CM

## 2025-05-17 RX ORDER — BUSPIRONE HYDROCHLORIDE 5 MG/1
5 TABLET ORAL 2 TIMES DAILY
Qty: 60 TABLET | Refills: 0 | Status: SHIPPED | OUTPATIENT
Start: 2025-05-17

## 2025-05-17 RX ORDER — LOSARTAN POTASSIUM 25 MG/1
37.5 TABLET ORAL 2 TIMES DAILY
Qty: 45 TABLET | Refills: 0 | Status: SHIPPED | OUTPATIENT
Start: 2025-05-17

## 2025-05-17 RX ORDER — PANTOPRAZOLE SODIUM 40 MG/1
40 TABLET, DELAYED RELEASE ORAL
Qty: 30 TABLET | Refills: 0 | Status: SHIPPED | OUTPATIENT
Start: 2025-05-17

## 2025-05-17 RX ORDER — LEVETIRACETAM 500 MG/1
500 TABLET ORAL 2 TIMES DAILY
Qty: 60 TABLET | Refills: 0 | Status: SHIPPED | OUTPATIENT
Start: 2025-05-17

## 2025-05-17 RX ORDER — AMLODIPINE BESYLATE 10 MG/1
10 TABLET ORAL DAILY
Qty: 30 TABLET | Refills: 0 | Status: SHIPPED | OUTPATIENT
Start: 2025-05-17

## 2025-05-17 RX ORDER — TRAZODONE HYDROCHLORIDE 100 MG/1
100 TABLET ORAL NIGHTLY PRN
Qty: 30 TABLET | Refills: 0 | Status: SHIPPED | OUTPATIENT
Start: 2025-05-17

## 2025-05-17 RX ORDER — BUMETANIDE 0.5 MG/1
0.5 TABLET ORAL DAILY
Qty: 30 TABLET | Refills: 0 | Status: SHIPPED | OUTPATIENT
Start: 2025-05-17

## 2025-05-17 NOTE — PROGRESS NOTES
The patient and his wife come to inpatient rehab floor 8K because the patient ran out of medications and is unable to refill the medication because our [facility] outpatient pharmacy is closed.    The patient requests refill of medications prescription to sent to [facility].    1 month supply of following medication are prescribed :  Buspirone 5 mg twice daily  Losartan 25 mg 1-1/2 tablet twice daily  Bumetanide 0.5 mg daily  Trazodone 100 mg nightly  Protonix 40 mg daily  Eliquis 5 mg twice daily  Stiolto inhaler 2 puff daily  Keppra 500 mg twice daily  Norvasc 10 mg daily    ZHENG MACKENZIE MD

## 2025-05-19 ENCOUNTER — HOSPITAL ENCOUNTER (OUTPATIENT)
Dept: INTERVENTIONAL RADIOLOGY/VASCULAR | Age: 65
Discharge: HOME | End: 2025-05-21
Attending: INTERNAL MEDICINE
Payer: COMMERCIAL

## 2025-05-19 ENCOUNTER — TRANSCRIBE ORDERS (OUTPATIENT)
Dept: ADMINISTRATIVE | Age: 65
End: 2025-05-19

## 2025-05-19 DIAGNOSIS — I82.451 DEEP VEIN THROMBOSIS OF PERONEAL VEIN, RIGHT (HCC): ICD-10-CM

## 2025-05-19 DIAGNOSIS — I82.451 DEEP VEIN THROMBOSIS OF PERONEAL VEIN, RIGHT (HCC): Primary | ICD-10-CM

## 2025-05-19 LAB
FUNGUS SPEC CULT: NORMAL
FUNGUS SPEC FUNGUS STN: NORMAL

## 2025-05-19 PROCEDURE — 93971 EXTREMITY STUDY: CPT

## 2025-05-20 ENCOUNTER — OFFICE VISIT (OUTPATIENT)
Dept: NEUROSURGERY | Age: 65
End: 2025-05-20

## 2025-05-20 VITALS
HEIGHT: 68 IN | BODY MASS INDEX: 25.16 KG/M2 | HEART RATE: 95 BPM | DIASTOLIC BLOOD PRESSURE: 71 MMHG | WEIGHT: 166 LBS | SYSTOLIC BLOOD PRESSURE: 105 MMHG

## 2025-05-20 DIAGNOSIS — C71.9 GLIOBLASTOMA (HCC): Primary | ICD-10-CM

## 2025-05-20 PROCEDURE — 99024 POSTOP FOLLOW-UP VISIT: CPT | Performed by: NEUROLOGICAL SURGERY

## 2025-05-20 NOTE — PROGRESS NOTES
[facility] PHYSICIANS [place] [facility]  [facility] NEUROSURGERY  [address]  [facility] [address]  Dept: [phone]  Dept Fax: [phone]  Loc: [phone]    Post Op Follow Visit  Visit Date: 5/20/2025      Koko  is a 65 y.o. male who is returning to the office today for a post-op follow-up visit. He had surgery on       Patient was evaluated today and is doing  overall.  No new complaints were voiced.  Patient  lives with their family  Wound: wound healing as expected  Follow-up Studies: No orders of the defined types were placed in this encounter.       Assessment/Plan:  Status Post L Parietal Craniotomy for Resection of Mass   Doing very well overall  Encouraged gradual increase in physical and mental activity.  Fall precaution and home safety education provided to patient.  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  Repeat head CT the day after the procedure showed a complete resection of the left parietal lesion.  An MRI performed on May 6 showed a complete resection of the left parietal lesion.  The patient's pathology came back as highly vascular with central areas of necrosis.  He is being treated currently as if this is a grade 4 glioma.  He had radiation therapy planning this week and is going to start Temodar.  The patient is currently on Decadron and the neuro oncologist will likely decrease the Decadron to 2 mg p.o. every 6 hours.  His incision is well-healed.  I did take out 2 kn and replaced these knots with april.  Koko will follow-up with me in 2 weeks to have his staples removed.  Otherwise Koko is doing very well and his anomic aphasia has improved greatly.  According to his wife he does have some periods of difficulty with word finding but again this is much improved compared to 2 weeks ago.  Overall Koko is doing well and as mentioned above we will follow-up in 2

## 2025-05-20 NOTE — TELEPHONE ENCOUNTER
Care Transitions Initial Follow Up Call    Outreach made within 2 business days of discharge: Yes    Patient: Koko Chao Patient : 1960   MRN: [id number]  Reason for Admission:   Discharge Date: 25       Spoke with: Attempted to contact, could not leave . [employer] message sent.    Discharge department/facility: [facility]          Kym Bishop MA

## 2025-05-21 ENCOUNTER — HOSPITAL ENCOUNTER (OUTPATIENT)
Dept: OCCUPATIONAL THERAPY | Age: 65
Setting detail: THERAPIES SERIES
Discharge: HOME | End: 2025-05-21
Payer: COMMERCIAL

## 2025-05-21 PROCEDURE — 97110 THERAPEUTIC EXERCISES: CPT

## 2025-05-21 NOTE — PROGRESS NOTES
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  he has a past surgical history that includes Testicle removal (); Colonoscopy (); Prostate biopsy (); liver biopsy (); Ultrasound Prostate/Transrectal (N/A, 2020); Colonoscopy (N/A, 3/13/2020); Upper gastrointestinal endoscopy (N/A, 3/13/2020); Cystoscopy (Left, 2023); Ureter surgery (N/A, 4/15/2023); craniotomy (Left, 2025); and craniotomy (Left, 2025).     Allergies Allergies   Allergen Reactions    Demerol Hives    Midazolam Hcl Hives    Other/Food Other (See Comments)     -zines; muscle spasms    Versed [Midazolam]      Hives        Medications   Current Outpatient Medications:     busPIRone (BUSPAR) 5 MG tablet, Take 1 tablet by mouth 2 times daily, Disp: 60 tablet, Rfl: 0    losartan (COZAAR) 25 MG tablet, Take 1.5 tablets by mouth 2 times daily, Disp: 45 tablet, Rfl: 0    traZODone (DESYREL) 100 MG tablet, Take 1 tablet by mouth nightly as needed for Sleep, Disp: 30 tablet, Rfl: 0    pantoprazole (PROTONIX) 40 MG

## 2025-05-22 ENCOUNTER — HOSPITAL ENCOUNTER (OUTPATIENT)
Dept: SPEECH THERAPY | Age: 65
Setting detail: THERAPIES SERIES
Discharge: HOME | End: 2025-05-22
Payer: COMMERCIAL

## 2025-05-22 PROCEDURE — 92507 TX SP LANG VOICE COMM INDIV: CPT | Performed by: SPEECH-LANGUAGE PATHOLOGIST

## 2025-05-22 NOTE — PROGRESS NOTES
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  he has a past surgical history that includes Testicle removal (); Colonoscopy (); Prostate biopsy (); liver biopsy (); Ultrasound Prostate/Transrectal (N/A, 2020); Colonoscopy (N/A, 3/13/2020); Upper gastrointestinal endoscopy (N/A, 3/13/2020); Cystoscopy (Left, 2023); Ureter surgery (N/A, 4/15/2023); craniotomy (Left, 2025); and craniotomy (Left, 2025).     Allergies Allergies   Allergen Reactions    Demerol Hives    Midazolam Hcl Hives    Other/Food Other (See Comments)     -zines; muscle spasms    Versed [Midazolam]      Hives        Medications   Current Outpatient Medications:     busPIRone (BUSPAR) 5 MG tablet, Take 1 tablet by mouth 2 times daily, Disp: 60 tablet, Rfl: 0    losartan (COZAAR) 25 MG tablet, Take 1.5 tablets by mouth 2 times daily, Disp: 45 tablet, Rfl: 0    traZODone (DESYREL) 100 MG tablet, Take 1 tablet by mouth nightly as needed for Sleep, Disp: 30 tablet, Rfl: 0    pantoprazole (PROTONIX) 40 MG tablet, Take 1 tablet by mouth every morning

## 2025-05-23 ENCOUNTER — APPOINTMENT (OUTPATIENT)
Dept: OCCUPATIONAL THERAPY | Age: 65
End: 2025-05-23
Payer: COMMERCIAL

## 2025-05-27 ENCOUNTER — HOSPITAL ENCOUNTER (OUTPATIENT)
Dept: OCCUPATIONAL THERAPY | Age: 65
Setting detail: THERAPIES SERIES
Discharge: HOME | End: 2025-05-27
Payer: COMMERCIAL

## 2025-05-27 PROCEDURE — 97110 THERAPEUTIC EXERCISES: CPT

## 2025-05-27 PROCEDURE — 97530 THERAPEUTIC ACTIVITIES: CPT

## 2025-05-27 NOTE — PROGRESS NOTES
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  he has a past surgical history that includes Testicle removal (); Colonoscopy (); Prostate biopsy (); liver biopsy (); Ultrasound Prostate/Transrectal (N/A, 2020); Colonoscopy (N/A, 3/13/2020); Upper gastrointestinal endoscopy (N/A, 3/13/2020); Cystoscopy (Left, 2023); Ureter surgery (N/A, 4/15/2023); craniotomy (Left, 2025); and craniotomy (Left, 2025).     Allergies Allergies   Allergen Reactions    Demerol Hives    Midazolam Hcl Hives    Other/Food Other (See Comments)     -zines; muscle spasms    Versed [Midazolam]      Hives        Medications   Current Outpatient Medications:     busPIRone (BUSPAR) 5 MG tablet, Take 1 tablet by mouth 2 times daily, Disp: 60 tablet, Rfl: 0    losartan (COZAAR) 25 MG tablet, Take 1.5 tablets by mouth 2 times daily, Disp: 45 tablet, Rfl: 0    traZODone (DESYREL) 100 MG tablet, Take 1 tablet by mouth nightly as needed for Sleep, Disp: 30 tablet, Rfl: 0    pantoprazole (PROTONIX) 40 MG

## 2025-05-28 ENCOUNTER — HOSPITAL ENCOUNTER (OUTPATIENT)
Dept: SPEECH THERAPY | Age: 65
Setting detail: THERAPIES SERIES
Discharge: HOME | End: 2025-05-28
Payer: COMMERCIAL

## 2025-05-28 PROCEDURE — 92507 TX SP LANG VOICE COMM INDIV: CPT | Performed by: SPEECH-LANGUAGE PATHOLOGIST

## 2025-05-30 ENCOUNTER — HOSPITAL ENCOUNTER (OUTPATIENT)
Dept: OCCUPATIONAL THERAPY | Age: 65
Setting detail: THERAPIES SERIES
Discharge: HOME | End: 2025-05-30
Payer: COMMERCIAL

## 2025-05-30 ENCOUNTER — HOSPITAL ENCOUNTER (OUTPATIENT)
Dept: SPEECH THERAPY | Age: 65
Setting detail: THERAPIES SERIES
Discharge: HOME | End: 2025-05-30
Payer: COMMERCIAL

## 2025-05-30 PROCEDURE — 92507 TX SP LANG VOICE COMM INDIV: CPT | Performed by: SPEECH-LANGUAGE PATHOLOGIST

## 2025-05-30 PROCEDURE — 97535 SELF CARE MNGMENT TRAINING: CPT

## 2025-05-30 PROCEDURE — 97530 THERAPEUTIC ACTIVITIES: CPT

## 2025-05-30 PROCEDURE — 97112 NEUROMUSCULAR REEDUCATION: CPT

## 2025-05-30 NOTE — PROGRESS NOTES
education provided.  Will continue education.  [x]  Barriers to learning: expressive aphasia    PLAN:  Treatment Recommendations: Strengthening, Neuromuscular Re-education, Home Exercise Program, Patient Education, Safety Education and Training, and ADL/IADL    []  Plan of care initiated.  Plan to see patient 2 times per week for 16  weeks to address the treatment planned outlined above.  [x]  Continue with current plan of care  []  Modify plan of care as follows:    []  Hold pending physician visit  []  Discharge    Time In 1120   Time Out 1202   Timed Code Minutes: 42 min   Total Treatment Time: 42 min       URBANO Gan/ALISHA, T  #[id number]

## 2025-05-30 NOTE — PROGRESS NOTES
Patient additionally with AMS.  CT head was obtained which reportedly revealed a posterior left parietal parenchymal hemorrhage.  Patient was taken back to the OR on 4/17/2025 for a left parietal craniotomy hematoma evacuation by Dr. Singh.  Patient remained intubated and sedated overnight.  Patient was extubated on 4/19/2025.     ID continue to follow.  Cultures obtained from operative intervention were negative for growth.  Ultimately, determined that this did not appear to be infectious in etiology and antimicrobials were discontinued.  On 4/21/2025, CT head was obtained and the radiologist read that there was a new hyperdense area posterior and superficial to the location of the tumor resection.  Per neurosurgery, likely related to infarct.  Patient was started on mannitol to get him through the maximal period of swelling.  Additionally, Dopplers obtained of bilateral lower extremities which reportedly revealed an acute DVT limited to the single peroneal vein in the right calf.     On 4/21/2025, repeat Dopplers obtained which reportedly revealed no evidence of a DVT.  Plan was to repeat Doppler in 72 hours and if propagation, patient would be a candidate for an IVC filter.  Patient was transferred out of the ICU on 4/24/2025.  On 4/24/2025, patient was cleared by neurosurgery for DVT prophylaxis but not full anticoagulation.  Per neurosurgery, okay to consider full anticoagulation 2 weeks from his last surgery (last surgical intervention was on 4/17/2025 making 2 weeks 5/1/2025).  Per neurosurgery on 4/25/2025, follow-up with neurosurgery in 2 weeks for suture removal (5/9). Additionally, neurosurgery recommended that he continue on seizure prophylaxis as well as Decadron.     Patient was on inpatient rehab from 4/28/25 to 5/9/25 when he was discharged home with his wife.    Patient reports continued difficulty with word finding.      SUBJECTIVE: Patient verbalized frustration with some of the tasks,

## 2025-05-31 DIAGNOSIS — E78.00 HYPERCHOLESTEROLEMIA: ICD-10-CM

## 2025-06-02 RX ORDER — PRAVASTATIN SODIUM 40 MG
40 TABLET ORAL DAILY
Qty: 90 TABLET | Refills: 0 | OUTPATIENT
Start: 2025-06-02

## 2025-06-02 NOTE — TELEPHONE ENCOUNTER
Medication Refill Request .      Please advise .  Patient's last appointment was: 2/7/2024  with our .  Patient's next appointment is: Visit date not found  with our Dr.   Last refilled on: 1/7/25  Which pharmacy does the script need sent to: [facility] . On  [address] .        Lab Results   Component Value Date    LABA1C 5.9 05/08/2025     Lab Results   Component Value Date    CHOL 192 05/08/2025    TRIG 58 05/08/2025     05/08/2025     Lab Results   Component Value Date     05/09/2025    K 5.0 05/09/2025    CL 99 05/09/2025    CO2 24 05/09/2025    BUN 25 (H) 05/09/2025    CREATININE 0.8 05/09/2025    GLUCOSE 134 (H) 05/09/2025    CALCIUM 9.3 05/09/2025    BILITOT 0.6 04/29/2025    ALKPHOS 57 04/29/2025    AST 20 04/29/2025    ALT 50 04/29/2025    LABGLOM > 90 05/09/2025     Lab Results   Component Value Date    TSH 4.130 06/01/2023     Lab Results   Component Value Date    WBC 11.8 (H) 05/08/2025    HGB 12.1 (L) 05/08/2025    HCT 36.2 (L) 05/08/2025    MCV 88.5 05/08/2025     05/08/2025

## 2025-06-03 ENCOUNTER — HOSPITAL ENCOUNTER (OUTPATIENT)
Dept: RADIATION ONCOLOGY | Age: 65
Discharge: HOME | End: 2025-06-03
Payer: COMMERCIAL

## 2025-06-03 ENCOUNTER — OFFICE VISIT (OUTPATIENT)
Dept: NEUROSURGERY | Age: 65
End: 2025-06-03

## 2025-06-03 VITALS
DIASTOLIC BLOOD PRESSURE: 78 MMHG | SYSTOLIC BLOOD PRESSURE: 135 MMHG | HEART RATE: 84 BPM | WEIGHT: 166.25 LBS | HEIGHT: 68 IN | BODY MASS INDEX: 25.2 KG/M2

## 2025-06-03 DIAGNOSIS — C71.9 GLIOBLASTOMA (HCC): Primary | ICD-10-CM

## 2025-06-03 PROCEDURE — 99024 POSTOP FOLLOW-UP VISIT: CPT | Performed by: NEUROLOGICAL SURGERY

## 2025-06-03 PROCEDURE — 77386 HC NTSTY MODUL RAD TX DLVR CPLX: CPT | Performed by: RADIOLOGY

## 2025-06-03 NOTE — PROGRESS NOTES
[facility] PHYSICIANS [place] SPECIALTY  [facility] [place] NEUROSURGERY  [address]  [facility] [address]  Dept: [phone]  Dept Fax: [phone]  Loc: [phone]    Post Op Follow Visit  Visit Date: 6/3/2025      Koko  is a 65 y.o. male who is returning to the office today for a post-op follow-up visit. He had surgery on 4/15/2025 and 4/17/2025.     Patient was evaluated today and is doing very well overall.  No new complaints were voiced.  Patient  lives with their spouse  Wound: wound healing as expected  Follow-up Studies: No orders of the defined types were placed in this encounter.       Assessment/Plan:  Status Post left parietal craniotomy for resection of left malignant glioma in the parietal lobe  Doing well overall  Encouraged gradual increase in physical and mental activity.  Fall precaution and home safety education provided to patient.  Follow-up: Koko is here for staple removal.  His incision is healed very well.  He will start radiation therapy today.  He will follow-up with me in the office after he completes his radiation therapy.  His last MRI of the brain showed a complete resection of his left parietal lobe lesion.  He has been diagnosed with a glioblastoma multiform a at this location.      Electronically signed by Zander Singh MD on 6/3/25 at 9:05 AM EDT

## 2025-06-04 ENCOUNTER — HOSPITAL ENCOUNTER (OUTPATIENT)
Dept: RADIATION ONCOLOGY | Age: 65
Discharge: HOME | End: 2025-06-04
Payer: COMMERCIAL

## 2025-06-04 ENCOUNTER — APPOINTMENT (OUTPATIENT)
Dept: OCCUPATIONAL THERAPY | Age: 65
End: 2025-06-04
Payer: COMMERCIAL

## 2025-06-04 PROCEDURE — 77386 HC NTSTY MODUL RAD TX DLVR CPLX: CPT | Performed by: RADIOLOGY

## 2025-06-04 RX ORDER — PRAVASTATIN SODIUM 40 MG
40 TABLET ORAL DAILY
Qty: 90 TABLET | Refills: 1 | Status: SHIPPED | OUTPATIENT
Start: 2025-06-04

## 2025-06-05 ENCOUNTER — HOSPITAL ENCOUNTER (OUTPATIENT)
Dept: RADIATION ONCOLOGY | Age: 65
Discharge: HOME | End: 2025-06-05
Payer: COMMERCIAL

## 2025-06-05 VITALS
DIASTOLIC BLOOD PRESSURE: 74 MMHG | BODY MASS INDEX: 25.24 KG/M2 | WEIGHT: 166.01 LBS | TEMPERATURE: 97.5 F | SYSTOLIC BLOOD PRESSURE: 124 MMHG | HEART RATE: 88 BPM | OXYGEN SATURATION: 96 % | RESPIRATION RATE: 16 BRPM

## 2025-06-05 DIAGNOSIS — C71.3 MALIGNANT NEOPLASM OF PARIETAL LOBE OF BRAIN (HCC): Primary | ICD-10-CM

## 2025-06-05 PROCEDURE — 77386 HC NTSTY MODUL RAD TX DLVR CPLX: CPT | Performed by: RADIOLOGY

## 2025-06-05 NOTE — PROGRESS NOTES
[facility]          [address], [address]        [place] [id number]        O: [phone]        F: [phone]       [employer]            Dr. Víctor Dejesus MD MS          Dr. Dara Stephens MD PhD    ON TREATMENT VISIT (OTV) NOTE     Date of Service: 2025  Patient ID: Koko Chao   : 1960  MRN: [id number]   Acct Number: [id number]     RADIATION ONCOLOGY ATTENDING:  Dara Stephens PhD MD      ICD-10-CM    1. Malignant neoplasm of parietal lobe of brain (HCC)  C71.3         Treatment Area: Brain    Current Total Dose(cGy): 600  Current Fraction: 3/30  Final/Cumulative Rx. Dose (cGy): 6000    Patient was seen today for weekly visit.     Wt Readings from Last 3 Encounters:   25 75.3 kg (166 lb 0.1 oz)   25 75.4 kg (166 lb 4 oz)   25 75.3 kg (166 lb)       /74   Pulse 88   Temp 97.5 °F (36.4 °C) (Infrared)   Resp 16   Wt 75.3 kg (166 lb 0.1 oz)   SpO2 96%   BMI 25.24 kg/m²     Lab Results   Component Value Date    WBC 11.8 (H) 2025    HGB 12.1 (L) 2025    HCT 36.2 (L) 2025     2025       Comfort Alteration  Fatigue:None, able to perform daily activities    Pain Location: Denies  Pain Intensity (Current): 0 No Pain  Pain Treatment: No treatment scheduled  Pain Relief: n/a    Emotional Alteration:   Coping: effective    Nutritional Alteration  Anorexia: none   Nausea: No nausea noted  Vomiting: No vomiting   Salivary Glands- Acute: Mild PO dryness, thick saliva, altered taste, no change in PO intake   Taste Disturbance (Dysgeusia): Normal   Dyspepsia/Heartburn: None  Dysphagia/Esophagitis: None    Skin Alteration   Skin reaction: No changes noted  Alopecia: No loss    Mucous Membrane Alteration  Mucositis XRT Related: None  Pharynx and Esophagus- Acute: No change over baseline  Voice Changes: Normal    Ventilation Alteration  Cough: None  Hemoptysis:

## 2025-06-06 ENCOUNTER — HOSPITAL ENCOUNTER (OUTPATIENT)
Dept: OCCUPATIONAL THERAPY | Age: 65
Setting detail: THERAPIES SERIES
Discharge: HOME | End: 2025-06-06
Payer: COMMERCIAL

## 2025-06-06 ENCOUNTER — HOSPITAL ENCOUNTER (OUTPATIENT)
Dept: SPEECH THERAPY | Age: 65
Setting detail: THERAPIES SERIES
Discharge: HOME | End: 2025-06-06
Payer: COMMERCIAL

## 2025-06-06 ENCOUNTER — HOSPITAL ENCOUNTER (OUTPATIENT)
Dept: RADIATION ONCOLOGY | Age: 65
Discharge: HOME | End: 2025-06-06
Payer: COMMERCIAL

## 2025-06-06 PROCEDURE — 97535 SELF CARE MNGMENT TRAINING: CPT

## 2025-06-06 PROCEDURE — 77386 HC NTSTY MODUL RAD TX DLVR CPLX: CPT | Performed by: RADIOLOGY

## 2025-06-06 PROCEDURE — 92507 TX SP LANG VOICE COMM INDIV: CPT

## 2025-06-06 NOTE — PROGRESS NOTES
[facility]  SPEECH THERAPY  [] SPEECH LANGUAGE COGNITIVE EVALUATION  [x] DAILY NOTE   [] PROGRESS NOTE [] DISCHARGE NOTE    [x] OUTPATIENT REHABILITATION CENTER - [place]   [] [facility] CARE [place]    [] 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  he has a past surgical history that includes Testicle removal (); Colonoscopy (); Prostate biopsy (); liver biopsy (); Ultrasound Prostate/Transrectal (N/A, 2020); Colonoscopy (N/A, 3/13/2020); Upper gastrointestinal endoscopy (N/A, 3/13/2020); Cystoscopy (Left, 2023); Ureter surgery (N/A, 4/15/2023); craniotomy (Left, 2025); and craniotomy (Left, 2025).     Allergies Allergies   Allergen Reactions    Demerol Hives    Midazolam Hcl Hives    Other/Food Other (See Comments)     -florin; muscle spasms    Versed [Midazolam]      Hives        Medications   Current Outpatient Medications:     pravastatin (PRAVACHOL) 40 MG tablet, Take 1 tablet by mouth once daily, Disp: 90 tablet, Rfl: 1    busPIRone (BUSPAR) 5 MG tablet, Take 1 tablet by mouth 2 times daily, Disp: 60 tablet, Rfl: 0    losartan (COZAAR) 25 MG tablet, Take 1.5 tablets by mouth 2 times daily, Disp: 45 tablet, Rfl: 0    traZODone (DESYREL) 100 MG tablet, Take 1 tablet by mouth nightly as needed for Sleep, Disp:

## 2025-06-06 NOTE — PROGRESS NOTES
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  he has a past surgical history that includes Testicle removal (); Colonoscopy (); Prostate biopsy (); liver biopsy (); Ultrasound Prostate/Transrectal (N/A, 2020); Colonoscopy (N/A, 3/13/2020); Upper gastrointestinal endoscopy (N/A, 3/13/2020); Cystoscopy (Left, 2023); Ureter surgery (N/A, 4/15/2023); craniotomy (Left, 2025); and craniotomy (Left, 2025).     Allergies Allergies   Allergen Reactions    Demerol Hives    Midazolam Hcl Hives    Other/Food Other (See Comments)     -zines; muscle spasms    Versed [Midazolam]      Hives        Medications   Current Outpatient Medications:     pravastatin (PRAVACHOL) 40 MG tablet, Take 1 tablet by mouth once daily, Disp: 90 tablet, Rfl: 1    busPIRone (BUSPAR) 5 MG tablet, Take 1 tablet by mouth 2 times daily, Disp: 60 tablet, Rfl: 0    losartan (COZAAR) 25 MG tablet, Take 1.5 tablets by mouth 2 times daily, Disp: 45 tablet, Rfl: 0    traZODone (DESYREL) 100 MG tablet, Take 1 tablet

## 2025-06-09 ENCOUNTER — HOSPITAL ENCOUNTER (OUTPATIENT)
Dept: RADIATION ONCOLOGY | Age: 65
Discharge: HOME | End: 2025-06-09
Payer: COMMERCIAL

## 2025-06-09 PROCEDURE — 77386 HC NTSTY MODUL RAD TX DLVR CPLX: CPT | Performed by: RADIOLOGY

## 2025-06-10 ENCOUNTER — HOSPITAL ENCOUNTER (OUTPATIENT)
Dept: RADIATION ONCOLOGY | Age: 65
Discharge: HOME | End: 2025-06-10
Payer: COMMERCIAL

## 2025-06-10 ENCOUNTER — LAB (OUTPATIENT)
Dept: LAB | Age: 65
End: 2025-06-10

## 2025-06-10 PROCEDURE — 77386 HC NTSTY MODUL RAD TX DLVR CPLX: CPT | Performed by: RADIOLOGY

## 2025-06-11 ENCOUNTER — APPOINTMENT (OUTPATIENT)
Dept: OCCUPATIONAL THERAPY | Age: 65
End: 2025-06-11
Payer: COMMERCIAL

## 2025-06-11 ENCOUNTER — APPOINTMENT (OUTPATIENT)
Dept: SPEECH THERAPY | Age: 65
End: 2025-06-11
Payer: COMMERCIAL

## 2025-06-11 ENCOUNTER — HOSPITAL ENCOUNTER (OUTPATIENT)
Dept: RADIATION ONCOLOGY | Age: 65
Discharge: HOME | End: 2025-06-11
Payer: COMMERCIAL

## 2025-06-11 PROCEDURE — 77386 HC NTSTY MODUL RAD TX DLVR CPLX: CPT | Performed by: RADIOLOGY

## 2025-06-12 ENCOUNTER — HOSPITAL ENCOUNTER (OUTPATIENT)
Dept: RADIATION ONCOLOGY | Age: 65
Discharge: HOME | End: 2025-06-12
Payer: COMMERCIAL

## 2025-06-12 VITALS
RESPIRATION RATE: 16 BRPM | TEMPERATURE: 98.2 F | DIASTOLIC BLOOD PRESSURE: 60 MMHG | BODY MASS INDEX: 25.17 KG/M2 | HEART RATE: 100 BPM | OXYGEN SATURATION: 96 % | SYSTOLIC BLOOD PRESSURE: 108 MMHG | WEIGHT: 165.57 LBS

## 2025-06-12 PROCEDURE — 77386 HC NTSTY MODUL RAD TX DLVR CPLX: CPT | Performed by: RADIOLOGY

## 2025-06-12 NOTE — PROGRESS NOTES
3 each 0    [Paused] aspirin 81 MG tablet Take 1 tablet by mouth daily       No current facility-administered medications for this encounter.         PHYSICAL EXAM:       ECO - Asymptomatic (Fully active, able to carry on all pre-disease activities without restriction)    General: NAD, AO x 3, Mentation is clear with appropriate affect.  HEENT: Normocephalic, atraumatic  Thorax:  Unlabored  Abdomen:  Non-distended  Neuro:  Cranial nerves grossly intact; no focal deficits    Chemotherapy Update: Concurrent chemotherapy - Temodar    Treatment Imaging: CBCT    ASSESSMENT: No significant radiation side effects. Responding appropriately to symptomatic management.    New medications, diagnostic results: No new images, medications, or changes to current recommendations    PLAN: Again reviewed potential side effects of radiation for the patient's treatment.    Continue local/topical care.    On steroids, advised to monitor for thrush.   Continue current radiation course as prescribed.

## 2025-06-13 ENCOUNTER — HOSPITAL ENCOUNTER (OUTPATIENT)
Dept: OCCUPATIONAL THERAPY | Age: 65
Setting detail: THERAPIES SERIES
Discharge: HOME | End: 2025-06-13
Payer: COMMERCIAL

## 2025-06-13 ENCOUNTER — HOSPITAL ENCOUNTER (OUTPATIENT)
Dept: SPEECH THERAPY | Age: 65
Setting detail: THERAPIES SERIES
Discharge: HOME | End: 2025-06-13
Payer: COMMERCIAL

## 2025-06-13 ENCOUNTER — HOSPITAL ENCOUNTER (OUTPATIENT)
Dept: RADIATION ONCOLOGY | Age: 65
Discharge: HOME | End: 2025-06-13
Payer: COMMERCIAL

## 2025-06-13 PROCEDURE — 97535 SELF CARE MNGMENT TRAINING: CPT

## 2025-06-13 PROCEDURE — 97110 THERAPEUTIC EXERCISES: CPT

## 2025-06-13 PROCEDURE — 77386 HC NTSTY MODUL RAD TX DLVR CPLX: CPT | Performed by: RADIOLOGY

## 2025-06-13 PROCEDURE — 92507 TX SP LANG VOICE COMM INDIV: CPT

## 2025-06-13 NOTE — PROGRESS NOTES
and word finding difficulty.  Patient additionally reported intermittent blurred vision.  In the ED, CT head was performed which reportedly revealed an intra-axial mass in the left parietal lobe.  Neurosurgery was consulted.  Patient was started on Keppra and Decadron.  Additionally, MRI of the brain was ordered and CT abdomen/pelvis ordered.  Patient was admitted for further evaluation management.     On admission, MRI brain was completed patient was completed which revealed a peripherally enhancing lesion measuring up to 2.4 cm within the posterior left parietal lobe with mild vasogenic edema.  Appearance reportedly suspicious for cerebral abscess.  CT of the chest, abdomen, and pelvis reportedly unremarkable.  Evaluated by neurosurgery who recommended surgical intervention.  Patient was started on 3% hypertonic saline for edema control and ID was consulted due to concerns for abscess.  Surgery was offered for 4/13/2025, however, patient preferred deferring surgery until later to further discuss with family.  Patient was initiated on broad-spectrum antibiotics by ID.  Blood cultures negative at 48 hours. Patient was transferred to the ICU for further monitoring.     On 4/16/2025, patient was taken to the OR by neurosurgeon, Dr. Singh.  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  On 4/17, patient noted to have bleeding at his incision.  Patient additionally with AMS.  CT head was obtained which reportedly revealed a posterior left parietal parenchymal hemorrhage.  Patient was taken back to the OR on 4/17/2025 for a left parietal craniotomy hematoma evacuation by Dr. Singh.  Patient remained intubated and sedated overnight.  Patient was extubated on 4/19/2025.     ID continue to follow.  Cultures obtained from operative intervention were negative for growth.  Ultimately, determined

## 2025-06-13 NOTE — PROGRESS NOTES
Retraction with Resistance  - 1 x daily - 7 x weekly - 1 sets - 10 reps  - Standing Shoulder Horizontal Abduction with Resistance  - 1 x daily - 7 x weekly - 1 sets - 10 reps  - Standing Shoulder Diagonal Horizontal Abduction 60/120 Degrees with Resistance  - 1 x daily - 7 x weekly - 1 sets - 10 reps  -Copying letters/numbers; kitchen measuring cups/spoons identification  -suggesting  []  No new Education completed  []  Reviewed Prior HEP      [x]  Patient verbalized and/or demonstrated understanding of education provided.  []  Patient unable to verbalize and/or demonstrate understanding of education provided.  Will continue education.  [x]  Barriers to learning: expressive aphasia    PLAN:  Treatment Recommendations: Strengthening, Neuromuscular Re-education, Home Exercise Program, Patient Education, Safety Education and Training, and ADL/IADL    []  Plan of care initiated.  Plan to see patient 2 times per week for 16  weeks to address the treatment planned outlined above.  [x]  Continue with current plan of care  []  Modify plan of care as follows:    []  Hold pending physician visit  []  Discharge    Time In 0845   Time Out 0930   Timed Code Minutes: 45 min   Total Treatment Time: 45 min       Lucy Blackmon OTR/L, CHT #[id number]

## 2025-06-16 ENCOUNTER — APPOINTMENT (OUTPATIENT)
Dept: SPEECH THERAPY | Age: 65
End: 2025-06-16
Payer: COMMERCIAL

## 2025-06-16 ENCOUNTER — HOSPITAL ENCOUNTER (OUTPATIENT)
Dept: RADIATION ONCOLOGY | Age: 65
Discharge: HOME | End: 2025-06-16
Payer: COMMERCIAL

## 2025-06-16 ENCOUNTER — TELEPHONE (OUTPATIENT)
Dept: FAMILY MEDICINE CLINIC | Age: 65
End: 2025-06-16

## 2025-06-16 PROCEDURE — 77386 HC NTSTY MODUL RAD TX DLVR CPLX: CPT | Performed by: RADIOLOGY

## 2025-06-16 PROCEDURE — 77336 RADIATION PHYSICS CONSULT: CPT | Performed by: RADIOLOGY

## 2025-06-16 PROCEDURE — 77014 CHG CT GUIDANCE RADIATION THERAPY FLDS PLACEMENT: CPT | Performed by: RADIOLOGY

## 2025-06-16 RX ORDER — BUMETANIDE 0.5 MG/1
0.5 TABLET ORAL DAILY
Qty: 30 TABLET | Refills: 0 | OUTPATIENT
Start: 2025-06-16

## 2025-06-16 RX ORDER — LOSARTAN POTASSIUM 25 MG/1
TABLET ORAL
Qty: 45 TABLET | Refills: 0 | OUTPATIENT
Start: 2025-06-16

## 2025-06-16 NOTE — TELEPHONE ENCOUNTER
This nurse put patient in to see Dr Weinstein at 3:40 6/17 to follow up on recent CA diagnosis. Dr Banks is requesting he come in so she can precept . Unable to leave VM

## 2025-06-17 ENCOUNTER — OFFICE VISIT (OUTPATIENT)
Dept: FAMILY MEDICINE CLINIC | Age: 65
End: 2025-06-17
Payer: COMMERCIAL

## 2025-06-17 ENCOUNTER — HOSPITAL ENCOUNTER (OUTPATIENT)
Dept: RADIATION ONCOLOGY | Age: 65
Discharge: HOME | End: 2025-06-17
Payer: COMMERCIAL

## 2025-06-17 VITALS
RESPIRATION RATE: 13 BRPM | BODY MASS INDEX: 25.07 KG/M2 | HEIGHT: 68 IN | OXYGEN SATURATION: 97 % | WEIGHT: 165.4 LBS | TEMPERATURE: 98 F | DIASTOLIC BLOOD PRESSURE: 71 MMHG | HEART RATE: 95 BPM | SYSTOLIC BLOOD PRESSURE: 120 MMHG

## 2025-06-17 DIAGNOSIS — E78.00 HYPERCHOLESTEROLEMIA: ICD-10-CM

## 2025-06-17 DIAGNOSIS — C71.9 GLIOBLASTOMA (HCC): Primary | ICD-10-CM

## 2025-06-17 DIAGNOSIS — F41.9 ANXIETY: ICD-10-CM

## 2025-06-17 DIAGNOSIS — I10 ESSENTIAL HYPERTENSION: ICD-10-CM

## 2025-06-17 DIAGNOSIS — G47.00 INSOMNIA, UNSPECIFIED TYPE: ICD-10-CM

## 2025-06-17 PROCEDURE — 77386 HC NTSTY MODUL RAD TX DLVR CPLX: CPT | Performed by: RADIOLOGY

## 2025-06-17 PROCEDURE — 3074F SYST BP LT 130 MM HG: CPT

## 2025-06-17 PROCEDURE — 3078F DIAST BP <80 MM HG: CPT

## 2025-06-17 PROCEDURE — 99214 OFFICE O/P EST MOD 30 MIN: CPT

## 2025-06-17 RX ORDER — TRAZODONE HYDROCHLORIDE 100 MG/1
50 TABLET ORAL NIGHTLY PRN
Qty: 30 TABLET | Refills: 3 | Status: SHIPPED | OUTPATIENT
Start: 2025-06-17

## 2025-06-17 RX ORDER — PRAVASTATIN SODIUM 40 MG
40 TABLET ORAL DAILY
Qty: 90 TABLET | Refills: 3 | Status: SHIPPED | OUTPATIENT
Start: 2025-06-17

## 2025-06-17 RX ORDER — BUSPIRONE HYDROCHLORIDE 5 MG/1
5 TABLET ORAL 2 TIMES DAILY
Qty: 60 TABLET | Refills: 3 | Status: SHIPPED | OUTPATIENT
Start: 2025-06-17

## 2025-06-17 RX ORDER — DEXAMETHASONE 2 MG/1
2 TABLET ORAL
COMMUNITY

## 2025-06-17 RX ORDER — AMLODIPINE BESYLATE 10 MG/1
10 TABLET ORAL DAILY
Qty: 30 TABLET | Refills: 5 | Status: SHIPPED | OUTPATIENT
Start: 2025-06-17

## 2025-06-17 RX ORDER — LOSARTAN POTASSIUM 25 MG/1
37.5 TABLET ORAL 2 TIMES DAILY
Qty: 85 TABLET | Refills: 5 | Status: SHIPPED | OUTPATIENT
Start: 2025-06-17

## 2025-06-17 ASSESSMENT — PATIENT HEALTH QUESTIONNAIRE - PHQ9
SUM OF ALL RESPONSES TO PHQ QUESTIONS 1-9: 0
2. FEELING DOWN, DEPRESSED OR HOPELESS: NOT AT ALL
SUM OF ALL RESPONSES TO PHQ QUESTIONS 1-9: 0
1. LITTLE INTEREST OR PLEASURE IN DOING THINGS: NOT AT ALL

## 2025-06-17 NOTE — PROGRESS NOTES
Patient:Koko Chao  YOB: 1960  MRN: [id number]    Subjective   65 y.o. male who presents for the following: Follow-up (Follow up on new diagnosis  .  )    Patient was requested to visit clinic per request of physician due to his recent hospitalizations and new diagnosis of glioblastoma.  He had been to the hospital several times across several admissions and ultimately got his diagnosis.  He follows with radiation oncology and hematology oncology as outpatient.  He is scheduled to continue getting radiation appointments until July 15.  He is currently taking chemotherapy orally daily.  He has tolerated these treatments well.  He is scheduled for restaging MRI after.    Review of his medications was performed.  Oncology is currently managing his Protonix, Decadron 2 mg 3 times daily WC, Bumex and chemotherapy.  He is scheduled to take Eliquis for 1 more month.  He is taking his Keppra until it is complete and will not take any further refills.  Primary care responsible for managing his blood pressure medicine, BuSpar, statin, and trazodone.    Review of Systems   Review of systems was normal except otherwise stated in the HPI    Patient History    Past Medical History:  He has a past medical history of Cancer (HCC), Cerebrovascular accident (HCC), CRVO (central retinal vein occlusion) (HCC), Heartburn, Hyperlipidemia, and Hypertension.    Social History:  He reports that he has never smoked. He has never used smokeless tobacco. He reports current alcohol use. He reports that he does not use drugs.     Past Surgical History:   Procedure Laterality Date    COLONOSCOPY  2009    WNL     COLONOSCOPY N/A 3/13/2020    COLONOSCOPY DIAGNOSTIC performed by Percy Cantu MD at [facility] Endoscopy    CRANIOTOMY Left 4/16/2025    LEFT PARIETAL CRANIOTOMY FOR EVACUATION OF CEREBRAL ABSCESS performed by Zander Singh MD at [facility] OR    CRANIOTOMY Left 4/17/2025    LEFT PARIETAL CRANIOTOMY HEMATOMA EVACUATION

## 2025-06-18 ENCOUNTER — APPOINTMENT (OUTPATIENT)
Dept: SPEECH THERAPY | Age: 65
End: 2025-06-18
Payer: COMMERCIAL

## 2025-06-18 ENCOUNTER — HOSPITAL ENCOUNTER (OUTPATIENT)
Dept: OCCUPATIONAL THERAPY | Age: 65
Setting detail: THERAPIES SERIES
Discharge: HOME | End: 2025-06-18
Payer: COMMERCIAL

## 2025-06-18 ENCOUNTER — HOSPITAL ENCOUNTER (OUTPATIENT)
Dept: RADIATION ONCOLOGY | Age: 65
Discharge: HOME | End: 2025-06-18
Payer: COMMERCIAL

## 2025-06-18 PROCEDURE — 97530 THERAPEUTIC ACTIVITIES: CPT

## 2025-06-18 PROCEDURE — 77386 HC NTSTY MODUL RAD TX DLVR CPLX: CPT | Performed by: RADIOLOGY

## 2025-06-18 PROCEDURE — 97110 THERAPEUTIC EXERCISES: CPT

## 2025-06-18 NOTE — PROGRESS NOTES
[facility]  OCCUPATIONAL THERAPY  [] EVALUATION  [x] DAILY NOTE (LAND) [] DAILY NOTE (AQUATIC ) [] PROGRESS NOTE [] DISCHARGE NOTE    [x] OUTPATIENT REHABILITATION CENTER [facility]   [] [facility] CARE [place]    [] [facility]   [] [facility]    Date: 2025  Patient Name:  Koko Chao  : 1960  MRN: [id number]  CSN: [id number]    Referring Practitioner Sonya Robison DO [id number]      Diagnosis  Diagnoses       C71.3 (ICD-10-CM) - Malignant neoplasm of parietal lobe of brain (HCC)    G06.0 (ICD-10-CM) - Brain abscess           Treatment Diagnosis Z73.6 Limitation of Activities Due to Disability  R41.89 Other Symptoms and Signs Involving Cognitive Functions and Awareness   Date of Evaluation 25   Additional Pertinent History Koko Chao has a past medical history of Cancer (HCC), Cerebrovascular accident (HCC), CRVO (central retinal vein occlusion) (HCC), Heartburn, Hyperlipidemia, and Hypertension.  he has a past surgical history that includes Testicle removal (); Colonoscopy (); Prostate biopsy (); liver biopsy (); Ultrasound Prostate/Transrectal (N/A, 2020); Colonoscopy (N/A, 3/13/2020); Upper gastrointestinal endoscopy (N/A, 3/13/2020); Cystoscopy (Left, 2023); Ureter surgery (N/A, 4/15/2023); craniotomy (Left, 2025); and craniotomy (Left, 2025).     Allergies Allergies   Allergen Reactions    Demerol Hives    Midazolam Hcl Hives    Other Other (See Comments)     -zines; muscle spasms    Versed [Midazolam]      Hives        Medications   Current Outpatient Medications:     TEMOZOLOMIDE PO, Take by mouth daily, Disp: , Rfl:     dexAMETHasone (DECADRON) 2 MG tablet, Take 1 tablet by mouth 3 times daily (with meals), Disp: , Rfl:     amLODIPine (NORVASC) 10 MG tablet, Take 1 tablet by mouth daily, Disp: 30 tablet, Rfl: 5    losartan (COZAAR) 25 MG tablet, Take 1.5 tablets by mouth 2 times daily, Disp: 85 tablet, Rfl:

## 2025-06-18 NOTE — PROGRESS NOTES
Nutrition Assessment    Reason for Visit: On treatment check in    Nutrition Recommendations:   - Gave edu on dry mouth, plant based diet, and homemade smoothie recipes  - Eat a fruit and/or vegetable at every meal  - follow plant based diet  - avoid processed meats   Malnutrition Assessment:   Malnutrition Status: No malnourishment  Context:  Findings of the 6 clinical characteristics of malnutrition (minimum of 2 out of 6 clinical characteristics is required to make the dx of moderate or severe Protein Calorie Malnutrition based on AND/ASPEN Guidelines):   1. Energy Intake:    2. Weight Loss:   3. Fat Loss: None   4. Muscle Loss: None   5. Fluid Accumulation: none   6.  Strength: Not measured    Nutrition Diagnosis:   Problem: Nutrition related knowledge deficit  Etiology: Brain cancer  Signs and Symptoms: Lack of knowledge on cancer preventative diet    Nutrition Assessment:   History: Brain cancer, HTN, HLD  Subjective: 1/2 way through radiation tx. Good appetite, eating 3 full meals plus snacks. C/o dry mouth.     Diet recall:   Bkft: cereal, or eggs and toast  Lunch: Soup, sandwich, or salad,  Dinner: Meat and veggie  Snacks: Granola bars, chips, cookies  Ensure + Ice cream every other day   Current Nutrition:   Oral Diet: Regular   Oral Diet Intake:  >75% of needs      Oral Nutrition Supplement (ONS): Ensure + Icecream shake   ONS intake:    1 every other day.   Anthropometric Measures:     Wt Readings from Last 3 Encounters:   06/17/25 75 kg (165 lb 6.4 oz)   06/12/25 75.1 kg (165 lb 9.1 oz)   06/05/25 75.3 kg (166 lb 0.1 oz)       Usual Weight:   190lbs (EMR) 2/13/25)   Ideal Weight: 148#  Adjusted BW:  n/a    BMI: 25.15    Symptoms  Anorexia: None  Nausea: None  Vomiting: None  Dyspepsia/Heartburn: None  Dysphagia/Esophagitis: None  Taste changes: None  Dry Mouth: Moderate  Bowel Movements: Normal    Comparative Standards:      Estimated daily calorie needs:   2250kcal (30 kcal/kg)   Estimated

## 2025-06-19 ENCOUNTER — HOSPITAL ENCOUNTER (OUTPATIENT)
Dept: RADIATION ONCOLOGY | Age: 65
Discharge: HOME | End: 2025-06-19
Payer: COMMERCIAL

## 2025-06-19 VITALS
SYSTOLIC BLOOD PRESSURE: 102 MMHG | WEIGHT: 165.57 LBS | HEART RATE: 96 BPM | OXYGEN SATURATION: 96 % | RESPIRATION RATE: 18 BRPM | BODY MASS INDEX: 25.18 KG/M2 | TEMPERATURE: 98 F | DIASTOLIC BLOOD PRESSURE: 61 MMHG

## 2025-06-19 PROCEDURE — 77386 HC NTSTY MODUL RAD TX DLVR CPLX: CPT | Performed by: RADIOLOGY

## 2025-06-19 RX ORDER — BUMETANIDE 0.5 MG/1
0.5 TABLET ORAL DAILY
Qty: 30 TABLET | Refills: 0 | OUTPATIENT
Start: 2025-06-19

## 2025-06-19 NOTE — PROGRESS NOTES
[facility]          [address], [address]        [place] [id number]        O: [phone]        F: [phone]       [employer]            Dr. Víctor Dejesus MD MS          Dr. Dara Stephens MD PhD    ON TREATMENT VISIT (OTV) NOTE     Date of Service: 2025  Patient ID: Koko Chao   : 1960  MRN: [id number]   Acct Number: [id number]     RADIATION ONCOLOGY ATTENDING:  Dara Stephens PhD MD    No diagnosis found.    Treatment Area: Brain    Current Total Dose(cGy): 2600  Current Fraction:   Final/Cumulative Rx. Dose (cGy): 6000    Patient was seen today for weekly visit.     Wt Readings from Last 3 Encounters:   25 75.1 kg (165 lb 9.1 oz)   25 75 kg (165 lb 6.4 oz)   25 75.1 kg (165 lb 9.1 oz)       /61   Pulse 96   Temp 98 °F (36.7 °C) (Infrared)   Resp 18   Wt 75.1 kg (165 lb 9.1 oz)   SpO2 96%   BMI 25.18 kg/m²     Lab Results   Component Value Date    WBC 11.8 (H) 2025    HGB 12.1 (L) 2025    HCT 36.2 (L) 2025     2025       Comfort Alteration  Fatigue:Able to perform daily activities with rest periods    Pain Location: Denies  Pain Intensity (Current): 0 No Pain  Pain Treatment: No treatment scheduled  Pain Relief: n/a    Emotional Alteration:   Coping: effective    Nutritional Alteration  Anorexia: none   Nausea: No nausea noted  Vomiting: No vomiting   Salivary Glands- Acute: Mild PO dryness, thick saliva, altered taste, no change in PO intake   Taste Disturbance (Dysgeusia): Normal   Dyspepsia/Heartburn: None  Dysphagia/Esophagitis: None    Skin Alteration   Skin reaction: No changes noted  Alopecia: No loss    Mucous Membrane Alteration  Mucositis XRT Related: None  Pharynx and Esophagus- Acute: No change over baseline  Voice Changes: Normal    Ventilation Alteration  Cough: None  Hemoptysis: None  Dyspnea: Normal  Mucous Quantity/Quality:

## 2025-06-20 ENCOUNTER — HOSPITAL ENCOUNTER (OUTPATIENT)
Dept: SPEECH THERAPY | Age: 65
Setting detail: THERAPIES SERIES
Discharge: HOME | End: 2025-06-20
Payer: COMMERCIAL

## 2025-06-20 ENCOUNTER — HOSPITAL ENCOUNTER (OUTPATIENT)
Dept: RADIATION ONCOLOGY | Age: 65
Discharge: HOME | End: 2025-06-20
Payer: COMMERCIAL

## 2025-06-20 ENCOUNTER — HOSPITAL ENCOUNTER (OUTPATIENT)
Dept: OCCUPATIONAL THERAPY | Age: 65
Setting detail: THERAPIES SERIES
Discharge: HOME | End: 2025-06-20
Payer: COMMERCIAL

## 2025-06-20 PROCEDURE — 97530 THERAPEUTIC ACTIVITIES: CPT

## 2025-06-20 PROCEDURE — 97110 THERAPEUTIC EXERCISES: CPT

## 2025-06-20 PROCEDURE — 77386 HC NTSTY MODUL RAD TX DLVR CPLX: CPT | Performed by: RADIOLOGY

## 2025-06-20 PROCEDURE — 92507 TX SP LANG VOICE COMM INDIV: CPT

## 2025-06-20 NOTE — PROGRESS NOTES
[facility]  OCCUPATIONAL THERAPY  [] EVALUATION  [x] DAILY NOTE (LAND) [] DAILY NOTE (AQUATIC ) [] PROGRESS NOTE [] DISCHARGE NOTE    [x] OUTPATIENT REHABILITATION CENTER [facility]   [] [facility] CARE [place]    [] [facility]   [] [facility]    Date: 2025  Patient Name:  Koko Chao  : 1960  MRN: [id number]  CSN: [id number]    Referring Practitioner Sonya Robison DO [id number]      Diagnosis  Diagnoses       C71.3 (ICD-10-CM) - Malignant neoplasm of parietal lobe of brain (HCC)    G06.0 (ICD-10-CM) - Brain abscess           Treatment Diagnosis Z73.6 Limitation of Activities Due to Disability  R41.89 Other Symptoms and Signs Involving Cognitive Functions and Awareness   Date of Evaluation 25   Additional Pertinent History Koko Chao has a past medical history of Cancer (HCC), Cerebrovascular accident (HCC), CRVO (central retinal vein occlusion) (HCC), Heartburn, Hyperlipidemia, and Hypertension.  he has a past surgical history that includes Testicle removal (); Colonoscopy (); Prostate biopsy (); liver biopsy (); Ultrasound Prostate/Transrectal (N/A, 2020); Colonoscopy (N/A, 3/13/2020); Upper gastrointestinal endoscopy (N/A, 3/13/2020); Cystoscopy (Left, 2023); Ureter surgery (N/A, 4/15/2023); craniotomy (Left, 2025); and craniotomy (Left, 2025).     Allergies Allergies   Allergen Reactions    Demerol Hives    Midazolam Hcl Hives    Other Other (See Comments)     -zines; muscle spasms    Versed [Midazolam]      Hives        Medications   Current Outpatient Medications:     TEMOZOLOMIDE PO, Take by mouth daily, Disp: , Rfl:     dexAMETHasone (DECADRON) 2 MG tablet, Take 1 tablet by mouth 3 times daily (with meals), Disp: , Rfl:     amLODIPine (NORVASC) 10 MG tablet, Take 1 tablet by mouth daily, Disp: 30 tablet, Rfl: 5    losartan (COZAAR) 25 MG tablet, Take 1.5 tablets by mouth 2 times daily, Disp: 85 tablet, Rfl: 
normal sinus rhythm, Normal axis, Normal OH interval and QRS complex. There are no acute ischemic ST or T-wave changes.

## 2025-06-20 NOTE — PROGRESS NOTES
[facility]  SPEECH THERAPY  [] SPEECH LANGUAGE COGNITIVE EVALUATION  [x] DAILY NOTE   [] PROGRESS NOTE [] DISCHARGE NOTE    [x] OUT[facility]IENT [facility]ABILITATION CENTER - [place]   [] [facility]    [] [facility]   [] [facility]    Date: 2025  Patient Name:  Koko Chao  : 1960  MRN: [id number]  CSN: [id number]    Referring Practitioner Sonya Robison DO [id number]      Diagnosis  Diagnoses       C71.3 (ICD-10-CM) - Malignant neoplasm of parietal lobe of brain (HCC)    G06.0 (ICD-10-CM) - Brain abscess           Treatment Diagnosis R47.01 Aphasia  R41.89 Other symptoms and signs involving cognitive functions and awareness   Date of Evaluation 25   Additional Pertinent History Koko Chao has a past medical history of Cancer (HCC), Cerebrovascular accident (HCC), CRVO (central retinal vein occlusion) (HCC), Heartburn, Hyperlipidemia, and Hypertension.  he has a past surgical history that includes Testicle removal (); Colonoscopy (); Prostate biopsy (); liver biopsy (); Ultrasound Prostate/Transrectal (N/A, 2020); Colonoscopy (N/A, 3/13/2020); Upper gastrointestinal endoscopy (N/A, 3/13/2020); Cystoscopy (Left, 2023); Ureter surgery (N/A, 4/15/2023); craniotomy (Left, 2025); and craniotomy (Left, 2025).     Allergies Allergies   Allergen Reactions    Demerol Hives    Midazolam Hcl Hives    Other Other (See Comments)     -zines; muscle spasms    Versed [Midazolam]      Hives        Medications   Current Outpatient Medications:     TEMOZOLOMIDE PO, Take by mouth daily, Disp: , Rfl:     dexAMETHasone (DECADRON) 2 MG tablet, Take 1 tablet by mouth 3 times daily (with meals), Disp: , Rfl:     amLODIPine (NORVASC) 10 MG tablet, Take 1 tablet by mouth daily, Disp: 30 tablet, Rfl: 5    losartan (COZAAR) 25 MG tablet, Take 1.5 tablets by mouth 2 times daily, Disp: 85 tablet, Rfl: 5    busPIRone (BUSPAR) 5 MG tablet, Take 1

## 2025-06-23 ENCOUNTER — HOSPITAL ENCOUNTER (OUTPATIENT)
Dept: RADIATION ONCOLOGY | Age: 65
Discharge: HOME | End: 2025-06-23
Payer: COMMERCIAL

## 2025-06-23 PROCEDURE — 77336 RADIATION PHYSICS CONSULT: CPT | Performed by: RADIOLOGY

## 2025-06-23 PROCEDURE — 77386 HC NTSTY MODUL RAD TX DLVR CPLX: CPT | Performed by: RADIOLOGY

## 2025-06-24 ENCOUNTER — HOSPITAL ENCOUNTER (OUTPATIENT)
Dept: RADIATION ONCOLOGY | Age: 65
Discharge: HOME | End: 2025-06-24
Payer: COMMERCIAL

## 2025-06-24 PROCEDURE — 77386 HC NTSTY MODUL RAD TX DLVR CPLX: CPT | Performed by: RADIOLOGY

## 2025-06-24 RX ORDER — BUMETANIDE 0.5 MG/1
0.5 TABLET ORAL DAILY
Qty: 30 TABLET | Refills: 0 | OUTPATIENT
Start: 2025-06-24

## 2025-06-24 NOTE — TELEPHONE ENCOUNTER
Patient's last appointment was: 6/17/2025  with our   Patient's next appointment is: 9/22/2025  with our Dr. Weinstein  Last refilled on: 05/17/2025  Which pharmacy does the script need sent to: [facility]      Lab Results   Component Value Date    LABA1C 5.9 05/08/2025     Lab Results   Component Value Date    CHOL 192 05/08/2025    TRIG 58 05/08/2025     05/08/2025     Lab Results   Component Value Date     05/09/2025    K 5.0 05/09/2025    CL 99 05/09/2025    CO2 24 05/09/2025    BUN 25 (H) 05/09/2025    CREATININE 0.8 05/09/2025    GLUCOSE 134 (H) 05/09/2025    CALCIUM 9.3 05/09/2025    BILITOT 0.6 04/29/2025    ALKPHOS 57 04/29/2025    AST 20 04/29/2025    ALT 50 04/29/2025    LABGLOM > 90 05/09/2025     Lab Results   Component Value Date    TSH 4.130 06/01/2023     Lab Results   Component Value Date    WBC 11.8 (H) 05/08/2025    HGB 12.1 (L) 05/08/2025    HCT 36.2 (L) 05/08/2025    MCV 88.5 05/08/2025     05/08/2025

## 2025-06-25 ENCOUNTER — APPOINTMENT (OUTPATIENT)
Dept: SPEECH THERAPY | Age: 65
End: 2025-06-25
Payer: COMMERCIAL

## 2025-06-25 ENCOUNTER — HOSPITAL ENCOUNTER (OUTPATIENT)
Dept: OCCUPATIONAL THERAPY | Age: 65
Setting detail: THERAPIES SERIES
Discharge: HOME | End: 2025-06-25
Payer: COMMERCIAL

## 2025-06-25 ENCOUNTER — HOSPITAL ENCOUNTER (OUTPATIENT)
Dept: RADIATION ONCOLOGY | Age: 65
Discharge: HOME | End: 2025-06-25
Payer: COMMERCIAL

## 2025-06-25 ENCOUNTER — HOSPITAL ENCOUNTER (OUTPATIENT)
Dept: MRI IMAGING | Age: 65
Discharge: HOME | End: 2025-06-25
Payer: COMMERCIAL

## 2025-06-25 DIAGNOSIS — C71.3 MALIGNANT NEOPLASM OF PARIETAL LOBE OF BRAIN (HCC): ICD-10-CM

## 2025-06-25 DIAGNOSIS — R41.0 CONFUSION: ICD-10-CM

## 2025-06-25 DIAGNOSIS — R42 DYSEQUILIBRIUM: ICD-10-CM

## 2025-06-25 DIAGNOSIS — C71.3 MALIGNANT NEOPLASM OF PARIETAL LOBE OF BRAIN (HCC): Primary | ICD-10-CM

## 2025-06-25 DIAGNOSIS — R45.86 MOOD CHANGES: ICD-10-CM

## 2025-06-25 DIAGNOSIS — R40.0 SOMNOLENCE: ICD-10-CM

## 2025-06-25 PROCEDURE — 6360000004 HC RX CONTRAST MEDICATION: Performed by: PHYSICIAN ASSISTANT

## 2025-06-25 PROCEDURE — 70553 MRI BRAIN STEM W/O & W/DYE: CPT

## 2025-06-25 PROCEDURE — 97530 THERAPEUTIC ACTIVITIES: CPT

## 2025-06-25 PROCEDURE — 97110 THERAPEUTIC EXERCISES: CPT

## 2025-06-25 PROCEDURE — A9579 GAD-BASE MR CONTRAST NOS,1ML: HCPCS | Performed by: PHYSICIAN ASSISTANT

## 2025-06-25 PROCEDURE — 77386 HC NTSTY MODUL RAD TX DLVR CPLX: CPT | Performed by: RADIOLOGY

## 2025-06-25 RX ORDER — BUMETANIDE 0.5 MG/1
0.5 TABLET ORAL DAILY
Qty: 30 TABLET | Refills: 0 | Status: SHIPPED | OUTPATIENT
Start: 2025-06-25

## 2025-06-25 RX ORDER — GADOTERIDOL 279.3 MG/ML
15 INJECTION INTRAVENOUS
Status: COMPLETED | OUTPATIENT
Start: 2025-06-25 | End: 2025-06-25

## 2025-06-25 RX ADMIN — GADOTERIDOL 15 ML: 279.3 INJECTION, SOLUTION INTRAVENOUS at 21:16

## 2025-06-25 NOTE — PROGRESS NOTES
I spoke with Koko's wife, Anita, this AM. She reports continued concern for decline in Koko's mental and physical abilities. She reports he seems to be gradually a bit more and more off balance with ambulation/standing. She reports that he slept for 13 consecutive hours last night which is not typical for him. He had therapy this AM and his BP's were 70-80's SBP with 40-50 DBP. Anita reports he has been eating relatively stable/normal amount, maybe slightly less fluids but not significantly changed from baseline. Anita has noticed increased confusion and even some mood changes. He is much more prone to anger outbursts than ever before. Just a few days ago, Anita and one of her friends were having a quiet conversation in their house. Koko suddenly burst out and told them to stop. He was reporting hearing the words being spoke were being repeated in his head and agitating him (? Auditory hallucinations). Anita reports the speech was quite soft and surprised her that this was so agitating to him. Anita reports it was described as words being repeated in his head, not tinnitus/ringing or other sounds.      Reviewed case with Dr Stephens. D/t concerns for progressively worsening neurologic function will obtain a stat MRI of brain w wo contrast. Attempted to contact Dr Chanel's office to provide update. No answer, will try again later as able.

## 2025-06-25 NOTE — PROGRESS NOTES
[facility]  OCCUPATIONAL THERAPY  [] EVALUATION  [x] DAILY NOTE (LAND) [] DAILY NOTE (AQUATIC ) [] PROGRESS NOTE [] DISCHARGE NOTE    [x] OUTPATIENT REHABILITATION CENTER [facility]   [] [facility] CARE [place]    [] [facility]   [] [facility]    Date: 2025  Patient Name:  Koko Chao  : 1960  MRN: [id number]  CSN: [id number]    Referring Practitioner Sonya Robison DO [id number]      Diagnosis  Diagnoses       C71.3 (ICD-10-CM) - Malignant neoplasm of parietal lobe of brain (HCC)    G06.0 (ICD-10-CM) - Brain abscess           Treatment Diagnosis Z73.6 Limitation of Activities Due to Disability  R41.89 Other Symptoms and Signs Involving Cognitive Functions and Awareness   Date of Evaluation 25   Additional Pertinent History Koko Chao has a past medical history of Cancer (HCC), Cerebrovascular accident (HCC), CRVO (central retinal vein occlusion) (HCC), Heartburn, Hyperlipidemia, and Hypertension.  he has a past surgical history that includes Testicle removal (); Colonoscopy (); Prostate biopsy (); liver biopsy (); Ultrasound Prostate/Transrectal (N/A, 2020); Colonoscopy (N/A, 3/13/2020); Upper gastrointestinal endoscopy (N/A, 3/13/2020); Cystoscopy (Left, 2023); Ureter surgery (N/A, 4/15/2023); craniotomy (Left, 2025); and craniotomy (Left, 2025).     Allergies Allergies   Allergen Reactions    Demerol Hives    Midazolam Hcl Hives    Other Other (See Comments)     -zines; muscle spasms    Versed [Midazolam]      Hives        Medications   Current Outpatient Medications:     bumetanide (BUMEX) 0.5 MG tablet, Take 1 tablet by mouth daily, Disp: 30 tablet, Rfl: 0    TEMOZOLOMIDE PO, Take by mouth daily, Disp: , Rfl:     dexAMETHasone (DECADRON) 2 MG tablet, Take 1 tablet by mouth 3 times daily (with meals), Disp: , Rfl:     amLODIPine (NORVASC) 10 MG tablet, Take 1 tablet by mouth daily, Disp: 30 tablet, Rfl: 5

## 2025-06-26 ENCOUNTER — HOSPITAL ENCOUNTER (OUTPATIENT)
Dept: RADIATION ONCOLOGY | Age: 65
Discharge: HOME | End: 2025-06-26
Payer: COMMERCIAL

## 2025-06-26 ENCOUNTER — RESULTS FOLLOW-UP (OUTPATIENT)
Dept: RADIATION ONCOLOGY | Age: 65
End: 2025-06-26

## 2025-06-26 ENCOUNTER — HOSPITAL ENCOUNTER (OUTPATIENT)
Dept: SPEECH THERAPY | Age: 65
Setting detail: THERAPIES SERIES
Discharge: HOME | End: 2025-06-26
Payer: COMMERCIAL

## 2025-06-26 ENCOUNTER — TELEPHONE (OUTPATIENT)
Dept: FAMILY MEDICINE CLINIC | Age: 65
End: 2025-06-26

## 2025-06-26 VITALS
BODY MASS INDEX: 24.64 KG/M2 | WEIGHT: 162.04 LBS | RESPIRATION RATE: 18 BRPM | OXYGEN SATURATION: 96 % | DIASTOLIC BLOOD PRESSURE: 57 MMHG | TEMPERATURE: 98.3 F | SYSTOLIC BLOOD PRESSURE: 94 MMHG | HEART RATE: 97 BPM

## 2025-06-26 DIAGNOSIS — I10 ESSENTIAL HYPERTENSION: ICD-10-CM

## 2025-06-26 PROCEDURE — 92507 TX SP LANG VOICE COMM INDIV: CPT

## 2025-06-26 PROCEDURE — 77386 HC NTSTY MODUL RAD TX DLVR CPLX: CPT | Performed by: RADIOLOGY

## 2025-06-26 PROCEDURE — 77300 RADIATION THERAPY DOSE PLAN: CPT | Performed by: RADIOLOGY

## 2025-06-26 PROCEDURE — 77301 RADIOTHERAPY DOSE PLAN IMRT: CPT | Performed by: RADIOLOGY

## 2025-06-26 RX ORDER — LOSARTAN POTASSIUM 25 MG/1
25 TABLET ORAL 2 TIMES DAILY
Qty: 85 TABLET | Refills: 5 | Status: SHIPPED | OUTPATIENT
Start: 2025-06-26

## 2025-06-26 NOTE — TELEPHONE ENCOUNTER
Received call from SAIGE Christina from radiation oncology. Stated the past couple of weeks, patient's wife had been complaining that patient has had neuro changes such as confusion and unsteady gait.     Jone stated that patient had an MRI to see if cancer was progressing or if anything could be causing situation, Jone stated that MRI showed what he considered \"normal post radiation treatment findings\"     Jone believes hypotension could possibly be cause of unsteady gait, stated wife took BP this morning and it was 112/78 prior to taking medication. Once at facility and has had medication, patient's BP was 94/57.      Please advise.

## 2025-06-26 NOTE — PROGRESS NOTES
[facility]  SPEECH THERAPY  [] SPEECH LANGUAGE COGNITIVE EVALUATION  [x] DAILY NOTE   [] PROGRESS NOTE [] DISCHARGE NOTE    [x] OUTPATIENT REHABILITATION CENTER - [place]   [] [facility]    [] [facility]   [] [facility]    Date: 2025  Patient Name:  Koko Chao  : 1960  MRN: [id number]  CSN: [id number]    Referring Practitioner Sonya Robison DO [id number]      Diagnosis  Diagnoses       C71.3 (ICD-10-CM) - Malignant neoplasm of parietal lobe of brain (HCC)    G06.0 (ICD-10-CM) - Brain abscess           Treatment Diagnosis R47.01 Aphasia  R41.89 Other symptoms and signs involving cognitive functions and awareness   Date of Evaluation 25   Additional Pertinent History Koko Chao has a past medical history of Cancer (HCC), Cerebrovascular accident (HCC), CRVO (central retinal vein occlusion) (HCC), Heartburn, Hyperlipidemia, and Hypertension.  he has a past surgical history that includes Testicle removal (); Colonoscopy (); Prostate biopsy (); liver biopsy (); Ultrasound Prostate/Transrectal (N/A, 2020); Colonoscopy (N/A, 3/13/2020); Upper gastrointestinal endoscopy (N/A, 3/13/2020); Cystoscopy (Left, 2023); Ureter surgery (N/A, 4/15/2023); craniotomy (Left, 2025); and craniotomy (Left, 2025).     Allergies Allergies   Allergen Reactions    Demerol Hives    Midazolam Hcl Hives    Other Other (See Comments)     -zines; muscle spasms    Versed [Midazolam]      Hives        Medications   Current Outpatient Medications:     bumetanide (BUMEX) 0.5 MG tablet, Take 1 tablet by mouth daily, Disp: 30 tablet, Rfl: 0    TEMOZOLOMIDE PO, Take by mouth daily, Disp: , Rfl:     dexAMETHasone (DECADRON) 2 MG tablet, Take 1 tablet by mouth 3 times daily (with meals), Disp: , Rfl:     amLODIPine (NORVASC) 10 MG tablet, Take 1 tablet by mouth daily, Disp: 30 tablet, Rfl: 5    losartan (COZAAR) 25 MG tablet, Take 1.5 tablets

## 2025-06-26 NOTE — TELEPHONE ENCOUNTER
Called the patient's wife to discuss his blood pressure medication. He has had several lows over the past few days, with the lowest being 77 systolic. Reviewed his medications with his wife, and instructed her to give him his losartan 25mg daily, bumex 0.5mg daily, and to hold the amlodipine for one week. She can give his amlodipine should his blood pressure rise above 135 systolic. Patient's wife verbalized understanding. Will follow up with a phone call in one week.

## 2025-06-26 NOTE — PROGRESS NOTES
[facility]          [address], [address]        [place] [address]        O: [phone]        F: [phone]       [place]            Dr. Víctor Dejesus MD MS          Dr. Dara Stephens MD PhD    ON TREATMENT VISIT (OTV) NOTE     Date of Service: 2025  Patient ID: Koko Chao   : 1960  MRN: [id number]   Acct Number: [id number]     RADIATION ONCOLOGY ATTENDING:  Dara Stephens PhD MD    No diagnosis found.    Treatment Area: Brain    Current Total Dose(cGy): 3600  Current Fraction:   Final/Cumulative Rx. Dose (cGy): 6000    Patient was seen today for weekly visit.     Wt Readings from Last 3 Encounters:   25 73.5 kg (162 lb 0.6 oz)   25 75.1 kg (165 lb 9.1 oz)   25 75 kg (165 lb 6.4 oz)       BP (!) 94/57   Pulse 97   Temp 98.3 °F (36.8 °C) (Infrared)   Resp 18   Wt 73.5 kg (162 lb 0.6 oz)   SpO2 96%   BMI 24.64 kg/m²     Lab Results   Component Value Date    WBC 11.8 (H) 2025    HGB 12.1 (L) 2025    HCT 36.2 (L) 2025     2025       Comfort Alteration  Fatigue:Able to perform daily activities with rest periods    Pain Location: Denies  Pain Intensity (Current): 0 No Pain  Pain Treatment: No treatment scheduled  Pain Relief: n/a    Emotional Alteration:   Coping: effective    Nutritional Alteration  Anorexia: none   Nausea: No nausea noted  Vomiting: No vomiting   Salivary Glands- Acute: Mild PO dryness, thick saliva, altered taste, no change in PO intake   Taste Disturbance (Dysgeusia): Normal   Dyspepsia/Heartburn: None  Dysphagia/Esophagitis: None    Skin Alteration   Skin reaction: No changes noted  Alopecia: No loss    Mucous Membrane Alteration  Mucositis XRT Related: None  Pharynx and Esophagus- Acute: No change over baseline  Voice Changes: Normal    Ventilation Alteration  Cough: None  Hemoptysis: None  Dyspnea: Normal  Mucous Quantity/Quality:

## 2025-06-27 ENCOUNTER — HOSPITAL ENCOUNTER (OUTPATIENT)
Dept: SPEECH THERAPY | Age: 65
Setting detail: THERAPIES SERIES
End: 2025-06-27
Payer: COMMERCIAL

## 2025-06-27 ENCOUNTER — HOSPITAL ENCOUNTER (OUTPATIENT)
Dept: RADIATION ONCOLOGY | Age: 65
Discharge: HOME | End: 2025-06-27
Payer: COMMERCIAL

## 2025-06-27 ENCOUNTER — HOSPITAL ENCOUNTER (OUTPATIENT)
Dept: OCCUPATIONAL THERAPY | Age: 65
Setting detail: THERAPIES SERIES
End: 2025-06-27
Payer: COMMERCIAL

## 2025-06-27 PROCEDURE — 77386 HC NTSTY MODUL RAD TX DLVR CPLX: CPT | Performed by: RADIOLOGY

## 2025-06-30 ENCOUNTER — HOSPITAL ENCOUNTER (OUTPATIENT)
Dept: SPEECH THERAPY | Age: 65
Setting detail: THERAPIES SERIES
End: 2025-06-30
Payer: COMMERCIAL

## 2025-06-30 ENCOUNTER — APPOINTMENT (OUTPATIENT)
Dept: OCCUPATIONAL THERAPY | Age: 65
End: 2025-06-30
Payer: COMMERCIAL

## 2025-06-30 ENCOUNTER — HOSPITAL ENCOUNTER (OUTPATIENT)
Dept: RADIATION ONCOLOGY | Age: 65
Discharge: HOME | End: 2025-06-30
Payer: COMMERCIAL

## 2025-06-30 PROCEDURE — 77336 RADIATION PHYSICS CONSULT: CPT | Performed by: RADIOLOGY

## 2025-06-30 PROCEDURE — 77014 CHG CT GUIDANCE RADIATION THERAPY FLDS PLACEMENT: CPT | Performed by: RADIOLOGY

## 2025-06-30 PROCEDURE — 77386 HC NTSTY MODUL RAD TX DLVR CPLX: CPT | Performed by: RADIOLOGY

## 2025-07-01 ENCOUNTER — HOSPITAL ENCOUNTER (OUTPATIENT)
Dept: RADIATION ONCOLOGY | Age: 65
Discharge: HOME | End: 2025-07-01
Payer: COMMERCIAL

## 2025-07-01 PROCEDURE — 77386 HC NTSTY MODUL RAD TX DLVR CPLX: CPT | Performed by: RADIOLOGY

## 2025-07-02 ENCOUNTER — HOSPITAL ENCOUNTER (OUTPATIENT)
Dept: RADIATION ONCOLOGY | Age: 65
Discharge: HOME | End: 2025-07-02
Payer: COMMERCIAL

## 2025-07-02 DIAGNOSIS — C71.3 MALIGNANT NEOPLASM OF PARIETAL LOBE OF BRAIN (HCC): Primary | ICD-10-CM

## 2025-07-02 PROCEDURE — 77338 DESIGN MLC DEVICE FOR IMRT: CPT | Performed by: RADIOLOGY

## 2025-07-02 PROCEDURE — 77386 HC NTSTY MODUL RAD TX DLVR CPLX: CPT | Performed by: RADIOLOGY

## 2025-07-02 NOTE — PROGRESS NOTES
Koko Chao was evaluated today and a DME order was entered for a standard wheelchair because he requires this to successfully complete daily living tasks of eating, bathing, toileting, personal cares, ambulating, and hygiene.  A standard manual wheelchair is necessary due to patient's impaired ambulation and mobility restrictions and would be unable to resolve these daily living tasks using a cane or walker.  The patient is capable of using a standard wheelchair safely in their home and can maneuver within their home with adequate access.  There is a caregiver available to provide necessary assistance.  The need for this equipment was discussed with the patient and he understands, is in agreement, and has not expressed an unwillingness to use the wheelchair.      Addendum 7/3/2025: Koko Chao was evaluated today and a DME order was entered for a lightweight wheelchair because he requires this to successfully complete daily living tasks of eating, bathing, toileting, and personal cares.  A lightweight wheelchair is necessary due to the patient's impaired ambulation and mobility restrictions. He relies on his wife to help transport him to radiation treatments and various ADLs. She is unable to help /store/transfer a standard wheelchair d/t weight and her health conditions as well. The patient would not be able to resolve these daily living tasks using a cane or walker.  The patient can self-propel a lightweight wheelchair safely in their home and can maneuver within their home with adequate access.  The patient cannot self-propel in a standard wheelchair.  The need for this equipment was discussed with the patient and he understands, is in agreement, and has not expressed an unwillingness to use the wheelchair.

## 2025-07-03 ENCOUNTER — HOSPITAL ENCOUNTER (OUTPATIENT)
Dept: RADIATION ONCOLOGY | Age: 65
Discharge: HOME | End: 2025-07-03
Payer: COMMERCIAL

## 2025-07-03 ENCOUNTER — SOCIAL WORK (OUTPATIENT)
Dept: RADIATION ONCOLOGY | Age: 65
End: 2025-07-03

## 2025-07-03 VITALS
TEMPERATURE: 97.5 F | BODY MASS INDEX: 24.61 KG/M2 | SYSTOLIC BLOOD PRESSURE: 92 MMHG | OXYGEN SATURATION: 95 % | RESPIRATION RATE: 16 BRPM | WEIGHT: 161.82 LBS | HEART RATE: 100 BPM | DIASTOLIC BLOOD PRESSURE: 54 MMHG

## 2025-07-03 DIAGNOSIS — C71.3 MALIGNANT NEOPLASM OF PARIETAL LOBE OF BRAIN (HCC): Primary | ICD-10-CM

## 2025-07-03 PROCEDURE — 77386 HC NTSTY MODUL RAD TX DLVR CPLX: CPT | Performed by: RADIOLOGY

## 2025-07-03 NOTE — PROGRESS NOTES
[facility]          [address], [address]        [place] [id number]        O: [phone]        F: [phone]       [place]            Dr. Víctor Dejesus MD MS          Dr. Dara Stephens MD PhD    ON TREATMENT VISIT (OTV) NOTE     Date of Service: 7/3/2025  Patient ID: Koko Chao   : 1960  MRN: [id number]   Acct Number: [id number]     RADIATION ONCOLOGY ATTENDING:  Dara Stephens PhD MD      ICD-10-CM    1. 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 Dose (cGy): 6000    Patient was seen today for weekly visit.     Wt Readings from Last 3 Encounters:   25 73.4 kg (161 lb 13.1 oz)   25 73.5 kg (162 lb 0.6 oz)   25 75.1 kg (165 lb 9.1 oz)       BP (!) 92/54   Pulse 100   Temp 97.5 °F (36.4 °C) (Infrared)   Resp 16   Wt 73.4 kg (161 lb 13.1 oz)   SpO2 95%   BMI 24.61 kg/m²     Lab Results   Component Value Date    WBC 11.8 (H) 2025    HGB 12.1 (L) 2025    HCT 36.2 (L) 2025     2025       Comfort Alteration  Fatigue:Must curtail daily activities even with rest periods and early bedtime - closes eyes frequently to rest them during the day, but is not always doing this to sleep. 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     Pain Location: Denies  Pain Intensity (Current): 0 No Pain  Pain Treatment: No treatment scheduled  Pain Relief: n/a    Emotional Alteration:   Coping: effective    Nutritional Alteration  Anorexia: none   Nausea: No nausea noted  Vomiting: No vomiting   Salivary Glands- Acute: Mild PO dryness, thick saliva, altered taste, no change in PO intake   Taste Disturbance (Dysgeusia): Normal   Dyspepsia/Heartburn: None  Dysphagia/Esophagitis: None    Skin Alteration   Skin reaction: No changes

## 2025-07-03 NOTE — PROGRESS NOTES
Oncology Social Work    Date: 7/3/2025  Time: 1:09 PM  Name: Koko Chao  MRN: [id number]     Contact Type: Follow-up    Note:   Situation: This staff was contacted by the provider (Dr Stephens) about assisting Koko's wife (Anita) in trying to get access to a lightweight wheelchair for Koko Chao.    Background: They have had a previous encounter with this staff. Today's conversation focused on trying to see where they can get a wheelchair    Assessment: This staff had the opportunity to speak with Anita and Koko while in the radiation area. They appeared pleasant as we investigated the equipment needs. It was suggested that Koko gets an order for Anita to take to various medical equipment locations.  - Suggested locations included: [employer], [facility], [employer], [employer], and [facility]. I provided her with phone numbers and offered to assist but Koko was hungry and didn't want to wait any longer. Anita was told she can call for my assistance at any time   - Additional education regarding the services provided by the  were explained during our conversation.   - A note was sent to [facility] [employer] in case all other options fail to come through with options of provision.    Recommendation: Koko Chao was encouraged to reach back out if they aren't able to obtain the wheelchair. This staff will remain available for assistance and support as needed. My direct phone number was provided for further contact..    Stefania Reddy, MSW, LSW, ONC-C, ACHP-  Oncology Social Worker      [place]

## 2025-07-06 ENCOUNTER — TELEPHONE (OUTPATIENT)
Dept: FAMILY MEDICINE CLINIC | Age: 65
End: 2025-07-06

## 2025-07-06 DIAGNOSIS — I10 ESSENTIAL HYPERTENSION: ICD-10-CM

## 2025-07-07 ENCOUNTER — HOSPITAL ENCOUNTER (OUTPATIENT)
Dept: RADIATION ONCOLOGY | Age: 65
Discharge: HOME | End: 2025-07-07
Payer: COMMERCIAL

## 2025-07-07 PROCEDURE — 77336 RADIATION PHYSICS CONSULT: CPT | Performed by: RADIOLOGY

## 2025-07-07 PROCEDURE — 77386 HC NTSTY MODUL RAD TX DLVR CPLX: CPT | Performed by: RADIOLOGY

## 2025-07-08 ENCOUNTER — HOSPITAL ENCOUNTER (OUTPATIENT)
Dept: RADIATION ONCOLOGY | Age: 65
Discharge: HOME | End: 2025-07-08
Payer: COMMERCIAL

## 2025-07-08 PROCEDURE — 77386 HC NTSTY MODUL RAD TX DLVR CPLX: CPT | Performed by: RADIOLOGY

## 2025-07-09 ENCOUNTER — HOSPITAL ENCOUNTER (OUTPATIENT)
Dept: RADIATION ONCOLOGY | Age: 65
Discharge: HOME | End: 2025-07-09
Payer: COMMERCIAL

## 2025-07-09 PROCEDURE — 77386 HC NTSTY MODUL RAD TX DLVR CPLX: CPT | Performed by: RADIOLOGY

## 2025-07-10 ENCOUNTER — HOSPITAL ENCOUNTER (OUTPATIENT)
Dept: RADIATION ONCOLOGY | Age: 65
Discharge: HOME | End: 2025-07-10
Payer: COMMERCIAL

## 2025-07-10 VITALS
BODY MASS INDEX: 24.44 KG/M2 | SYSTOLIC BLOOD PRESSURE: 100 MMHG | TEMPERATURE: 97.2 F | DIASTOLIC BLOOD PRESSURE: 67 MMHG | RESPIRATION RATE: 16 BRPM | OXYGEN SATURATION: 94 % | HEART RATE: 106 BPM | WEIGHT: 160.72 LBS

## 2025-07-10 DIAGNOSIS — C71.3 MALIGNANT NEOPLASM OF PARIETAL LOBE OF BRAIN (HCC): Primary | ICD-10-CM

## 2025-07-10 PROCEDURE — 77386 HC NTSTY MODUL RAD TX DLVR CPLX: CPT | Performed by: RADIOLOGY

## 2025-07-10 NOTE — DISCHARGE INSTRUCTIONS
PATIENT DISCHARGE INSTRUCTIONS    Remember that side effects present at the end of your treatments will improve within a few weeks after the last treatment.  Eat well balanced meals even though your treatments are finished.  This will help speed the healing process. Continue any special diets prescribed to control side effects until these side effects have been resolved.  Get plenty of rest.  If you have experienced fatigue and/or weakness, this may continue for several weeks after your last treatment.  Continue with your daily activities according to the way you feel.  Continue to be gentle with your skin.  Follow your present skin care instructions until your follow-up visit.  IF YOU DEVELOP ANY CHANGES IN YOUR SKIN IN THE AREA TREATED WITH RADIATION, PLEASE CALL THE RADIATION ONCOLOGY NURSE -[phone].  Protect your skin from any injury and avoid direct sun exposure in the treatment area.  The skin in the treated area may always be more sensitive than the rest of your skin.  Always use SPF 30 or higher sun block if you will be in the sun and cannot avoid exposure.  Please contact your referring physician for a follow-up appointment in addition to your Radiation Oncology appointment.  Presence of pain:   Medication Taper: Yes    See Instructions Dated: 7-10-25  Follow up orders: RIVERA Alberto on 8-15-25 prior to follow up

## 2025-07-10 NOTE — PROGRESS NOTES
[facility]          [address]        [place] [id number]        O: [phone]        F: [phone]       [employer]            Dr. Víctor Dejesus MD MS          Dr. Dara Stephens MD PhD    ON TREATMENT VISIT (OTV) NOTE     Date of Service: 7/10/2025  Patient ID: Koko Chao   : 1960  MRN: [id number]   Acct Number: [id number]     RADIATION ONCOLOGY ATTENDING:  Dara Stephens PhD MD    No diagnosis found.      Treatment Area: Brain    Current Total Dose(cGy): 5400  Current Fraction:   Final/Cumulative Rx. Dose (cGy): 6000    Patient was seen today for weekly visit.     Wt Readings from Last 3 Encounters:   07/10/25 72.9 kg (160 lb 11.5 oz)   25 73.4 kg (161 lb 13.1 oz)   25 73.5 kg (162 lb 0.6 oz)       /67   Pulse (!) 106   Temp 97.2 °F (36.2 °C) (Infrared)   Resp 16   Wt 72.9 kg (160 lb 11.5 oz)   SpO2 94%   BMI 24.44 kg/m²     Lab Results   Component Value Date    WBC 11.8 (H) 2025    HGB 12.1 (L) 2025    HCT 36.2 (L) 2025     2025       Comfort Alteration  Fatigue:Must curtail daily activities even with rest periods and early bedtime - closes eyes frequently to rest them during the day, but is not always doing this to sleep. 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     Pain Location: Denies  Pain Intensity (Current): 0 No Pain  Pain Treatment: No treatment scheduled  Pain Relief: n/a    Emotional Alteration:   Coping: effective    Nutritional Alteration  Anorexia: none   Nausea: No nausea noted  Vomiting: No vomiting   Salivary Glands- Acute: Mild PO dryness, thick saliva, altered taste, no change in PO intake   Taste Disturbance (Dysgeusia): Normal   Dyspepsia/Heartburn: None  Dysphagia/Esophagitis: None    Skin Alteration   Skin reaction: No changes noted  Alopecia: Pronounced or total hair loss    Mucous

## 2025-07-11 ENCOUNTER — HOSPITAL ENCOUNTER (OUTPATIENT)
Dept: RADIATION ONCOLOGY | Age: 65
Discharge: HOME | End: 2025-07-11
Payer: COMMERCIAL

## 2025-07-11 PROCEDURE — 77386 HC NTSTY MODUL RAD TX DLVR CPLX: CPT | Performed by: RADIOLOGY

## 2025-07-14 ENCOUNTER — HOSPITAL ENCOUNTER (OUTPATIENT)
Dept: RADIATION ONCOLOGY | Age: 65
Discharge: HOME | End: 2025-07-14
Payer: COMMERCIAL

## 2025-07-14 PROCEDURE — 77386 HC NTSTY MODUL RAD TX DLVR CPLX: CPT | Performed by: RADIOLOGY

## 2025-07-14 PROCEDURE — 77014 CHG CT GUIDANCE RADIATION THERAPY FLDS PLACEMENT: CPT | Performed by: RADIOLOGY

## 2025-07-14 PROCEDURE — 77336 RADIATION PHYSICS CONSULT: CPT | Performed by: RADIOLOGY

## 2025-07-15 ENCOUNTER — HOSPITAL ENCOUNTER (OUTPATIENT)
Dept: RADIATION ONCOLOGY | Age: 65
Discharge: HOME | End: 2025-07-15
Payer: COMMERCIAL

## 2025-07-15 PROCEDURE — 77014 CHG CT GUIDANCE RADIATION THERAPY FLDS PLACEMENT: CPT | Performed by: RADIOLOGY

## 2025-07-15 PROCEDURE — 77386 HC NTSTY MODUL RAD TX DLVR CPLX: CPT | Performed by: RADIOLOGY

## 2025-07-16 ENCOUNTER — TELEPHONE (OUTPATIENT)
Dept: RADIATION ONCOLOGY | Age: 65
End: 2025-07-16

## 2025-07-16 NOTE — TELEPHONE ENCOUNTER
Nutrition Assessment     Reason for Visit: On treatment check in     Nutrition Recommendations:   - Gave edu on dry mouth, plant based diet, and homemade smoothie recipes  - Eat a fruit and/or vegetable at every meal  - follow plant based diet  - avoid processed meats          Malnutrition Assessment:   Malnutrition Status: No malnourishment  Context:  Findings of the 6 clinical characteristics of malnutrition (minimum of 2 out of 6 clinical characteristics is required to make the dx of moderate or severe Protein Calorie Malnutrition based on AND/ASPEN Guidelines):              1. Energy Intake:               2. Weight Loss:              3. Fat Loss: None              4. Muscle Loss: None              5. Fluid Accumulation: none              6.  Strength: Not measured     Nutrition Diagnosis:   Problem: Nutrition related knowledge deficit  Etiology: Brain cancer  Signs and Symptoms: Lack of knowledge on cancer preventative diet     Nutrition Assessment:   History: Brain cancer, HTN, HLD  Subjective:   7/16/25 Called pt and left message to check in. Will follow up PRN.  6/18/25 1/2 way through radiation tx. Good appetite, eating 3 full meals plus snacks. C/o dry mouth.      Diet recall:   Bkft: cereal, or eggs and toast  Lunch: Soup, sandwich, or salad,  Dinner: Meat and veggie  Snacks: Granola bars, chips, cookies  Ensure + Ice cream every other day   Current Nutrition:              Oral Diet: Regular              Oral Diet Intake:  >75% of needs                Oral Nutrition Supplement (ONS): Ensure + Icecream shake              ONS intake:    1 every other day.   Anthropometric Measures:                    Wt Readings from Last 3 Encounters:   06/17/25 75 kg (165 lb 6.4 oz)   06/12/25 75.1 kg (165 lb 9.1 oz)   06/05/25 75.3 kg (166 lb 0.1 oz)                  Usual Weight:   190lbs (EMR) 2/13/25)              Ideal Weight: 148#  Adjusted BW:  n/a

## 2025-07-21 ENCOUNTER — APPOINTMENT (OUTPATIENT)
Dept: GENERAL RADIOLOGY | Age: 65
DRG: 193 | End: 2025-07-21
Payer: COMMERCIAL

## 2025-07-21 ENCOUNTER — HOSPITAL ENCOUNTER (INPATIENT)
Age: 65
LOS: 3 days | Discharge: HOME | DRG: 193 | End: 2025-07-25
Attending: FAMILY MEDICINE
Payer: COMMERCIAL

## 2025-07-21 DIAGNOSIS — J18.9 PNEUMONIA OF LEFT LOWER LOBE DUE TO INFECTIOUS ORGANISM: ICD-10-CM

## 2025-07-21 DIAGNOSIS — R53.83 FATIGUE, UNSPECIFIED TYPE: Primary | ICD-10-CM

## 2025-07-21 DIAGNOSIS — R53.1 GENERAL WEAKNESS: ICD-10-CM

## 2025-07-21 LAB
ALBUMIN SERPL BCG-MCNC: 3 G/DL (ref 3.4–4.9)
ALP SERPL-CCNC: 68 U/L (ref 40–129)
ALT SERPL W/O P-5'-P-CCNC: 60 U/L (ref 10–50)
ANION GAP SERPL CALC-SCNC: 12 MEQ/L (ref 8–16)
AST SERPL-CCNC: 33 U/L (ref 10–50)
BILIRUB CONJ SERPL-MCNC: 0.2 MG/DL (ref 0–0.2)
BILIRUB SERPL-MCNC: 0.5 MG/DL (ref 0.3–1.2)
BUN SERPL-MCNC: 17 MG/DL (ref 8–23)
CALCIUM SERPL-MCNC: 8.9 MG/DL (ref 8.8–10.2)
CHLORIDE SERPL-SCNC: 97 MEQ/L (ref 98–111)
CO2 SERPL-SCNC: 22 MEQ/L (ref 22–29)
CREAT SERPL-MCNC: 0.8 MG/DL (ref 0.7–1.2)
EKG ATRIAL RATE: 74 BPM
EKG P AXIS: 19 DEGREES
EKG P-R INTERVAL: 126 MS
EKG Q-T INTERVAL: 378 MS
EKG QRS DURATION: 84 MS
EKG QTC CALCULATION (BAZETT): 419 MS
EKG R AXIS: 71 DEGREES
EKG T AXIS: 82 DEGREES
EKG VENTRICULAR RATE: 74 BPM
GFR SERPL CREATININE-BSD FRML MDRD: > 90 ML/MIN/1.73M2
GLUCOSE SERPL-MCNC: 104 MG/DL (ref 74–109)
LACTIC ACID, SEPSIS: 1.4 MMOL/L (ref 0.5–1.9)
MAGNESIUM SERPL-MCNC: 2.1 MG/DL (ref 1.6–2.6)
NT-PROBNP SERPL IA-MCNC: 375 PG/ML (ref 0–124)
OSMOLALITY SERPL CALC.SUM OF ELEC: 264.5 MOSMOL/KG (ref 275–300)
POTASSIUM SERPL-SCNC: 4.1 MEQ/L (ref 3.5–5.2)
PROCALCITONIN SERPL IA-MCNC: 0.14 NG/ML (ref 0.01–0.09)
PROT SERPL-MCNC: 5.7 G/DL (ref 6.4–8.3)
SCAN OF BLOOD SMEAR: NORMAL
SODIUM SERPL-SCNC: 131 MEQ/L (ref 135–145)

## 2025-07-21 PROCEDURE — 83605 ASSAY OF LACTIC ACID: CPT

## 2025-07-21 PROCEDURE — 93010 ELECTROCARDIOGRAM REPORT: CPT | Performed by: INTERNAL MEDICINE

## 2025-07-21 PROCEDURE — 99223 1ST HOSP IP/OBS HIGH 75: CPT

## 2025-07-21 PROCEDURE — 99285 EMERGENCY DEPT VISIT HI MDM: CPT

## 2025-07-21 PROCEDURE — 83935 ASSAY OF URINE OSMOLALITY: CPT

## 2025-07-21 PROCEDURE — 6360000002 HC RX W HCPCS

## 2025-07-21 PROCEDURE — 87040 BLOOD CULTURE FOR BACTERIA: CPT

## 2025-07-21 PROCEDURE — 96375 TX/PRO/DX INJ NEW DRUG ADDON: CPT

## 2025-07-21 PROCEDURE — 81003 URINALYSIS AUTO W/O SCOPE: CPT

## 2025-07-21 PROCEDURE — G0378 HOSPITAL OBSERVATION PER HR: HCPCS

## 2025-07-21 PROCEDURE — 82570 ASSAY OF URINE CREATININE: CPT

## 2025-07-21 PROCEDURE — 84300 ASSAY OF URINE SODIUM: CPT

## 2025-07-21 PROCEDURE — 83735 ASSAY OF MAGNESIUM: CPT

## 2025-07-21 PROCEDURE — 0202U NFCT DS 22 TRGT SARS-COV-2: CPT

## 2025-07-21 PROCEDURE — 96374 THER/PROPH/DIAG INJ IV PUSH: CPT

## 2025-07-21 PROCEDURE — 82248 BILIRUBIN DIRECT: CPT

## 2025-07-21 PROCEDURE — 85025 COMPLETE CBC W/AUTO DIFF WBC: CPT

## 2025-07-21 PROCEDURE — 2580000003 HC RX 258

## 2025-07-21 PROCEDURE — 2500000003 HC RX 250 WO HCPCS

## 2025-07-21 PROCEDURE — 83880 ASSAY OF NATRIURETIC PEPTIDE: CPT

## 2025-07-21 PROCEDURE — 93005 ELECTROCARDIOGRAM TRACING: CPT

## 2025-07-21 PROCEDURE — 84145 PROCALCITONIN (PCT): CPT

## 2025-07-21 PROCEDURE — 71045 X-RAY EXAM CHEST 1 VIEW: CPT

## 2025-07-21 PROCEDURE — 80053 COMPREHEN METABOLIC PANEL: CPT

## 2025-07-21 PROCEDURE — 36415 COLL VENOUS BLD VENIPUNCTURE: CPT

## 2025-07-21 RX ORDER — ENOXAPARIN SODIUM 100 MG/ML
40 INJECTION SUBCUTANEOUS DAILY
Status: DISCONTINUED | OUTPATIENT
Start: 2025-07-21 | End: 2025-07-21 | Stop reason: ALTCHOICE

## 2025-07-21 RX ORDER — POLYETHYLENE GLYCOL 3350 17 G/17G
17 POWDER, FOR SOLUTION ORAL DAILY PRN
Status: DISCONTINUED | OUTPATIENT
Start: 2025-07-21 | End: 2025-07-25 | Stop reason: HOSPADM

## 2025-07-21 RX ORDER — SODIUM CHLORIDE 9 MG/ML
INJECTION, SOLUTION INTRAVENOUS PRN
Status: DISCONTINUED | OUTPATIENT
Start: 2025-07-21 | End: 2025-07-25 | Stop reason: HOSPADM

## 2025-07-21 RX ORDER — 0.9 % SODIUM CHLORIDE 0.9 %
1000 INTRAVENOUS SOLUTION INTRAVENOUS ONCE
Status: COMPLETED | OUTPATIENT
Start: 2025-07-21 | End: 2025-07-21

## 2025-07-21 RX ORDER — ONDANSETRON 2 MG/ML
4 INJECTION INTRAMUSCULAR; INTRAVENOUS EVERY 6 HOURS PRN
Status: DISCONTINUED | OUTPATIENT
Start: 2025-07-21 | End: 2025-07-25 | Stop reason: HOSPADM

## 2025-07-21 RX ORDER — ONDANSETRON 4 MG/1
4 TABLET, ORALLY DISINTEGRATING ORAL EVERY 8 HOURS PRN
Status: DISCONTINUED | OUTPATIENT
Start: 2025-07-21 | End: 2025-07-25 | Stop reason: HOSPADM

## 2025-07-21 RX ORDER — ACETAMINOPHEN 650 MG/1
650 SUPPOSITORY RECTAL EVERY 6 HOURS PRN
Status: DISCONTINUED | OUTPATIENT
Start: 2025-07-21 | End: 2025-07-25 | Stop reason: HOSPADM

## 2025-07-21 RX ORDER — SODIUM CHLORIDE 0.9 % (FLUSH) 0.9 %
5-40 SYRINGE (ML) INJECTION PRN
Status: DISCONTINUED | OUTPATIENT
Start: 2025-07-21 | End: 2025-07-25 | Stop reason: HOSPADM

## 2025-07-21 RX ORDER — SODIUM CHLORIDE 0.9 % (FLUSH) 0.9 %
5-40 SYRINGE (ML) INJECTION EVERY 12 HOURS SCHEDULED
Status: DISCONTINUED | OUTPATIENT
Start: 2025-07-21 | End: 2025-07-25 | Stop reason: HOSPADM

## 2025-07-21 RX ORDER — ALBUTEROL SULFATE 0.83 MG/ML
2.5 SOLUTION RESPIRATORY (INHALATION)
Status: DISCONTINUED | OUTPATIENT
Start: 2025-07-21 | End: 2025-07-25 | Stop reason: HOSPADM

## 2025-07-21 RX ORDER — GUAIFENESIN 600 MG/1
600 TABLET, EXTENDED RELEASE ORAL 2 TIMES DAILY
Status: DISCONTINUED | OUTPATIENT
Start: 2025-07-21 | End: 2025-07-25 | Stop reason: HOSPADM

## 2025-07-21 RX ORDER — IPRATROPIUM BROMIDE AND ALBUTEROL SULFATE 2.5; .5 MG/3ML; MG/3ML
1 SOLUTION RESPIRATORY (INHALATION)
Status: DISCONTINUED | OUTPATIENT
Start: 2025-07-22 | End: 2025-07-22

## 2025-07-21 RX ORDER — AZITHROMYCIN 250 MG/1
500 TABLET, FILM COATED ORAL EVERY 24 HOURS
Status: COMPLETED | OUTPATIENT
Start: 2025-07-22 | End: 2025-07-24

## 2025-07-21 RX ORDER — ACETAMINOPHEN 325 MG/1
650 TABLET ORAL EVERY 6 HOURS PRN
Status: DISCONTINUED | OUTPATIENT
Start: 2025-07-21 | End: 2025-07-25 | Stop reason: HOSPADM

## 2025-07-21 RX ORDER — ENOXAPARIN SODIUM 100 MG/ML
40 INJECTION SUBCUTANEOUS DAILY
Status: DISCONTINUED | OUTPATIENT
Start: 2025-07-21 | End: 2025-07-21 | Stop reason: SDUPTHER

## 2025-07-21 RX ADMIN — AZITHROMYCIN MONOHYDRATE 500 MG: 500 INJECTION, POWDER, LYOPHILIZED, FOR SOLUTION INTRAVENOUS at 20:38

## 2025-07-21 RX ADMIN — SODIUM CHLORIDE, PRESERVATIVE FREE 10 ML: 5 INJECTION INTRAVENOUS at 23:50

## 2025-07-21 RX ADMIN — SODIUM CHLORIDE 1000 ML: 0.9 INJECTION, SOLUTION INTRAVENOUS at 18:57

## 2025-07-21 RX ADMIN — WATER 2000 MG: 1 INJECTION INTRAMUSCULAR; INTRAVENOUS; SUBCUTANEOUS at 20:32

## 2025-07-21 ASSESSMENT — LIFESTYLE VARIABLES
HOW OFTEN DO YOU HAVE A DRINK CONTAINING ALCOHOL: NEVER
HOW MANY STANDARD DRINKS CONTAINING ALCOHOL DO YOU HAVE ON A TYPICAL DAY: PATIENT DOES NOT DRINK

## 2025-07-21 ASSESSMENT — PAIN - FUNCTIONAL ASSESSMENT: PAIN_FUNCTIONAL_ASSESSMENT: NONE - DENIES PAIN

## 2025-07-21 NOTE — ED PROVIDER NOTES
[facility] EMERGENCY DEPARTMENT  EMERGENCY DEPARTMENT ENCOUNTER          Pt Name: Koko Chao  MRN: [id number]  Birthdate 1960  Date of evaluation: 7/21/2025  Physician: Reno Esteban MD  Supervising Attending Physician: Mike Navas MD       CHIEF COMPLAINT       Chief Complaint   Patient presents with    Fatigue         HISTORY OF PRESENT ILLNESS    HPI  Koko Chao is a 65 y.o. male who presents to the emergency department from home, as a walk in to the ED lobby for evaluation of fatigue    Patient with history of glioma, on chemoradiation last dose was last week.  Was brought in by wife due to increasing fatigue generalized weakness, he was felt to be warm tactilely but no recorded fever.  Has been coughing a little bit.  Noted some increased frequency in the urine.  The patient has no other acute complaints at this time.      PAST MEDICAL AND SURGICAL HISTORY     Past Medical History:   Diagnosis Date    Cancer (HCC)     Cerebrovascular accident (HCC) 05/07/2025    CRVO (central retinal vein occlusion) (HCC) 01/2016    Right eye    Heartburn     Hyperlipidemia 2010    Hypertension 2010       Past Surgical History:   Procedure Laterality Date    COLONOSCOPY  2009    WNL     COLONOSCOPY N/A 3/13/2020    COLONOSCOPY DIAGNOSTIC performed by Percy Cantu MD at [facility]    CRANIOTOMY Left 4/16/2025    LEFT PARIETAL CRANIOTOMY FOR EVACUATION OF CEREBRAL ABSCESS performed by Zadner Singh MD at [facility] OR    CRANIOTOMY Left 4/17/2025    LEFT PARIETAL CRANIOTOMY HEMATOMA EVACUATION performed by Zander Singh MD at [facility] OR    CYSTOSCOPY Left 4/6/2023    CYSTOSCOPY LEFT URETERAL STENT INSERTION performed by Yo Denis MD at [facility] OR    LIVER BIOPSY  1995    PROSTATE BIOPSY  2012    benign     TESTICLE REMOVAL  1994    Lt    ULTRASOUND PROSTATE/TRANSRECTAL N/A 1/16/2020    TRANSRECTAL ULTRASOUND WITH PROSTATE BX performed by Yo Denis MD at [facility] OR    UPPER  tablet, Take 1 tablet by mouth daily, Disp: , Rfl:     Previous Medications    AMLODIPINE (NORVASC) 10 MG TABLET    Take 1 tablet by mouth daily    APIXABAN (ELIQUIS) 5 MG TABS TABLET    Take 1 tablet by mouth 2 times daily    ASPIRIN 81 MG TABLET    Take 1 tablet by mouth daily    BUMETANIDE (BUMEX) 0.5 MG TABLET    Take 1 tablet by mouth daily    BUSPIRONE (BUSPAR) 5 MG TABLET    Take 1 tablet by mouth 2 times daily    DEXAMETHASONE (DECADRON) 2 MG TABLET    Take 1 tablet by mouth 3 times daily (with meals)    LEVETIRACETAM (KEPPRA) 500 MG TABLET    Take 1 tablet by mouth 2 times daily    LOSARTAN (COZAAR) 25 MG TABLET    Take 1 tablet by mouth 2 times daily    MELATONIN 3 MG TABS TABLET    Take 2 tablets by mouth at bedtime    PANTOPRAZOLE (PROTONIX) 40 MG TABLET    Take 1 tablet by mouth every morning (before breakfast)    PRAVASTATIN (PRAVACHOL) 40 MG TABLET    Take 1 tablet by mouth daily    TEMOZOLOMIDE PO    Take by mouth daily    TRAZODONE (DESYREL) 100 MG TABLET    Take 0.5 tablets by mouth nightly as needed for Sleep         SOCIAL HISTORY     Social History     Social History Narrative    Not on file       Social History     Tobacco Use    Smoking status: Never    Smokeless tobacco: Never   Vaping Use    Vaping status: Never Used   Substance Use Topics    Alcohol use: Yes     Comment: occ beer    Drug use: No         ALLERGIES     Allergies   Allergen Reactions    Demerol Hives    Midazolam Hcl Hives    Other Other (See Comments)     -florin; muscle spasms    Versed [Midazolam]      Hives           FAMILY HISTORY     Family History   Problem Relation Age of Onset    Cancer Mother 65        lung     Heart Disease Mother 62    Coronary Art Dis Mother 62    Heart Disease Father 77    Heart Attack Father 77    Coronary Art Dis Father 77    High Blood Pressure Father     High Cholesterol Father     Diabetes Father         borderline    High Blood Pressure Sister 38    High Cholesterol Sister 38    Heart Attack

## 2025-07-21 NOTE — ED NOTES
Patient resting in bed. Respirations easy and unlabored. No distress noted. Call light within reach. Wife at bedside

## 2025-07-21 NOTE — ED TRIAGE NOTES
Pt presents to the ED from home for complaints of weakness and fatigue. Pt wife states that pt has cancer and just got a chemo treatment on July 15th and pt has been weak ever since. Wife states that pt has been having to use his wheelchair more due to weakness. Pt walks with steady gait at baseline. Wife states that pt has a hx of brain surgery in April. Pt presents being A&Ox3 and in not oriented to time. Pt wife states that pt has been tapering off of his dexamethasone on July 16th. Respirations are even and unlabored.

## 2025-07-22 ENCOUNTER — APPOINTMENT (OUTPATIENT)
Dept: GENERAL RADIOLOGY | Age: 65
DRG: 193 | End: 2025-07-22
Payer: COMMERCIAL

## 2025-07-22 PROBLEM — J18.9 COMMUNITY ACQUIRED PNEUMONIA, UNSPECIFIED LATERALITY: Status: ACTIVE | Noted: 2025-07-22

## 2025-07-22 PROBLEM — E87.1 HYPO-OSMOLAR HYPONATREMIA: Status: ACTIVE | Noted: 2025-07-22

## 2025-07-22 PROBLEM — Z86.718 HISTORY OF DVT (DEEP VEIN THROMBOSIS): Status: ACTIVE | Noted: 2025-07-22

## 2025-07-22 PROBLEM — I10 ESSENTIAL HYPERTENSION: Status: ACTIVE | Noted: 2025-07-22

## 2025-07-22 PROBLEM — R53.81 PHYSICAL DECONDITIONING: Status: ACTIVE | Noted: 2025-07-22

## 2025-07-22 LAB
ANION GAP SERPL CALC-SCNC: 11 MEQ/L (ref 8–16)
AUTO DIFF PNL BLD: ABNORMAL
B PERT DNA NPH QL NAA+PROBE: NOT DETECTED
BASOPHILS ABSOLUTE: 0 THOU/MM3 (ref 0–0.1)
BASOPHILS ABSOLUTE: 0 THOU/MM3 (ref 0–0.1)
BASOPHILS NFR BLD AUTO: 0.4 %
BASOPHILS NFR BLD AUTO: 0.4 %
BILIRUB UR QL STRIP.AUTO: NEGATIVE
BORDETELLA PARAPERTUSSIS BY PCR: NOT DETECTED
BUN SERPL-MCNC: 18 MG/DL (ref 8–23)
C PNEUM DNA SPEC QL NAA+PROBE: NOT DETECTED
CALCIUM SERPL-MCNC: 8.8 MG/DL (ref 8.8–10.2)
CHARACTER UR: CLEAR
CHLORIDE SERPL-SCNC: 101 MEQ/L (ref 98–111)
CO2 SERPL-SCNC: 22 MEQ/L (ref 22–29)
COLOR, UA: YELLOW
CREAT SERPL-MCNC: 0.8 MG/DL (ref 0.7–1.2)
CREAT UR-MCNC: 118 MG/DL
DEPRECATED RDW RBC AUTO: 59.3 FL (ref 35–45)
DEPRECATED RDW RBC AUTO: 61.8 FL (ref 35–45)
EOSINOPHIL NFR BLD AUTO: 0.7 %
EOSINOPHIL NFR BLD AUTO: 0.9 %
EOSINOPHILS ABSOLUTE: 0 THOU/MM3 (ref 0–0.4)
EOSINOPHILS ABSOLUTE: 0 THOU/MM3 (ref 0–0.4)
ERYTHROCYTE [DISTWIDTH] IN BLOOD BY AUTOMATED COUNT: 17.8 % (ref 11.5–14.5)
ERYTHROCYTE [DISTWIDTH] IN BLOOD BY AUTOMATED COUNT: 18.1 % (ref 11.5–14.5)
FLUAV RNA NPH QL NAA+PROBE: NOT DETECTED
FLUBV RNA NPH QL NAA+PROBE: NOT DETECTED
GFR SERPL CREATININE-BSD FRML MDRD: > 90 ML/MIN/1.73M2
GLUCOSE SERPL-MCNC: 96 MG/DL (ref 74–109)
GLUCOSE UR QL STRIP.AUTO: NEGATIVE MG/DL
HADV DNA NPH QL NAA+PROBE: NOT DETECTED
HCOV 229E RNA SPEC QL NAA+PROBE: NOT DETECTED
HCOV HKU1 RNA SPEC QL NAA+PROBE: NOT DETECTED
HCOV NL63 RNA SPEC QL NAA+PROBE: NOT DETECTED
HCOV OC43 RNA SPEC QL NAA+PROBE: NOT DETECTED
HCT VFR BLD AUTO: 31.7 % (ref 42–52)
HCT VFR BLD AUTO: 33.4 % (ref 42–52)
HGB BLD-MCNC: 10.5 GM/DL (ref 14–18)
HGB BLD-MCNC: 11.4 GM/DL (ref 14–18)
HGB UR QL STRIP.AUTO: NEGATIVE
HMPV RNA NPH QL NAA+PROBE: NOT DETECTED
HPIV1 RNA NPH QL NAA+PROBE: NOT DETECTED
HPIV2 RNA NPH QL NAA+PROBE: NOT DETECTED
HPIV3 RNA NPH QL NAA+PROBE: NOT DETECTED
HPIV4 RNA NPH QL NAA+PROBE: NOT DETECTED
IMM GRANULOCYTES # BLD AUTO: 0.4 THOU/MM3 (ref 0–0.07)
IMM GRANULOCYTES # BLD AUTO: 0.41 THOU/MM3 (ref 0–0.07)
IMM GRANULOCYTES NFR BLD AUTO: 7.4 %
IMM GRANULOCYTES NFR BLD AUTO: 7.5 %
KETONES UR QL STRIP.AUTO: NEGATIVE
LYMPHOCYTES ABSOLUTE: 0.2 THOU/MM3 (ref 1–4.8)
LYMPHOCYTES ABSOLUTE: 0.3 THOU/MM3 (ref 1–4.8)
LYMPHOCYTES NFR BLD AUTO: 4.3 %
LYMPHOCYTES NFR BLD AUTO: 5.1 %
M PNEUMO DNA SPEC QL NAA+PROBE: NOT DETECTED
MCH RBC QN AUTO: 30.9 PG (ref 26–33)
MCH RBC QN AUTO: 31.3 PG (ref 26–33)
MCHC RBC AUTO-ENTMCNC: 33.1 GM/DL (ref 32.2–35.5)
MCHC RBC AUTO-ENTMCNC: 34.1 GM/DL (ref 32.2–35.5)
MCV RBC AUTO: 91.8 FL (ref 80–94)
MCV RBC AUTO: 93.2 FL (ref 80–94)
MONOCYTES ABSOLUTE: 0.2 THOU/MM3 (ref 0.4–1.3)
MONOCYTES ABSOLUTE: 0.2 THOU/MM3 (ref 0.4–1.3)
MONOCYTES NFR BLD AUTO: 3 %
MONOCYTES NFR BLD AUTO: 3.1 %
NEUTROPHILS ABSOLUTE: 4.4 THOU/MM3 (ref 1.8–7.7)
NEUTROPHILS ABSOLUTE: 4.6 THOU/MM3 (ref 1.8–7.7)
NEUTROPHILS NFR BLD AUTO: 83.1 %
NEUTROPHILS NFR BLD AUTO: 84.1 %
NITRITE UR QL STRIP: NEGATIVE
NRBC BLD AUTO-RTO: 1 /100 WBC
NRBC BLD AUTO-RTO: 1 /100 WBC
OSMOLALITY SERPL CALC.SUM OF ELEC: 270 MOSMOL/KG (ref 275–300)
OSMOLALITY UR: 488 MOSMOL/KG (ref 250–750)
PATHOLOGIST REVIEW: ABNORMAL
PH UR STRIP.AUTO: 6 [PH] (ref 5–9)
PLATELET # BLD AUTO: 154 THOU/MM3 (ref 130–400)
PLATELET # BLD AUTO: 166 THOU/MM3 (ref 130–400)
PLATELET BLD QL SMEAR: ADEQUATE
PLATELET BLD QL SMEAR: ADEQUATE
PMV BLD AUTO: 9.5 FL (ref 9.4–12.4)
PMV BLD AUTO: 9.6 FL (ref 9.4–12.4)
POLYCHROMASIA BLD QL SMEAR: ABNORMAL
POTASSIUM SERPL-SCNC: 5.1 MEQ/L (ref 3.5–5.2)
PROT UR STRIP.AUTO-MCNC: NEGATIVE MG/DL
RBC # BLD AUTO: 3.4 MILL/MM3 (ref 4.7–6.1)
RBC # BLD AUTO: 3.64 MILL/MM3 (ref 4.7–6.1)
RSV RNA NPH QL NAA+PROBE: NOT DETECTED
RV+EV RNA SPEC QL NAA+PROBE: NOT DETECTED
SARS-COV-2 RNA NPH QL NAA+NON-PROBE: NOT DETECTED
SCAN OF BLOOD SMEAR: NORMAL
SODIUM SERPL-SCNC: 134 MEQ/L (ref 135–145)
SODIUM UR-SCNC: 57 MEQ/L
SP GR UR REFRACT.AUTO: 1.02 (ref 1–1.03)
UROBILINOGEN, URINE: 0.2 EU/DL (ref 0–1)
WBC # BLD AUTO: 5.3 THOU/MM3 (ref 4.8–10.8)
WBC # BLD AUTO: 5.5 THOU/MM3 (ref 4.8–10.8)
WBC #/AREA URNS HPF: NEGATIVE /[HPF]

## 2025-07-22 PROCEDURE — 71046 X-RAY EXAM CHEST 2 VIEWS: CPT

## 2025-07-22 PROCEDURE — 1200000003 HC TELEMETRY R&B

## 2025-07-22 PROCEDURE — 6360000002 HC RX W HCPCS

## 2025-07-22 PROCEDURE — 6370000000 HC RX 637 (ALT 250 FOR IP)

## 2025-07-22 PROCEDURE — 36415 COLL VENOUS BLD VENIPUNCTURE: CPT

## 2025-07-22 PROCEDURE — 85025 COMPLETE CBC W/AUTO DIFF WBC: CPT

## 2025-07-22 PROCEDURE — 2500000003 HC RX 250 WO HCPCS

## 2025-07-22 PROCEDURE — 94761 N-INVAS EAR/PLS OXIMETRY MLT: CPT

## 2025-07-22 PROCEDURE — 99233 SBSQ HOSP IP/OBS HIGH 50: CPT | Performed by: NURSE PRACTITIONER

## 2025-07-22 PROCEDURE — 2700000000 HC OXYGEN THERAPY PER DAY

## 2025-07-22 PROCEDURE — 80048 BASIC METABOLIC PNL TOTAL CA: CPT

## 2025-07-22 PROCEDURE — 94640 AIRWAY INHALATION TREATMENT: CPT

## 2025-07-22 RX ORDER — PRAVASTATIN SODIUM 40 MG
40 TABLET ORAL DAILY
Status: DISCONTINUED | OUTPATIENT
Start: 2025-07-22 | End: 2025-07-25 | Stop reason: HOSPADM

## 2025-07-22 RX ORDER — AMLODIPINE BESYLATE 10 MG/1
10 TABLET ORAL DAILY
Status: DISCONTINUED | OUTPATIENT
Start: 2025-07-22 | End: 2025-07-23

## 2025-07-22 RX ORDER — TRAZODONE HYDROCHLORIDE 50 MG/1
50 TABLET ORAL NIGHTLY PRN
Status: DISCONTINUED | OUTPATIENT
Start: 2025-07-22 | End: 2025-07-25 | Stop reason: HOSPADM

## 2025-07-22 RX ORDER — IPRATROPIUM BROMIDE AND ALBUTEROL SULFATE 2.5; .5 MG/3ML; MG/3ML
1 SOLUTION RESPIRATORY (INHALATION) EVERY 4 HOURS PRN
Status: DISCONTINUED | OUTPATIENT
Start: 2025-07-22 | End: 2025-07-25 | Stop reason: HOSPADM

## 2025-07-22 RX ORDER — LEVETIRACETAM 500 MG/1
500 TABLET ORAL 2 TIMES DAILY
Status: DISCONTINUED | OUTPATIENT
Start: 2025-07-22 | End: 2025-07-22

## 2025-07-22 RX ORDER — BUSPIRONE HYDROCHLORIDE 5 MG/1
5 TABLET ORAL 2 TIMES DAILY
Status: DISCONTINUED | OUTPATIENT
Start: 2025-07-22 | End: 2025-07-25 | Stop reason: HOSPADM

## 2025-07-22 RX ORDER — PANTOPRAZOLE SODIUM 40 MG/1
40 TABLET, DELAYED RELEASE ORAL
Status: DISCONTINUED | OUTPATIENT
Start: 2025-07-22 | End: 2025-07-25 | Stop reason: HOSPADM

## 2025-07-22 RX ORDER — LOSARTAN POTASSIUM 25 MG/1
25 TABLET ORAL 2 TIMES DAILY
Status: DISCONTINUED | OUTPATIENT
Start: 2025-07-22 | End: 2025-07-23

## 2025-07-22 RX ORDER — BUMETANIDE 0.5 MG/1
0.5 TABLET ORAL DAILY
Status: DISCONTINUED | OUTPATIENT
Start: 2025-07-22 | End: 2025-07-25 | Stop reason: HOSPADM

## 2025-07-22 RX ORDER — DEXAMETHASONE 4 MG/1
2 TABLET ORAL 2 TIMES DAILY
Status: DISCONTINUED | OUTPATIENT
Start: 2025-07-22 | End: 2025-07-25 | Stop reason: HOSPADM

## 2025-07-22 RX ADMIN — SODIUM CHLORIDE, PRESERVATIVE FREE 10 ML: 5 INJECTION INTRAVENOUS at 20:35

## 2025-07-22 RX ADMIN — DEXAMETHASONE 2 MG: 4 TABLET ORAL at 20:35

## 2025-07-22 RX ADMIN — Medication 6 MG: at 20:35

## 2025-07-22 RX ADMIN — DEXAMETHASONE 2 MG: 4 TABLET ORAL at 01:53

## 2025-07-22 RX ADMIN — SODIUM CHLORIDE, PRESERVATIVE FREE 10 ML: 5 INJECTION INTRAVENOUS at 08:56

## 2025-07-22 RX ADMIN — GUAIFENESIN 600 MG: 600 TABLET, EXTENDED RELEASE ORAL at 23:31

## 2025-07-22 RX ADMIN — GUAIFENESIN 600 MG: 600 TABLET, EXTENDED RELEASE ORAL at 08:55

## 2025-07-22 RX ADMIN — GUAIFENESIN 600 MG: 600 TABLET, EXTENDED RELEASE ORAL at 01:53

## 2025-07-22 RX ADMIN — LEVETIRACETAM 500 MG: 500 TABLET, FILM COATED ORAL at 01:53

## 2025-07-22 RX ADMIN — BUSPIRONE HYDROCHLORIDE 5 MG: 5 TABLET ORAL at 20:35

## 2025-07-22 RX ADMIN — PRAVASTATIN SODIUM 40 MG: 40 TABLET ORAL at 20:35

## 2025-07-22 RX ADMIN — DEXAMETHASONE 2 MG: 4 TABLET ORAL at 08:55

## 2025-07-22 RX ADMIN — BUSPIRONE HYDROCHLORIDE 5 MG: 5 TABLET ORAL at 01:53

## 2025-07-22 RX ADMIN — PANTOPRAZOLE SODIUM 40 MG: 40 TABLET, DELAYED RELEASE ORAL at 06:06

## 2025-07-22 RX ADMIN — APIXABAN 5 MG: 5 TABLET, FILM COATED ORAL at 20:35

## 2025-07-22 RX ADMIN — APIXABAN 5 MG: 5 TABLET, FILM COATED ORAL at 01:55

## 2025-07-22 RX ADMIN — BUSPIRONE HYDROCHLORIDE 5 MG: 5 TABLET ORAL at 08:55

## 2025-07-22 RX ADMIN — APIXABAN 5 MG: 5 TABLET, FILM COATED ORAL at 08:55

## 2025-07-22 RX ADMIN — IPRATROPIUM BROMIDE AND ALBUTEROL SULFATE 1 DOSE: .5; 3 SOLUTION RESPIRATORY (INHALATION) at 08:28

## 2025-07-22 RX ADMIN — WATER 1000 MG: 1 INJECTION INTRAMUSCULAR; INTRAVENOUS; SUBCUTANEOUS at 20:39

## 2025-07-22 RX ADMIN — AZITHROMYCIN DIHYDRATE 500 MG: 250 TABLET ORAL at 20:35

## 2025-07-22 NOTE — CARE COORDINATION
Case Management Assessment Initial Evaluation    Date/Time of Evaluation: 2025 3:42 PM  Assessment Completed by: Aisha Gill    If patient is discharged prior to next notation, then this note serves as note for discharge by case management.    Patient Name: Koko Chao                   YOB: 1960  Diagnosis: General weakness [R53.1]  Pneumonia of left lower lobe due to infectious organism [J18.9]  Fatigue, unspecified type [R53.83]  Community acquired pneumonia of left lung, unspecified part of lung [J18.9]  Community acquired pneumonia, unspecified laterality [J18.9]                   Date / Time: 2025  5:56 PM  Location: [address] [place] [address]     Patient Admission Status: Inpatient   Readmission Risk Low 0-14, Mod 15-19), High > 20: Readmission Risk Score: 20.8    Current PCP: Wu Weinstein,   Health Care Decision Makers:   Primary Decision Maker: Anita Chao - [facility] - [phone]    Additional Case Management Notes: present to ER for fatigue and generalized weakness. history of glioma, on chemoradiation last dose was last week. Iv antibiotics. O2 n/c started.    Procedures: none    Imagin/21 CXR: Small left pleural effusion with ill-defined left basilar densities.    CXR: Hyperinflated lungs with flattened hemidiaphragms, consistent with COPD.  2. Borderline heart size. Tiny effusion right side.  3. Moderate bibasilar Edilma/pneumonia, worse on the right.  4. Overall appearance appears slightly worse than prior.    Patient Goals/Plan/Treatment Preferences: plans to return home with wife upon discharge. Is current with [facility] radiation/ oncology. Message left with Cristina from pharmacy for potential medication purchasing help     25 [phone]   Service Assessment   Patient Orientation Alert and Oriented   Cognition Alert   History Provided By Patient   Primary Caregiver Self   Accompanied By/Relationship alone   Support Systems Spouse/Significant Other   Patient's Healthcare

## 2025-07-22 NOTE — RT PROTOCOL NOTE
RT Inhaler-Nebulizer Bronchodilator Protocol Note    There is a bronchodilator order in the chart from a provider indicating to follow the RT Bronchodilator Protocol and there is an “Initiate RT Inhaler-Nebulizer Bronchodilator Protocol” order as well (see protocol at bottom of note).    CXR Findings:  XR CHEST PORTABLE  Result Date: 7/21/2025  1. Small left pleural effusion with ill-defined left basilar densities. This document has been electronically signed by: Irving Lou MD on 07/21/2025 07:18 PM      The findings from the last RT Protocol Assessment were as follows:   History Pulmonary Disease: None or smoker <15 pack years  Respiratory Pattern: Regular pattern and RR 12-20 bpm  Breath Sounds: Clear breath sounds  Cough: Strong, spontaneous, non-productive  Indication for Bronchodilator Therapy: None  Bronchodilator Assessment Score: 0    Aerosolized bronchodilator medication orders have been revised according to the RT Inhaler-Nebulizer Bronchodilator Protocol below.    Respiratory Therapist to perform RT Therapy Protocol Assessment initially then follow the protocol.  Repeat RT Therapy Protocol Assessment PRN for score 0-3 or on second treatment, BID, and PRN for scores above 3.    No Indications - adjust the frequency to every 6 hours PRN wheezing or bronchospasm, if no treatments needed after 48 hours then discontinue using Per Protocol order mode.     If indication present, adjust the RT bronchodilator orders based on the Bronchodilator Assessment Score as indicated below.  Use Inhaler orders unless patient has one or more of the following: on home nebulizer, not able to hold breath for 10 seconds, is not alert and oriented, cannot activate and use MDI correctly, or respiratory rate 25 breaths per minute or more, then use the equivalent nebulizer order(s) with same Frequency and PRN reasons based on the score.  If a patient is on this medication at home then do not decrease Frequency below that used at

## 2025-07-22 NOTE — ED NOTES
ED to inpatient nurses report      Chief Complaint:  Chief Complaint   Patient presents with    Fatigue     Present to ED from: Home    MOA:     LOC: alert and orientated to name, place, date  Mobility: Requires assistance * 1  Oxygen Baseline: RA    Current needs required: 2L NC     Code Status:   Prior    What abnormal results were found and what did you give/do to treat them? Fatigue, pneumonia; scans, labs, medication   Any procedures or intervention occur? See chart     Mental Status:  Level of Consciousness: Alert (0)    Psych Assessment:        Vitals:  Patient Vitals for the past 24 hrs:   BP Temp Temp src Pulse Resp SpO2 Weight   07/21/25 1935 136/80 -- -- 71 17 97 % --   07/21/25 1858 139/75 -- -- 69 16 93 % --   07/21/25 1801 121/72 98.1 °F (36.7 °C) Oral 72 16 92 % 73 kg (161 lb)        LDAs:   Peripheral IV 07/21/25 Right Antecubital (Active)   Site Assessment Clean, dry & intact 07/21/25 1842   Line Status Brisk blood return;Flushed;Normal saline locked;Specimen collected 07/21/25 1842   Phlebitis Assessment No symptoms 07/21/25 1842   Infiltration Assessment 0 07/21/25 1842   Dressing Status New dressing applied;Clean, dry & intact 07/21/25 1842   Dressing Type Transparent 07/21/25 1842   Dressing Intervention New 07/21/25 1842       Ambulatory Status:  Presents to emergency department  because of falls (Syncope, seizure, or loss of consciousness): No, Age > 70: No, Altered Mental Status, Intoxication with alcohol or substance confusion (Disorientation, impaired judgment, poor safety awaremess, or inability to follow instructions): No, Impaired Mobility: Ambulates or transfers with assistive devices or assistance; Unable to ambulate or transer.: Yes, Nursing Judgement: Yes    Diagnosis:  DISPOSITION Decision To Admit 07/21/2025 08:04:47 PM   Final diagnoses:   Fatigue, unspecified type   General weakness   Pneumonia of left lower lobe due to infectious organism        Consults:  None     Pain  Score:  Pain Assessment  Pain Assessment: None - Denies Pain    C-SSRS:   Risk of Suicide: No Risk    Sepsis Screening:       Kinder Fall Risk:  [place] 1 Fall Risk  Presents to emergency department  because of falls (Syncope, seizure, or loss of consciousness): No  Age > 70: No  Altered Mental Status, Intoxication with alcohol or substance confusion (Disorientation, impaired judgment, poor safety awaremess, or inability to follow instructions): No  Impaired Mobility: Ambulates or transfers with assistive devices or assistance; Unable to ambulate or transer.: Yes  Nursing Judgement: Yes    Swallow Screening        Preferred Language:   English      ALLERGIES     Demerol, Midazolam hcl, Other, and Versed [midazolam]    SURGICAL HISTORY       Past Surgical History:   Procedure Laterality Date    COLONOSCOPY  2009    WNL     COLONOSCOPY N/A 3/13/2020    COLONOSCOPY DIAGNOSTIC performed by Percy Cantu MD at [facility]    CRANIOTOMY Left 4/16/2025    LEFT PARIETAL CRANIOTOMY FOR EVACUATION OF CEREBRAL ABSCESS performed by Zander Singh MD at [facility] OR    CRANIOTOMY Left 4/17/2025    LEFT PARIETAL CRANIOTOMY HEMATOMA EVACUATION performed by Zander Singh MD at [facility] OR    CYSTOSCOPY Left 4/6/2023    CYSTOSCOPY LEFT URETERAL STENT INSERTION performed by Yo Denis MD at [facility] OR    LIVER BIOPSY  1995    PROSTATE BIOPSY  2012    benign     TESTICLE REMOVAL  1994    Lt    ULTRASOUND PROSTATE/TRANSRECTAL N/A 1/16/2020    TRANSRECTAL ULTRASOUND WITH PROSTATE BX performed by Yo Denis MD at [facility] OR    UPPER GASTROINTESTINAL ENDOSCOPY N/A 3/13/2020    EGD BIOPSY performed by Percy Cantu MD at [facility]    URETER SURGERY N/A 4/15/2023    CYSTOSCOPY, LEFT URETEROSCOPY, LASER LITHOTRIPSY,  LEFT URETERAL STENT EXCHANGE performed by Yo Denis MD at [facility] OR       PAST MEDICAL HISTORY       Past Medical History:   Diagnosis Date    Cancer (HCC)     Cerebrovascular accident (HCC) 05/07/2025    CRVO (central

## 2025-07-22 NOTE — PROGRESS NOTES
Hospitalist Progress Note    Patient:  Koko Chao      Unit/Bed:8B-36/036-A    YOB: 1960    MRN: [id number]       Acct: [id number]     PCP: Wu Weinstein DO    Date of Admission: 7/21/2025    Assessment/Plan:    Acute respiratory failure secondary to CAP, improved. Max oxygen demand was 4LNC. Down to 2L/NC today. Baseline is room air. Nursing to wean to keep pulse ox 90-92%. PCXR: small left pleural effusion with ill-defined left bsilar densities. Obtain 2 view today. Procal 0.14. Blood cultures x2 pending. Continue Rocephin, Zithromax. Pulmonary hygiene: IS, Acapella, Mucinex BID, Duoneb Q4H WA, PRN Albuterol. Respiratory panel and culture pending collection.   Hypotonic hyponatremia, improved. Na 134. Reports a decreased appetite and fluid intake. Likely due to poor oral intake and medication induced. Holding Bumex.   Physical deconditioning: Wife states that pt has been having to use his wheelchair more due to weakness. Pt walks with steady gait at baseline. Fall precautions. PT/OT to eval and treat  Glioblastoma: S/p left parietal craniotomy for resection of left malignant glioma in the parietal lobe on 4/15 and 4/17/25. Follows with Radiation/oncology and Neurosurgery as OP. Currently receiving radiation as OP, last treatment on 7/15/25.  Continue Temozolmide and Decadron. Stop Keppra wife reports this was discontinued.    Essential HTN: Restart ARB with holing parameters. Continue CCB.    GERD: Continue Protonix  History of DVT: Continue Eliquis  History of CVA: Continue statin  Anxiety/Insomnia: Continue Buspar, melatonin, PRN Trazodone    Possible home tomorrow if oxygen is weaned.    Chief Complaint: Fatigue     Hospital Course:     Koko Chao is a 65 y.o. male with PMHx of Glioblastoma, HTN, DVT who presented to [facility] with chief complaint of Fatigue. Patient with expressive aphasia, wife at bedside assisted with history. Wife reports that patient just finished radiation

## 2025-07-22 NOTE — PLAN OF CARE
Problem: Chronic Conditions and Co-morbidities  Goal: Patient's chronic conditions and co-morbidity symptoms are monitored and maintained or improved  Outcome: Progressing     Problem: Discharge Planning  Goal: Discharge to home or other facility with appropriate resources  Outcome: Progressing  Flowsheets (Taken 7/21/2025 0456)  Discharge to home or other facility with appropriate resources:   Identify barriers to discharge with patient and caregiver   Arrange for needed discharge resources and transportation as appropriate   Identify discharge learning needs (meds, wound care, etc)     Problem: Safety - Adult  Goal: Free from fall injury  Outcome: Progressing

## 2025-07-22 NOTE — H&P
Hospitalist History & Physical    Patient:  Koko Chao    Unit/Bed:08/008A  YOB: 1960  MRN: [id number]   Acct: [id number]   PCP: Wu Weinstein DO  Code Status: Prior    Date of Service: Pt seen/examined on 07/21/25 and admitted to Observation with expected LOS less than two midnights due to medical therapy.     Chief Complaint: Fatigue    Assessment/Plan:    Acute respiratory failure secondary to CAP: Complains of fatigue, generalized weakness, felt warm but denies fever, cough. No documented hypoxia noted on chart review. Patient currently 94% on 4LNC. Baseline is room air. CXR: small left pleural effusion with ill-defined left basilar densities. Procal 0.14.   Blood cultures x2 pending  Continue Rocephin, Zithromax  Pulmonary hygiene: IS, Acapella, Mucinex BID, Duoneb Q4H WA, PRN Albuterol  Respiratory panel and culture pending  Supportive Care    Hypotonic hyponatremia: Na 131. Reports a decreased appetite and fluid intake. Likely due to poor oral intake and medication induced.   Hold Bumex, losartan  UA and lytes pending  BMP in AM    Physical deconditioning: Wife states that pt has been having to use his wheelchair more due to weakness. Pt walks with steady gait at baseline.   Fall precautions  PT/OT to eval and treat    Glioblastoma: S/p left parietal craniotomy for resection of left malignant glioma in the parietal lobe on 4/15 and 4/17/25. Follows with Radiation/oncology and Neurosurgery as OP. Currently receiving radiation as OP, last treatment on 7/15/25.   Continue Temozolmide-dose needs clarified  Continue Decadron, Keppra    Essential HTN:   Hold Bumex, losartan  Continue amlodipine    GERD:   Continue Protonix    History of DVT:   Continue Eliquis    History of CVA:   Continue statin    Anxiety/Insomnia:   Continue Buspar, melatonin, PRN Trazodone      DVT prophylaxis: Already on anticoagulation  Diet: No diet orders on file  PT/OT: Yes  Tele: Yes  Code Status:

## 2025-07-23 PROBLEM — R53.83 FATIGUE: Status: ACTIVE | Noted: 2025-07-23

## 2025-07-23 PROBLEM — K21.9 GASTROESOPHAGEAL REFLUX DISEASE WITHOUT ESOPHAGITIS: Status: ACTIVE | Noted: 2025-07-23

## 2025-07-23 LAB
ANION GAP SERPL CALC-SCNC: 12 MEQ/L (ref 8–16)
BASOPHILS ABSOLUTE: 0 THOU/MM3 (ref 0–0.1)
BASOPHILS NFR BLD AUTO: 0.6 %
BUN SERPL-MCNC: 13 MG/DL (ref 8–23)
CALCIUM SERPL-MCNC: 8.8 MG/DL (ref 8.8–10.2)
CHLORIDE SERPL-SCNC: 99 MEQ/L (ref 98–111)
CO2 SERPL-SCNC: 21 MEQ/L (ref 22–29)
CREAT SERPL-MCNC: 0.8 MG/DL (ref 0.7–1.2)
DEPRECATED RDW RBC AUTO: 60.5 FL (ref 35–45)
EOSINOPHIL NFR BLD AUTO: 1 %
EOSINOPHILS ABSOLUTE: 0.1 THOU/MM3 (ref 0–0.4)
ERYTHROCYTE [DISTWIDTH] IN BLOOD BY AUTOMATED COUNT: 17.9 % (ref 11.5–14.5)
GFR SERPL CREATININE-BSD FRML MDRD: > 90 ML/MIN/1.73M2
GLUCOSE SERPL-MCNC: 100 MG/DL (ref 74–109)
HCT VFR BLD AUTO: 33.5 % (ref 42–52)
HGB BLD-MCNC: 11.4 GM/DL (ref 14–18)
IMM GRANULOCYTES # BLD AUTO: 0.48 THOU/MM3 (ref 0–0.07)
IMM GRANULOCYTES NFR BLD AUTO: 9.6 %
LYMPHOCYTES ABSOLUTE: 0.3 THOU/MM3 (ref 1–4.8)
LYMPHOCYTES NFR BLD AUTO: 5.6 %
MCH RBC QN AUTO: 31.6 PG (ref 26–33)
MCHC RBC AUTO-ENTMCNC: 34 GM/DL (ref 32.2–35.5)
MCV RBC AUTO: 92.8 FL (ref 80–94)
MONOCYTES ABSOLUTE: 0.1 THOU/MM3 (ref 0.4–1.3)
MONOCYTES NFR BLD AUTO: 2.6 %
NEUTROPHILS ABSOLUTE: 4 THOU/MM3 (ref 1.8–7.7)
NEUTROPHILS NFR BLD AUTO: 80.6 %
NRBC BLD AUTO-RTO: 2 /100 WBC
PLATELET # BLD AUTO: 176 THOU/MM3 (ref 130–400)
PLATELET BLD QL SMEAR: ADEQUATE
PMV BLD AUTO: 9.6 FL (ref 9.4–12.4)
POLYCHROMASIA BLD QL SMEAR: ABNORMAL
POTASSIUM SERPL-SCNC: 4.8 MEQ/L (ref 3.5–5.2)
RBC # BLD AUTO: 3.61 MILL/MM3 (ref 4.7–6.1)
SCAN OF BLOOD SMEAR: NORMAL
SODIUM SERPL-SCNC: 132 MEQ/L (ref 135–145)
WBC # BLD AUTO: 5 THOU/MM3 (ref 4.8–10.8)

## 2025-07-23 PROCEDURE — 6370000000 HC RX 637 (ALT 250 FOR IP)

## 2025-07-23 PROCEDURE — 1200000003 HC TELEMETRY R&B

## 2025-07-23 PROCEDURE — 36415 COLL VENOUS BLD VENIPUNCTURE: CPT

## 2025-07-23 PROCEDURE — 97166 OT EVAL MOD COMPLEX 45 MIN: CPT

## 2025-07-23 PROCEDURE — 80048 BASIC METABOLIC PNL TOTAL CA: CPT

## 2025-07-23 PROCEDURE — 97530 THERAPEUTIC ACTIVITIES: CPT

## 2025-07-23 PROCEDURE — 97162 PT EVAL MOD COMPLEX 30 MIN: CPT

## 2025-07-23 PROCEDURE — 85025 COMPLETE CBC W/AUTO DIFF WBC: CPT

## 2025-07-23 PROCEDURE — 6360000002 HC RX W HCPCS

## 2025-07-23 PROCEDURE — 2500000003 HC RX 250 WO HCPCS

## 2025-07-23 PROCEDURE — 94669 MECHANICAL CHEST WALL OSCILL: CPT

## 2025-07-23 PROCEDURE — 6370000000 HC RX 637 (ALT 250 FOR IP): Performed by: NURSE PRACTITIONER

## 2025-07-23 PROCEDURE — 99232 SBSQ HOSP IP/OBS MODERATE 35: CPT | Performed by: NURSE PRACTITIONER

## 2025-07-23 RX ORDER — HYDROXYZINE PAMOATE 25 MG/1
25 CAPSULE ORAL 3 TIMES DAILY PRN
Status: DISCONTINUED | OUTPATIENT
Start: 2025-07-23 | End: 2025-07-25 | Stop reason: HOSPADM

## 2025-07-23 RX ADMIN — DEXAMETHASONE 2 MG: 4 TABLET ORAL at 09:48

## 2025-07-23 RX ADMIN — GUAIFENESIN 600 MG: 600 TABLET, EXTENDED RELEASE ORAL at 09:48

## 2025-07-23 RX ADMIN — AZITHROMYCIN DIHYDRATE 500 MG: 250 TABLET ORAL at 21:19

## 2025-07-23 RX ADMIN — SODIUM CHLORIDE, PRESERVATIVE FREE 10 ML: 5 INJECTION INTRAVENOUS at 09:50

## 2025-07-23 RX ADMIN — HYDROXYZINE PAMOATE 25 MG: 25 CAPSULE ORAL at 21:02

## 2025-07-23 RX ADMIN — BUSPIRONE HYDROCHLORIDE 5 MG: 5 TABLET ORAL at 09:48

## 2025-07-23 RX ADMIN — APIXABAN 5 MG: 5 TABLET, FILM COATED ORAL at 09:48

## 2025-07-23 RX ADMIN — PANTOPRAZOLE SODIUM 40 MG: 40 TABLET, DELAYED RELEASE ORAL at 06:08

## 2025-07-23 RX ADMIN — TRAZODONE HYDROCHLORIDE 50 MG: 50 TABLET ORAL at 21:02

## 2025-07-23 RX ADMIN — PRAVASTATIN SODIUM 40 MG: 40 TABLET ORAL at 21:06

## 2025-07-23 RX ADMIN — Medication 6 MG: at 21:02

## 2025-07-23 RX ADMIN — DEXAMETHASONE 2 MG: 4 TABLET ORAL at 21:02

## 2025-07-23 RX ADMIN — BUSPIRONE HYDROCHLORIDE 5 MG: 5 TABLET ORAL at 21:06

## 2025-07-23 RX ADMIN — GUAIFENESIN 600 MG: 600 TABLET, EXTENDED RELEASE ORAL at 21:06

## 2025-07-23 RX ADMIN — WATER 1000 MG: 1 INJECTION INTRAMUSCULAR; INTRAVENOUS; SUBCUTANEOUS at 21:03

## 2025-07-23 RX ADMIN — APIXABAN 5 MG: 5 TABLET, FILM COATED ORAL at 21:06

## 2025-07-23 RX ADMIN — SODIUM CHLORIDE, PRESERVATIVE FREE 10 ML: 5 INJECTION INTRAVENOUS at 21:03

## 2025-07-23 NOTE — PLAN OF CARE
Problem: Chronic Conditions and Co-morbidities  Goal: Patient's chronic conditions and co-morbidity symptoms are monitored and maintained or improved  Outcome: Progressing     Problem: Discharge Planning  Goal: Discharge to home or other facility with appropriate resources  Outcome: Progressing     Problem: Safety - Adult  Goal: Free from fall injury  Outcome: Progressing     Problem: Respiratory - Adult  Goal: Achieves optimal ventilation and oxygenation  Outcome: Progressing

## 2025-07-23 NOTE — PROGRESS NOTES
[facility]  INPATIENT PHYSICAL THERAPY  EVALUATION  [facility] MED SURG 8AB - 8B-36/036-A    Discharge Recommendations: Continue to assess pending progress, 24 hour supervision or assist, Home with Home health PT  Equipment Recommendations: No  has access to walker per wife          Time In: 1136  Time Out: 1159  Timed Code Treatment Minutes: 15 Minutes  Minutes: 23        Date: 2025  Patient Name: Koko Chao,  Gender:  male        MRN: [id number]  : 1960  (65 y.o.)      Referring Practitioner: Rebecca Huang APRN - CNP  Diagnosis: Community acquired pneumonia of left lung, unspecified part of lung  Additional Pertinent Hx: Per EMR \"Koko Chao is a 65 y.o. male with PMHx of Glioblastoma, HTN, DVT who presented to [facility] with chief complaint of Fatigue. Patient with expressive aphasia, wife at bedside assisted with history. Wife reports that patient just finished radiation therapy on 7/15/25. States patient has done very well up until the last 2 days. Reports patient has been more fatigued and weak. States that he is normally ambulatory with a steady gait but the last couple of days has required a wheelchair to get around. States patient had a low grade fever on Thursday and Friday up to 100 orally. Reports some dizziness today. Reports rhinorrhea and a soft nonproductive cough. Reports a decreased appetite and fluid intake. Reports a decreased urine frequency. Denies headache or lightheadedness. Denies chest pain or SOB. Denies abdominal pain or nausea.\"     Restrictions/Precautions:  Restrictions/Precautions: Fall Risk, General Precautions       Other Position/Activity Restrictions: monitor HR and O2, expressive aphasia    Required Braces or Orthoses?: No      Subjective:  Chart Reviewed: Yes  Patient assessed for rehabilitation services?: Yes  Family/Caregiver Present: Yes  Subjective: OK to see pt per nursing. Pt in bed when PT arrived, wife and family present. Pt agreeable to PT

## 2025-07-23 NOTE — PROGRESS NOTES
Spiritual Health Progress Note  [facility]      Room # 8B-36/036-A    Name: Koko Chao           Age: 65 y.o.    Gender: male          MRN: [id number]  Church: [employer]       Preferred Language: English      Date: 07/23/25  Visit Time: Begin Time: (P) 1505 End Time : (P) 1520  Complexity of Encounter: (P) Moderate      Visit Summary:  encountered the patient as he was in his room. The patient was begging for help from Heriberto. This was taken as spiritual in nature originally. However his theological thoughts would work into answers to questions that had nothing to do with the topic. The patient had word confusion similar to some stroke or brain surgery patients. The patients scar and nursing information shared this is likely a result of the latter (brain surgery). What could be gathered is the patient was bothered by a lack of sleep, that his wife was not present (is his object permanence solid?) , He wished to see a physician and was frustrated. The  ensured nursing and the physicians who visited were aware of the potential concerns and they followed up.     Referral/Consult From: (P) Nurse  Encounter Overview/Reason: (P) Spiritual/Emotional Needs  Encounter Code:     Crisis (if applicable):    Service Provided For: (P) Patient     Patient was available.    Kimberly, Belief, Meaning:   Patient identifies as spiritual  is connected with a kimberly tradition or spiritual practice  has beliefs or practices that help with coping during difficult times  kimberly/ spirituality is a source of strength  Other: The speech pattern of the patient shows that his brain is rewired through his spiritual cognition. The patient would attempt to express himself but is limited to theological terms to refer to neftali situation. Thus he can make a sentence and the noun/ pronoun will become theological or confused. The patient regularly addressed the male  as her.   Family/Friends unable to assess at this

## 2025-07-23 NOTE — TELEPHONE ENCOUNTER
Patient's last appointment was: 6/17/2025  with our   Patient's next appointment is: 9/22/2025  with our Dr. Weinstein  Last refilled on: 6/25/2025   Which pharmacy does the script need sent to: [facility], [address]      Lab Results   Component Value Date    LABA1C 5.9 05/08/2025     Lab Results   Component Value Date    CHOL 192 05/08/2025    TRIG 58 05/08/2025     05/08/2025     Lab Results   Component Value Date     (L) 07/22/2025    K 5.1 07/22/2025     07/22/2025    CO2 22 07/22/2025    BUN 18 07/22/2025    CREATININE 0.8 07/22/2025    GLUCOSE 96 07/22/2025    CALCIUM 8.8 07/22/2025    BILITOT 0.5 07/21/2025    ALKPHOS 68 07/21/2025    AST 33 07/21/2025    ALT 60 (H) 07/21/2025    LABGLOM > 90 07/22/2025     Lab Results   Component Value Date    TSH 4.130 06/01/2023     Lab Results   Component Value Date    WBC 5.3 07/22/2025    HGB 10.5 (L) 07/22/2025    HCT 31.7 (L) 07/22/2025    MCV 93.2 07/22/2025     07/22/2025

## 2025-07-23 NOTE — PROGRESS NOTES
pt denies pain and discomfort at this time   Pt voiced 0 issues and or concerns at this time   Pt noted to be at bedside with NA going to bathroom  Pt call light and personal items noted to be within reach  Safety maintained    0815-pt denies pain and discomfort at this time   Pt voiced 0 issues and or concerns    0948-nurse reviewed meds with pt prior to giving them  Per wife she informed staff that the BP meds was discontinued  Nurse reviewed med list with wife and made adjustments at this time  At this time we are holding Norvasc and Cozzar  Nurse informed wife that she will send MD to go over meds and we will continue to hold BP meds until MD comes and talk to pt and pt wife    1000-this nurse informed MD of the above concerns of the wife in regards to meds    1115-pt denies pain and discomfort at this time   Pt voiced 0 issues and or concerns  Pt noted to be resting as well    1315-this nurse updated pt wife and informed wife that MD is going to dc meds     1510--pt denies pain and discomfort at this time   Pt voiced 0 issues and or concerns at this time    1810-end of shift rounding completed at this time  pt denies pain and discomfort at this time   Pt voiced 0 issues and or concerns at this time   Pt call light and personal items noted to be within reach  Safety maintained    1823-pt noted to be tearful at this time  Pt crying out that he wants his wife  This nurse and charge nurse attempted to call wife  with no response

## 2025-07-23 NOTE — PROGRESS NOTES
9068-nurse reviewed meds with pt prior to giving them  Per wife she informed staff that the BP meds was discontinued  Nurse reviewed med list with wife and made adjustments at this time  At this time we are holding Norvasc and Cozzar  Nurse informed wife that she will send MD to go over meds and we will continue to hold BP meds until MD comes and talk to pt and pt wife    1000-this nurse informed MD of the above concerns of the wife in regards to meds    1115-pt denies pain and discomfort at this time   Pt voiced 0 issues and or concerns  Pt noted to be resting as well    1315-this nurse updated pt wife and informed wife that MD is going to dc meds    Problem Visit    Chief Complaint:     Chief Complaint   Patient presents with    Other     Consult for fibroid treatment options       HPI:    Earnestine Tavares is a 46 y.o.  female with LMP of Patient's last menstrual period was 2024 (exact date).  who would like to discuss surgical options for her menorrhagia and her fibroid uterus. Pt was previously counseled on options including hormonal management vs surgical management (hysteroscopy, D+C, endometrial ablation vs robotic hysterectomy). Pt would like to revisit her options. She stopped her Ocps and had a withdrawal bleed. She is concerned about a hysterectomy changing her sexual experience.     Last US done showed:     Uterus   Length 8.04 cm 6.40 9.68 * avg.   Width 6.82 cm 6.89* 6.75 avg.   Height 6.74 cm 6.05 7.42 avg.   Volume 193.508 cm³ 139.686 253.853   Endo.Thickness 7.62 mm 7.90 7.34 avg.     Right Ovary   Length 1.77 cm 1.77 avg.   Width 1.92 cm 1.92 avg.   Height 1.23 cm 1.23 avg.   Volume 2.189 cm³ 2.189    TV ULTRASOUND    THE UTERUS IS ANTEVERTED, ENLARGED IN SIZE, AND HETEROGENEOUS IN ECHOGENICITY. THERE APPEARS TO BE MULTIPLE FIBROIDS SEEN RT/ANT/SUBSEROSAL MEASURING 32 X 27MM, RT/POST/INTRAMURAL MEASURING 21 X 16MM, MID/FUNDAL/SUBSEROSAL MEASURING 33 X 37MM, MID/POST/FUNDAL/SUBSEROSAL MEASURING 54 X 53MM, MID/POST/SUBSEROSAL MEASURING 22 X 19MM, AND LT/ANT/SUBSEROSAL MEASURING 32 X 30MM. THE ENDOMETRIUM MEASURES 7.6MM IN THICKNESS. THERE APPEARS TO BE A HYPERECHOIC STRUCTURE WITHIN THE ENDOMETRIUM MEASURING 14 X 12MM. THERE APPEARS TO BE HYPERVASCULARITY SEEN. RIGHT OVARY APPEARS WNL. LEFT OVARY APPEARS WNL. NO FREE FLUID IS SEEN IN THE CDS.     Pt had an EMB done that was benign.      Past Medical History:    Past Medical History:   Diagnosis Date    Helicobacter pylori ab+ 2018    Hypertension     Migraine without status migrainosus, not intractable, unspecified migraine type 2022    Obesity     Uterine fibroid

## 2025-07-23 NOTE — PROGRESS NOTES
[facility]  INPATIENT OCCUPATIONAL THERAPY  STRZ MED SURG 8AB  EVALUATION      Discharge Recommendations: Continue to assess pending progress  Equipment Recommendations: No        Time In: 0845  Time Out: 0915  Timed Code Treatment Minutes: 15 Minutes  Minutes: 30          Date: 2025  Patient Name: Koko Chao,   Gender: male      MRN: [id number]  : 1960  (65 y.o.)  Referring Practitioner: CORBY Patel-CNP  Diagnosis: Community acquired pneumonia of left lung, unspecified part of lung  Additional Pertinent Hx: Pt with PMHx of Glioblastoma, HTN, DVT who presented to [facility] with chief complaint of Fatigue. Patient with expressive aphasia, wife at bedside assisted with history. Wife reports that patient just finished radiation therapy on 7/15/25. States patient has done very well up until the last 2 days. Reports patient has been more fatigued and weak. States that he is normally ambulatory with a steady gait but the last couple of days has required a wheelchair to get around. States patient had a low grade fever on Thursday and Friday up to 100 orally. Reports some dizziness today. Reports rhinorrhea and a soft nonproductive cough. Reports a decreased appetite and fluid intake. Reports a decreased urine frequency.    Restrictions/Precautions:       Subjective  Chart Reviewed: Yes, Orders, History and Physical  Family / Caregiver Present: Yes (Spouse present and supportive)    Subjective: Pleasant.  Anxious.  Comments: RN approved session.  Pt agreed to get up to the chair.  He was able to also demonstrate use of an incentive spirometer.  He has no C/O pain.    Pain: None reported.    Vitals: Nurse checked vitals prior to session    Social/Functional History:  Lives With: Spouse  Type of Home: House  Home Layout: One level  Home Access: Stairs to enter with rails  Entrance Stairs - Number of Steps: 3 steps   Bathroom Shower/Tub: Walk-in shower, Shower chair with back  Bathroom

## 2025-07-23 NOTE — PROGRESS NOTES
Hospitalist Progress Note    Patient:  Koko Retanaer      Unit/Bed:8B-36/036-A    YOB: 1960    MRN: [id number]       Acct: [id number]     PCP: Wu Weinstein DO    Date of Admission: 7/21/2025    Assessment/Plan:    Anticipated Discharge in : Possible tomorrow 7/24/25    Active Hospital Problems    Diagnosis Date Noted    Hypo-osmolar hyponatremia [E87.1] 07/22/2025    Physical deconditioning [R53.81] 07/22/2025    History of DVT (deep vein thrombosis) [Z86.718] 07/22/2025    Essential hypertension [I10] 07/22/2025    Community acquired pneumonia, unspecified laterality [J18.9] 07/22/2025    Community acquired pneumonia of left lung, unspecified part of lung [J18.9] 07/21/2025    Glioblastoma multiforme of parietal lobe (HCC) [C71.3] 05/06/2025       Acute respiratory failure secondary to CAP, improved. Initial was on max oxygen demand was 4LNC. Patient now down to 2L/NC   goal is to wean off O2. Patient  baseline is room air. Nursing to wean to keep pulse ox 90-92%. PCXR: small left pleural effusion with ill-defined left bsilar densities.   Cxray showed: Hyperinflated lungs with flattened hemidiaphragms, consistent with COPD.. Borderline heart size. Tiny effusion right side.   Moderate bibasilar Edilma/pneumonia, worse on the right.   Overall appearance appears slightly worse than prior.    Procal 0.14. Blood cultures x2  preliminary report no growth   Continue Rocephin, Zithromax. Pulmonary hygiene: IS, Acapella, Mucinex BID, Duoneb Q4H WA, PRN Albuterol.   Respiratory panel  negative     Patient overall reports improvement     Hypotonic hyponatremia, improving. Na 132.Likely due to poor oral intake. Patient with reported decreased appetite and fluid intake.     Continue to Hold Bumex.     Physical deconditioning: Per record review wife states that pt has been having to use his wheelchair more due to weakness. Pt walks with steady gait at baseline. Fall precautions. PT/OT to eval and  been electronically signed by: Irving Lou MD on    07/21/2025 07:18 PM          Diet: ADULT DIET; Regular    DVT prophylaxis: [] Lovenox                                 [] SCDs                                 [] SQ Heparin                                 [] Encourage ambulation           [] Already on Anticoagulation     Disposition:    [] Home       [] TCU       [] Rehab       [] Psych       [] SNF       [] Long Term Care Facility       [] Other-    Code Status: Full Code    PT/OT Eval Status:         Electronically signed by CORBY Moore CNP on 7/23/2025 at 11:38 AM

## 2025-07-24 LAB
ANION GAP SERPL CALC-SCNC: 11 MEQ/L (ref 8–16)
ANISOCYTOSIS BLD QL SMEAR: PRESENT
BASOPHILS ABSOLUTE: 0 THOU/MM3 (ref 0–0.1)
BASOPHILS NFR BLD AUTO: 0.5 %
BUN SERPL-MCNC: 12 MG/DL (ref 8–23)
CALCIUM SERPL-MCNC: 8.3 MG/DL (ref 8.8–10.2)
CHLORIDE SERPL-SCNC: 102 MEQ/L (ref 98–111)
CO2 SERPL-SCNC: 19 MEQ/L (ref 22–29)
CREAT SERPL-MCNC: 0.7 MG/DL (ref 0.7–1.2)
DEPRECATED RDW RBC AUTO: 60.3 FL (ref 35–45)
EOSINOPHIL NFR BLD AUTO: 1.5 %
EOSINOPHILS ABSOLUTE: 0.1 THOU/MM3 (ref 0–0.4)
ERYTHROCYTE [DISTWIDTH] IN BLOOD BY AUTOMATED COUNT: 18 % (ref 11.5–14.5)
GFR SERPL CREATININE-BSD FRML MDRD: > 90 ML/MIN/1.73M2
GLUCOSE SERPL-MCNC: 116 MG/DL (ref 74–109)
HCT VFR BLD AUTO: 29.9 % (ref 42–52)
HGB BLD-MCNC: 10.2 GM/DL (ref 14–18)
IMM GRANULOCYTES # BLD AUTO: 0.45 THOU/MM3 (ref 0–0.07)
IMM GRANULOCYTES NFR BLD AUTO: 10.9 %
LYMPHOCYTES ABSOLUTE: 0.2 THOU/MM3 (ref 1–4.8)
LYMPHOCYTES NFR BLD AUTO: 5.8 %
MCH RBC QN AUTO: 31.5 PG (ref 26–33)
MCHC RBC AUTO-ENTMCNC: 34.1 GM/DL (ref 32.2–35.5)
MCV RBC AUTO: 92.3 FL (ref 80–94)
MONOCYTES ABSOLUTE: 0.1 THOU/MM3 (ref 0.4–1.3)
MONOCYTES NFR BLD AUTO: 2.4 %
NEUTROPHILS ABSOLUTE: 3.2 THOU/MM3 (ref 1.8–7.7)
NEUTROPHILS NFR BLD AUTO: 78.9 %
NRBC BLD AUTO-RTO: 3 /100 WBC
PLATELET # BLD AUTO: 154 THOU/MM3 (ref 130–400)
PLATELET BLD QL SMEAR: ADEQUATE
PMV BLD AUTO: 9.6 FL (ref 9.4–12.4)
POIKILOCYTES: ABNORMAL
POLYCHROMASIA BLD QL SMEAR: ABNORMAL
POTASSIUM SERPL-SCNC: 4 MEQ/L (ref 3.5–5.2)
RBC # BLD AUTO: 3.24 MILL/MM3 (ref 4.7–6.1)
SCAN OF BLOOD SMEAR: NORMAL
SODIUM SERPL-SCNC: 132 MEQ/L (ref 135–145)
WBC # BLD AUTO: 4.1 THOU/MM3 (ref 4.8–10.8)

## 2025-07-24 PROCEDURE — 80048 BASIC METABOLIC PNL TOTAL CA: CPT

## 2025-07-24 PROCEDURE — 6360000002 HC RX W HCPCS

## 2025-07-24 PROCEDURE — 1200000003 HC TELEMETRY R&B

## 2025-07-24 PROCEDURE — 94669 MECHANICAL CHEST WALL OSCILL: CPT

## 2025-07-24 PROCEDURE — 99232 SBSQ HOSP IP/OBS MODERATE 35: CPT | Performed by: NURSE PRACTITIONER

## 2025-07-24 PROCEDURE — 6370000000 HC RX 637 (ALT 250 FOR IP)

## 2025-07-24 PROCEDURE — 36415 COLL VENOUS BLD VENIPUNCTURE: CPT

## 2025-07-24 PROCEDURE — 2500000003 HC RX 250 WO HCPCS

## 2025-07-24 PROCEDURE — 85025 COMPLETE CBC W/AUTO DIFF WBC: CPT

## 2025-07-24 PROCEDURE — 6370000000 HC RX 637 (ALT 250 FOR IP): Performed by: NURSE PRACTITIONER

## 2025-07-24 RX ORDER — BUMETANIDE 0.5 MG/1
0.5 TABLET ORAL DAILY
Qty: 30 TABLET | Refills: 0 | Status: SHIPPED | OUTPATIENT
Start: 2025-07-24

## 2025-07-24 RX ORDER — HYDROCODONE BITARTRATE AND ACETAMINOPHEN 5; 325 MG/1; MG/1
1 TABLET ORAL ONCE
Refills: 0 | Status: COMPLETED | OUTPATIENT
Start: 2025-07-24 | End: 2025-07-24

## 2025-07-24 RX ADMIN — HYDROXYZINE PAMOATE 25 MG: 25 CAPSULE ORAL at 20:54

## 2025-07-24 RX ADMIN — APIXABAN 5 MG: 5 TABLET, FILM COATED ORAL at 20:55

## 2025-07-24 RX ADMIN — SODIUM CHLORIDE, PRESERVATIVE FREE 10 ML: 5 INJECTION INTRAVENOUS at 09:35

## 2025-07-24 RX ADMIN — GUAIFENESIN 600 MG: 600 TABLET, EXTENDED RELEASE ORAL at 09:34

## 2025-07-24 RX ADMIN — TRAZODONE HYDROCHLORIDE 50 MG: 50 TABLET ORAL at 20:54

## 2025-07-24 RX ADMIN — PANTOPRAZOLE SODIUM 40 MG: 40 TABLET, DELAYED RELEASE ORAL at 05:13

## 2025-07-24 RX ADMIN — Medication 6 MG: at 20:54

## 2025-07-24 RX ADMIN — PRAVASTATIN SODIUM 40 MG: 40 TABLET ORAL at 20:55

## 2025-07-24 RX ADMIN — WATER 1000 MG: 1 INJECTION INTRAMUSCULAR; INTRAVENOUS; SUBCUTANEOUS at 20:46

## 2025-07-24 RX ADMIN — DEXAMETHASONE 2 MG: 4 TABLET ORAL at 20:55

## 2025-07-24 RX ADMIN — HYDROCODONE BITARTRATE AND ACETAMINOPHEN 1 TABLET: 5; 325 TABLET ORAL at 17:06

## 2025-07-24 RX ADMIN — AZITHROMYCIN DIHYDRATE 500 MG: 250 TABLET ORAL at 20:55

## 2025-07-24 RX ADMIN — BUSPIRONE HYDROCHLORIDE 5 MG: 5 TABLET ORAL at 09:34

## 2025-07-24 RX ADMIN — SODIUM CHLORIDE, PRESERVATIVE FREE 10 ML: 5 INJECTION INTRAVENOUS at 20:47

## 2025-07-24 RX ADMIN — GUAIFENESIN 600 MG: 600 TABLET, EXTENDED RELEASE ORAL at 20:55

## 2025-07-24 RX ADMIN — APIXABAN 5 MG: 5 TABLET, FILM COATED ORAL at 09:34

## 2025-07-24 RX ADMIN — BUSPIRONE HYDROCHLORIDE 5 MG: 5 TABLET ORAL at 20:55

## 2025-07-24 RX ADMIN — DEXAMETHASONE 2 MG: 4 TABLET ORAL at 09:35

## 2025-07-24 ASSESSMENT — PAIN DESCRIPTION - DESCRIPTORS: DESCRIPTORS: ACHING

## 2025-07-24 ASSESSMENT — PAIN DESCRIPTION - PAIN TYPE: TYPE: ACUTE PAIN

## 2025-07-24 ASSESSMENT — PAIN DESCRIPTION - FREQUENCY: FREQUENCY: INTERMITTENT

## 2025-07-24 ASSESSMENT — PAIN SCALES - GENERAL
PAINLEVEL_OUTOF10: 0
PAINLEVEL_OUTOF10: 8
PAINLEVEL_OUTOF10: 4
PAINLEVEL_OUTOF10: 0
PAINLEVEL_OUTOF10: 0

## 2025-07-24 ASSESSMENT — PAIN DESCRIPTION - ORIENTATION: ORIENTATION: RIGHT;LEFT

## 2025-07-24 ASSESSMENT — PAIN DESCRIPTION - LOCATION: LOCATION: GENERALIZED

## 2025-07-24 ASSESSMENT — PAIN DESCRIPTION - ONSET: ONSET: SUDDEN

## 2025-07-24 NOTE — PLAN OF CARE
Problem: Chronic Conditions and Co-morbidities  Goal: Patient's chronic conditions and co-morbidity symptoms are monitored and maintained or improved  Outcome: Progressing     Problem: Discharge Planning  Goal: Discharge to home or other facility with appropriate resources  Outcome: Progressing     Problem: Safety - Adult  Goal: Free from fall injury  Outcome: Progressing     Problem: Respiratory - Adult  Goal: Achieves optimal ventilation and oxygenation  Outcome: Progressing     Problem: Musculoskeletal - Adult  Goal: Return mobility to safest level of function  Outcome: Progressing  Goal: Maintain proper alignment of affected body part  Outcome: Progressing  Goal: Return ADL status to a safe level of function  Outcome: Progressing     Problem: Gastrointestinal - Adult  Goal: Minimal or absence of nausea and vomiting  Outcome: Progressing  Goal: Maintains or returns to baseline bowel function  Outcome: Progressing  Goal: Maintains adequate nutritional intake  Outcome: Progressing     Problem: Anxiety  Goal: Will report anxiety at manageable levels  Description: INTERVENTIONS:  1. Administer medication as ordered  2. Teach and rehearse alternative coping skills  3. Provide emotional support with 1:1 interaction with staff  Outcome: Progressing

## 2025-07-24 NOTE — PROGRESS NOTES
Hospitalist Progress Note    Patient:  Koko Fernandocker      Unit/Bed:8B-36/036-A    YOB: 1960    MRN: [id number]       Acct: [id number]     PCP: Wu Weinstein DO    Date of Admission: 7/21/2025    Assessment/Plan:    Anticipated Discharge in : Possible tomorrow 7/24/25    Active Hospital Problems    Diagnosis Date Noted    Fatigue [R53.83] 07/23/2025    Gastroesophageal reflux disease without esophagitis [K21.9] 07/23/2025    Hyponatremia [E87.1] 07/22/2025    Physical deconditioning [R53.81] 07/22/2025    History of DVT (deep vein thrombosis) [Z86.718] 07/22/2025    Essential hypertension [I10] 07/22/2025    Community acquired pneumonia, unspecified laterality [J18.9] 07/22/2025    Community acquired pneumonia of left lung, unspecified part of lung [J18.9] 07/21/2025    Glioblastoma multiforme of parietal lobe (HCC) [C71.3] 05/06/2025       Acute respiratory failure secondary to CAP, improved. Initial was on max oxygen demand was 4LNC. Patient now down to 2L/NC   goal is to wean off O2. Patient  baseline is room air. Nursing to wean to keep pulse ox 90-92%. PCXR: small left pleural effusion with ill-defined left bsilar densities.   Cxray showed: Hyperinflated lungs with flattened hemidiaphragms, consistent with COPD.. Borderline heart size. Tiny effusion right side.   Moderate bibasilar Edilma/pneumonia, worse on the right.   Overall appearance appears slightly worse than prior.    Procal 0.14. Blood cultures x2  preliminary report no growth   Continue Rocephin, Zithromax. Pulmonary hygiene: IS, Acapella, Mucinex BID, Duoneb Q4H WA, PRN Albuterol.   Respiratory panel  negative     Patient overall reports improvement    7/24/25  Patient was weaned to room air maintaining saturation at 94%     Hypotonic hyponatremia, improving. Na 132.Likely due to poor oral intake. Patient with reported decreased appetite and fluid intake.     Continue to Hold Bumex.     7/24/25      Physical  motion. No jugular venous distention. Trachea midline.  Respiratory:  Normal respiratory effort. Clear to auscultation, bilaterally without Rales/Wheezes/Rhonchi.  Cardiovascular: Regular rate and rhythm with normal S1/S2 without murmurs, rubs or gallops.  Abdomen: Soft, non-tender, non-distended with normal bowel sounds.  Musculoskeletal: passive and active ROM x 4 extremities.  Skin: Skin color, texture, turgor normal.  No rashes or lesions.  Neurologic:  Neurovascularly intact without any focal sensory/motor deficits. Cranial nerves: II-XII intact, grossly non-focal.  Psychiatric: Alert and oriented, thought content appropriate, normal insight  Capillary Refill: Brisk,< 3 seconds   Peripheral Pulses: +2 palpable, equal bilaterally       Labs:   Recent Labs     07/22/25  0602 07/23/25  0859 07/24/25  0557   WBC 5.3 5.0 4.1*   HGB 10.5* 11.4* 10.2*   HCT 31.7* 33.5* 29.9*    176 154     Recent Labs     07/22/25  0602 07/23/25  0859 07/24/25  0557   * 132* 132*   K 5.1 4.8 4.0    99 102   CO2 22 21* 19*   BUN 18 13 12   CREATININE 0.8 0.8 0.7   CALCIUM 8.8 8.8 8.3*     Recent Labs     07/21/25  1835   AST 33   ALT 60*   BILIDIR 0.2   BILITOT 0.5   ALKPHOS 68     No results for input(s): \"INR\" in the last 72 hours.  No results for input(s): \"CKTOTAL\", \"TROPONINI\" in the last 72 hours.    Urinalysis:      Lab Results   Component Value Date/Time    NITRU NEGATIVE 07/21/2025 11:40 PM    WBCUA 0-2 08/23/2024 10:10 AM    BACTERIA NONE SEEN 08/23/2024 10:10 AM    RBCUA NONE SEEN 08/23/2024 10:10 AM    BLOODU NEGATIVE 07/21/2025 11:40 PM    GLUCOSEU NEGATIVE 07/21/2025 11:40 PM       Radiology:  XR CHEST (2 VW)   Final Result   1. Hyperinflated lungs with flattened hemidiaphragms, consistent with COPD.   2. Borderline heart size. Tiny effusion right side.   3. Moderate bibasilar Edilma/pneumonia, worse on the right.   4. Overall appearance appears slightly worse than prior.            **This report has

## 2025-07-24 NOTE — CARE COORDINATION
7/24/25, 3:20 PM EDT    DISCHARGE ON GOING EVALUATION    [facility] day: 2  Location: [address] [place] [address] Reason for admit: General weakness [R53.1]  Pneumonia of left lower lobe due to infectious organism [J18.9]  Fatigue, unspecified type [R53.83]  Community acquired pneumonia of left lung, unspecified part of lung [J18.9]  Community acquired pneumonia, unspecified laterality [J18.9]     Procedures: No    Imaging since last note: No    Barriers to Discharge: O2 weaned and now on room air. O2 sat 94%. IV Rocephin and po Zithromax continue. Possible discharge tomorrow.     PCP: Wu Weinstein, DO  Readmission Risk Score: 27.9    Patient Goals/Plan/Treatment Preferences: Plans return home with spouse at discharge.

## 2025-07-24 NOTE — PLAN OF CARE
Problem: Chronic Conditions and Co-morbidities  Goal: Patient's chronic conditions and co-morbidity symptoms are monitored and maintained or improved  7/24/2025 1132 by Alyssa Cantrell RN  Outcome: Progressing  7/24/2025 0019 by Marcie Weston RN  Outcome: Progressing     Problem: Discharge Planning  Goal: Discharge to home or other facility with appropriate resources  7/24/2025 1132 by Alyssa Cantrell RN  Outcome: Progressing  7/24/2025 0019 by Marcie Weston RN  Outcome: Progressing     Problem: Safety - Adult  Goal: Free from fall injury  7/24/2025 1132 by Alyssa Cantrell RN  Outcome: Progressing  Flowsheets (Taken 7/24/2025 0810)  Free From Fall Injury: Instruct family/caregiver on patient safety  7/24/2025 0019 by Marcie Weston RN  Outcome: Progressing     Problem: Respiratory - Adult  Goal: Achieves optimal ventilation and oxygenation  7/24/2025 1132 by Alyssa Cantrell RN  Outcome: Progressing  7/24/2025 0019 by Marcie Weston RN  Outcome: Progressing     Problem: Musculoskeletal - Adult  Goal: Return mobility to safest level of function  7/24/2025 1132 by Alyssa Cantrell RN  Outcome: Progressing  7/24/2025 0019 by Marcie Weston RN  Outcome: Progressing  Goal: Maintain proper alignment of affected body part  7/24/2025 1132 by Alyssa Cantrell RN  Outcome: Progressing  7/24/2025 0019 by Marcie Weston RN  Outcome: Progressing  Goal: Return ADL status to a safe level of function  7/24/2025 1132 by Alyssa Cantrell RN  Outcome: Progressing  7/24/2025 0019 by Marcie Weston RN  Outcome: Progressing     Problem: Gastrointestinal - Adult  Goal: Minimal or absence of nausea and vomiting  7/24/2025 1132 by Alyssa Cantrell RN  Outcome: Progressing  7/24/2025 0019 by Marcie Weston RN  Outcome: Progressing  Goal: Maintains or returns to baseline bowel function  7/24/2025 1132 by Alyssa Cantrell RN  Outcome: Progressing  7/24/2025 0019 by Marcie Weston RN  Outcome:

## 2025-07-24 NOTE — PROGRESS NOTES
[facility]  PHYSICAL THERAPY MISSED TREATMENT NOTE  STRZ MED SURG 8AB    Date: 2025  Patient Name: Koko Chao        MRN: [id number]   : 1960  (65 y.o.)  Gender: male   Referring Practitioner: Rebecca Huang APRN - CNP            REASON FOR MISSED TREATMENT:  Nursing asked this therapist to check back after his pain meds however unable to check back today so will check back next available date.

## 2025-07-24 NOTE — PROGRESS NOTES
[facility]  OCCUPATIONAL THERAPY MISSED TREATMENT NOTE  STRZ MED SURG 8AB  8B-36/036-A      Date: 2025  Patient Name: Koko Chao        CSN: [id number]   : 1960  (65 y.o.)  Gender: male   Referring Practitioner: KADEEM Patel  Diagnosis: Community Acquired Pneumonia- Left lung         REASON FOR MISSED TREATMENT: Patient Refused.  Patient having too much pain and not feeling well

## 2025-07-24 NOTE — PROGRESS NOTES
pt denies pain and discomfort at this time   Pt voiced 0 issues and or concerns at this time   Pt states he slept much better with no issues   Pt call light and personal items noted to be within reach  Safety maintained    0810-pt denies pain and discomfort at this time   Pt voiced 0 issues and or concerns at this time     0934-pt took am meds with no concerns  Nurse went over all meds with wife prior to giving     1110-pt denies pain and discomfort at this time   Pt voiced 0 issues and or concerns at this time     1510- pt denies pain and discomfort at this time   Pt voiced 0 issues and or concerns at this time     1805-end of shift rounding completed at this time  Pt wife noted to be at bedside feeding pt at this time  pt denies pain and discomfort at this time   Pt voiced 0 issues and or concerns at this time   Pt call light and personal items noted to be within reach  Safety maintained

## 2025-07-25 VITALS
HEIGHT: 68 IN | OXYGEN SATURATION: 91 % | SYSTOLIC BLOOD PRESSURE: 109 MMHG | HEART RATE: 64 BPM | RESPIRATION RATE: 16 BRPM | BODY MASS INDEX: 24.46 KG/M2 | WEIGHT: 161.4 LBS | DIASTOLIC BLOOD PRESSURE: 68 MMHG | TEMPERATURE: 98 F

## 2025-07-25 LAB
ANION GAP SERPL CALC-SCNC: 12 MEQ/L (ref 8–16)
ANISOCYTOSIS BLD QL SMEAR: PRESENT
BASO STIPL BLD QL SMEAR: ABNORMAL
BASOPHILS ABSOLUTE: 0 THOU/MM3 (ref 0–0.1)
BASOPHILS NFR BLD AUTO: 0.4 %
BUN SERPL-MCNC: 12 MG/DL (ref 8–23)
CALCIUM SERPL-MCNC: 8.3 MG/DL (ref 8.8–10.2)
CHLORIDE SERPL-SCNC: 103 MEQ/L (ref 98–111)
CO2 SERPL-SCNC: 20 MEQ/L (ref 22–29)
CREAT SERPL-MCNC: 0.6 MG/DL (ref 0.7–1.2)
DEPRECATED RDW RBC AUTO: 60.7 FL (ref 35–45)
EOSINOPHIL NFR BLD AUTO: 0.7 %
EOSINOPHILS ABSOLUTE: 0 THOU/MM3 (ref 0–0.4)
ERYTHROCYTE [DISTWIDTH] IN BLOOD BY AUTOMATED COUNT: 17.9 % (ref 11.5–14.5)
GFR SERPL CREATININE-BSD FRML MDRD: > 90 ML/MIN/1.73M2
GLUCOSE SERPL-MCNC: 106 MG/DL (ref 74–109)
HCT VFR BLD AUTO: 30 % (ref 42–52)
HGB BLD-MCNC: 10 GM/DL (ref 14–18)
IMM GRANULOCYTES # BLD AUTO: 0.53 THOU/MM3 (ref 0–0.07)
IMM GRANULOCYTES NFR BLD AUTO: 11.5 %
LYMPHOCYTES ABSOLUTE: 0.3 THOU/MM3 (ref 1–4.8)
LYMPHOCYTES NFR BLD AUTO: 6.3 %
MCH RBC QN AUTO: 31 PG (ref 26–33)
MCHC RBC AUTO-ENTMCNC: 33.3 GM/DL (ref 32.2–35.5)
MCV RBC AUTO: 92.9 FL (ref 80–94)
MONOCYTES ABSOLUTE: 0.1 THOU/MM3 (ref 0.4–1.3)
MONOCYTES NFR BLD AUTO: 2.6 %
NEUTROPHILS ABSOLUTE: 3.6 THOU/MM3 (ref 1.8–7.7)
NEUTROPHILS NFR BLD AUTO: 78.5 %
NRBC BLD AUTO-RTO: 3 /100 WBC
PLATELET # BLD AUTO: 167 THOU/MM3 (ref 130–400)
PLATELET BLD QL SMEAR: ADEQUATE
PMV BLD AUTO: 9.8 FL (ref 9.4–12.4)
POIKILOCYTES: ABNORMAL
POLYCHROMASIA BLD QL SMEAR: ABNORMAL
POTASSIUM SERPL-SCNC: 4 MEQ/L (ref 3.5–5.2)
RBC # BLD AUTO: 3.23 MILL/MM3 (ref 4.7–6.1)
SCAN OF BLOOD SMEAR: NORMAL
SODIUM SERPL-SCNC: 135 MEQ/L (ref 135–145)
WBC # BLD AUTO: 4.6 THOU/MM3 (ref 4.8–10.8)

## 2025-07-25 PROCEDURE — 36415 COLL VENOUS BLD VENIPUNCTURE: CPT

## 2025-07-25 PROCEDURE — 99239 HOSP IP/OBS DSCHRG MGMT >30: CPT | Performed by: NURSE PRACTITIONER

## 2025-07-25 PROCEDURE — 80048 BASIC METABOLIC PNL TOTAL CA: CPT

## 2025-07-25 PROCEDURE — 2500000003 HC RX 250 WO HCPCS

## 2025-07-25 PROCEDURE — 6370000000 HC RX 637 (ALT 250 FOR IP)

## 2025-07-25 PROCEDURE — 6360000002 HC RX W HCPCS

## 2025-07-25 PROCEDURE — 85025 COMPLETE CBC W/AUTO DIFF WBC: CPT

## 2025-07-25 RX ORDER — CEFDINIR 300 MG/1
300 CAPSULE ORAL 2 TIMES DAILY
Qty: 10 CAPSULE | Refills: 0 | Status: SHIPPED | OUTPATIENT
Start: 2025-07-25 | End: 2025-07-30

## 2025-07-25 RX ORDER — HYDROXYZINE PAMOATE 25 MG/1
25 CAPSULE ORAL 3 TIMES DAILY PRN
Qty: 21 CAPSULE | Refills: 0 | Status: SHIPPED | OUTPATIENT
Start: 2025-07-25 | End: 2025-08-01

## 2025-07-25 RX ORDER — GUAIFENESIN 600 MG/1
600 TABLET, EXTENDED RELEASE ORAL 2 TIMES DAILY
Qty: 28 TABLET | Refills: 0 | Status: SHIPPED | OUTPATIENT
Start: 2025-07-25 | End: 2025-08-08

## 2025-07-25 RX ADMIN — APIXABAN 5 MG: 5 TABLET, FILM COATED ORAL at 08:19

## 2025-07-25 RX ADMIN — BUSPIRONE HYDROCHLORIDE 5 MG: 5 TABLET ORAL at 08:19

## 2025-07-25 RX ADMIN — PANTOPRAZOLE SODIUM 40 MG: 40 TABLET, DELAYED RELEASE ORAL at 06:21

## 2025-07-25 RX ADMIN — GUAIFENESIN 600 MG: 600 TABLET, EXTENDED RELEASE ORAL at 08:19

## 2025-07-25 RX ADMIN — DEXAMETHASONE 2 MG: 4 TABLET ORAL at 08:18

## 2025-07-25 RX ADMIN — SODIUM CHLORIDE, PRESERVATIVE FREE 10 ML: 5 INJECTION INTRAVENOUS at 08:19

## 2025-07-25 ASSESSMENT — PAIN SCALES - GENERAL: PAINLEVEL_OUTOF10: 0

## 2025-07-25 NOTE — DISCHARGE INSTR - COC
Continuity of Care Form    Patient Name: Koko Chao   :  1960  MRN:  [id number]    Admit date:  2025  Discharge date:  ***    Code Status Order: Full Code   Advance Directives:     Admitting Physician:  Yvonne Gusman MD  PCP: Wu Weinstein DO    Discharging Nurse: ***  Discharging Hospital Unit/Room#: 8B-36/036-A  Discharging Unit Phone Number: ***    Emergency Contact:   Extended Emergency Contact Information  Primary Emergency Contact: Anita Chao  Address: [address] [place] [address]  Home Phone: [phone]  Work Phone: [phone]  Mobile Phone: [phone]  Relation: Spouse  Hearing or visual needs: None  Other needs: None  Preferred language: English   needed? No    Past Surgical History:  Past Surgical History:   Procedure Laterality Date    COLONOSCOPY      WNL     COLONOSCOPY N/A 3/13/2020    COLONOSCOPY DIAGNOSTIC performed by Percy Cantu MD at [facility]    CRANIOTOMY Left 2025    LEFT PARIETAL CRANIOTOMY FOR EVACUATION OF CEREBRAL ABSCESS performed by Zander Singh MD at [facility]    CRANIOTOMY Left 2025    LEFT PARIETAL CRANIOTOMY HEMATOMA EVACUATION performed by Zander Singh MD at [facility]    CYSTOSCOPY Left 2023    CYSTOSCOPY LEFT URETERAL STENT INSERTION performed by Yo Denis MD at [facility]    LIVER BIOPSY      PROSTATE BIOPSY      benign     TESTICLE REMOVAL      Lt    ULTRASOUND PROSTATE/TRANSRECTAL N/A 2020    TRANSRECTAL ULTRASOUND WITH PROSTATE BX performed by Yo Denis MD at [facility]    UPPER GASTROINTESTINAL ENDOSCOPY N/A 3/13/2020    EGD BIOPSY performed by Percy Cantu MD at [facility]    URETER SURGERY N/A 4/15/2023    CYSTOSCOPY, LEFT URETEROSCOPY, LASER LITHOTRIPSY,  LEFT URETERAL STENT EXCHANGE performed by Yo Denis MD at [facility]       Immunization History:   Immunization History   Administered Date(s) Administered    COVID-19, PFIZER PURPLE  BMI 24.54 kg/m²     Last documented pain score (0-10 scale): Pain Level: 0  Last Weight:   Wt Readings from Last 1 Encounters:   25 73.2 kg (161 lb 6.4 oz)     Mental Status:  {IP PT MENTAL STATUS:}    IV Access:  { CAROLINA IV ACCESS:076109924}    Nursing Mobility/ADLs:  Walking   {CHP DME ADLs:605166688}  Transfer  {CHP DME ADLs:569216829}  Bathing  {CHP DME ADLs:525556287}  Dressing  {CHP DME ADLs:285271937}  Toileting  {CHP DME ADLs:536981656}  Feeding  {CHP DME ADLs:996498103}  Med Admin  {CHP DME ADLs:740113638}  Med Delivery   { CAROLINA MED Delivery:642449874}    Wound Care Documentation and Therapy:  Incision 25 Head Left;Posterior (Active)   Number of days: 98        Elimination:  Continence:   Bowel: {YES / NO:}  Bladder: {YES / NO:}  Urinary Catheter: {Urinary Catheter:454177328}   Colostomy/Ileostomy/Ileal Conduit: {YES / NO:}       Date of Last BM: ***    Intake/Output Summary (Last 24 hours) at 2025 1126  Last data filed at 2025 0855  Gross per 24 hour   Intake 1580 ml   Output --   Net 1580 ml     I/O last 3 completed shifts:  In:  [P.O.:]  Out: 0     Safety Concerns:     { CAROLINA Safety Concerns:745333521}    Impairments/Disabilities:      { CAROLINA Impairments/Disabilities:430693972}    Nutrition Therapy:  Current Nutrition Therapy:   { CAROLINA Diet List:317809229}    Routes of Feeding: {P DME Other Feedings:470281900}  Liquids: {Slp liquid thickness:91174}  Daily Fluid Restriction: {CHP DME Yes amt example:037173298}  Last Modified Barium Swallow with Video (Video Swallowing Test): {Done Not Done Date:}    Treatments at the Time of Hospital Discharge:   Respiratory Treatments: ***  Oxygen Therapy:  {Therapy; copd oxygen:44641}  Ventilator:    { CC Vent List:874148524}    Rehab Therapies: {THERAPEUTIC INTERVENTION:2486227947}  Weight Bearing Status/Restrictions: { CC Weight Bearin}  Other Medical Equipment (for information only, NOT a

## 2025-07-25 NOTE — PLAN OF CARE
Problem: Chronic Conditions and Co-morbidities  Goal: Patient's chronic conditions and co-morbidity symptoms are monitored and maintained or improved  7/25/2025 0020 by Marcie Weston RN  Outcome: Progressing  7/24/2025 1132 by Alyssa Cantrell RN  Outcome: Progressing  Flowsheets (Taken 7/24/2025 0810)  Care Plan - Patient's Chronic Conditions and Co-Morbidity Symptoms are Monitored and Maintained or Improved: Monitor and assess patient's chronic conditions and comorbid symptoms for stability, deterioration, or improvement     Problem: Discharge Planning  Goal: Discharge to home or other facility with appropriate resources  7/25/2025 0020 by Marcie Weston RN  Outcome: Progressing  7/24/2025 1132 by Alyssa Cantrell RN  Outcome: Progressing  Flowsheets (Taken 7/24/2025 0810)  Discharge to home or other facility with appropriate resources: Identify barriers to discharge with patient and caregiver     Problem: Safety - Adult  Goal: Free from fall injury  7/25/2025 0020 by Marcie Weston RN  Outcome: Progressing  7/24/2025 1132 by Alyssa Cantrell RN  Outcome: Progressing  Flowsheets (Taken 7/24/2025 0810)  Free From Fall Injury: Instruct family/caregiver on patient safety     Problem: Respiratory - Adult  Goal: Achieves optimal ventilation and oxygenation  7/25/2025 0020 by Marcie Weston RN  Outcome: Progressing  7/24/2025 1132 by Alyssa Cantrell RN  Outcome: Progressing  Flowsheets (Taken 7/24/2025 0810)  Achieves optimal ventilation and oxygenation: Assess for changes in respiratory status     Problem: Musculoskeletal - Adult  Goal: Return mobility to safest level of function  7/25/2025 0020 by Marcie Weston RN  Outcome: Progressing  7/24/2025 1132 by Alyssa Cantrell RN  Outcome: Progressing  Flowsheets (Taken 7/24/2025 0810)  Return Mobility to Safest Level of Function: Assess patient stability and activity tolerance for standing, transferring and ambulating with or without assistive

## 2025-07-25 NOTE — PLAN OF CARE
Problem: Chronic Conditions and Co-morbidities  Goal: Patient's chronic conditions and co-morbidity symptoms are monitored and maintained or improved  7/25/2025 1113 by Yudith Barillas RN  Outcome: Adequate for Discharge  7/25/2025 0020 by Marcie Weston RN  Outcome: Progressing     Problem: Discharge Planning  Goal: Discharge to home or other facility with appropriate resources  7/25/2025 1113 by Yudith Barillas RN  Outcome: Adequate for Discharge  7/25/2025 0020 by Marcie Weston RN  Outcome: Progressing     Problem: Safety - Adult  Goal: Free from fall injury  7/25/2025 1113 by Yudith Barillas RN  Outcome: Adequate for Discharge  7/25/2025 0020 by Marcie Weston RN  Outcome: Progressing     Problem: Respiratory - Adult  Goal: Achieves optimal ventilation and oxygenation  7/25/2025 1113 by Yudtih Barillas RN  Outcome: Adequate for Discharge  7/25/2025 0020 by Marcie Weston RN  Outcome: Progressing     Problem: Musculoskeletal - Adult  Goal: Return mobility to safest level of function  7/25/2025 1113 by Yudith Barillas RN  Outcome: Adequate for Discharge  7/25/2025 0020 by Marcie Weston RN  Outcome: Progressing  Goal: Maintain proper alignment of affected body part  7/25/2025 1113 by Yudith Barillas RN  Outcome: Adequate for Discharge  7/25/2025 0020 by Marcie Weston RN  Outcome: Progressing  Goal: Return ADL status to a safe level of function  7/25/2025 1113 by Yudith Barillas RN  Outcome: Adequate for Discharge  7/25/2025 0020 by Marcie Weston RN  Outcome: Progressing     Problem: Gastrointestinal - Adult  Goal: Minimal or absence of nausea and vomiting  7/25/2025 1113 by Yudith Barillas RN  Outcome: Adequate for Discharge  7/25/2025 0020 by Marcie Weston RN  Outcome: Progressing  Goal: Maintains or returns to baseline bowel function  7/25/2025 1113 by Yudith Barillas RN  Outcome: Adequate for Discharge  7/25/2025 0020 by Marcie Weston RN  Outcome: Progressing  Goal: Maintains adequate nutritional

## 2025-07-25 NOTE — DISCHARGE INSTR - DIET

## 2025-07-25 NOTE — CARE COORDINATION
7/25/25, 1:02 PM EDT    Patient goals/plan/ treatment preferences discussed by  and .  Patient goals/plan/ treatment preferences reviewed with patient/ family.  Patient/ family verbalize understanding of discharge plan and are in agreement with goal/plan/treatment preferences.  Understanding was demonstrated using the teach back method.  AVS provided by RN at time of discharge, which includes all necessary medical information pertaining to the patients current course of illness, treatment, post-discharge goals of care, and treatment preferences.     Services At/After Discharge: Home Health, Nursing service, OT, and PT [facility] by [facility] .     Discharge order in place. Pt plans return home with spouse.

## 2025-07-25 NOTE — PROGRESS NOTES
Patient is being discharged to home with wife via private transportation. Education completed with patient and spouse. All questions answered from spouse. No other questions or concerns verbalized by spouse or patient. Iv removed, intact, and no complications at site.

## 2025-07-25 NOTE — DISCHARGE SUMMARY
Hospital Medicine Discharge Summary      Patient Identification:   Koko Chao   : 1960  MRN: [id number]   Account: [id number]      Patient's PCP: Wu Weinstein DO    Admit Date: 2025     Discharge Date: 2025      Admitting Physician: Yvonne Gusman MD     Discharge Physician: Carly Raphael, APRN - CNP     Discharge Diagnoses:      Acute respiratory failure secondary to CAP, improved. Initial was on max oxygen demand was 4LNC. Patient now down to 2L/NC patient was weaned off O2 successfully and maintained room air without desaturation. Patient  baseline is room air. Nursing to wean to keep pulse ox 90-92%. PCXR: small left pleural effusion with ill-defined left bsilar densities.   Cxray showed: Hyperinflated lungs with flattened hemidiaphragms, consistent with COPD.. Borderline heart size. Tiny effusion right side.   Moderate bibasilar Edilma/pneumonia, worse on the right.   Overall appearance appears slightly worse than prior.    Procal 0.14. Blood cultures x2  preliminary report no growth  Treated with Rocephin, Zithromax. Pulmonary hygiene:   Mucinex BID, Duoneb Q4H WA, PRN Albuterol.   Respiratory panel  negative   Continue antibiotics as instructed-       Hypotonic hyponatremia, resolved initial Na 131.Likely due to poor oral intake. Patient with reported decreased appetite and fluid intake.    Current sodium level 135     Physical deconditioning: Per record review wife states that pt has been having to use his wheelchair more due to weakness. Pt walks with steady gait at baseline. Fall precautions  PT/OT recommended home health     Glioblastoma: S/p left parietal craniotomy for resection of left malignant glioma in the parietal lobe on 4/15 and 25. Follows with Radiation/oncology and Neurosurgery as OP. Currently receiving radiation as OP, last treatment on 7/15/25.  Continue Temozolmide and Decadron. Stop Keppra wife reports this was discontinued.       Essential HTN:   Patient's  and continuity at follow-up the following most recent labs are provided:      CBC:    Lab Results   Component Value Date/Time    WBC 4.6 07/25/2025 06:13 AM    HGB 10.0 07/25/2025 06:13 AM    HCT 30.0 07/25/2025 06:13 AM     07/25/2025 06:13 AM       Renal:    Lab Results   Component Value Date/Time     07/25/2025 06:13 AM    K 4.0 07/25/2025 06:13 AM     07/25/2025 06:13 AM    CO2 20 07/25/2025 06:13 AM    BUN 12 07/25/2025 06:13 AM    CREATININE 0.6 07/25/2025 06:13 AM    CALCIUM 8.3 07/25/2025 06:13 AM         Significant Diagnostic Studies    Radiology:   XR CHEST (2 VW)   Final Result   1. Hyperinflated lungs with flattened hemidiaphragms, consistent with COPD.   2. Borderline heart size. Tiny effusion right side.   3. Moderate bibasilar Edilma/pneumonia, worse on the right.   4. Overall appearance appears slightly worse than prior.            **This report has been created using voice recognition software.  It may contain   minor errors which are inherent in voice recognition technology.**            Electronically signed by Dr. Jai Johnson      XR CHEST PORTABLE   Final Result   1. Small left pleural effusion with ill-defined left basilar densities.      This document has been electronically signed by: Irving Lou MD on    07/21/2025 07:18 PM             Consults:     IP CONSULT TO SPIRITUAL SERVICES  IP CONSULT TO HOME CARE NEEDS    Disposition:    [x] Home       [] TCU       [] Rehab       [] Psych       [] SNF       [] Long Term Care Facility       [] Other-    Condition at Discharge: Stable    Code Status:  Prior     Patient Instructions:    Discharge lab work:   Activity: activity as tolerated  Diet: No diet orders on file      Follow-up visits:   Wu Weinstein, DO  [address] [facility] [address] [place] [address]  [phone]    Go in 1 week(s)  attempted to call office, office is closed. call to make an appt with wu in 7-10 days    [facility]

## 2025-07-26 LAB
BACTERIA BLD AEROBE CULT: NORMAL
BACTERIA BLD AEROBE CULT: NORMAL

## 2025-08-08 ENCOUNTER — APPOINTMENT (OUTPATIENT)
Dept: OTHER | Age: 65
End: 2025-08-08
Payer: COMMERCIAL

## 2025-08-08 ENCOUNTER — HOSPITAL ENCOUNTER (OUTPATIENT)
Dept: GENERAL RADIOLOGY | Age: 65
Discharge: HOME | End: 2025-08-08
Payer: COMMERCIAL

## 2025-08-08 DIAGNOSIS — C61 PROSTATE CANCER (HCC): ICD-10-CM

## 2025-08-08 DIAGNOSIS — N20.0 KIDNEY STONE: ICD-10-CM

## 2025-08-08 LAB — PSA SERPL-MCNC: 2.44 NG/ML (ref 0–1)

## 2025-08-08 PROCEDURE — 74018 RADEX ABDOMEN 1 VIEW: CPT

## 2025-08-08 PROCEDURE — 36415 COLL VENOUS BLD VENIPUNCTURE: CPT

## 2025-08-08 PROCEDURE — 84153 ASSAY OF PSA TOTAL: CPT

## 2025-08-12 ENCOUNTER — TELEPHONE (OUTPATIENT)
Dept: FAMILY MEDICINE CLINIC | Age: 65
End: 2025-08-12

## 2025-08-12 ENCOUNTER — OFFICE VISIT (OUTPATIENT)
Dept: FAMILY MEDICINE CLINIC | Age: 65
End: 2025-08-12

## 2025-08-12 VITALS
DIASTOLIC BLOOD PRESSURE: 68 MMHG | HEIGHT: 68 IN | HEART RATE: 95 BPM | OXYGEN SATURATION: 95 % | BODY MASS INDEX: 25.04 KG/M2 | RESPIRATION RATE: 16 BRPM | WEIGHT: 165.2 LBS | SYSTOLIC BLOOD PRESSURE: 119 MMHG

## 2025-08-12 DIAGNOSIS — E83.51 HYPOCALCEMIA: ICD-10-CM

## 2025-08-12 DIAGNOSIS — K21.9 GASTROESOPHAGEAL REFLUX DISEASE WITHOUT ESOPHAGITIS: ICD-10-CM

## 2025-08-12 DIAGNOSIS — R26.9 GAIT DISTURBANCE: Primary | ICD-10-CM

## 2025-08-12 DIAGNOSIS — G40.A09 ABSENCE SEIZURE (HCC): Primary | ICD-10-CM

## 2025-08-12 DIAGNOSIS — I10 ESSENTIAL HYPERTENSION: ICD-10-CM

## 2025-08-12 RX ORDER — LOSARTAN POTASSIUM 25 MG/1
25 TABLET ORAL 2 TIMES DAILY
Qty: 85 TABLET | Refills: 5 | Status: SHIPPED | OUTPATIENT
Start: 2025-08-12

## 2025-08-12 RX ORDER — LEVETIRACETAM 500 MG/1
500 TABLET ORAL 2 TIMES DAILY
Qty: 90 TABLET | Refills: 3 | Status: SHIPPED | OUTPATIENT
Start: 2025-08-12

## 2025-08-14 ENCOUNTER — OFFICE VISIT (OUTPATIENT)
Dept: UROLOGY | Age: 65
End: 2025-08-14
Payer: COMMERCIAL

## 2025-08-14 VITALS — WEIGHT: 165 LBS | HEIGHT: 68 IN | BODY MASS INDEX: 25.01 KG/M2 | RESPIRATION RATE: 18 BRPM

## 2025-08-14 DIAGNOSIS — N39.0 RECURRENT UTI: ICD-10-CM

## 2025-08-14 DIAGNOSIS — N40.1 BENIGN LOCALIZED PROSTATIC HYPERPLASIA WITH LOWER URINARY TRACT SYMPTOMS (LUTS): ICD-10-CM

## 2025-08-14 DIAGNOSIS — C61 PROSTATE CANCER (HCC): Primary | ICD-10-CM

## 2025-08-14 PROCEDURE — 99214 OFFICE O/P EST MOD 30 MIN: CPT | Performed by: UROLOGY

## 2025-08-15 ENCOUNTER — HOSPITAL ENCOUNTER (OUTPATIENT)
Dept: MRI IMAGING | Age: 65
Discharge: HOME | End: 2025-08-15
Payer: COMMERCIAL

## 2025-08-15 DIAGNOSIS — C71.3 MALIGNANT NEOPLASM OF PARIETAL LOBE OF BRAIN (HCC): ICD-10-CM

## 2025-08-15 PROCEDURE — 70553 MRI BRAIN STEM W/O & W/DYE: CPT

## 2025-08-15 PROCEDURE — 6360000004 HC RX CONTRAST MEDICATION: Performed by: PHYSICIAN ASSISTANT

## 2025-08-15 PROCEDURE — A9579 GAD-BASE MR CONTRAST NOS,1ML: HCPCS | Performed by: PHYSICIAN ASSISTANT

## 2025-08-15 RX ORDER — GADOTERIDOL 279.3 MG/ML
15 INJECTION INTRAVENOUS
Status: COMPLETED | OUTPATIENT
Start: 2025-08-15 | End: 2025-08-15

## 2025-08-15 RX ADMIN — GADOTERIDOL 15 ML: 279.3 INJECTION, SOLUTION INTRAVENOUS at 11:55

## 2025-08-16 LAB
BACTERIA UR CULT: ABNORMAL
ORGANISM: ABNORMAL

## 2025-08-21 ENCOUNTER — HOSPITAL ENCOUNTER (OUTPATIENT)
Dept: RADIATION ONCOLOGY | Age: 65
Discharge: HOME | End: 2025-08-21
Payer: COMMERCIAL

## 2025-08-21 VITALS
OXYGEN SATURATION: 95 % | SYSTOLIC BLOOD PRESSURE: 115 MMHG | TEMPERATURE: 97.6 F | RESPIRATION RATE: 16 BRPM | HEART RATE: 99 BPM | WEIGHT: 172 LBS | BODY MASS INDEX: 25.4 KG/M2 | DIASTOLIC BLOOD PRESSURE: 63 MMHG

## 2025-08-21 PROCEDURE — 99212 OFFICE O/P EST SF 10 MIN: CPT | Performed by: PHYSICIAN ASSISTANT

## 2025-08-21 ASSESSMENT — ENCOUNTER SYMPTOMS
TROUBLE SWALLOWING: 0
NAUSEA: 0
FACIAL SWELLING: 0
RECTAL PAIN: 0
BLOOD IN STOOL: 0
ABDOMINAL PAIN: 0
COUGH: 0
SORE THROAT: 0
SHORTNESS OF BREATH: 0

## 2025-08-21 ASSESSMENT — PAIN SCALES - GENERAL: PAINLEVEL_OUTOF10: 0

## 2025-08-29 ENCOUNTER — SOCIAL WORK (OUTPATIENT)
Dept: INFUSION THERAPY | Age: 65
End: 2025-08-29

## (undated) DEVICE — DRESSING,GAUZE,XEROFORM,CURAD,5"X9",ST: Brand: CURAD

## (undated) DEVICE — BASIC SINGLE BASIN BTC-LF: Brand: MEDLINE INDUSTRIES, INC.

## (undated) DEVICE — AGENT HEMSTAT 3GM OXIDIZED REGENERATED CELOS ABSRB FOR CONT (ORDER MULTIPLES OF 5EA)

## (undated) DEVICE — DUAL LUMEN URETERAL CATHETER

## (undated) DEVICE — DRAPE,TOP,102X53,STERILE: Brand: MEDLINE

## (undated) DEVICE — CRANIALMASK TRACKER: Brand: CRANIALMASK TRACKER

## (undated) DEVICE — CYSTO: Brand: MEDLINE INDUSTRIES, INC.

## (undated) DEVICE — SPIRAL ROUTER

## (undated) DEVICE — GLOVE SURG SZ 65 THK91MIL LTX FREE SYN POLYISOPRENE

## (undated) DEVICE — SOLUTION IRRIG 3000ML 0.9% SOD CHL USP UROMATIC PLAS CONT

## (undated) DEVICE — PACK-MAJOR

## (undated) DEVICE — DRAPE,INSTRUMENT,MAGNETIC,10X16: Brand: MEDLINE

## (undated) DEVICE — DRAPE GYN LAPSCP UROLOGY CLR DISP STRL OCVD CLRVIEWDRP

## (undated) DEVICE — Y-TYPE TUR/BLADDER IRRIGATION SET, REGULATING CLAMP

## (undated) DEVICE — BLADE,CARBON-STEEL,11,STRL,DISPOSABLE,TB: Brand: MEDLINE

## (undated) DEVICE — Device

## (undated) DEVICE — SPONGE,TONSIL,DBL STRNG,XRAY,SM,7/8",ST: Brand: MEDLINE INDUSTRIES, INC.

## (undated) DEVICE — PAD,NON-ADHERENT,3X8,STERILE,LF,1/PK: Brand: MEDLINE

## (undated) DEVICE — SINGLE ACTION PUMPING SYSTEM

## (undated) DEVICE — DIFFUSER: Brand: CORE, MAESTRO

## (undated) DEVICE — 1.1MM X 6.0MM STRAIGHT ROUTER

## (undated) DEVICE — 3M™ STERI-STRIP™ COMPOUND BENZOIN TINCTURE 40 BAGS/CARTON 4 CARTONS/CASE C1544: Brand: 3M™ STERI-STRIP™

## (undated) DEVICE — INTEGRA® MILTEX® OSTRUM PUNCH 4", BACK-BITING, STRAIGHT BITE: Brand: INTEGRA® MILTEX®

## (undated) DEVICE — GOWN,SIRUS,NON REINFRCD,LARGE,SET IN SL: Brand: MEDLINE

## (undated) DEVICE — WAX SURG 2.5GM HEMSTAT BNE BEESWAX PARAFFIN ISO PALMITATE

## (undated) DEVICE — BLADE OPHTH ORNG GRINDLESS SMALLER ALTERNATIVE TO NO15 GEN

## (undated) DEVICE — BANDAGE,GAUZE,4.5"X4.1YD,STERILE,LF: Brand: MEDLINE

## (undated) DEVICE — BANDAGE COMPR W4INXL12FT E DISP ESMARCH EVEN

## (undated) DEVICE — GOWN,SIRUS,NONRNF,SETINSLV,XL,20/CS: Brand: MEDLINE

## (undated) DEVICE — PADDING,UNDERCAST,COTTON, 4"X4YD STERILE: Brand: MEDLINE

## (undated) DEVICE — SHEET, ORTHO, SPLIT, STERILE: Brand: MEDLINE

## (undated) DEVICE — AGENT HEMOSTATIC SURGIFLOW MATRIX KIT W/THROMBIN

## (undated) DEVICE — STRIP,CLOSURE,WOUND,MEDI-STRIP,1/2X4: Brand: MEDLINE

## (undated) DEVICE — DRAPE,EXTREMITY,89X128,STERILE: Brand: MEDLINE

## (undated) DEVICE — GLOVE SURG SZ 8 L12IN THK75MIL DK GRN LTX FREE

## (undated) DEVICE — SYSTEM PMP VAC SYR 10CC CONT FLO SGL ACT 1 W VLV SAPS

## (undated) DEVICE — CATHETER ETER IV 22GA L1IN POLYUR STR RADPQ INTROCAN SFTY

## (undated) DEVICE — MAX-CORE® DISPOSABLE CORE BIOPSY INSTRUMENT, 18G X 25CM: Brand: MAX-CORE

## (undated) DEVICE — CATHETER IV 16 GAX32.5 IN TRPL BVL LUERLOCK TIP ANGIOCATH

## (undated) DEVICE — GUIDEWIRE URO L150CM DIA0.035IN STIFF NIT HYDRPHLC STR TIP

## (undated) DEVICE — MONOPTY® DISPOSABLE CORE BIOPSY INSTRUMENT, 22MM PENETRATION DEPTH, 18G X 20CM: Brand: MONOPTY

## (undated) DEVICE — 1LYRTR 16FR10ML100%SILTMPS SNP: Brand: MEDLINE INDUSTRIES, INC.

## (undated) DEVICE — COVER,TABLE,44X90,STERILE: Brand: MEDLINE

## (undated) DEVICE — AGENT HEMSTAT W2XL4IN OXIDIZED REGENERATED CELOS ABSRB SFT

## (undated) DEVICE — CATHETER URET 5FR L70CM OPN END SGL LUMN INJ HUB FLEXIMA

## (undated) DEVICE — CONTAINER,SPECIMEN,PNEU TUBE,4OZ,OR STRL: Brand: MEDLINE

## (undated) DEVICE — SOLUTION IV 1000ML 0.45% SOD CHL PH 5 INJ USP VIAFLX PLAS

## (undated) DEVICE — OIL CARTRIDGE: Brand: CORE, MAESTRO

## (undated) DEVICE — PACK PROCEDURE SURG SET UP SRMC

## (undated) DEVICE — SUTURE VICRYL + SZ 2-0 L18IN ABSRB VLT L26MM SH 1/2 CIR VCP775D

## (undated) DEVICE — SET ADMIN 25ML L117IN PMP MOD CK VLV RLER CLMP 2 SMRTSITE

## (undated) DEVICE — GAUZE,SPONGE,8"X4",12PLY,XRAY,STRL,LF: Brand: MEDLINE

## (undated) DEVICE — APPLICATOR MEDICATED 26 CC SOLUTION HI LT ORNG CHLORAPREP

## (undated) DEVICE — SINGLE-USE DIGITAL FLEXIBLE URETEROSCOPE: Brand: LITHOVUE

## (undated) DEVICE — SYRINGE MED 30ML STD CLR PLAS LUERLOCK TIP N CTRL DISP

## (undated) DEVICE — MICRO TIP WIPE: Brand: DEVON

## (undated) DEVICE — SUTURE NRLN SZ 4-0 L18IN NONABSORBABLE BLK L13MM TF 1/2 CIR C584D

## (undated) DEVICE — SOLUTION IRRIG 1000ML STRL H2O USP PLAS POUR BTL

## (undated) DEVICE — SUTURE NRLN SZ 4-0 L18IN NONABSORBABLE BLK L17MM RB-1 1/2 C554D

## (undated) DEVICE — INSTRUMENT BATTERY

## (undated) DEVICE — CRANI: Brand: MEDLINE INDUSTRIES, INC.

## (undated) DEVICE — CYSTO PACK: Brand: MEDLINE INDUSTRIES, INC.

## (undated) DEVICE — SPONGE GZ W4XL4IN COT 12 PLY TYP VII WVN C FLD DSGN STERILE

## (undated) DEVICE — PENCIL SMK EVAC ALL IN 1 DSGN ENH VISIBILITY IMPROVED AIR

## (undated) DEVICE — ZYPHR DISPOSABLE CRANIAL PERFORATOR, LARGE 14/11MM: Brand: ZYPHR

## (undated) DEVICE — DRAPE,UNDERBUTTOCKS,PCH,STERILE: Brand: MEDLINE

## (undated) DEVICE — BANDAGE COMPR W2XL5YD E SWIFT WRP VELC HK AND LOOP CLSR

## (undated) DEVICE — BANDAGE COMPR E SGL LAYERED CLSR BGE W/ CLP W4INXL15FT

## (undated) DEVICE — TUBING IV STOPCOCK 48 CM 3 W

## (undated) DEVICE — SUTURE VICRYL SZ 3-0 L18IN ABSRB VLT L26MM SH 1/2 CIR J774D

## (undated) DEVICE — SYRINGE MED 10ML LUERLOCK TIP W/O SFTY DISP

## (undated) DEVICE — DISPOSABLE STANDARD BIPOLAR FORCEPS, NON-STICK,: Brand: SPETZLER-MALIS

## (undated) DEVICE — IV START KIT: Brand: MEDLINE INDUSTRIES, INC.

## (undated) DEVICE — SPONGE GZ W4XL4IN COT 12 PLY TYP VII WVN C FLD DSGN

## (undated) DEVICE — BANDAGE,GAUZE,CONFORMING,3"X75",STRL,LF: Brand: MEDLINE

## (undated) DEVICE — GLOVE ORANGE PI 7 1/2   MSG9075

## (undated) DEVICE — HYPODERMIC SAFETY NEEDLE: Brand: MAGELLAN

## (undated) DEVICE — SYRINGE IRRIG 60ML SFT PLIABLE BLB EZ TO GRP 1 HND USE W/

## (undated) DEVICE — ADAPTER URO SCP UROLOK LL